# Patient Record
Sex: FEMALE | Race: WHITE | HISPANIC OR LATINO | Employment: UNEMPLOYED | URBAN - METROPOLITAN AREA
[De-identification: names, ages, dates, MRNs, and addresses within clinical notes are randomized per-mention and may not be internally consistent; named-entity substitution may affect disease eponyms.]

---

## 2018-08-02 PROBLEM — E78.00 HIGH CHOLESTEROL: Status: ACTIVE | Noted: 2018-08-02

## 2018-08-02 PROBLEM — I10 HYPERTENSION: Status: ACTIVE | Noted: 2018-08-02

## 2018-08-02 RX ORDER — ROPINIROLE 0.5 MG/1
0.5 TABLET, FILM COATED ORAL 2 TIMES DAILY
COMMUNITY
End: 2019-10-15 | Stop reason: SDUPTHER

## 2018-08-02 RX ORDER — BENAZEPRIL HYDROCHLORIDE 20 MG/1
20 TABLET ORAL DAILY
COMMUNITY
End: 2018-11-20 | Stop reason: SDUPTHER

## 2018-08-02 RX ORDER — INSULIN GLARGINE 100 [IU]/ML
48 INJECTION, SOLUTION SUBCUTANEOUS DAILY
COMMUNITY
End: 2018-08-13 | Stop reason: SDUPTHER

## 2018-08-02 RX ORDER — CYCLOBENZAPRINE HCL 5 MG
5 TABLET ORAL AS NEEDED
COMMUNITY
End: 2018-08-13 | Stop reason: SDUPTHER

## 2018-08-02 RX ORDER — ATORVASTATIN CALCIUM 10 MG/1
10 TABLET, FILM COATED ORAL DAILY
COMMUNITY
End: 2018-11-29 | Stop reason: HOSPADM

## 2018-08-02 RX ORDER — AMLODIPINE BESYLATE 5 MG/1
5 TABLET ORAL DAILY
COMMUNITY
End: 2018-11-20 | Stop reason: SDUPTHER

## 2018-08-02 RX ORDER — ASPIRIN 81 MG/1
81 TABLET ORAL DAILY
COMMUNITY

## 2018-08-02 RX ORDER — TIZANIDINE 4 MG/1
4 TABLET ORAL EVERY 8 HOURS PRN
COMMUNITY

## 2018-08-02 RX ORDER — TRAMADOL HYDROCHLORIDE 50 MG/1
50 TABLET ORAL EVERY 8 HOURS
COMMUNITY

## 2018-08-13 DIAGNOSIS — M62.838 MUSCLE SPASM: ICD-10-CM

## 2018-08-13 DIAGNOSIS — E10.9 TYPE 1 DIABETES MELLITUS WITHOUT COMPLICATION (HCC): Primary | ICD-10-CM

## 2018-08-13 DIAGNOSIS — M62.838 MUSCLE SPASM: Primary | ICD-10-CM

## 2018-08-13 RX ORDER — INSULIN GLARGINE 100 [IU]/ML
48 INJECTION, SOLUTION SUBCUTANEOUS DAILY
Qty: 10 ML | Refills: 3 | Status: SHIPPED | OUTPATIENT
Start: 2018-08-13 | End: 2018-08-17

## 2018-08-13 RX ORDER — CYCLOBENZAPRINE HCL 5 MG
5 TABLET ORAL DAILY
Qty: 30 TABLET | Refills: 0 | Status: SHIPPED | OUTPATIENT
Start: 2018-08-13 | End: 2018-10-10 | Stop reason: SDUPTHER

## 2018-08-13 RX ORDER — CYCLOBENZAPRINE HCL 5 MG
TABLET ORAL
Qty: 30 TABLET | Refills: 0 | Status: SHIPPED | OUTPATIENT
Start: 2018-08-13 | End: 2018-08-13 | Stop reason: SDUPTHER

## 2018-08-16 ENCOUNTER — TELEPHONE (OUTPATIENT)
Dept: FAMILY MEDICINE CLINIC | Facility: CLINIC | Age: 58
End: 2018-08-16

## 2018-08-16 NOTE — TELEPHONE ENCOUNTER
Notification from the pharmacy that patient's insurance will not cover the Lantus without an auth  They prefer she be changed to [place]  If this is ok, please send in  If you want her to continue Lantus, I will work on the [facility]    Auth # [id number]

## 2018-08-17 DIAGNOSIS — Z79.4 TYPE 2 DIABETES MELLITUS WITH COMPLICATION, WITH LONG-TERM CURRENT USE OF INSULIN (HCC): Primary | ICD-10-CM

## 2018-08-17 DIAGNOSIS — E11.8 TYPE 2 DIABETES MELLITUS WITH COMPLICATION, WITH LONG-TERM CURRENT USE OF INSULIN (HCC): Primary | ICD-10-CM

## 2018-09-05 ENCOUNTER — OFFICE VISIT (OUTPATIENT)
Dept: FAMILY MEDICINE CLINIC | Facility: CLINIC | Age: 58
End: 2018-09-05
Payer: COMMERCIAL

## 2018-09-05 VITALS
BODY MASS INDEX: 31.36 KG/M2 | RESPIRATION RATE: 16 BRPM | WEIGHT: 170.4 LBS | TEMPERATURE: 97.1 F | HEART RATE: 75 BPM | HEIGHT: 62 IN | DIASTOLIC BLOOD PRESSURE: 90 MMHG | SYSTOLIC BLOOD PRESSURE: 138 MMHG | OXYGEN SATURATION: 98 %

## 2018-09-05 DIAGNOSIS — E11.8 TYPE 2 DIABETES MELLITUS WITH COMPLICATION, WITH LONG-TERM CURRENT USE OF INSULIN (HCC): ICD-10-CM

## 2018-09-05 DIAGNOSIS — E78.00 HIGH CHOLESTEROL: ICD-10-CM

## 2018-09-05 DIAGNOSIS — I10 ESSENTIAL HYPERTENSION: Primary | ICD-10-CM

## 2018-09-05 DIAGNOSIS — Z79.4 TYPE 2 DIABETES MELLITUS WITH COMPLICATION, WITH LONG-TERM CURRENT USE OF INSULIN (HCC): ICD-10-CM

## 2018-09-05 DIAGNOSIS — M79.672 LEFT FOOT PAIN: ICD-10-CM

## 2018-09-05 PROBLEM — G89.29 CHRONIC BILATERAL LOW BACK PAIN WITHOUT SCIATICA: Status: ACTIVE | Noted: 2018-09-05

## 2018-09-05 PROBLEM — M54.50 CHRONIC BILATERAL LOW BACK PAIN WITHOUT SCIATICA: Status: ACTIVE | Noted: 2018-09-05

## 2018-09-05 PROCEDURE — 99214 OFFICE O/P EST MOD 30 MIN: CPT | Performed by: INTERNAL MEDICINE

## 2018-09-05 PROCEDURE — 3008F BODY MASS INDEX DOCD: CPT | Performed by: INTERNAL MEDICINE

## 2018-09-05 RX ORDER — PEN NEEDLE, DIABETIC 32GX 5/32"
NEEDLE, DISPOSABLE MISCELLANEOUS DAILY
Qty: 100 EACH | Refills: 3 | Status: SHIPPED | OUTPATIENT
Start: 2018-09-05 | End: 2019-03-07 | Stop reason: SDUPTHER

## 2018-09-05 RX ORDER — LANCETS
EACH MISCELLANEOUS
Qty: 300 EACH | Refills: 3 | Status: SHIPPED | OUTPATIENT
Start: 2018-09-05 | End: 2019-03-07 | Stop reason: SDUPTHER

## 2018-09-05 RX ORDER — PEN NEEDLE, DIABETIC 32GX 5/32"
NEEDLE, DISPOSABLE MISCELLANEOUS
Refills: 1 | COMMUNITY
Start: 2018-08-13 | End: 2018-09-05 | Stop reason: SDUPTHER

## 2018-09-05 RX ORDER — BLOOD SUGAR DIAGNOSTIC
STRIP MISCELLANEOUS
Refills: 0 | COMMUNITY
Start: 2018-08-14 | End: 2018-11-19 | Stop reason: SDUPTHER

## 2018-09-05 RX ORDER — LANCETS
EACH MISCELLANEOUS
Refills: 0 | COMMUNITY
Start: 2018-08-13 | End: 2018-09-05 | Stop reason: SDUPTHER

## 2018-09-05 NOTE — PROGRESS NOTES
Assessment/Plan:  Left foot pain for 3 weeks  Lump in the bottom of her her left mid sole  X-rays of the left foot and a podiatry consultation will be made whether it is a lipoma or a neuroma needs to be evaluated  Diabetes mellitus type 2 insulin requiring currently on basal ER insulin 48 U subcu daily metformin 500 mg 2 tablets daily  Hypertension will continue amlodipine  Chronic low back pain patient goes to Dr Estuardo Sroto for this hypercholesterolemia patient is on Lipitor 10 mg daily  Borderline liver function tests were abnormal I will repeat them again  The recent lab tests done on 06/26 2018 was reviewed and discussed with the patient  Patient reports she had a mammogram done this year  She had a colon exam done by Dr Ty Boards  Diagnoses and all orders for this visit:    Essential hypertension    High cholesterol    Type 2 diabetes mellitus with complication, with long-term current use of insulin ([address] )  -     BD PEN NEEDLE RACHEL U/F 32G X 4 MM MISC; Inject under the skin daily  -     ACCU-CHEK SOFTCLIX LANCETS lancets; Test TID    Left foot pain  -     XR foot 3+ vw left; Future  -     Ambulatory referral to Podiatry; Future    Other orders  -     ACCU-CHEK CHANDANA PLUS test strip;   -     Discontinue: BD PEN NEEDLE RACHEL U/F 32G X 4 MM MISC;   -     Discontinue: ACCU-CHEK SOFTCLIX LANCETS lancets; Subjective:      Patient ID: Ronak Reyes is a 62 y o  female      20-year-old white female is coming in for a routine follow-up visit  Complaining of pain in the left foot mainly in the bottom of her foot there is no history of trauma she has noted some lump in the bottom of her foot also no fever is reported she did not step on any nail she had a problem with her right foot before and she saw a foot doctor in [facility] and had a surgery done on the right foot at times when she puts her weight on the left foot it causes severe pain there is no weakness of the left foot        The following portions of the patient's history were reviewed and updated as appropriate: allergies, current medications, past family history, past medical history, past social history, past surgical history and problem list       Current Outpatient Prescriptions:     ACCU-CHEK CHANDANA PLUS test strip, , Disp: , Rfl: 0    ACCU-CHEK SOFTCLIX LANCETS lancets, Test TID, Disp: 300 each, Rfl: 3    amLODIPine (NORVASC) 5 mg tablet, Take 5 mg by mouth daily, Disp: , Rfl:     aspirin (ECOTRIN LOW STRENGTH) 81 mg EC tablet, Take 81 mg by mouth daily, Disp: , Rfl:     atorvastatin (LIPITOR) 10 mg tablet, Take 10 mg by mouth daily, Disp: , Rfl:     BD PEN NEEDLE RACHEL U/F 32G X 4 MM MISC, Inject under the skin daily, Disp: 100 each, Rfl: 3    benazepril (LOTENSIN) 20 mg tablet, Take 20 mg by mouth daily, Disp: , Rfl:     cyclobenzaprine (FLEXERIL) 5 mg tablet, Take 1 tablet (5 mg total) by mouth daily, Disp: 30 tablet, Rfl: 0    insulin glargine (BASAGLAR KWIKPEN) 100 units/mL injection pen, Inject 48 Units under the skin daily, Disp: 5 pen, Rfl: 2    metFORMIN (GLUCOPHAGE) 500 mg tablet, Take 500 mg by mouth 2 (two) times a day, Disp: , Rfl:     rOPINIRole (REQUIP) 0 5 mg tablet, Take 0 5 mg by mouth 2 (two) times a day  , Disp: , Rfl:     tiZANidine (ZANAFLEX) 4 mg tablet, Take 4 mg by mouth every 8 (eight) hours as needed for muscle spasms 1/2-1, Disp: , Rfl:     traMADol (ULTRAM) 50 mg tablet, Take 50 mg by mouth every 8 (eight) hours, Disp: , Rfl:     Past Medical History:   Diagnosis Date    Chronic back pain     Hypertension     Type 2 diabetes mellitus ([address] )        Past Surgical History:   Procedure Laterality Date    ANKLE SURGERY      tubal    GALLBLADDER SURGERY         Social History       Patient Active Problem List   Diagnosis    Hypertension    High cholesterol    Chronic bilateral low back pain without sciatica           Review of Systems   Constitutional: Negative  HENT: Negative  Eyes: Negative      Respiratory: Negative  Cardiovascular: Negative  Gastrointestinal: Negative  Endocrine: Negative  Genitourinary: Negative  Musculoskeletal: Negative  Skin: Negative  Allergic/Immunologic: Negative  Neurological: Negative  Hematological: Negative  Psychiatric/Behavioral: Negative  Objective:      /90   Pulse 75   Temp (!) 97 1 °F (36 2 °C) (Tympanic)   Resp 16   Ht 5' 1 5" (1 562 m)   Wt 77 3 kg (170 lb 6 4 oz)   SpO2 98%   BMI 31 68 kg/m²          Physical Exam   Constitutional: She is oriented to person, place, and time  She appears well-developed and well-nourished  HENT:   Head: Normocephalic  Mouth/Throat: Oropharynx is clear and moist    Eyes: Conjunctivae are normal  Pupils are equal, round, and reactive to light  Neck: Normal range of motion  Neck supple  Cardiovascular: Normal rate and normal heart sounds  Pulmonary/Chest: Effort normal and breath sounds normal    Abdominal: Soft  Bowel sounds are normal    Musculoskeletal: Normal range of motion  There is a lump in the bottom of her left foot near the mid sole consistent with lipoma or a fatty tumor pulses in the free time normal   Neurological: She is alert and oriented to person, place, and time  Skin: Skin is warm and dry  No visits with results within 4 Month(s) from this visit  Latest known visit with results is:   Conversion Encounter Outpatient on 06/26/2014   Component Date Value Ref Range Status    Hemoglobin A1C 06/26/2014 6 6* 4 0 - 5 6 % Final    Comment: 5 7-6 4% impaired fasting glucose  >=6 5% diagnosis of diabetes  Falsely low levels are seen in conditions linked to short RBC life span  ï¿½ hemolytic anemia, and splenomegaly  Falsely elevated levels are seen in situations where there is an  increased production of RBC ï¿½ receipt of erythropoietin or blood  transfusions  Adopted from ADA-Clinical Practice Recommendations      EAG 06/26/2014 143  mg/dL Final    Comment:  The above 2 analytes were performed by [facility]  [address], 25-Hydroxy 06/26/2014 17 3* 30 0 - 100 0 ng/mL Final    Comment: Deficiency <20ng/ml  Insufficiency 20-30ng/ml  Sufficient  ng/ml  *Patients undergoing fluorescein dye angiography may retain small  amounts of fluorescein in the body for 48-72 hours post procedure  Samples containing fluorescein can produce falsely elevated Vitamin D  values  If the patient had this procedure, a specimen should be  resubmitted post fluorescein clearance  The above 1 analytes were performed by [facility] [address] [id number]      Creatinine, Ur 06/26/2014 179 8  mg/dL Final    MICROALBUM ,U,RANDOM 06/26/2014 10  0 - 20 mg/L Final    MICROALBUMIN/CREATININE RATIO 06/26/2014 6  0 - 30 mg/g creatinine Final    Comment: The above 3 analytes were performed [facility]  [address] [facility] [address]      TSH [address] [place] [address] 06/26/2014 3 212  0 550 - 4 780 uIU/ML Final    Comment: *Patients undergoing fluorescein dye angiography may retain small  amounts of fluorescein in the body for 48-72 hours post procedure  Samples containing fluorescein can produce falsely depressed TSH   values  If the patient had this procedure, a specimen should be  resubmitted post fluorescein clearance    The recommended reference ranges for TSH during pregnancy are as  follows:  First trimester 0 1 to 2 5 uIU/mL  Second trimester  0 2 to 3 0 uIU/mL  Third trimester 0 3 to 3 0 uIU/mL  The above 1 analytes were performed by [facility]  [address] [facility] [address]      Triglycerides 06/26/2014 358  mg/dL Final    Comment: TRIGLYCERIDE:       Normal                 <150 mg/dl       Borderline High       150-199 mg/dl       High                  200-499 mg/dl       Very High             >499 mg/dl  _______________________________________      Cholesterol 06/26/2014 235  mg/dL Final    Comment: CHOLESTEROL:       Desirable <200 mg/dl       Borderline High     200-239 mg/dl       High                   >239 mg/dl  ____________________________________      HDL 06/26/2014 31  mg/dL Final    Comment: HDL:       High       >59 mg/dl       Low        <41 mg/dl  ______________________________      LDL Calculated 06/26/2014 132* 0 - 100 mg/dL Final    Comment: LDL, CALCULATED:     This screening LDL is a calculated result  It does not have the accuracy of the Direct Measured LDL in the  monitoring of patients with hyperlipidemia and/or statin therapy  Direct Measured LDL (Test Code 3126) must be ordered separately in these  patients   ______________________________  The above 4 analytes were performed by [facility]  [address] [place] [address]      Sodium 06/26/2014 144  135 - 146 mmol/L Final    Potassium 06/26/2014 4 5  3 5 - 5 3 mmol/L Final    Chloride 06/26/2014 105  98 - 108 mmol/L Final    CO2 06/26/2014 26 4  21 0 - 32 0 mmol/L Final    Anion Gap 06/26/2014 13  4 - 13 mmol/L Final    Glucose 06/26/2014 121  65 - 140 mg/dL Final    Comment: If patient is fasting, the ADA then defines impaired fasting glucose as  >100 mg/dl and diabetes as  >or equal to 126 mg/dl   BUN 06/26/2014 13  5 - 25 mg/dL Final    Calcium 06/26/2014 9 0  8 3 - 10 1 mg/dL Final    Creatinine 06/26/2014 0 81  0 60 - 1 30 mg/dL Final    Standardized to IDMS reference method    AAGFR 06/26/2014 >60  ml/min/1 73sq m Final    Comment: National Kidney Disease Education Program recommendations are as  follows:  GFR calculation is accurate only with a steady state creatinine  Chronic Kidney disease less than 60 ml/min/1 73 sq  meters  Kidney failure less than 15 ml/min/1 73 sq  meters        NAAGFR 06/26/2014 >60  ml/min/1 73sq m Final    Comment: The above 11 analytes were performed by [facility]  [address] [place] [address]      WBC 06/26/2014 7 52  4 31 - 10 16 Thousand/uL Final    RBC 06/26/2014 4 63  3 81 - 5 12 Million/uL Final    Hemoglobin 06/26/2014 13 2  11 5 - 15 4 g/dL Final    Hematocrit 06/26/2014 41 1  34 8 - 46 1 % Final    MCV 06/26/2014 89  82 - 98 fL Final    MCH 06/26/2014 28 5  26 8 - 34 3 pg Final    MCHC 06/26/2014 32 1  31 4 - 37 4 g/dL Final    RDW 06/26/2014 15 5* 11 6 - 15 1 % Final    Platelets 54/51/9963 363  149 - 390 Thousand/uL Final    MPV 06/26/2014 9 8  8 9 - 12 7 Final    Comment: The above 10 analytes were performed by [facility]  [address] [place] [address]         No results found

## 2018-10-01 ENCOUNTER — APPOINTMENT (OUTPATIENT)
Dept: RADIOLOGY | Facility: CLINIC | Age: 58
End: 2018-10-01
Payer: COMMERCIAL

## 2018-10-01 DIAGNOSIS — M79.672 LEFT FOOT PAIN: ICD-10-CM

## 2018-10-01 PROCEDURE — 73630 X-RAY EXAM OF FOOT: CPT

## 2018-10-08 NOTE — PROGRESS NOTES
Patient is going for MRI arthrogram/CT arthrogram  Patient needs to stop metformin the day of procedure and for 2 more days  Repeat BMP on day 3  Restart metformin if BMP is unchanged and okayed by the MD

## 2018-10-08 NOTE — PROGRESS NOTES
Patient is going for MRI arthrogram/CT arthrogram  Patient needs to stop metformin the day of procedure and for 2 more days  Repeat BMP on day 3  Restart metformin if BMP is unchanged and okayed by the MD also  Reduce insulinbasaglar evening before by 24 units instead of 48 units

## 2018-10-10 DIAGNOSIS — M62.838 MUSCLE SPASM: ICD-10-CM

## 2018-10-10 DIAGNOSIS — Z76.0 MEDICATION REFILL: Primary | ICD-10-CM

## 2018-10-10 RX ORDER — CYCLOBENZAPRINE HCL 5 MG
5 TABLET ORAL DAILY
Qty: 30 TABLET | Refills: 0 | Status: SHIPPED | OUTPATIENT
Start: 2018-10-10 | End: 2019-01-02 | Stop reason: SDUPTHER

## 2018-11-19 ENCOUNTER — HOSPITAL ENCOUNTER (EMERGENCY)
Facility: HOSPITAL | Age: 58
Discharge: HOME | End: 2018-11-20
Attending: EMERGENCY MEDICINE | Admitting: EMERGENCY MEDICINE
Payer: COMMERCIAL

## 2018-11-19 ENCOUNTER — APPOINTMENT (EMERGENCY)
Dept: RADIOLOGY | Facility: HOSPITAL | Age: 58
End: 2018-11-19
Payer: COMMERCIAL

## 2018-11-19 DIAGNOSIS — R07.9 CHEST PAIN: Primary | ICD-10-CM

## 2018-11-19 DIAGNOSIS — E11.9 TYPE 2 DIABETES MELLITUS WITHOUT COMPLICATION, WITHOUT LONG-TERM CURRENT USE OF INSULIN (HCC): Primary | ICD-10-CM

## 2018-11-19 LAB
ALBUMIN SERPL BCP-MCNC: 4.1 G/DL (ref 3.5–5.7)
ALP SERPL-CCNC: 117 U/L (ref 40–150)
ALT SERPL W P-5'-P-CCNC: 39 U/L (ref 7–52)
ANION GAP SERPL CALCULATED.3IONS-SCNC: 9 MMOL/L (ref 4–13)
AST SERPL W P-5'-P-CCNC: 30 U/L (ref 13–39)
BASOPHILS # BLD AUTO: 0.1 THOUSANDS/ΜL (ref 0–0.1)
BASOPHILS NFR BLD AUTO: 1 % (ref 0–2)
BILIRUB SERPL-MCNC: 0.5 MG/DL (ref 0.2–1)
BUN SERPL-MCNC: 16 MG/DL (ref 7–25)
CALCIUM SERPL-MCNC: 9.6 MG/DL (ref 8.6–10.5)
CHLORIDE SERPL-SCNC: 98 MMOL/L (ref 98–107)
CO2 SERPL-SCNC: 23 MMOL/L (ref 21–31)
CREAT SERPL-MCNC: 0.96 MG/DL (ref 0.6–1.2)
EOSINOPHIL # BLD AUTO: 0.2 THOUSAND/ΜL (ref 0–0.61)
EOSINOPHIL NFR BLD AUTO: 2 % (ref 0–5)
ERYTHROCYTE [DISTWIDTH] IN BLOOD BY AUTOMATED COUNT: 14.1 % (ref 11.5–14.5)
GFR SERPL CREATININE-BSD FRML MDRD: 65 ML/MIN/1.73SQ M
GLUCOSE SERPL-MCNC: 494 MG/DL (ref 65–99)
HCT VFR BLD AUTO: 41.7 % (ref 34.8–46.1)
HGB BLD-MCNC: 13.6 G/DL (ref 12–16)
LYMPHOCYTES # BLD AUTO: 2.6 THOUSANDS/ΜL (ref 0.6–4.47)
LYMPHOCYTES NFR BLD AUTO: 28 % (ref 21–51)
MCH RBC QN AUTO: 29.8 PG (ref 26–34)
MCHC RBC AUTO-ENTMCNC: 32.6 G/DL (ref 31–37)
MCV RBC AUTO: 91 FL (ref 81–99)
MONOCYTES # BLD AUTO: 0.5 THOUSAND/ΜL (ref 0.17–1.22)
MONOCYTES NFR BLD AUTO: 6 % (ref 2–12)
NEUTROPHILS # BLD AUTO: 5.8 THOUSANDS/ΜL (ref 1.4–6.5)
NEUTS SEG NFR BLD AUTO: 64 % (ref 42–75)
NRBC BLD AUTO-RTO: 0 /100 WBCS
PLATELET # BLD AUTO: 348 THOUSANDS/UL (ref 149–390)
PMV BLD AUTO: 7.9 FL (ref 8.6–11.7)
POTASSIUM SERPL-SCNC: 3.8 MMOL/L (ref 3.5–5.5)
PROT SERPL-MCNC: 8 G/DL (ref 6.4–8.9)
RBC # BLD AUTO: 4.56 MILLION/UL (ref 3.9–5.2)
SODIUM SERPL-SCNC: 130 MMOL/L (ref 134–143)
TROPONIN I SERPL-MCNC: <0.03 NG/ML
WBC # BLD AUTO: 9.2 THOUSAND/UL (ref 4.8–10.8)

## 2018-11-19 PROCEDURE — 84484 ASSAY OF TROPONIN QUANT: CPT | Performed by: EMERGENCY MEDICINE

## 2018-11-19 PROCEDURE — 36415 COLL VENOUS BLD VENIPUNCTURE: CPT | Performed by: EMERGENCY MEDICINE

## 2018-11-19 PROCEDURE — 93005 ELECTROCARDIOGRAM TRACING: CPT

## 2018-11-19 PROCEDURE — 71046 X-RAY EXAM CHEST 2 VIEWS: CPT

## 2018-11-19 PROCEDURE — 99285 EMERGENCY DEPT VISIT HI MDM: CPT

## 2018-11-19 PROCEDURE — 85025 COMPLETE CBC W/AUTO DIFF WBC: CPT | Performed by: EMERGENCY MEDICINE

## 2018-11-19 PROCEDURE — 80053 COMPREHEN METABOLIC PANEL: CPT | Performed by: EMERGENCY MEDICINE

## 2018-11-19 RX ORDER — 0.9 % SODIUM CHLORIDE 0.9 %
3 VIAL (ML) INJECTION AS NEEDED
Status: DISCONTINUED | OUTPATIENT
Start: 2018-11-19 | End: 2018-11-20 | Stop reason: HOSPADM

## 2018-11-19 RX ORDER — BLOOD SUGAR DIAGNOSTIC
STRIP MISCELLANEOUS
Qty: 100 EACH | Refills: 0 | Status: SHIPPED | OUTPATIENT
Start: 2018-11-19 | End: 2018-11-20 | Stop reason: SDUPTHER

## 2018-11-20 VITALS
HEIGHT: 61 IN | OXYGEN SATURATION: 99 % | DIASTOLIC BLOOD PRESSURE: 64 MMHG | RESPIRATION RATE: 18 BRPM | HEART RATE: 88 BPM | WEIGHT: 169 LBS | TEMPERATURE: 98.8 F | SYSTOLIC BLOOD PRESSURE: 150 MMHG | BODY MASS INDEX: 31.91 KG/M2

## 2018-11-20 DIAGNOSIS — E11.9 TYPE 2 DIABETES MELLITUS WITHOUT COMPLICATION, WITHOUT LONG-TERM CURRENT USE OF INSULIN (HCC): ICD-10-CM

## 2018-11-20 DIAGNOSIS — I10 ESSENTIAL HYPERTENSION: Primary | ICD-10-CM

## 2018-11-20 LAB
ATRIAL RATE: 91 BPM
P AXIS: 53 DEGREES
PR INTERVAL: 152 MS
QRS AXIS: 65 DEGREES
QRSD INTERVAL: 74 MS
QT INTERVAL: 362 MS
QTC INTERVAL: 445 MS
T WAVE AXIS: 21 DEGREES
VENTRICULAR RATE: 91 BPM

## 2018-11-20 PROCEDURE — 93010 ELECTROCARDIOGRAM REPORT: CPT | Performed by: INTERNAL MEDICINE

## 2018-11-20 PROCEDURE — 96372 THER/PROPH/DIAG INJ SC/IM: CPT

## 2018-11-20 PROCEDURE — 96374 THER/PROPH/DIAG INJ IV PUSH: CPT

## 2018-11-20 PROCEDURE — 4010F ACE/ARB THERAPY RXD/TAKEN: CPT | Performed by: INTERNAL MEDICINE

## 2018-11-20 RX ORDER — BLOOD SUGAR DIAGNOSTIC
STRIP MISCELLANEOUS
Qty: 200 EACH | Refills: 1 | Status: SHIPPED | OUTPATIENT
Start: 2018-11-20 | End: 2019-01-02 | Stop reason: SDUPTHER

## 2018-11-20 RX ORDER — BENAZEPRIL HYDROCHLORIDE 20 MG/1
20 TABLET ORAL DAILY
Qty: 90 TABLET | Refills: 1 | Status: ON HOLD | OUTPATIENT
Start: 2018-11-20 | End: 2018-11-29

## 2018-11-20 RX ORDER — AMLODIPINE BESYLATE 5 MG/1
5 TABLET ORAL DAILY
Qty: 90 TABLET | Refills: 1 | Status: SHIPPED | OUTPATIENT
Start: 2018-11-20 | End: 2019-12-18 | Stop reason: SDUPTHER

## 2018-11-20 RX ADMIN — INSULIN HUMAN 15 UNITS: 100 INJECTION, SOLUTION PARENTERAL at 00:43

## 2018-11-20 NOTE — ED PROCEDURE NOTE
PROCEDURE  ECG 12 Lead Documentation  Date/Time: 11/19/2018 11:19 PM  Performed by: Mindy Peterson  Authorized by: Castillo CABRERA     ECG reviewed by me, the ED Provider: yes    Patient location:  ED  Previous ECG:     Previous ECG:  Unavailable    Comparison to cardiac monitor: No    Interpretation:     Interpretation: normal    Rate:     ECG rate assessment: normal    Rhythm:     Rhythm: sinus rhythm    QRS:     QRS axis:  Normal    QRS intervals:  Normal  Conduction:     Conduction: normal    ST segments:     ST segments:  Normal  T waves:     T waves: normal           Barbara Rios MD  11/19/18 [id number]

## 2018-11-20 NOTE — DISCHARGE INSTRUCTIONS
Chest Pain, Ambulatory Care   GENERAL INFORMATION:   Chest pain  can be caused by a range of conditions, from not serious to life-threatening  It may be caused by a heart attack or a blood clot in your lungs  Sometimes chest pain or pressure is caused by poor blood flow to your heart (angina)  Infection, inflammation, or a fracture in the bones or cartilage in your chest can cause pain or discomfort  Chest pain can also be a symptom of a digestive problem, such as acid reflux or a stomach ulcer  Common symptoms include the following:   · Fever or sweating     · Nausea or vomiting     · Shortness of breath     · Discomfort or pressure that spreads from your chest to your back, jaw, or arm     · A racing or slow heartbeat     · Feeling weak, tired, or faint  Seek immediate care for the following symptoms:   · Any of the following signs of a heart attack:      ¨ Squeezing, pressure, or pain in your chest that lasts longer than 5 minutes or returns    ¨ Discomfort or pain in your back, neck, jaw, stomach, or arm     ¨ Trouble breathing     ¨ Nausea or vomiting    ¨ Lightheadedness or a sudden cold sweat, especially with trouble breathing         · Chest discomfort that gets worse, even with medicine    · Coughing or vomiting blood    · Black or bloody bowel movements     · Vomiting that does not stop, or pain when you swallow  Treatment for chest pain  may include medicine to treat your symptoms while he determines the cause of your chest pain  You may also need any of the following:  · Antiplatelets , such as aspirin, help prevent blood clots  Take your antiplatelet medicine exactly as directed  These medicines make it more likely for you to bleed or bruise  If you are told to take aspirin, do not take acetaminophen or ibuprofen instead  · Prescription pain medicine  may be given  Ask how to take this medicine safely  Do not smoke: If you smoke, it is never too late to quit   Smoking increases your risk for a heart attack and other heart and lung conditions  Ask your healthcare provider for information about how to stop smoking if you need help  Follow up with your healthcare provider as directed: You may need more tests  You may be referred to a specialist, such as a cardiologist or gastroenterologist  Write down your questions so you remember to ask them during your visits  CARE AGREEMENT:   You have the right to help plan your care  Learn about your health condition and how it may be treated  Discuss treatment options with your caregivers to decide what care you want to receive  You always have the right to refuse treatment  The above information is an  only  It is not intended as medical advice for individual conditions or treatments  Talk to your doctor, nurse or pharmacist before following any medical regimen to see if it is safe and effective for you  © 2014 [address] is for End User's use only and may not be sold, redistributed or otherwise used for commercial purposes  All illustrations and images included in CareNotes® are the copyrighted property of A D [employer] M [employer]  or Rajiv Valentine  Diabetic Hyperglycemia, Ambulatory Care   GENERAL INFORMATION:   Diabetic hyperglycemia  is a blood glucose (sugar) level that is higher than your healthcare provider recommends  You may not have any signs and symptoms  You may have increased thirst and urinate more often than usual   Seek immediate care for the following symptoms:   · Shortness of breath    · Breath that smells fruity    · Nausea and vomiting    · Symptoms of dehydration, such as dark yellow urine, dry mouth and lips, and dry skin  Manage diabetic hyperglycemia:   · If you take diabetes medicine or insulin, take it as directed  Missed or wrong doses can cause your blood sugar to go up  · Tell your healthcare provider if you continue to have trouble managing your blood sugar    He may change the type, amount, or timing of your diabetes medicine or insulin  If you do not take diabetes medicine or insulin, you may need to start  · Work with your healthcare provider to develop a sick day plan  Illness can cause your blood sugar to rise  A sick day plan helps you control your blood sugar level when you are sick  Prevent diabetic hyperglycemia:   · Check your blood sugar levels regularly  Ask your healthcare provider how often to check your blood sugar and what your levels should be  · Follow your meal plan  Your blood sugar can go up if you eat a large meal or you eat more carbohydrates than recommended  Work with a dietitian to develop a meal plan that is right for you  · Exercise  can help lower your blood sugar when it is high  It can also keep your blood sugar levels steady over time  Exercise for at least 30 minutes, 5 days a week  Include muscle strengthening activities 2 days each week  Work with your healthcare provider to create an exercise plan  Children should get at least 60 minutes of physical activity each day  · Check your ketones before exercise  if your blood sugar level is above 240 mg/dl  Do not exercise if you have ketones in your urine, because your blood sugar level may rise even more  Ask your healthcare provider how to lower your blood sugar when you have ketones  Follow up with your healthcare provider as directed:  Write down your questions so you remember to ask them during your visits  CARE AGREEMENT:   You have the right to help plan your care  Learn about your health condition and how it may be treated  Discuss treatment options with your caregivers to decide what care you want to receive  You always have the right to refuse treatment  The above information is an  only  It is not intended as medical advice for individual conditions or treatments   Talk to your doctor, nurse or pharmacist before following any medical regimen to see if it is safe and effective for you  © 2014 [address] is for End User's use only and may not be sold, redistributed or otherwise used for commercial purposes  All illustrations and images included in CareNotes® are the copyrighted property of A D A M [employer]  or Rajiv Valentine

## 2018-11-20 NOTE — ED PROVIDER NOTES
History  Chief Complaint   Patient presents with    Chest Pain     onset one week ago  pt  states "I just got sick of it" when asked what prompted tonight's visit   "central chest pain"     Arm Pain     left arm pain onset one week ago, "feels like it starts dangling"  denies numbness  THIS 62YEAR-OLD FEMALE WITH A HISTORY OF HYPERTENSION WHO PRESENTS TO THE EMERGENCY DEPARTMENT WITH LEFT-SIDED CHEST PAIN AS WELL AS LEFT SHOULDER PAIN  THE PATIENT DOES NOT CONNECT THE CHEST AND SHOULDER PAIN AS A RADIATING PICTURE  SHE DOES STATE THAT THE PAIN IN HER ARM SEEMS TO BE WORSE WITH MOVEMENT  SHE STATES THE PAIN IN HER CHEST SEEMS TO BE WORSE WITH DEEP INSPIRATION SHE REPORTS OCCASIONAL SHORTNESS OF BREATH WITH WALKING  SHE REPORTS OCCASIONAL WEAKNESS BUT NO SENSE OF FATIGUE THAT IS ASSOCIATED WITH THE PAIN  SHE DENIES ANY SENSE OF WARMTH OR DIAPHORESIS  SHE HAS HAD AN OCCASIONAL ONGOING COUGH  Prior to Admission Medications   Prescriptions Last Dose Informant Patient Reported? Taking? ACCU-CHEK CHANDANA PLUS test strip 11/19/2018 at Unknown time  No Yes   Sig: USE TO TEST SUGARS 7 TIMES DAILY   E11 9   ACCU-CHEK SOFTCLIX LANCETS lancets 11/19/2018 at Unknown time  No Yes   Sig: Test TID   BD PEN NEEDLE RACHEL U/F 32G X 4 MM MISC 11/19/2018 at Unknown time  No Yes   Sig: Inject under the skin daily   amLODIPine (NORVASC) 5 mg tablet 11/19/2018 at Unknown time  Yes Yes   Sig: Take 5 mg by mouth daily   aspirin (ECOTRIN LOW STRENGTH) 81 mg EC tablet 11/19/2018 at Unknown time  Yes Yes   Sig: Take 81 mg by mouth daily   atorvastatin (LIPITOR) 10 mg tablet 11/19/2018 at Unknown time  Yes Yes   Sig: Take 10 mg by mouth daily   benazepril (LOTENSIN) 20 mg tablet 11/19/2018 at Unknown time  Yes Yes   Sig: Take 20 mg by mouth daily   cyclobenzaprine (FLEXERIL) 5 mg tablet 11/19/2018 at Unknown time  No Yes   Sig: Take 1 tablet (5 mg total) by mouth daily   insulin glargine (BASAGLAR KWIKPEN) 100 units/mL injection pen 11/19/2018 at Unknown time  No Yes   Sig: Inject 48 Units under the skin daily   metFORMIN (GLUCOPHAGE) 500 mg tablet 11/19/2018 at Unknown time  No Yes   Sig: Take 1 tablet (500 mg total) by mouth 2 (two) times a day   rOPINIRole (REQUIP) 0 5 mg tablet 11/19/2018 at Unknown time  Yes Yes   Sig: Take 0 5 mg by mouth 2 (two) times a day     tiZANidine (ZANAFLEX) 4 mg tablet 11/19/2018 at Unknown time  Yes Yes   Sig: Take 4 mg by mouth every 8 (eight) hours as needed for muscle spasms 1/2-1   traMADol (ULTRAM) 50 mg tablet 11/19/2018 at Unknown time  Yes Yes   Sig: Take 50 mg by mouth every 8 (eight) hours      Facility-Administered Medications: None       Past Medical History:   Diagnosis Date    Chronic back pain     Hyperlipidemia     Hypertension     Restless leg syndrome     Type 2 diabetes mellitus (HCC)        Past Surgical History:   Procedure Laterality Date    ANKLE SURGERY      tubal    CHOLECYSTECTOMY      GALLBLADDER SURGERY      TUBAL LIGATION         Family History   Problem Relation Age of Onset    Breast cancer Mother     Diabetes Mother     Heart failure Mother     Heart disease Father     Stroke Father      I have reviewed and agree with the history as documented  Social History   Substance Use Topics    Smoking status: Never Smoker    Smokeless tobacco: Never Used    Alcohol use Yes      Comment: occasionally        Review of Systems   Constitutional: Negative for chills and fever  HENT: Negative for ear pain, rhinorrhea and sore throat  Eyes: Negative for pain, redness and visual disturbance  Respiratory: Negative for cough and shortness of breath  THERE IS TENDERNESS OVER THE LEFT ANTERIOR CHEST JUST MEDIAL TO THE SHOULDER AS WELL AS TENDERNESS AT THE SHOULDER THERE IS INCREASED PAIN WITH SHOULDER MOVEMENT AS WELL AS DEEP INSPIRATION  Cardiovascular: Negative for chest pain and leg swelling     Gastrointestinal: Negative for abdominal pain, diarrhea, nausea and vomiting  Genitourinary: Negative for dysuria, flank pain, frequency and urgency  Musculoskeletal: Negative for back pain, myalgias and neck pain  Skin: Negative for rash  Neurological: Negative for dizziness, weakness, light-headedness and headaches  Hematological: Negative  Psychiatric/Behavioral: Negative for agitation, confusion and suicidal ideas  The patient is not nervous/anxious  All other systems reviewed and are negative  Physical Exam  Physical Exam    Vital Signs  ED Triage Vitals [11/19/18 2323]   Temperature Pulse Respirations Blood Pressure SpO2   98 8 °F (37 1 °C) 100 18 (!) 203/114 96 %      Temp Source Heart Rate Source Patient Position - Orthostatic VS BP Location FiO2 (%)   Temporal -- Lying Right arm --      Pain Score       9           Vitals:    11/19/18 2323   BP: (!) 203/114   Pulse: 100   Patient Position - Orthostatic VS: Lying       Visual Acuity      ED Medications  Medications   sodium chloride (PF) 0 9 % injection 3 mL (not administered)       Diagnostic Studies  Results Reviewed     Procedure Component Value Units Date/Time    Comprehensive metabolic panel [[id number]]  (Abnormal) Collected:  11/19/18 2329    Lab Status:  Final result Specimen:  Blood from Arm, Left Updated:  11/19/18 2352     Sodium 130 (L) mmol/L      Potassium 3 8 mmol/L      Chloride 98 mmol/L      CO2 23 mmol/L      ANION GAP 9 mmol/L      BUN 16 mg/dL      Creatinine 0 96 mg/dL      Glucose 494 (H) mg/dL      Calcium 9 6 mg/dL      AST 30 U/L      ALT 39 U/L      Alkaline Phosphatase 117 U/L      Total Protein 8 0 g/dL      Albumin 4 1 g/dL      Total Bilirubin 0 50 mg/dL      eGFR 65 ml/min/1 73sq m     Narrative:         National Kidney Disease Education Program recommendations are as follows:  GFR calculation is accurate only with a steady state creatinine  Chronic Kidney disease less than 60 ml/min/1 73 sq  meters  Kidney failure less than 15 ml/min/1 73 sq  meters  CBC and differential [[id number]]  (Abnormal) Collected:  11/19/18 2329    Lab Status:  Final result Specimen:  Blood from Arm, Left Updated:  11/19/18 2337     WBC 9 20 Thousand/uL      RBC 4 56 Million/uL      Hemoglobin 13 6 g/dL      Hematocrit 41 7 %      MCV 91 fL      MCH 29 8 pg      MCHC 32 6 g/dL      RDW 14 1 %      MPV 7 9 (L) fL      Platelets 400 Thousands/uL      nRBC 0 /100 WBCs      Neutrophils Relative 64 %      Lymphocytes Relative 28 %      Monocytes Relative 6 %      Eosinophils Relative 2 %      Basophils Relative 1 %      Neutrophils Absolute 5 80 Thousands/µL      Lymphocytes Absolute 2 60 Thousands/µL      Monocytes Absolute 0 50 Thousand/µL      Eosinophils Absolute 0 20 Thousand/µL      Basophils Absolute 0 10 Thousands/µL     Troponin I [[id number]] Collected:  11/19/18 2329    Lab Status: In process Specimen:  Blood from Arm, Left Updated:  11/19/18 2333                 X-ray chest 2 views    (Results Pending)              Procedures  Procedures       Phone Contacts  ED Phone Contact    ED Course                               MDM  CritCare Time    Disposition  Final diagnoses:   None     ED Disposition     None      Follow-up Information    None         Patient's Medications   Discharge Prescriptions    No medications on file     No discharge procedures on file      ED Provider  Electronically Signed by           Sandhya Woodall MD  11/20/18 2273

## 2018-11-21 ENCOUNTER — OFFICE VISIT (OUTPATIENT)
Dept: FAMILY MEDICINE CLINIC | Facility: CLINIC | Age: 58
End: 2018-11-21
Payer: COMMERCIAL

## 2018-11-21 VITALS
SYSTOLIC BLOOD PRESSURE: 168 MMHG | RESPIRATION RATE: 14 BRPM | HEIGHT: 61 IN | WEIGHT: 168 LBS | TEMPERATURE: 99 F | OXYGEN SATURATION: 100 % | HEART RATE: 96 BPM | BODY MASS INDEX: 31.72 KG/M2 | DIASTOLIC BLOOD PRESSURE: 90 MMHG

## 2018-11-21 DIAGNOSIS — M54.50 CHRONIC BILATERAL LOW BACK PAIN WITHOUT SCIATICA: ICD-10-CM

## 2018-11-21 DIAGNOSIS — G89.29 CHRONIC BILATERAL LOW BACK PAIN WITHOUT SCIATICA: ICD-10-CM

## 2018-11-21 DIAGNOSIS — I10 ESSENTIAL HYPERTENSION: ICD-10-CM

## 2018-11-21 DIAGNOSIS — E78.00 HIGH CHOLESTEROL: ICD-10-CM

## 2018-11-21 DIAGNOSIS — R07.9 CHEST PAIN, UNSPECIFIED TYPE: Primary | ICD-10-CM

## 2018-11-21 PROCEDURE — 99214 OFFICE O/P EST MOD 30 MIN: CPT | Performed by: INTERNAL MEDICINE

## 2018-11-21 PROCEDURE — 3008F BODY MASS INDEX DOCD: CPT | Performed by: INTERNAL MEDICINE

## 2018-11-21 NOTE — PROGRESS NOTES
Assessment/Plan:  Chest pain nonspecific does not appear to be cardiac at this point  EKG was done in my office which shows normal sinus rhythm rate of 98 no her acute MI or ischemic changes were detected  Hypertension not well controlled  Advised her to increase the dose of beta benazepril to 40 mg daily  Insulin-dependent diabetes mellitus  Patient is on 48 units of basal lot at night and metformin the morning  Hypercholesterolemia patient is on Lotensin and preventive aspirin therapy  Will obtain a D-dimer test CT scan of the chest and a repeat troponin level  If these are negative then she will was advised to see a cardiologist she may need a stress test done  If she gets a dye in the CT scan she was told to stop metformin for for 2 days  And increase the dose of insulin by 5 more units for 2 days      Diagnoses and all orders for this visit:    Chest pain, unspecified type  -     CT chest w contrast; Future  -     D-dimer, quantitative; Future  -     Troponin I; Future    Chronic bilateral low back pain without sciatica    Essential hypertension    High cholesterol        Subjective:      Patient ID: Obey Schneider is a 62 y o  female      HPI 6year-old white female is coming in for a follow-up visit with chest pain mainly localized in the behind the sternum and it has been going on for 3 days is some mild dull kind of pain it does not radiate into the neck it does not radiate into the arms there is no shortness of breathing no nausea vomiting no sweating is reported she went for the same problem 2 [facility] on 11/19/2018 had a EKG done which was unremarkable and normal troponin level was normal and she will x-ray was unremarkable patient says she was sent home but her blood pressures have been running little bit high and she has continuous chest pain since then for the past 3 days she denies any heavy physical activity no fever no sputum production no leg pain is reported patient is a nonsmoker she has history of diabetes mellitus and hypertension hypercholesterolemia and chronic low back pain syndrome for which she goes to a pain control  she has no fever no cough no sputum production high blood sugar this morning was 178 in her home    The following portions of the patient's history were reviewed and updated as appropriate: allergies, current medications, past family history, past medical history, past social history, past surgical history and problem list       Current Outpatient Prescriptions:     ACCU-CHEK CHANDANA PLUS test strip, USE TO TEST SUGARS 7 TIMES DAILY   E11 9, Disp: 200 each, Rfl: 1    ACCU-CHEK SOFTCLIX LANCETS lancets, Test TID, Disp: 300 each, Rfl: 3    amLODIPine (NORVASC) 5 mg tablet, Take 1 tablet (5 mg total) by mouth daily, Disp: 90 tablet, Rfl: 1    aspirin (ECOTRIN LOW STRENGTH) 81 mg EC tablet, Take 81 mg by mouth daily, Disp: , Rfl:     atorvastatin (LIPITOR) 10 mg tablet, Take 10 mg by mouth daily, Disp: , Rfl:     BD PEN NEEDLE RACHEL U/F 32G X 4 MM MISC, Inject under the skin daily, Disp: 100 each, Rfl: 3    benazepril (LOTENSIN) 20 mg tablet, Take 1 tablet (20 mg total) by mouth daily, Disp: 90 tablet, Rfl: 1    cyclobenzaprine (FLEXERIL) 5 mg tablet, Take 1 tablet (5 mg total) by mouth daily, Disp: 30 tablet, Rfl: 0    insulin glargine (BASAGLAR KWIKPEN) 100 units/mL injection pen, Inject 48 Units under the skin daily, Disp: 5 pen, Rfl: 2    metFORMIN (GLUCOPHAGE) 500 mg tablet, Take 1 tablet (500 mg total) by mouth 2 (two) times a day, Disp: 180 tablet, Rfl: 2    rOPINIRole (REQUIP) 0 5 mg tablet, Take 0 5 mg by mouth 2 (two) times a day  , Disp: , Rfl:     tiZANidine (ZANAFLEX) 4 mg tablet, Take 4 mg by mouth every 8 (eight) hours as needed for muscle spasms 1/2-1, Disp: , Rfl:     traMADol (ULTRAM) 50 mg tablet, Take 50 mg by mouth every 8 (eight) hours, Disp: , Rfl:     Past Medical History:   Diagnosis Date    Chronic back pain     Hyperlipidemia     Hypertension     Restless leg syndrome     Type 2 diabetes mellitus ([address] )        Past Surgical History:   Procedure Laterality Date    ANKLE SURGERY      tubal    CHOLECYSTECTOMY      GALLBLADDER SURGERY      TUBAL LIGATION         Social History       Patient Active Problem List   Diagnosis    Hypertension    High cholesterol    Chronic bilateral low back pain without sciatica           Review of Systems      Objective:      /90 (BP Location: Left arm, Patient Position: Sitting, Cuff Size: Adult)   Pulse 96   Temp 99 °F (37 2 °C) (Tympanic)   Resp 14   Ht 5' 1" (1 549 m)   Wt 76 2 kg (168 lb)   SpO2 100%   BMI 31 74 kg/m²          Physical Exam   Constitutional: She is oriented to person, place, and time  She appears well-developed and well-nourished  HENT:   Head: Normocephalic  Mouth/Throat: Oropharynx is clear and moist    Eyes: Pupils are equal, round, and reactive to light  Conjunctivae are normal    Neck: Normal range of motion  Neck supple  Cardiovascular: Normal rate and normal heart sounds  No murmur no pericardial rub   Pulmonary/Chest: Effort normal and breath sounds normal    Abdominal: Soft  Bowel sounds are normal    Musculoskeletal: Normal range of motion  Neurological: She is alert and oriented to person, place, and time  Skin: Skin is warm and dry             Admission on 11/19/2018, Discharged on 11/20/2018   Component Date Value Ref Range Status    WBC 11/19/2018 9 20  4 80 - 10 80 Thousand/uL Final    RBC 11/19/2018 4 56  3 90 - 5 20 Million/uL Final    Hemoglobin 11/19/2018 13 6  12 0 - 16 0 g/dL Final    Hematocrit 11/19/2018 41 7  34 8 - 46 1 % Final    MCV 11/19/2018 91  81 - 99 fL Final    MCH 11/19/2018 29 8  26 0 - 34 0 pg Final    MCHC 11/19/2018 32 6  31 0 - 37 0 g/dL Final    RDW 11/19/2018 14 1  11 5 - 14 5 % Final    MPV 11/19/2018 7 9* 8 6 - 11 7 fL Final    Platelets 11/25/3270 348  149 - 390 Thousands/uL Final    nRBC 11/19/2018 0  /100 WBCs Final    Neutrophils Relative 11/19/2018 64  42 - 75 % Final    Lymphocytes Relative 11/19/2018 28  21 - 51 % Final    Monocytes Relative 11/19/2018 6  2 - 12 % Final    Eosinophils Relative 11/19/2018 2  0 - 5 % Final    Basophils Relative 11/19/2018 1  0 - 2 % Final    Neutrophils Absolute 11/19/2018 5 80  1 40 - 6 50 Thousands/µL Final    Lymphocytes Absolute 11/19/2018 2 60  0 60 - 4 47 Thousands/µL Final    Monocytes Absolute 11/19/2018 0 50  0 17 - 1 22 Thousand/µL Final    Eosinophils Absolute 11/19/2018 0 20  0 00 - 0 61 Thousand/µL Final    Basophils Absolute 11/19/2018 0 10  0 00 - 0 10 Thousands/µL Final    Sodium 11/19/2018 130* 134 - 143 mmol/L Final    Potassium 11/19/2018 3 8  3 5 - 5 5 mmol/L Final    Chloride 11/19/2018 98  98 - 107 mmol/L Final    CO2 11/19/2018 23  21 - 31 mmol/L Final    ANION GAP 11/19/2018 9  4 - 13 mmol/L Final    BUN 11/19/2018 16  7 - 25 mg/dL Final    Creatinine 11/19/2018 0 96  0 60 - 1 20 mg/dL Final    Standardized to IDMS reference method    Glucose 11/19/2018 494* 65 - 99 mg/dL Final      If the patient is fasting, the ADA then defines impaired fasting glucose as > 100 mg/dL and diabetes as > or equal to 123 mg/dL  Specimen collection should occur prior to Sulfasalazine administration due to the potential for falsely depressed results  Specimen collection should occur prior to Sulfapyridine administration due to the potential for falsely elevated results   Calcium 11/19/2018 9 6  8 6 - 10 5 mg/dL Final    AST 11/19/2018 30  13 - 39 U/L Final      Specimen collection should occur prior to Sulfasalazine administration due to the potential for falsely depressed results   ALT 11/19/2018 39  7 - 52 U/L Final      Specimen collection should occur prior to Sulfasalazine administration due to the potential for falsely depressed results       Alkaline Phosphatase 11/19/2018 117  40 - 150 U/L Final    Total Protein 11/19/2018 8 0  6 4 - 8 9 g/dL Final    Albumin 11/19/2018 4 1  3 5 - 5 7 g/dL Final    Total Bilirubin 11/19/2018 0 50  0 20 - 1 00 mg/dL Final    eGFR 11/19/2018 65  ml/min/1 73sq m Final    Troponin I 11/19/2018 <0 03  <=0 03 ng/mL Final    Ventricular Rate 11/19/2018 91  BPM Final    Atrial Rate 11/19/2018 91  BPM Final    CO Interval 11/19/2018 152  ms Final    QRSD Interval 11/19/2018 74  ms Final    QT Interval 11/19/2018 362  ms Final    QTC Interval 11/19/2018 445  ms Final    P Axis 11/19/2018 53  degrees Final    QRS Axis 11/19/2018 65  degrees Final    T Wave Axis 11/19/2018 21  degrees Final       Procedure: Xr Foot 3+ Vw Left    Result Date: 10/1/2018  Narrative: LEFT FOOT INDICATION:   [id number]: Pain in left foot  COMPARISON:  None VIEWS:  XR FOOT 3+ VW LEFT Images: 3 FINDINGS: There is no acute fracture or dislocation  Bipartite medial sesamoid of the 1st ray, considered a normal variant  Mild narrowing of the 1st metatarsophalangeal joint space is seen  Subchondral cyst noted in the medial aspect of the head of the 1st metatarsal  No lytic or blastic lesions seen  Soft tissues are unremarkable  Impression: No acute osseous abnormality  Degenerative changes, 1st metatarsophalangeal joint   Workstation performed: [id number]

## 2018-11-26 ENCOUNTER — APPOINTMENT (OUTPATIENT)
Dept: ULTRASOUND IMAGING | Facility: HOSPITAL | Age: 58
End: 2018-11-26
Payer: COMMERCIAL

## 2018-11-26 ENCOUNTER — APPOINTMENT (EMERGENCY)
Dept: RADIOLOGY | Facility: HOSPITAL | Age: 58
End: 2018-11-26
Payer: COMMERCIAL

## 2018-11-26 ENCOUNTER — APPOINTMENT (EMERGENCY)
Dept: CT IMAGING | Facility: HOSPITAL | Age: 58
End: 2018-11-26
Payer: COMMERCIAL

## 2018-11-26 ENCOUNTER — HOSPITAL ENCOUNTER (OUTPATIENT)
Facility: HOSPITAL | Age: 58
Setting detail: OBSERVATION
End: 2018-11-27
Attending: EMERGENCY MEDICINE | Admitting: INTERNAL MEDICINE
Payer: COMMERCIAL

## 2018-11-26 DIAGNOSIS — R07.9 ACUTE CHEST PAIN: Primary | ICD-10-CM

## 2018-11-26 DIAGNOSIS — I10 UNCONTROLLED HYPERTENSION: ICD-10-CM

## 2018-11-26 PROBLEM — G25.81 RESTLESS LEG SYNDROME: Chronic | Status: ACTIVE | Noted: 2018-11-26

## 2018-11-26 PROBLEM — R07.89 OTHER CHEST PAIN: Status: ACTIVE | Noted: 2018-11-26

## 2018-11-26 LAB
ALBUMIN SERPL BCP-MCNC: 4.5 G/DL (ref 3.5–5.7)
ALP SERPL-CCNC: 106 U/L (ref 40–150)
ALT SERPL W P-5'-P-CCNC: 54 U/L (ref 7–52)
ANION GAP SERPL CALCULATED.3IONS-SCNC: 14 MMOL/L (ref 4–13)
APTT PPP: 39 SECONDS (ref 26–38)
AST SERPL W P-5'-P-CCNC: 57 U/L (ref 13–39)
ATRIAL RATE: 121 BPM
BASOPHILS # BLD AUTO: 0.1 THOUSANDS/ΜL (ref 0–0.1)
BASOPHILS NFR BLD AUTO: 1 % (ref 0–2)
BILIRUB SERPL-MCNC: 0.4 MG/DL (ref 0.2–1)
BUN SERPL-MCNC: 10 MG/DL (ref 7–25)
CALCIUM SERPL-MCNC: 10 MG/DL (ref 8.6–10.5)
CHLORIDE SERPL-SCNC: 100 MMOL/L (ref 98–107)
CO2 SERPL-SCNC: 23 MMOL/L (ref 21–31)
CREAT SERPL-MCNC: 0.86 MG/DL (ref 0.6–1.2)
DEPRECATED D DIMER PPP: 402 NG/ML (FEU)
EOSINOPHIL # BLD AUTO: 0.1 THOUSAND/ΜL (ref 0–0.61)
EOSINOPHIL NFR BLD AUTO: 1 % (ref 0–5)
ERYTHROCYTE [DISTWIDTH] IN BLOOD BY AUTOMATED COUNT: 14.4 % (ref 11.5–14.5)
GFR SERPL CREATININE-BSD FRML MDRD: 75 ML/MIN/1.73SQ M
GLUCOSE SERPL-MCNC: 263 MG/DL (ref 65–140)
GLUCOSE SERPL-MCNC: 276 MG/DL (ref 65–99)
GLUCOSE SERPL-MCNC: 307 MG/DL (ref 65–140)
HCT VFR BLD AUTO: 44.7 % (ref 34.8–46.1)
HGB BLD-MCNC: 14.7 G/DL (ref 12–16)
INR PPP: 1.02 (ref 0.9–1.5)
LYMPHOCYTES # BLD AUTO: 3.5 THOUSANDS/ΜL (ref 0.6–4.47)
LYMPHOCYTES NFR BLD AUTO: 37 % (ref 21–51)
MCH RBC QN AUTO: 30.2 PG (ref 26–34)
MCHC RBC AUTO-ENTMCNC: 33 G/DL (ref 31–37)
MCV RBC AUTO: 91 FL (ref 81–99)
MONOCYTES # BLD AUTO: 0.5 THOUSAND/ΜL (ref 0.17–1.22)
MONOCYTES NFR BLD AUTO: 5 % (ref 2–12)
NEUTROPHILS # BLD AUTO: 5.3 THOUSANDS/ΜL (ref 1.4–6.5)
NEUTS SEG NFR BLD AUTO: 56 % (ref 42–75)
NRBC BLD AUTO-RTO: 0 /100 WBCS
P AXIS: 47 DEGREES
PLATELET # BLD AUTO: 407 THOUSANDS/UL (ref 149–390)
PMV BLD AUTO: 7.5 FL (ref 8.6–11.7)
POTASSIUM SERPL-SCNC: 3.6 MMOL/L (ref 3.5–5.5)
PR INTERVAL: 146 MS
PROT SERPL-MCNC: 8.3 G/DL (ref 6.4–8.9)
PROTHROMBIN TIME: 11.8 SECONDS (ref 10.2–13)
QRS AXIS: 64 DEGREES
QRSD INTERVAL: 78 MS
QT INTERVAL: 340 MS
QTC INTERVAL: 482 MS
RBC # BLD AUTO: 4.88 MILLION/UL (ref 3.9–5.2)
SODIUM SERPL-SCNC: 137 MMOL/L (ref 134–143)
T WAVE AXIS: 28 DEGREES
TROPONIN I SERPL-MCNC: 0.04 NG/ML
TROPONIN I SERPL-MCNC: 0.07 NG/ML
TROPONIN I SERPL-MCNC: 0.23 NG/ML
VENTRICULAR RATE: 121 BPM
WBC # BLD AUTO: 9.6 THOUSAND/UL (ref 4.8–10.8)

## 2018-11-26 PROCEDURE — 93010 ELECTROCARDIOGRAM REPORT: CPT | Performed by: INTERNAL MEDICINE

## 2018-11-26 PROCEDURE — 85379 FIBRIN DEGRADATION QUANT: CPT | Performed by: EMERGENCY MEDICINE

## 2018-11-26 PROCEDURE — 71275 CT ANGIOGRAPHY CHEST: CPT

## 2018-11-26 PROCEDURE — 76705 ECHO EXAM OF ABDOMEN: CPT

## 2018-11-26 PROCEDURE — 96375 TX/PRO/DX INJ NEW DRUG ADDON: CPT

## 2018-11-26 PROCEDURE — 84484 ASSAY OF TROPONIN QUANT: CPT | Performed by: PHYSICIAN ASSISTANT

## 2018-11-26 PROCEDURE — 99285 EMERGENCY DEPT VISIT HI MDM: CPT

## 2018-11-26 PROCEDURE — 85610 PROTHROMBIN TIME: CPT | Performed by: EMERGENCY MEDICINE

## 2018-11-26 PROCEDURE — 36415 COLL VENOUS BLD VENIPUNCTURE: CPT | Performed by: EMERGENCY MEDICINE

## 2018-11-26 PROCEDURE — 84484 ASSAY OF TROPONIN QUANT: CPT | Performed by: EMERGENCY MEDICINE

## 2018-11-26 PROCEDURE — 80053 COMPREHEN METABOLIC PANEL: CPT | Performed by: EMERGENCY MEDICINE

## 2018-11-26 PROCEDURE — 96374 THER/PROPH/DIAG INJ IV PUSH: CPT

## 2018-11-26 PROCEDURE — 94760 N-INVAS EAR/PLS OXIMETRY 1: CPT

## 2018-11-26 PROCEDURE — 93005 ELECTROCARDIOGRAM TRACING: CPT

## 2018-11-26 PROCEDURE — 96361 HYDRATE IV INFUSION ADD-ON: CPT

## 2018-11-26 PROCEDURE — 82948 REAGENT STRIP/BLOOD GLUCOSE: CPT

## 2018-11-26 PROCEDURE — 85025 COMPLETE CBC W/AUTO DIFF WBC: CPT | Performed by: EMERGENCY MEDICINE

## 2018-11-26 PROCEDURE — 85730 THROMBOPLASTIN TIME PARTIAL: CPT | Performed by: EMERGENCY MEDICINE

## 2018-11-26 PROCEDURE — 71045 X-RAY EXAM CHEST 1 VIEW: CPT

## 2018-11-26 RX ORDER — CYCLOBENZAPRINE HCL 10 MG
5 TABLET ORAL 3 TIMES DAILY PRN
Status: DISCONTINUED | OUTPATIENT
Start: 2018-11-26 | End: 2018-11-27 | Stop reason: HOSPADM

## 2018-11-26 RX ORDER — TIZANIDINE 4 MG/1
4 TABLET ORAL
Status: DISCONTINUED | OUTPATIENT
Start: 2018-11-26 | End: 2018-11-27 | Stop reason: HOSPADM

## 2018-11-26 RX ORDER — NITROGLYCERIN 0.4 MG/1
0.4 TABLET SUBLINGUAL
Status: DISCONTINUED | OUTPATIENT
Start: 2018-11-26 | End: 2018-11-27 | Stop reason: HOSPADM

## 2018-11-26 RX ORDER — ASPIRIN 81 MG/1
81 TABLET ORAL DAILY
Status: DISCONTINUED | OUTPATIENT
Start: 2018-11-27 | End: 2018-11-27 | Stop reason: HOSPADM

## 2018-11-26 RX ORDER — ATORVASTATIN CALCIUM 10 MG/1
10 TABLET, FILM COATED ORAL DAILY
Status: DISCONTINUED | OUTPATIENT
Start: 2018-11-27 | End: 2018-11-27

## 2018-11-26 RX ORDER — ONDANSETRON 2 MG/ML
4 INJECTION INTRAMUSCULAR; INTRAVENOUS EVERY 6 HOURS PRN
Status: DISCONTINUED | OUTPATIENT
Start: 2018-11-26 | End: 2018-11-27 | Stop reason: HOSPADM

## 2018-11-26 RX ORDER — AMLODIPINE BESYLATE 5 MG/1
5 TABLET ORAL DAILY
Status: DISCONTINUED | OUTPATIENT
Start: 2018-11-27 | End: 2018-11-27 | Stop reason: HOSPADM

## 2018-11-26 RX ORDER — ASPIRIN 81 MG/1
162 TABLET, CHEWABLE ORAL ONCE
Status: COMPLETED | OUTPATIENT
Start: 2018-11-26 | End: 2018-11-26

## 2018-11-26 RX ORDER — ACETAMINOPHEN 325 MG/1
650 TABLET ORAL EVERY 4 HOURS PRN
Status: DISCONTINUED | OUTPATIENT
Start: 2018-11-26 | End: 2018-11-27 | Stop reason: HOSPADM

## 2018-11-26 RX ORDER — NITROGLYCERIN 0.4 MG/1
0.4 TABLET SUBLINGUAL
Status: DISCONTINUED | OUTPATIENT
Start: 2018-11-26 | End: 2018-11-26

## 2018-11-26 RX ORDER — LISINOPRIL 20 MG/1
40 TABLET ORAL DAILY
Status: DISCONTINUED | OUTPATIENT
Start: 2018-11-27 | End: 2018-11-27 | Stop reason: HOSPADM

## 2018-11-26 RX ORDER — TRAMADOL HYDROCHLORIDE 50 MG/1
50 TABLET ORAL EVERY 8 HOURS
Status: DISCONTINUED | OUTPATIENT
Start: 2018-11-26 | End: 2018-11-27 | Stop reason: HOSPADM

## 2018-11-26 RX ORDER — LABETALOL HYDROCHLORIDE 5 MG/ML
20 INJECTION, SOLUTION INTRAVENOUS ONCE
Status: COMPLETED | OUTPATIENT
Start: 2018-11-26 | End: 2018-11-26

## 2018-11-26 RX ADMIN — INSULIN LISPRO 8 UNITS: 100 INJECTION, SOLUTION INTRAVENOUS; SUBCUTANEOUS at 18:42

## 2018-11-26 RX ADMIN — ROPINIROLE 1.5 MG: 1 TABLET, FILM COATED ORAL at 22:04

## 2018-11-26 RX ADMIN — LABETALOL 20 MG/4 ML (5 MG/ML) INTRAVENOUS SYRINGE 20 MG: at 15:06

## 2018-11-26 RX ADMIN — INSULIN LISPRO 3 UNITS: 100 INJECTION, SOLUTION INTRAVENOUS; SUBCUTANEOUS at 22:07

## 2018-11-26 RX ADMIN — SODIUM CHLORIDE 1000 ML: 0.9 INJECTION, SOLUTION INTRAVENOUS at 15:43

## 2018-11-26 RX ADMIN — ACETAMINOPHEN 650 MG: 325 TABLET ORAL at 20:32

## 2018-11-26 RX ADMIN — NITROGLYCERIN 0.4 MG: 0.4 TABLET SUBLINGUAL at 15:11

## 2018-11-26 RX ADMIN — MORPHINE SULFATE 2 MG: 2 INJECTION, SOLUTION INTRAMUSCULAR; INTRAVENOUS at 16:40

## 2018-11-26 RX ADMIN — INSULIN DETEMIR 52 UNITS: 100 INJECTION, SOLUTION SUBCUTANEOUS at 22:03

## 2018-11-26 RX ADMIN — ASPIRIN 81 MG 162 MG: 81 TABLET ORAL at 15:06

## 2018-11-26 RX ADMIN — TRAMADOL HYDROCHLORIDE 50 MG: 50 TABLET, COATED ORAL at 18:43

## 2018-11-26 RX ADMIN — NITROGLYCERIN 1 INCH: 20 OINTMENT TOPICAL at 16:43

## 2018-11-26 RX ADMIN — TIZANIDINE 4 MG: 4 TABLET ORAL at 22:04

## 2018-11-26 RX ADMIN — IOHEXOL 80 ML: 350 INJECTION, SOLUTION INTRAVENOUS at 16:24

## 2018-11-26 NOTE — PLAN OF CARE
Problem: PAIN - ADULT  Goal: Verbalizes/displays adequate comfort level or baseline comfort level  Interventions:  - Encourage patient to monitor pain and request assistance  - Assess pain using appropriate pain scale  - Administer analgesics based on type and severity of pain and evaluate response  - Implement non-pharmacological measures as appropriate and evaluate response  - Consider cultural and social influences on pain and pain management  - Notify physician/advanced practitioner if interventions unsuccessful or patient reports new pain   Outcome: Progressing      Comments: Patient states no pain @ this time

## 2018-11-26 NOTE — RESPIRATORY THERAPY NOTE
RT Protocol Note  Casie Garcia 62 y o  female MRN: [id number]  Unit/Bed#: -02 Encounter: [id number]    Assessment    Principal Problem:    Other chest pain  Active Problems:    Accelerated hypertension    High cholesterol    Type 2 diabetes mellitus without complication, with long-term current use of insulin (HCC)    Restless leg syndrome      Home Pulmonary Medications:  NONE  Home Devices/Therapy:  (None)    Past Medical History:   Diagnosis Date    Chronic back pain     Hyperlipidemia     Hypertension     Restless leg syndrome     Type 2 diabetes mellitus ([address] )      Social History     Social History    Marital status: /Civil Union     Spouse name: N/A    Number of children: N/A    Years of education: N/A     Social History Main Topics    Smoking status: Never Smoker    Smokeless tobacco: Never Used    Alcohol use Yes      Comment: occasionally    Drug use: No    Sexual activity: Not Asked     Other Topics Concern    None     Social History Narrative    None       Subjective         Objective    Physical Exam:   Assessment Type: Assess only  General Appearance: Alert, Awake  Respiratory Pattern: Normal  Chest Assessment: Chest expansion symmetrical  Bilateral Breath Sounds: Clear    Vitals:  Blood pressure 165/97, pulse 80, temperature 97 6 °F (36 4 °C), temperature source Temporal, resp  rate 18, height 5' 1" (1 549 m), weight 77 4 kg (170 lb 9 6 oz), SpO2 98 %  Imaging and other studies: I have personally reviewed pertinent reports              Plan    Respiratory Plan: No distress/Pulmonary history, Discontinue Protocol        Resp Comments: Pt requires no respiratory intervention

## 2018-11-26 NOTE — H&P
H&P- Litzy Patrick 1960, 62 y o  female MRN: [id number]    Unit/Bed#: ED 06 Encounter: [id number]    Primary Care Provider: Christina Riley MD   Date and time admitted to hospital: 11/26/2018  2:34 PM        * Other chest pain   Assessment & Plan    · Noted for several days  · Patient seen in ER 11/19, saw PCP 11/21 for chest pain  · Continue to trend troponins, monitor telemetry, will ask for cardiology consult  · Check echo with accelerated BP on admission     Accelerated hypertension   Assessment & Plan    · BP was accelerated in /125, , given nitro 0 4mg, labetalol 20mg IV  · BP improved  · Continue to Monitor  · Continue home meds     Type 2 diabetes mellitus without complication, with long-term current use of insulin (HCC)   Assessment & Plan    Lab Results   Component Value Date    HGBA1C 6 6 (H) 06/26/2014       No results for input(s): POCGLU in the last 72 hours  Blood Sugar Average: Last 72 hrs:  · continue insulin  · Check A1C  · Have meal coverage  · Hold metformin 48 hours w/ CTA     High cholesterol   Assessment & Plan    · Continue statin     Restless leg syndrome   Assessment & Plan    · Continue Requip       VTE Prophylaxis: Enoxaparin (Lovenox)  Code Status: full code  POLST: POLST is not applicable to this patient  Discussion with family:     Anticipated Length of Stay:  Patient will be admitted on an Observation basis with an anticipated length of stay of  < 2 midnights  Justification for Hospital Stay: Chest pain workup      Chief Complaint:   Chest pain    History of Present Illness:    Litzy Patrick is a 62 y o  female who presents with chest pain  Continue chest pain for over 1 month  Patient was seen in ER 11/19 and saw PCP 11/21 for Chest pain  Today, she reports pain was constant  She reports intermittent substernal chest pain with radiation to left arm, started around noon today  Noted SOB, mild nausea, no diaphoresis   No burping, belching, sour taste   +HA  9/10 before nitro, had trouble walking, breathing  Was pain free after nitro  Now 1/10  Patient did report shoveling last week  She also has RUQ pain that she notes when she eats, she did have cholecystectomy  Review of Systems:  Review of Systems   Constitutional: Positive for chills  Negative for fever  HENT: Negative for trouble swallowing  Eyes: Negative for pain  Respiratory: Positive for cough and shortness of breath  Cardiovascular: Positive for chest pain and palpitations  Negative for leg swelling  Gastrointestinal: Positive for abdominal pain and nausea  Negative for diarrhea and vomiting  Genitourinary: Negative for dysuria  Musculoskeletal: Positive for neck pain  Skin: Negative  Neurological: Positive for light-headedness and headaches  Negative for dizziness  Psychiatric/Behavioral: Negative for confusion  Past Medical and Surgical History:   Past Medical History:   Diagnosis Date    Chronic back pain     Hyperlipidemia     Hypertension     Restless leg syndrome     Type 2 diabetes mellitus ([facility] [address] )        Past Surgical History:   Procedure Laterality Date    ANKLE SURGERY      tubal    CHOLECYSTECTOMY      GALLBLADDER SURGERY      TUBAL LIGATION         Meds/Allergies:  Prior to Admission medications    Medication Sig Start Date End Date Taking? Authorizing Provider   ACCRADHA-IGOR CHANDANA PLUS test strip USE TO TEST SUGARS 7 TIMES DAILY   E11 9 11/20/18   Amanda Mcgee MD   ACCU-CHEKALYANI SOFTCLIX LANCETS lancets Test TID 9/5/18   Amanda Mcgee MD   amLODIPine (NORVASC) 5 mg tablet Take 1 tablet (5 mg total) by mouth daily 11/20/18   Amanda Mcgee MD   aspirin (ECOTRIN LOW STRENGTH) 81 mg EC tablet Take 81 mg by mouth daily    Historical Provider, MD   atorvastatin (LIPITOR) 10 mg tablet Take 10 mg by mouth daily    Historical Provider, MD   BD PEN NEEDLE RACHEL U/F 32G X 4 MM MISC Inject under the skin daily 9/5/18   Amanda Mcgee MD   benazepril (LOTENSIN) 20 mg tablet Take 1 tablet (20 mg total) by mouth daily  Patient taking differently: Take 40 mg by mouth daily   11/20/18   [facility], MD   cyclobenzaprine (FLEXERIL) 5 mg tablet Take 1 tablet (5 mg total) by mouth daily 10/10/18   [facility], MD   insulin glargine [facility]) 100 units/mL injection pen Inject 48 Units under the skin daily  Patient taking differently: Inject 52 Units under the skin daily   8/17/18   [facility], MD   metFORMIN (GLUCOPHAGE) 500 mg tablet Take 1 tablet (500 mg total) by mouth 2 (two) times a day 10/10/18   [facility], MD   rOPINIRole (REQUIP) 0 5 mg tablet Take 0 5 mg by mouth 2 (two) times a day      Historical Provider, MD   tiZANidine (ZANAFLEX) 4 mg tablet Take 4 mg by mouth every 8 (eight) hours as needed for muscle spasms 1/2-1    Historical Provider, MD   traMADol (ULTRAM) 50 mg tablet Take 50 mg by mouth every 8 (eight) hours    Historical Provider, MD     I have reviewed home medications with patient personally  Allergies: No Known Allergies    Social History:  Marital Status: /Civil Union   Occupation: retired  Patient Pre-hospital Living Situation: home  Patient Pre-hospital Level of Mobility: mobile  Patient Pre-hospital Diet Restrictions: diabetic  Substance Use History:   History   Alcohol Use    Yes     Comment: occasionally     History   Smoking Status    Never Smoker   Smokeless Tobacco    Never Used     History   Drug Use No       Family History:  I have reviewed the patients family history    Physical Exam:   Vitals:   Blood Pressure: 165/97 (11/26/18 1643)  Pulse: 78 (11/26/18 1643)  Temperature: 97 6 °F (36 4 °C) (11/26/18 1434)  Temp Source: Temporal (11/26/18 1434)  Respirations: 19 (11/26/18 1643)  Height: 5' 1" (154 9 cm) (11/26/18 1434)  Weight - Scale: 76 2 kg (168 lb) (11/26/18 1434)  SpO2: 98 % (11/26/18 1643)    Physical Exam   Constitutional: She is oriented to person, place, and time  She appears well-developed and well-nourished  HENT:   Head: Normocephalic and atraumatic  Eyes: Conjunctivae and EOM are normal  Right eye exhibits no discharge  Left eye exhibits no discharge  Neck: Normal range of motion  No tracheal deviation present  Cardiovascular: Normal rate, regular rhythm and normal heart sounds  Exam reveals no gallop and no friction rub  No murmur heard  Pulmonary/Chest: Effort normal and breath sounds normal  No respiratory distress  She has no wheezes  She has no rales  She exhibits tenderness  Abdominal: Soft  Bowel sounds are normal  She exhibits no distension  There is tenderness (RUQ)  There is no rebound  Musculoskeletal: Normal range of motion  She exhibits no edema, tenderness or deformity  Neurological: She is alert and oriented to person, place, and time  Skin: Skin is warm and dry  No rash noted  No erythema  No pallor  Psychiatric: She has a normal mood and affect  Her behavior is normal  Judgment and thought content normal    Nursing note and vitals reviewed  Additional Data:   Lab Results: I have personally reviewed pertinent reports  Results from last 7 days  Lab Units 11/26/18  1438   WBC Thousand/uL 9 60   HEMOGLOBIN g/dL 14 7   HEMATOCRIT % 44 7   PLATELETS Thousands/uL 407*   NEUTROS PCT % 56   LYMPHS PCT % 37   MONOS PCT % 5   EOS PCT % 1       Results from last 7 days  Lab Units 11/26/18  1438   POTASSIUM mmol/L 3 6   CHLORIDE mmol/L 100   CO2 mmol/L 23   BUN mg/dL 10   CREATININE mg/dL 0 86   CALCIUM mg/dL 10 0   ALK PHOS U/L 106   ALT U/L 54*   AST U/L 57*       Results from last 7 days  Lab Units 11/26/18  1446   INR  1 02     Imaging: I have personally reviewed pertinent reports  CTA ED chest PE study   Final Result by Artie Epstein (11/26 8443)   No pulmonary embolism or other acute intrathoracic process  Coronary artery calcifications  Severe hepatic steatosis  Tiny hiatal hernia           Signed by Artie Epstein MD      X-ray chest 1 view portable   Final Result by Alfonso Perez (11/26 1537)   No acute cardiopulmonary process  Signed by Alfonso Perez MD          [employer] / [facility] Records Reviewed: Yes     ** Please Note: This note has been constructed using a voice recognition system   **

## 2018-11-26 NOTE — ASSESSMENT & PLAN NOTE
· Noted for several days  · Patient seen in ER 11/19, saw PCP 11/21 for chest pain    · Continue to trend troponins, monitor telemetry, will ask for cardiology consult  · Check echo with accelerated BP on admission

## 2018-11-26 NOTE — NURSING NOTE
Pt to unit @ 1740, call bell in reach, no complaints of pain @ this time   Instructed to call if chest pain returns

## 2018-11-26 NOTE — ASSESSMENT & PLAN NOTE
Lab Results   Component Value Date    HGBA1C 6 6 (H) 06/26/2014       No results for input(s): POCGLU in the last 72 hours      Blood Sugar Average: Last 72 hrs:  · continue insulin  · Check A1C  · Have meal coverage  · Hold metformin 48 hours w/ CTA

## 2018-11-26 NOTE — ASSESSMENT & PLAN NOTE
· BP was accelerated in /125, , given nitro 0 4mg, labetalol 20mg IV  · BP improved  · Continue to Monitor  · Continue home meds

## 2018-11-26 NOTE — ED PROVIDER NOTES
History  Chief Complaint   Patient presents with    Chest Pain     chest pain on and off for the past few days  todays began around noon     25-year-old female with history of diabetes, hypertension and high cholesterol with complaints of intermittent substernal chest pain with radiation to her left arm since 12:00 p m  López Bacon Chest pain is described as sharp  Slight shortness of breath  Some nausea without emesis  No diaphoresis  No abdominal or back pain  No cough or congestion  No fever chills  No leg pain or swelling  Patient states she was to have a CTA of her coronaries in 4 days        History provided by:  Patient  Chest Pain   Pain location:  Substernal area  Pain quality: sharp    Pain radiates to:  L arm  Timing:  Intermittent  Relieved by:  Nothing  Worsened by:  Nothing tried  Associated symptoms: nausea and shortness of breath    Associated symptoms: no abdominal pain, no back pain, no cough, no diaphoresis, no dizziness, no fever, no headache, no lower extremity edema, no numbness, not vomiting and no weakness        Prior to Admission Medications   Prescriptions Last Dose Informant Patient Reported? Taking? ACCU-CHEK CHANDANA PLUS test strip   No No   Sig: USE TO TEST SUGARS 7 TIMES DAILY   E11 9   ACCU-CHEK SOFTCLIX LANCETS lancets   No No   Sig: Test TID   BD PEN NEEDLE RACHEL U/F 32G X 4 MM MISC   No No   Sig: Inject under the skin daily   amLODIPine (NORVASC) 5 mg tablet   No No   Sig: Take 1 tablet (5 mg total) by mouth daily   aspirin (ECOTRIN LOW STRENGTH) 81 mg EC tablet   Yes No   Sig: Take 81 mg by mouth daily   atorvastatin (LIPITOR) 10 mg tablet   Yes No   Sig: Take 10 mg by mouth daily   benazepril (LOTENSIN) 20 mg tablet   No No   Sig: Take 1 tablet (20 mg total) by mouth daily   Patient taking differently: Take 40 mg by mouth daily     cyclobenzaprine (FLEXERIL) 5 mg tablet   No No   Sig: Take 1 tablet (5 mg total) by mouth daily   insulin glargine (BASAGLAR KWIKPEN) 100 units/mL injection pen   No No   Sig: Inject 48 Units under the skin daily   Patient taking differently: Inject 52 Units under the skin daily     metFORMIN (GLUCOPHAGE) 500 mg tablet   No No   Sig: Take 1 tablet (500 mg total) by mouth 2 (two) times a day   rOPINIRole (REQUIP) 0 5 mg tablet   Yes No   Sig: Take 0 5 mg by mouth 2 (two) times a day     tiZANidine (ZANAFLEX) 4 mg tablet   Yes No   Sig: Take 4 mg by mouth every 8 (eight) hours as needed for muscle spasms 1/2-1   traMADol (ULTRAM) 50 mg tablet   Yes No   Sig: Take 50 mg by mouth every 8 (eight) hours      Facility-Administered Medications: None       Past Medical History:   Diagnosis Date    Chronic back pain     Hyperlipidemia     Hypertension     Restless leg syndrome     Type 2 diabetes mellitus (HCC)        Past Surgical History:   Procedure Laterality Date    ANKLE SURGERY      tubal    CHOLECYSTECTOMY      GALLBLADDER SURGERY      TUBAL LIGATION         Family History   Problem Relation Age of Onset    Breast cancer Mother     Diabetes Mother     Heart failure Mother     Heart disease Father     Stroke Father      I have reviewed and agree with the history as documented  Social History   Substance Use Topics    Smoking status: Never Smoker    Smokeless tobacco: Never Used    Alcohol use Yes      Comment: occasionally        Review of Systems   Constitutional: Negative for chills, diaphoresis and fever  HENT: Negative for rhinorrhea and sore throat  Eyes: Negative for visual disturbance  Respiratory: Positive for shortness of breath  Negative for cough  Cardiovascular: Positive for chest pain  Negative for leg swelling  Gastrointestinal: Positive for nausea  Negative for abdominal pain, diarrhea and vomiting  Genitourinary: Negative for dysuria  Musculoskeletal: Negative for back pain and myalgias  Skin: Negative for rash  Neurological: Negative for dizziness, weakness, numbness and headaches     Psychiatric/Behavioral: Negative for confusion  All other systems reviewed and are negative  Physical Exam  Physical Exam   Constitutional: She is oriented to person, place, and time  She appears well-developed and well-nourished  Hypertensive, tachycardic, anxious   HENT:   Nose: Nose normal    Mouth/Throat: Oropharynx is clear and moist  No oropharyngeal exudate  Eyes: Pupils are equal, round, and reactive to light  Conjunctivae and EOM are normal  No scleral icterus  Neck: Normal range of motion  Neck supple  No JVD present  No tracheal deviation present  Cardiovascular: Normal rate, regular rhythm and normal heart sounds  No murmur heard  Pulmonary/Chest: Effort normal and breath sounds normal  No respiratory distress  She has no wheezes  She has no rales  She exhibits tenderness (Mild tenderness sternal area)  Abdominal: Soft  Bowel sounds are normal  There is no tenderness  There is no guarding  Musculoskeletal: Normal range of motion  She exhibits no edema or tenderness  Neurological: She is alert and oriented to person, place, and time  No cranial nerve deficit or sensory deficit  She exhibits normal muscle tone  5/5 motor, nl sens   Skin: Skin is warm and dry  Psychiatric: Her behavior is normal    Anxious affect   Nursing note and vitals reviewed        Vital Signs  ED Triage Vitals [11/26/18 1434]   Temperature Pulse Respirations Blood Pressure SpO2   97 6 °F (36 4 °C) (!) 123 18 (!) 217/125 99 %      Temp Source Heart Rate Source Patient Position - Orthostatic VS BP Location FiO2 (%)   Temporal Monitor Lying Right arm --      Pain Score       9           Vitals:    11/26/18 1445 11/26/18 1500 11/26/18 1542 11/26/18 1643   BP: (!) 198/114  121/75 165/97   Pulse: (!) 114 (!) 113 84 78   Patient Position - Orthostatic VS:           Visual Acuity      ED Medications  Medications   nitroglycerin (NITROSTAT) SL tablet 0 4 mg (0 4 mg Sublingual Given 11/26/18 1511)   aspirin chewable tablet 162 mg (162 mg Oral Given 11/26/18 1506)   labetalol (NORMODYNE) injection 20 mg (20 mg Intravenous Given 11/26/18 1506)   sodium chloride 0 9 % bolus 1,000 mL (0 mL Intravenous Stopped 11/26/18 1643)   morphine injection 2 mg (2 mg Intravenous Given 11/26/18 1640)   nitroglycerin (NITRO-BID) 2 % TD ointment 1 inch (1 inch Topical Given 11/26/18 1643)   iohexol (OMNIPAQUE) 350 MG/ML injection (SINGLE-DOSE) 100 mL (80 mL Intravenous Given 11/26/18 1624)       Diagnostic Studies  Results Reviewed     Procedure Component Value Units Date/Time    Protime-INR [[id number]]  (Normal) Collected:  11/26/18 1446    Lab Status:  Final result Specimen:  Blood from Arm, Left Updated:  11/26/18 1518     Protime 11 8 seconds      INR 1 02    APTT [[id number]]  (Abnormal) Collected:  11/26/18 1446    Lab Status:  Final result Specimen:  Blood from Arm, Left Updated:  11/26/18 1518     PTT 39 (H) seconds     D-Dimer [[id number]]  (Abnormal) Collected:  11/26/18 1446    Lab Status:  Final result Specimen:  Blood from Arm, Left Updated:  11/26/18 1518     D-Dimer, Quant 402 (H) ng/ml (FEU)     Troponin I [[id number]]  (Abnormal) Collected:  11/26/18 1438    Lab Status:  Final result Specimen:  Blood from Arm, Right Updated:  11/26/18 1510     Troponin I 0 04 (H) ng/mL     Comprehensive metabolic panel [[id number]]  (Abnormal) Collected:  11/26/18 1438    Lab Status:  Final result Specimen:  Blood from Arm, Right Updated:  11/26/18 1510     Sodium 137 mmol/L      Potassium 3 6 mmol/L      Chloride 100 mmol/L      CO2 23 mmol/L      ANION GAP 14 (H) mmol/L      BUN 10 mg/dL      Creatinine 0 86 mg/dL      Glucose 276 (H) mg/dL      Calcium 10 0 mg/dL      AST 57 (H) U/L      ALT 54 (H) U/L      Alkaline Phosphatase 106 U/L      Total Protein 8 3 g/dL      Albumin 4 5 g/dL      Total Bilirubin 0 40 mg/dL      eGFR 75 ml/min/1 73sq m     Narrative:         National Kidney Disease Education Program recommendations are as follows:  GFR calculation is accurate only with a steady state creatinine  Chronic Kidney disease less than 60 ml/min/1 73 sq  meters  Kidney failure less than 15 ml/min/1 73 sq  meters  CBC and differential [[id number]]  (Abnormal) Collected:  11/26/18 1438    Lab Status:  Final result Specimen:  Blood from Arm, Right Updated:  11/26/18 1446     WBC 9 60 Thousand/uL      RBC 4 88 Million/uL      Hemoglobin 14 7 g/dL      Hematocrit 44 7 %      MCV 91 fL      MCH 30 2 pg      MCHC 33 0 g/dL      RDW 14 4 %      MPV 7 5 (L) fL      Platelets 102 (H) Thousands/uL      nRBC 0 /100 WBCs      Neutrophils Relative 56 %      Lymphocytes Relative 37 %      Monocytes Relative 5 %      Eosinophils Relative 1 %      Basophils Relative 1 %      Neutrophils Absolute 5 30 Thousands/µL      Lymphocytes Absolute 3 50 Thousands/µL      Monocytes Absolute 0 50 Thousand/µL      Eosinophils Absolute 0 10 Thousand/µL      Basophils Absolute 0 10 Thousands/µL                  CTA ED chest PE study   Final Result by Cecilia Nava (11/26 1643)   No pulmonary embolism or other acute intrathoracic process  Coronary artery calcifications  Severe hepatic steatosis  Tiny hiatal hernia  Signed by Cecilia Nava MD      X-ray chest 1 view portable   Final Result by Norma Hobson (11/26 1537)   No acute cardiopulmonary process  Signed by Yao Higginbotham MD                 Procedures  Procedures       Phone Contacts  ED Phone Contact    ED Course  ED Course as of Nov 26 1709   Mon Nov 26, 2018   1440 EKG ST 120bpm, nl QRS, NSSTTW changes    1536 HR and BP much improved after meds    1601 D-dimer elevated - CTA chest ordered    Pt states she is CP free    1704 Chloride: 100   1706 D/w Kika - admitting PA - accepts to tele OBS                                MDM  CritCare Time    Disposition  Final diagnoses:   Acute chest pain   Uncontrolled hypertension     Time reflects when diagnosis was documented in both MDM as applicable and the Disposition within this note     Time User Action Codes Description Comment    11/26/2018  3:36 PM Dedrajohn Pardodavid Neely [R07 9] Acute chest pain     11/26/2018  5:07 PM Dedra Gerald Neely [I10] Uncontrolled hypertension       ED Disposition     ED Disposition Condition Comment    Admit  Case was discussed with Rm Ivan - admitting PA, and the patient's admission status was agreed to be Admission Status: observation status to the service of Dr Sana Alvarez   Follow-up Information    None         Patient's Medications   Discharge Prescriptions    No medications on file     No discharge procedures on file      ED Provider  Electronically Signed by           Chase Wills MD  11/26/18 5472

## 2018-11-27 ENCOUNTER — HOSPITAL ENCOUNTER (INPATIENT)
Facility: HOSPITAL | Age: 58
LOS: 2 days | Discharge: HOME | DRG: 174 | End: 2018-11-29
Attending: INTERNAL MEDICINE | Admitting: INTERNAL MEDICINE
Payer: COMMERCIAL

## 2018-11-27 ENCOUNTER — APPOINTMENT (OUTPATIENT)
Dept: NON INVASIVE DIAGNOSTICS | Facility: HOSPITAL | Age: 58
End: 2018-11-27
Payer: COMMERCIAL

## 2018-11-27 VITALS
OXYGEN SATURATION: 99 % | BODY MASS INDEX: 32.21 KG/M2 | RESPIRATION RATE: 16 BRPM | WEIGHT: 170.6 LBS | HEIGHT: 61 IN | TEMPERATURE: 98.4 F | DIASTOLIC BLOOD PRESSURE: 79 MMHG | HEART RATE: 71 BPM | SYSTOLIC BLOOD PRESSURE: 126 MMHG

## 2018-11-27 DIAGNOSIS — I25.9 CHEST PAIN DUE TO MYOCARDIAL ISCHEMIA, UNSPECIFIED ISCHEMIC CHEST PAIN TYPE: ICD-10-CM

## 2018-11-27 DIAGNOSIS — Z79.4 TYPE 2 DIABETES MELLITUS WITH COMPLICATION, WITH LONG-TERM CURRENT USE OF INSULIN (HCC): ICD-10-CM

## 2018-11-27 DIAGNOSIS — I10 ESSENTIAL HYPERTENSION: ICD-10-CM

## 2018-11-27 DIAGNOSIS — E11.9 TYPE 2 DIABETES MELLITUS WITHOUT COMPLICATION, WITH LONG-TERM CURRENT USE OF INSULIN (HCC): Primary | Chronic | ICD-10-CM

## 2018-11-27 DIAGNOSIS — I21.4 NSTEMI (NON-ST ELEVATED MYOCARDIAL INFARCTION) (HCC): ICD-10-CM

## 2018-11-27 DIAGNOSIS — E11.8 TYPE 2 DIABETES MELLITUS WITH COMPLICATION, WITH LONG-TERM CURRENT USE OF INSULIN (HCC): ICD-10-CM

## 2018-11-27 DIAGNOSIS — E78.2 MIXED HYPERLIPIDEMIA: ICD-10-CM

## 2018-11-27 DIAGNOSIS — Z79.4 TYPE 2 DIABETES MELLITUS WITHOUT COMPLICATION, WITH LONG-TERM CURRENT USE OF INSULIN (HCC): Primary | Chronic | ICD-10-CM

## 2018-11-27 DIAGNOSIS — Z76.0 MEDICATION REFILL: ICD-10-CM

## 2018-11-27 PROBLEM — E78.00 HIGH CHOLESTEROL: Chronic | Status: ACTIVE | Noted: 2018-08-02

## 2018-11-27 PROBLEM — R07.9 ACUTE CHEST PAIN: Status: ACTIVE | Noted: 2018-11-26

## 2018-11-27 LAB
CHOLEST SERPL-MCNC: 277 MG/DL (ref 0–200)
EST. AVERAGE GLUCOSE BLD GHB EST-MCNC: 258 MG/DL
GLUCOSE SERPL-MCNC: 143 MG/DL (ref 65–140)
GLUCOSE SERPL-MCNC: 194 MG/DL (ref 65–140)
GLUCOSE SERPL-MCNC: 258 MG/DL (ref 65–140)
HBA1C MFR BLD: 10.6 % (ref 4.2–6.3)
HDLC SERPL-MCNC: 38 MG/DL (ref 40–60)
LDLC SERPL CALC-MCNC: 164 MG/DL (ref 75–193)
NONHDLC SERPL-MCNC: 239 MG/DL
TRIGL SERPL-MCNC: 373 MG/DL (ref 44–166)
TROPONIN I SERPL-MCNC: 0.31 NG/ML

## 2018-11-27 PROCEDURE — 93005 ELECTROCARDIOGRAM TRACING: CPT

## 2018-11-27 PROCEDURE — 82948 REAGENT STRIP/BLOOD GLUCOSE: CPT

## 2018-11-27 PROCEDURE — 99245 OFF/OP CONSLTJ NEW/EST HI 55: CPT | Performed by: INTERNAL MEDICINE

## 2018-11-27 PROCEDURE — 84484 ASSAY OF TROPONIN QUANT: CPT | Performed by: INTERNAL MEDICINE

## 2018-11-27 PROCEDURE — 80061 LIPID PANEL: CPT | Performed by: PHYSICIAN ASSISTANT

## 2018-11-27 PROCEDURE — 83036 HEMOGLOBIN GLYCOSYLATED A1C: CPT | Performed by: PHYSICIAN ASSISTANT

## 2018-11-27 PROCEDURE — 99222 1ST HOSP IP/OBS MODERATE 55: CPT | Performed by: INTERNAL MEDICINE

## 2018-11-27 RX ORDER — AMLODIPINE BESYLATE 5 MG/1
5 TABLET ORAL DAILY
Status: DISCONTINUED | OUTPATIENT
Start: 2018-11-28 | End: 2018-11-29 | Stop reason: HOSPADM

## 2018-11-27 RX ORDER — ATORVASTATIN CALCIUM 40 MG/1
40 TABLET, FILM COATED ORAL DAILY
Status: CANCELLED | OUTPATIENT
Start: 2018-11-28

## 2018-11-27 RX ORDER — TRAMADOL HYDROCHLORIDE 50 MG/1
50 TABLET ORAL EVERY 8 HOURS
Status: DISCONTINUED | OUTPATIENT
Start: 2018-11-27 | End: 2018-11-29 | Stop reason: HOSPADM

## 2018-11-27 RX ORDER — CYCLOBENZAPRINE HCL 10 MG
5 TABLET ORAL 3 TIMES DAILY PRN
Status: DISCONTINUED | OUTPATIENT
Start: 2018-11-27 | End: 2018-11-29 | Stop reason: HOSPADM

## 2018-11-27 RX ORDER — ASPIRIN 81 MG/1
81 TABLET ORAL DAILY
Status: CANCELLED | OUTPATIENT
Start: 2018-11-28

## 2018-11-27 RX ORDER — KETOROLAC TROMETHAMINE 30 MG/ML
15 INJECTION, SOLUTION INTRAMUSCULAR; INTRAVENOUS ONCE
Status: COMPLETED | OUTPATIENT
Start: 2018-11-27 | End: 2018-11-27

## 2018-11-27 RX ORDER — CYCLOBENZAPRINE HCL 10 MG
5 TABLET ORAL 3 TIMES DAILY PRN
Status: CANCELLED | OUTPATIENT
Start: 2018-11-27

## 2018-11-27 RX ORDER — TIZANIDINE 4 MG/1
4 TABLET ORAL
Status: CANCELLED | OUTPATIENT
Start: 2018-11-27

## 2018-11-27 RX ORDER — ASPIRIN 81 MG/1
81 TABLET ORAL DAILY
Status: DISCONTINUED | OUTPATIENT
Start: 2018-11-28 | End: 2018-11-29 | Stop reason: HOSPADM

## 2018-11-27 RX ORDER — NITROGLYCERIN 0.4 MG/1
0.4 TABLET SUBLINGUAL
Status: DISCONTINUED | OUTPATIENT
Start: 2018-11-27 | End: 2018-11-29 | Stop reason: HOSPADM

## 2018-11-27 RX ORDER — ATORVASTATIN CALCIUM 40 MG/1
40 TABLET, FILM COATED ORAL DAILY
Status: DISCONTINUED | OUTPATIENT
Start: 2018-11-28 | End: 2018-11-27 | Stop reason: HOSPADM

## 2018-11-27 RX ORDER — ONDANSETRON 2 MG/ML
4 INJECTION INTRAMUSCULAR; INTRAVENOUS EVERY 6 HOURS PRN
Status: CANCELLED | OUTPATIENT
Start: 2018-11-27

## 2018-11-27 RX ORDER — ATORVASTATIN CALCIUM 40 MG/1
40 TABLET, FILM COATED ORAL
Status: DISCONTINUED | OUTPATIENT
Start: 2018-11-27 | End: 2018-11-29 | Stop reason: HOSPADM

## 2018-11-27 RX ORDER — ONDANSETRON 2 MG/ML
4 INJECTION INTRAMUSCULAR; INTRAVENOUS EVERY 6 HOURS PRN
Status: DISCONTINUED | OUTPATIENT
Start: 2018-11-27 | End: 2018-11-29 | Stop reason: HOSPADM

## 2018-11-27 RX ORDER — NITROGLYCERIN 0.4 MG/1
0.4 TABLET SUBLINGUAL
Status: CANCELLED | OUTPATIENT
Start: 2018-11-27

## 2018-11-27 RX ORDER — ACETAMINOPHEN 325 MG/1
650 TABLET ORAL EVERY 4 HOURS PRN
Status: CANCELLED | OUTPATIENT
Start: 2018-11-27

## 2018-11-27 RX ORDER — ACETAMINOPHEN 325 MG/1
650 TABLET ORAL EVERY 4 HOURS PRN
Status: DISCONTINUED | OUTPATIENT
Start: 2018-11-27 | End: 2018-11-29 | Stop reason: HOSPADM

## 2018-11-27 RX ORDER — LISINOPRIL 20 MG/1
40 TABLET ORAL DAILY
Status: DISCONTINUED | OUTPATIENT
Start: 2018-11-28 | End: 2018-11-29 | Stop reason: HOSPADM

## 2018-11-27 RX ORDER — LISINOPRIL 20 MG/1
40 TABLET ORAL DAILY
Status: CANCELLED | OUTPATIENT
Start: 2018-11-28

## 2018-11-27 RX ORDER — MAGNESIUM HYDROXIDE/ALUMINUM HYDROXICE/SIMETHICONE 120; 1200; 1200 MG/30ML; MG/30ML; MG/30ML
15 SUSPENSION ORAL ONCE
Status: COMPLETED | OUTPATIENT
Start: 2018-11-27 | End: 2018-11-27

## 2018-11-27 RX ORDER — TRAMADOL HYDROCHLORIDE 50 MG/1
50 TABLET ORAL EVERY 8 HOURS
Status: CANCELLED | OUTPATIENT
Start: 2018-11-27

## 2018-11-27 RX ORDER — CLOPIDOGREL BISULFATE 75 MG/1
300 TABLET ORAL ONCE
Status: COMPLETED | OUTPATIENT
Start: 2018-11-27 | End: 2018-11-27

## 2018-11-27 RX ORDER — AMLODIPINE BESYLATE 5 MG/1
5 TABLET ORAL DAILY
Status: CANCELLED | OUTPATIENT
Start: 2018-11-28

## 2018-11-27 RX ORDER — TIZANIDINE 4 MG/1
4 TABLET ORAL
Status: DISCONTINUED | OUTPATIENT
Start: 2018-11-27 | End: 2018-11-29 | Stop reason: HOSPADM

## 2018-11-27 RX ADMIN — ASPIRIN 81 MG: 81 TABLET, COATED ORAL at 09:14

## 2018-11-27 RX ADMIN — METOPROLOL TARTRATE 25 MG: 25 TABLET, FILM COATED ORAL at 10:30

## 2018-11-27 RX ADMIN — TRAMADOL HYDROCHLORIDE 50 MG: 50 TABLET, COATED ORAL at 10:27

## 2018-11-27 RX ADMIN — TRAMADOL HYDROCHLORIDE 50 MG: 50 TABLET, COATED ORAL at 22:00

## 2018-11-27 RX ADMIN — LIDOCAINE HYDROCHLORIDE 15 ML: 20 SOLUTION ORAL; TOPICAL at 23:04

## 2018-11-27 RX ADMIN — ALUMINUM HYDROXIDE, MAGNESIUM HYDROXIDE, AND SIMETHICONE 15 ML: 200; 200; 20 SUSPENSION ORAL at 23:04

## 2018-11-27 RX ADMIN — NITROGLYCERIN 0.4 MG: 0.4 TABLET SUBLINGUAL at 11:29

## 2018-11-27 RX ADMIN — METOPROLOL TARTRATE 25 MG: 25 TABLET, FILM COATED ORAL at 21:42

## 2018-11-27 RX ADMIN — ENOXAPARIN SODIUM 40 MG: 40 INJECTION SUBCUTANEOUS at 09:14

## 2018-11-27 RX ADMIN — KETOROLAC TROMETHAMINE 15 MG: 30 INJECTION, SOLUTION INTRAMUSCULAR at 23:05

## 2018-11-27 RX ADMIN — INSULIN LISPRO 3 UNITS: 100 INJECTION, SOLUTION INTRAVENOUS; SUBCUTANEOUS at 23:04

## 2018-11-27 RX ADMIN — LISINOPRIL 40 MG: 20 TABLET ORAL at 09:13

## 2018-11-27 RX ADMIN — ROPINIROLE HYDROCHLORIDE 1.5 MG: 1 TABLET, FILM COATED ORAL at 21:42

## 2018-11-27 RX ADMIN — INSULIN LISPRO 2 UNITS: 100 INJECTION, SOLUTION INTRAVENOUS; SUBCUTANEOUS at 11:40

## 2018-11-27 RX ADMIN — ATORVASTATIN CALCIUM 10 MG: 10 TABLET, FILM COATED ORAL at 09:14

## 2018-11-27 RX ADMIN — INSULIN DETEMIR 52 UNITS: 100 INJECTION, SOLUTION SUBCUTANEOUS at 21:42

## 2018-11-27 RX ADMIN — ACETAMINOPHEN 650 MG: 325 TABLET ORAL at 06:38

## 2018-11-27 RX ADMIN — TIZANIDINE 4 MG: 4 TABLET ORAL at 21:42

## 2018-11-27 RX ADMIN — NITROGLYCERIN 0.4 MG: 0.4 TABLET SUBLINGUAL at 11:36

## 2018-11-27 RX ADMIN — AMLODIPINE BESYLATE 5 MG: 5 TABLET ORAL at 09:14

## 2018-11-27 RX ADMIN — CLOPIDOGREL BISULFATE 300 MG: 75 TABLET ORAL at 10:29

## 2018-11-27 RX ADMIN — TRAMADOL HYDROCHLORIDE 50 MG: 50 TABLET, COATED ORAL at 02:34

## 2018-11-27 NOTE — CONSULTS
Consult- Maddie Tatumyon 1960, 62 y o  female MRN: [id number]    Unit/Bed#: -02 Encounter: [id number]    Primary Care Provider: Glo Langston MD   Date and time admitted to hospital: 11/26/2018  2:34 PM      Inpatient consult to Cardiology  Consult performed by: Argenis Ordonez ordered by: Gerald Kat        * Other chest pain   Assessment & Plan    Exertional Chest Pain with typical features of ACI   EKG is inconsistent with acute ischemia   Troponins are steadily rising although they are not significant in their elevation  Given CAD risk factors of HTN, HLD, DMII, obesity and Family Hx of Early CAD, I would recommend transfer to [facility] for cardiac catheterization  Type 2 diabetes mellitus without complication, with long-term current use of insulin (HCC)   Assessment & Plan    Risk factor of CAD     High cholesterol   Assessment & Plan    Risk factor for CAD,  with elevated LDL  Accelerated hypertension   Assessment & Plan    This is now better controlled and patient continues to have exertional CP          Arrangements will be made for catheterization        Chief Complaint:  Chief Complaint   Patient presents with    Chest Pain     chest pain on and off for the past few days  todays began around noon        History of Present Illness: The patient is a 61 y/o female with a history of HTN, HLD, obesity and DMII  She comes to the hospital with complaints of exertional substernal chest pain that has been worsening over the past month or so  She has noticed that when she walks it worsens and is associated with diaphoresis, nausea and radiation into the L-arm  It can last several minutes in duration up to an hour without alleviation, even when she rests  Troponin has risen to a level of 0 23 from 0 04, creatinine and h/h has been normal  EKG is normal without ischemic changes       When she arrived her BP was elevated, but this morning her BP is under control and she continues to have exertional chest pain  She has a significant family history of early CAD in her mother, father and younger sister  She is a non-smoker and denies illicit drug use or excessive ETOH consumption  Review of Systems: a 10 point review of systems was conducted and is negative except for as mentioned in the HPI or as below  ROS    Past Medical and Surgical History:  Past Medical History:   Diagnosis Date    Chronic back pain     Hyperlipidemia     Hypertension     Restless leg syndrome     Type 2 diabetes mellitus (HCC)      Past Surgical History:   Procedure Laterality Date    ANKLE SURGERY      tubal    CHOLECYSTECTOMY      GALLBLADDER SURGERY      TUBAL LIGATION       History   Alcohol Use    Yes     Comment: occasionally     History   Drug Use No     History   Smoking Status    Never Smoker   Smokeless Tobacco    Never Used       Family History:  Family History   Problem Relation Age of Onset   Maggie Courser Breast cancer Mother     Diabetes Mother     Heart failure Mother     Heart disease Father     Stroke Father        Medication:  Prescriptions Prior to Admission   Medication    ACCU-CHEK CHANDANA PLUS test strip    ACCU-CHEK SOFTCLIX LANCETS lancets    amLODIPine (NORVASC) 5 mg tablet    aspirin (ECOTRIN LOW STRENGTH) 81 mg EC tablet    atorvastatin (LIPITOR) 10 mg tablet    benazepril (LOTENSIN) 20 mg tablet    cyclobenzaprine (FLEXERIL) 5 mg tablet    insulin glargine (BASAGLAR KWIKPEN) 100 units/mL injection pen    metFORMIN (GLUCOPHAGE) 500 mg tablet    rOPINIRole (REQUIP) 0 5 mg tablet    tiZANidine (ZANAFLEX) 4 mg tablet    traMADol (ULTRAM) 50 mg tablet    BD PEN NEEDLE RACHEL U/F 32G X 4 MM MISC        Allergies:  No Known Allergies    Physical Exam:  Vitals: Blood pressure 135/82, pulse 77, temperature 98 9 °F (37 2 °C), temperature source Tympanic, resp  rate 18, height 5' 1" (1 549 m), weight 77 4 kg (170 lb 9 6 oz), SpO2 95 %  , Body mass index is 32 23 kg/m² , Orthostatic Blood Pressures      Most Recent Value   Blood Pressure  135/82 filed at 11/27/2018 0615   Patient Position - Orthostatic VS  Lying filed at 11/27/2018 0963      , Systolic (41DPN), CMV:477 , Min:90 , PTO:609   , Diastolic (38WKP), SEP:56, Min:60, Max:125    Physical Exam    Most Recent Cardiac Imaging: Echo pending    EKG: NSR NS changes    Lab Results:   Troponins:   Results from last 7 days  Lab Units 11/26/18  2153 11/26/18  1724 11/26/18  1438   TROPONIN I ng/mL 0 23* 0 07* 0 04*     BNP:    CBC with diff:   Results from last 7 days  Lab Units 11/26/18  1438   WBC Thousand/uL 9 60   HEMOGLOBIN g/dL 14 7   HEMATOCRIT % 44 7   PLATELETS Thousands/uL 407*   RBC Million/uL 4 88     CMP:  Results from last 7 days  Lab Units 11/26/18  1438   POTASSIUM mmol/L 3 6   CHLORIDE mmol/L 100   BUN mg/dL 10   CREATININE mg/dL 0 86   CALCIUM mg/dL 10 0   AST U/L 57*   ALT U/L 54*   ALK PHOS U/L 106   EGFR ml/min/1 73sq m 75     Lipid Profile:  Results from last 7 days  Lab Units 11/27/18  0515   TRIGLYCERIDES mg/dL 373*

## 2018-11-27 NOTE — PLAN OF CARE
Problem: DISCHARGE PLANNING - CARE MANAGEMENT  Goal: Discharge to post-acute care or home with appropriate resources  INTERVENTIONS:  - Conduct assessment to determine patient/family and health care team treatment goals, and need for post-acute services based on payer coverage, community resources, and patient preferences, and barriers to discharge  - Address psychosocial, clinical, and financial barriers to discharge as identified in assessment in conjunction with the patient/family and health care team  - Arrange appropriate level of post-acute services according to patient's   needs and preference and payer coverage in collaboration with the physician and health care team  - Communicate with and update the patient/family, physician, and health care team regarding progress on the discharge plan  - Arrange appropriate transportation to post-acute venues  Transfer to [facility] for a cardiac cath today  Outcome: Completed Date Met: 11/27/18

## 2018-11-27 NOTE — UTILIZATION REVIEW
Initial Clinical Review    Admission: Date/Time/Statement: 11/26/18 @ 1708 OBSERVATION    Orders Placed This Encounter   Procedures    Place in Observation (expected length of stay for this patient is less than two midnights)     Standing Status:   Standing     Number of Occurrences:   1     Order Specific Question:   Admitting Physician     Answer:   Azar Basilio [[id number]]     Order Specific Question:   Level of Care     Answer:   Med Surg [16]     Order Specific Question:   Bed request comments     Answer:   tele     ED: Date/Time/Mode of Arrival:   ED Arrival Information     Expected Arrival 70 Ob[address] Miller of Arrival Escorted By Service Admission Type    - 11/26/2018 14:26 Emergent [facility] Medicine Emergency    Arrival Complaint    chest pain        Chief Complaint:   Chief Complaint   Patient presents with    Chest Pain     chest pain on and off for the past few days  todays began around noon       History of Illness: 72-year-old female with history of diabetes, hypertension and high cholesterol with complaints of intermittent substernal chest pain with radiation to her left arm since 12:00 p m  Jane Higgins Chest pain is described as sharp  Slight shortness of breath  Some nausea without emesis  No diaphoresis  No abdominal or back pain  No cough or congestion  No fever chills  No leg pain or swelling  Patient states she was to have a CTA of her coronaries in 4 days  ED Vital Signs:   ED Triage Vitals [11/26/18 1434]   Temperature Pulse Respirations Blood Pressure SpO2   97 6 °F (36 4 °C) (!) 123 18 (!) 217/125 99 %      Temp Source Heart Rate Source Patient Position - Orthostatic VS BP Location FiO2 (%)   Temporal Monitor Lying Right arm --      Pain Score       9        Wt Readings from Last 1 Encounters:   11/26/18 77 4 kg (170 lb 9 6 oz)       Vital Signs (abnormal):  WNL    Abnormal Labs/Diagnostic Test Results:     Troponin = 0 04, 0 07, 0 23, Glucose = 276, Cholesterol = 277, Triglycerides = 373, HDL = 38, D-Dimer = 402    EKG:    Ref Range & Units 11/26/18 1436   Ventricular Rate     Atrial Rate     OH Interval ms 146    QRSD Interval ms 78    QT Interval ms 340    QTC Interval ms 482    P Axis degrees 47    QRS Axis degrees 64    T Wave Axis degrees 28      Sinus tachycardia  Otherwise normal ECG  When compared with ECG of 19-NOV-2018 23:36,  No significant change was found          CT chest: no PE or other acute intrathoracic process  Coronary artery calcifications  ED Treatment:   Medication Administration from 11/26/2018 1426 to 11/26/2018 1730       Date/Time Order Dose Route Action Action by Comments     11/26/2018 1506 aspirin chewable tablet 162 mg 162 mg Oral Given Abhi Beltran RN      11/26/2018 1511 nitroglycerin (NITROSTAT) SL tablet 0 4 mg 0 4 mg Sublingual Given Abhi Beltran RN      11/26/2018 1506 labetalol (NORMODYNE) injection 20 mg 20 mg Intravenous Given Abhi Belrtan RN      11/26/2018 1643 sodium chloride 0 9 % bolus 1,000 mL 0 mL Intravenous Stopped Abhi Beltran RN      11/26/2018 1543 sodium chloride 0 9 % bolus 1,000 mL 1,000 mL Intravenous New Bag Abhi Beltran RN      11/26/2018 1640 morphine injection 2 mg 2 mg Intravenous Given Abhi Beltran RN      11/26/2018 1643 nitroglycerin (NITRO-BID) 2 % TD ointment 1 inch 1 inch Topical Given Abhi Beltran RN      11/26/2018 1624 iohexol (OMNIPAQUE) 350 MG/ML injection (SINGLE-DOSE) 100 mL 80 mL Intravenous Given Isiah Antoine           Past Medical/Surgical History:    Active Ambulatory Problems     Diagnosis Date Noted    Accelerated hypertension 08/02/2018    High cholesterol 08/02/2018    Chronic bilateral low back pain without sciatica 09/05/2018     Resolved Ambulatory Problems     Diagnosis Date Noted    No Resolved Ambulatory Problems     Past Medical History:   Diagnosis Date    Chronic back pain     Hyperlipidemia     Hypertension     Restless leg syndrome  Type 2 diabetes mellitus (HCC)        Admitting Diagnosis: Chest pain [R07 9]  Acute chest pain [R07 9]  Uncontrolled hypertension [I10]    Age/Sex: 62 y o  female    Assessment/Plan:        * Other chest pain   Assessment & Plan     · Noted for several days  · Patient seen in ER 11/19, saw PCP 11/21 for chest pain  · Continue to trend troponins, monitor telemetry, will ask for cardiology consult  · Check echo with accelerated BP on admission      Accelerated hypertension   Assessment & Plan     · BP was accelerated in /125, , given nitro 0 4mg, labetalol 20mg IV  · BP improved  · Continue to Monitor  · Continue home meds      Type 2 diabetes mellitus without complication, with long-term current use of insulin ([facility])   Assessment & Plan             Lab Results   Component Value Date     HGBA1C 6 6 (H) 06/26/2014         No results for input(s): POCGLU in the last 72 hours      Blood Sugar Average: Last 72 hrs:  · continue insulin  · Check A1C  · Have meal coverage  · Hold metformin 48 hours w/ CTA      High cholesterol   Assessment & Plan     · Continue statin      Restless leg syndrome   Assessment & Plan     · Continue Requip     Admission Orders: Cardiology consult, ECHO, HgbA1c, OOB, continuous cardiac and pulse oximetry monitoring      Scheduled Meds:   Current Facility-Administered Medications:  acetaminophen 650 mg Oral Q4H PRN   amLODIPine 5 mg Oral Daily   aspirin 81 mg Oral Daily   atorvastatin 10 mg Oral Daily   cyclobenzaprine 5 mg Oral TID PRN   enoxaparin 40 mg Subcutaneous Daily   insulin detemir 52 Units Subcutaneous HS   insulin lispro 1-6 Units Subcutaneous HS   insulin lispro 2-12 Units Subcutaneous TID AC   lisinopril 40 mg Oral Daily   nitroglycerin 0 4 mg Sublingual Q5 Min PRN   ondansetron 4 mg Intravenous Q6H PRN   rOPINIRole 1 5 mg Oral HS   tiZANidine 4 mg Oral HS   traMADol 50 mg Oral Q8H       ===========================================================  Continued Stay Review    Date: 11/27/18 @ 1609    Vital Signs: /79 (BP Location: Right arm)   Pulse 71   Temp 98 4 °F (36 9 °C) (Tympanic)   Resp 16   Ht 5' 1" (1 549 m)   Wt 77 4 kg (170 lb 9 6 oz)   SpO2 99%   BMI 32 23 kg/m²     Medications:   Scheduled Meds:   Current Facility-Administered Medications:  acetaminophen 650 mg Oral Q4H PRN   amLODIPine 5 mg Oral Daily   aspirin 81 mg Oral Daily   [START ON 11/28/2018] atorvastatin 40 mg Oral Daily   cyclobenzaprine 5 mg Oral TID PRN   enoxaparin 40 mg Subcutaneous Daily   insulin detemir 52 Units Subcutaneous HS   insulin lispro 1-6 Units Subcutaneous HS   insulin lispro 2-12 Units Subcutaneous TID AC   lisinopril 40 mg Oral Daily   metoprolol tartrate 25 mg Oral Q12H MANUEL   nitroglycerin 0 4 mg Sublingual Q5 Min PRN   ondansetron 4 mg Intravenous Q6H PRN   rOPINIRole 1 5 mg Oral HS   tiZANidine 4 mg Oral HS   traMADol 50 mg Oral Q8H       Abnormal Labs/Diagnostic Results:     Age/Sex: 62 y o  female     Assessment/Plan:     Alvarado Godfrey is a 62 y o  female patient who originally presented to the hospital on 11/26/2018 due to chest pain  She has been complaining of chest pain on and off for 1 month, had been seen in ER and by her PCP for chest pain  Her troponins increased slightly and Cardiology is recommending transfer for a cardiac cath    She does have hyperlipidemia, diabetes mellitus uncontrolled with A1c 10 6, hypertension, BP was accelerated on presentation, improved with IV labetalol and nitro     ======================================================  11/27 Cardiology Consult:           * Other chest pain   Assessment & Plan     Exertional Chest Pain with typical features of ACI               EKG is inconsistent with acute ischemia               Troponins are steadily rising although they are not significant in their elevation        Given CAD risk factors of HTN, HLD, DMII, obesity and Family Hx of Early CAD, I would recommend transfer to [facility] for cardiac catheterization          Type 2 diabetes mellitus without complication, with long-term current use of insulin (HCC)   Assessment & Plan     Risk factor of CAD      High cholesterol   Assessment & Plan     Risk factor for CAD,  with elevated LDL        Accelerated hypertension   Assessment & Plan     This is now better controlled and patient continues to have exertional CP          Discharge Plan: Transfer to [facility] for cardiac cath                  [address]  Utilization Review Department  Phone: [phone]; Fax [phone]  [email or web address]  ATTENTION: Please call with any questions or concerns to [phone]  and carefully listen to the prompts so that you are directed to the right person  Send all requests for admission clinical reviews, approved or denied determinations and any other requests to fax [phone]   All voicemails are confidential

## 2018-11-27 NOTE — EMTALA/ACUTE CARE TRANSFER
[address] [facility] [address]  [facility] [address] [id number]  [phone]  Dept: [phone]      ACUTE CARE TRANSFER CONSENT    NAME Maddie TRUJILLO 1960                              MRN [id number]    I have been informed of my rights regarding examination, treatment, and transfer   by Dr Carlos Painter MD    Benefits:   higher level care    Risks:   deterioration of condition, delay with traffic      Transfer Request:  I acknowledge that my medical condition has been evaluated and explained to me by the treating physician or other qualified medical person and/or my attending physician who has recommended and offered to me further medical examination and treatment  I understand the Hospital's obligation with respect to the treatment and stabilization of my medical condition  I nevertheless request to be transferred  I release the Hospital, the doctor, and any other persons caring for me from all responsibility or liability for any injury or ill effects that may result from my transfer and agree to accept all responsibility for the consequences of my choice to transfer, rather than receive stabilizing treatment at the Hospital  I understand that because the transfer is my request, my insurance may not provide reimbursement for the services  The Hospital will assist and direct me and my family in how to make arrangements for transfer, but the hospital is not liable for any fees charged by the transport service  In spite of this understanding, I refuse to consent to further medical examination and treatment which has been offered to me, and request transfer to    I authorize the performance of emergency medical procedures and treatments upon me in both transit and upon arrival at the receiving facility    Additionally, I authorize the release of any and all medical records to the receiving facility and request they be transported with me, if possible  I authorize the performance of emergency medical procedures and treatments upon me in both transit and upon arrival at the receiving facility  Additionally, I authorize the release of any and all medical records to the receiving facility and request they be transported with me, if possible  I understand that the safest mode of transportation during a medical emergency is an ambulance and that the Hospital advocates the use of this mode of transport  Risks of traveling to the receiving facility by car, including absence of medical control, life sustaining equipment, such as oxygen, and medical personnel has been explained to me and I fully understand them  (HANNAH CORRECT BOX BELOW)  [  ]  I consent to the stated transfer and to be transported by ambulance/helicopter  [  ]  I consent to the stated transfer, but refuse transportation by ambulance and accept full responsibility for my transportation by car  I understand the risks of non-ambulance transfers and I exonerate the Hospital and its staff from any deterioration in my condition that results from this refusal     X___________________________________________    DATE  18  TIME________  Signature of patient or legally responsible individual signing on patient behalf           RELATIONSHIP TO PATIENT_________________________          Provider Certification    NAME Brannon Castillo                                        [facility] 1960                              MRN [id number]    A medical screening exam was performed on the above named patient    Based on the examination:    Condition Necessitating Transfer Cardiac condition need further testing    Patient Condition:   stable    Reason for Transfer:  cardiac cath    Transfer Requirements: Facility     · Space available and qualified personnel available for treatment as acknowledged by    · Agreed to accept transfer and to provide appropriate medical treatment as acknowledged by · Appropriate medical records of the examination and treatment of the patient are provided at the time of transfer   [address] [facility], [address] _______  · Transfer will be performed by qualified personnel from    and appropriate transfer equipment as required, including the use of necessary and appropriate life support measures  Provider Certification: I have examined the patient and explained the following risks and benefits of being transferred/refusing transfer to the patient/family:         Based on these reasonable risks and benefits to the patient and/or the unborn child(marie), and based upon the information available at the time of the patients examination, I certify that the medical benefits reasonably to be expected from the provision of appropriate medical treatments at another medical facility outweigh the increasing risks, if any, to the individuals medical condition, and in the case of labor to the unborn child, from effecting the transfer      X____________________________________________ DATE 11/27/18        TIME_______      ORIGINAL - SEND TO MEDICAL RECORDS   COPY - SEND WITH PATIENT DURING TRANSFER

## 2018-11-27 NOTE — ASSESSMENT & PLAN NOTE
· BP was accelerated in /125, , given nitro 0 4mg, labetalol 20mg IV - resolved  · BP improved  · Continue to Monitor  · Continue home meds and metoprolol

## 2018-11-27 NOTE — ASSESSMENT & PLAN NOTE
Lab Results   Component Value Date    HGBA1C 10 6 (H) 11/27/2018       Recent Labs      11/26/18   1822  11/26/18   2040  11/27/18   0614  11/27/18   1051   POCGLU  307*  263*  143*  194*       Blood Sugar Average: Last 72 hrs:   poorly controlled diabetes mellitus, for now will continue previous home regimen, consult Endocrinology, further adjustment of insulin regimen pending further measurement overnight

## 2018-11-27 NOTE — ASSESSMENT & PLAN NOTE
Patient presented originally in [facility] [facility] with accelerated hypertension  Currently blood pressure is acceptable on metoprolol, lisinopril, amlodipine  Continue to monitor

## 2018-11-27 NOTE — DISCHARGE SUMMARY
Discharge- Awanda Bernheim 1960, 62 y o  female MRN: [id number]    Unit/Bed#: -02 Encounter: [id number]    Primary Care Provider: Nikole Cotter MD   Date and time admitted to hospital: 11/26/2018  2:34 PM        * Acute chest pain   Assessment & Plan    · Noted for several days  · Patient seen in ER 11/19, saw PCP 11/21 for chest pain  · Trops 0 04, 0 07, 0 23  · Cardiology recommend transfer for cardiac cath, Has been accepted by Dr Sanjeev Bills     Accelerated hypertension   Assessment & Plan    · BP was accelerated in /125, , given nitro 0 4mg, labetalol 20mg IV - resolved  · BP improved  · Continue to Monitor  · Continue home meds and metoprolol     Type 2 diabetes mellitus without complication, with long-term current use of insulin [facility])   Assessment & Plan    Lab Results   Component Value Date    HGBA1C 10 6 (H) 11/27/2018       Recent Labs      11/26/18   1822  11/26/18   2040  11/27/18   0614  11/27/18   1051   POCGLU  307*  263*  143*  194*       Blood Sugar Average: Last 72 hrs:  · (P) 226 75continue insulin  ·  A1C 10 6  · Have meal coverage and Lantus  · Hold metformin w/ CTA and pending Cath     High cholesterol   Assessment & Plan    · Continue statin     Restless leg syndrome   Assessment & Plan    · Continue Requip 1 5mg QHS       Discharging Physician / Practitioner: Eva Lomas PA-C  PCP: Nikole Cotter MD  Admission Date:   Admission Orders     Ordered        11/26/18 1708  Place in Observation (expected length of stay for this patient is less than two midnights)  Once             Discharge Date: 11/27/18    Resolved Problems  Date Reviewed: 11/27/2018    None          Consultations During Hospital Stay:  · Dr Michelle Fong, Cardiology    Procedures Performed:   · CTA: no PE, severe hepatic steatosis, Coronary artery calcifications  · CXR: no acute disease  · RUQ US: Surgically absent gallbladder, no biliary dilatation  Diffuse hepatic steatosis   No significant change compared to the prior ultrasound  Significant Findings / Test Results:   · Mildly abnormal troponins recommended for cardiac cath    Incidental Findings:   · none     Test Results Pending at Discharge (will require follow up):   · none     Outpatient Tests Requested:  · none    Complications:  none    Reason for Admission: chest pain    Hospital Course:     Sheila Tipton is a 62 y o  female patient who originally presented to the hospital on 11/26/2018 due to chest pain  She has been complaining of chest pain on and off for 1 month, had been seen in ER and by her PCP for chest pain  Her troponins increased slightly and Cardiology is recommending transfer for a cardiac cath  She does have hyperlipidemia, diabetes mellitus uncontrolled with A1c 10 6, hypertension, BP was accelerated on presentation, improved with IV labetalol and nitro  Case was discussed with Cardiology MARIANN Arteaga and nursing    Please see above list of diagnoses and related plan for additional information  Condition at Discharge: stable     Discharge Day Visit / Exam:     Subjective:  Patient seen in bed, reports having constant chest pain since 1030 this AM, she was given 2 nitro with minimal relief  She has mild nausea  She also had oxygen placed for comfort  Vitals: Blood Pressure: 135/82 (11/27/18 0615)  Pulse: 77 (11/27/18 0615)  Temperature: 98 9 °F (37 2 °C) (11/27/18 0615)  Temp Source: Tympanic (11/27/18 0615)  Respirations: 18 (11/27/18 0615)  Height: 5' 1" (154 9 cm) (11/26/18 1753)  Weight - Scale: 77 4 kg (170 lb 9 6 oz) (11/26/18 1753)  SpO2: 95 % (11/27/18 0615)     Exam:   Physical Exam   Constitutional: She is oriented to person, place, and time  She appears well-developed and well-nourished  Appears uncomfortable, anxious   HENT:   Head: Normocephalic and atraumatic  Eyes: Conjunctivae and EOM are normal  Right eye exhibits no discharge  Left eye exhibits no discharge  Neck: Normal range of motion   No tracheal deviation present  Cardiovascular: Normal rate, regular rhythm and normal heart sounds  Exam reveals no gallop and no friction rub  No murmur heard  Pulmonary/Chest: Breath sounds normal  No respiratory distress  She has no wheezes  She has no rales  She exhibits tenderness  Abdominal: Soft  Bowel sounds are normal  She exhibits no distension  There is tenderness (+ruq )  There is no rebound  Musculoskeletal: Normal range of motion  She exhibits no edema, tenderness or deformity  Neurological: She is alert and oriented to person, place, and time  Skin: Skin is warm and dry  No rash noted  No erythema  No pallor  Psychiatric: She has a normal mood and affect  Her behavior is normal  Judgment and thought content normal    Nursing note and vitals reviewed  Discussion with Family:  at bedside    Discharge instructions/Information to patient and family:   See after visit summary for information provided to patient and family  Provisions for Follow-Up Care:  See after visit summary for information related to follow-up care and any pertinent home health orders  Disposition:     [address] [address] Transfer to Dr Dom Castillo service [facility]    Planned Readmission: [facility] for cardiac cath     Discharge Statement:  I spent 35 minutes discharging the patient  This time was spent on the day of discharge  I had direct contact with the patient on the day of discharge  Greater than 50% of the total time was spent examining patient, answering all patient questions, arranging and discussing plan of care with patient as well as directly providing post-discharge instructions  Additional time then spent on discharge activities  Discharge Medications:  See after visit summary for reconciled discharge medications provided to patient and family        ** Please Note: This note has been constructed using a voice recognition system **

## 2018-11-27 NOTE — ASSESSMENT & PLAN NOTE
Lab Results   Component Value Date    HGBA1C 10 6 (H) 11/27/2018       Recent Labs      11/26/18   1822  11/26/18   2040  11/27/18   0614  11/27/18   1051   POCGLU  307*  263*  143*  194*       Blood Sugar Average: Last 72 hrs:  · (P) 226 75continue insulin  ·  A1C 10 6  · Have meal coverage and Lantus  · Hold metformin w/ CTA and pending Cath

## 2018-11-27 NOTE — ASSESSMENT & PLAN NOTE
Exertional Chest Pain with typical features of ACI   EKG is inconsistent with acute ischemia   Troponins are steadily rising although they are not significant in their elevation  Given CAD risk factors of HTN, HLD, DMII, obesity and Family Hx of Early CAD, I would recommend transfer to [facility] for cardiac catheterization

## 2018-11-27 NOTE — SOCIAL WORK
Evaluated the pt at the bedside with the spouse present  Pt lives in a 2 story home with 4 steps in front and 7 in back  Pt currently is having chest pain and will be transferred  to [facility] for a cardiac catheterization today  CM reviewed d/c planning process including the following: identifying help at home, patient preference for d/c planning needs, availability of treatment team to discuss questions or concerns patient and/or family may have regarding understanding medications and recognizing signs and symptoms once discharged  CM also encouraged patient to follow up with all recommended appointments after discharge  Patient advised of importance for patient and family to participate in managing patients medical well being

## 2018-11-27 NOTE — H&P
H&P- Altpatricia Patrick 1960, 62 y o  female MRN: [id number]    Unit/Bed#: 2 213-01 Encounter: [id number]    Primary Care Provider: Christina Riley MD   Date and time admitted to hospital: 11/27/2018  5:48 PM        Type 2 diabetes mellitus without complication, with long-term current use of insulin [facility])   Assessment & Plan    Lab Results   Component Value Date    HGBA1C 10 6 (H) 11/27/2018       Recent Labs      11/26/18   1822  11/26/18   2040  11/27/18   0614  11/27/18   1051   POCGLU  307*  263*  143*  194*       Blood Sugar Average: Last 72 hrs:   poorly controlled diabetes mellitus, for now will continue previous home regimen, consult Endocrinology, further adjustment of insulin regimen pending further measurement overnight     Essential hypertension   Assessment & Plan    Patient presented originally in [place] with accelerated hypertension  Currently blood pressure is acceptable on metoprolol, lisinopril, amlodipine  Continue to monitor     * Chest pain   Assessment & Plan    Patient presented with complain of chest pain on and off for a month  Found to have elevation of cardiac enzyme, originally pain was thought to be secondary to GI pathology, right upper quadrant ultrasound was obtained, showing diffuse hepatic steatosis  Patient found to have troponin elevation, max value 0 23, suspected type 1 NSTEMI  She received 300 mg of Plavix p o   Time once today as per Cardiology recommendation  She was transferred to our facility from [place] for cardiac catheterization tomorrow  Discussed with cardiologist this morning, at this point no need for full anticoagulation  Chest pain free  Measures troponin time to again  GI consultation  NPO after midnight       VTE Prophylaxis: Enoxaparin (Lovenox)  / sequential compression device   Code Status:  Full code  POLST: POLST is not applicable to this patient  Discussion with family:   at bedside    Anticipated Length of Stay:  Patient will be admitted on an Inpatient basis with an anticipated length of stay of  > 2 midnights  Justification for Hospital Stay:  Please refer to above    Total Time for Visit, including Counseling / Coordination of Care: 1 hour  Greater than 50% of this total time spent on direct patient counseling and coordination of care  Chief Complaint:   Presently she has no complaints, previously chest pain    History of Present Illness:    Boris Rosario is a 62 y o  female who presents with chest pain  This is a 60-year-old female with past medical history of hypertension, uncontrolled diabetes mellitus, hyperlipidemia, chronic back pain, restless leg syndrome, presenting to [facility] for chest pain, hospitalized under observation and subsequently found to have elevation of troponin, likely type 1 NSTEMI  For this reason, she was transferred to our facility for further management by Cardiology with cardiac catheterization  Currently she was no complaints  She she has not chest pain  No nausea, vomiting, diaphoresis, shortness of breath  No other concerns or complaints  As per patient chest pain was present on and off for a week  As per  at bedside patient pain was present on and off for about a month  Review of Systems:    Review of Systems   Respiratory: Negative for chest tightness  Cardiovascular: Negative for chest pain  Gastrointestinal: Negative for nausea  All other systems reviewed and are negative  Past Medical and Surgical History:     Past Medical History:   Diagnosis Date    Chronic back pain     Hyperlipidemia     Hypertension     Restless leg syndrome     Type 2 diabetes mellitus ([address] )        Past Surgical History:   Procedure Laterality Date    ANKLE SURGERY      tubal    CHOLECYSTECTOMY      GALLBLADDER SURGERY      TUBAL LIGATION         Meds/Allergies:    Prior to Admission medications    Medication Sig Start Date End Date Taking?  Authorizing Provider   ACCU-CHEK CHANDANA PLUS test strip USE TO TEST SUGARS 7 TIMES DAILY   E11 9 11/20/18   Amanda Mcgee MD   ACCU-CHEK SOFTCLIX LANCETS lancets Test TID 9/5/18   Amanda Mcgee MD   amLODIPine (NORVASC) 5 mg tablet Take 1 tablet (5 mg total) by mouth daily 11/20/18   Amanda Mcgee MD   aspirin (ECOTRIN LOW STRENGTH) 81 mg EC tablet Take 81 mg by mouth daily    Historical Provider, MD   atorvastatin (LIPITOR) 10 mg tablet Take 10 mg by mouth daily    Historical Provider, MD   BD PEN NEEDLE RACHEL U/F 32G X 4 MM MISC Inject under the skin daily 9/5/18   Amanda Mcgee MD   benazepril (LOTENSIN) 20 mg tablet Take 1 tablet (20 mg total) by mouth daily  Patient taking differently: Take 40 mg by mouth daily   11/20/18   Amanda Mcgee MD   cyclobenzaprine (FLEXERIL) 5 mg tablet Take 1 tablet (5 mg total) by mouth daily 10/10/18   Amanda Mcgee MD   insulin glargine [facility]) 100 units/mL injection pen Inject 48 Units under the skin daily  Patient taking differently: Inject 52 Units under the skin daily   8/17/18   Amanda Mcgee MD   metFORMIN (GLUCOPHAGE) 500 mg tablet Take 1 tablet (500 mg total) by mouth 2 (two) times a day 10/10/18   Amanda Mcgee MD   rOPINIRole (REQUIP) 0 5 mg tablet Take 0 5 mg by mouth 2 (two) times a day      Historical Provider, MD   tiZANidine (ZANAFLEX) 4 mg tablet Take 4 mg by mouth every 8 (eight) hours as needed for muscle spasms 1/2-1    Historical Provider, MD   traMADol (ULTRAM) 50 mg tablet Take 50 mg by mouth every 8 (eight) hours    Historical Provider, MD         Allergies: No Known Allergies    Social History:     Marital Status: /Civil Union     Substance Use History:   History   Alcohol Use    Yes     Comment: occasionally     History   Smoking Status    Never Smoker   Smokeless Tobacco    Never Used     History   Drug Use No       Family History:    non-contributory    Physical Exam:     Vitals:   Blood Pressure: 146/95 (11/27/18 1811)  Pulse: 78 (11/27/18 1811)  Temperature: 98 °F (36 7 °C) (11/27/18 1811)  Temp Source: Oral (11/27/18 1811)  Respirations: 18 (11/27/18 1811)  Height: 5' 1 5" (156 2 cm) (11/27/18 1811)  Weight - Scale: 77 1 kg (170 lb) (11/27/18 1811)  SpO2: 95 % (11/27/18 1811)    Physical Exam   Constitutional: She is oriented to person, place, and time  She appears well-developed  Cardiovascular: Normal rate, regular rhythm and normal heart sounds  Exam reveals no friction rub  No murmur heard  Pulmonary/Chest: Effort normal  No respiratory distress  She has no wheezes  She has no rales  Abdominal: Soft  She exhibits no distension  There is no tenderness  There is no rebound  Musculoskeletal: She exhibits no edema  Neurological: She is alert and oriented to person, place, and time  She exhibits normal muscle tone  Skin: Skin is warm  Psychiatric: She has a normal mood and affect  Additional Data:     Lab Results: I have personally reviewed pertinent reports  Results from last 7 days  Lab Units 11/26/18  1438   WBC Thousand/uL 9 60   HEMOGLOBIN g/dL 14 7   HEMATOCRIT % 44 7   PLATELETS Thousands/uL 407*   NEUTROS PCT % 56   LYMPHS PCT % 37   MONOS PCT % 5   EOS PCT % 1       Results from last 7 days  Lab Units 11/26/18  1438   SODIUM mmol/L 137   POTASSIUM mmol/L 3 6   CHLORIDE mmol/L 100   CO2 mmol/L 23   BUN mg/dL 10   CREATININE mg/dL 0 86   ANION GAP mmol/L 14*   CALCIUM mg/dL 10 0   ALBUMIN g/dL 4 5   TOTAL BILIRUBIN mg/dL 0 40   ALK PHOS U/L 106   ALT U/L 54*   AST U/L 57*   GLUCOSE RANDOM mg/dL 276*       Results from last 7 days  Lab Units 11/26/18  1446   INR  1 02       Results from last 7 days  Lab Units 11/27/18  1051 11/27/18  0614 11/26/18  2040 11/26/18  1822   POC GLUCOSE mg/dl 194* 143* 263* 307*       Results from last 7 days  Lab Units 11/27/18  0515   HEMOGLOBIN A1C % 10 6*           Imaging: I have personally reviewed pertinent reports        No orders to display       AllscriAppiterate / Picklify Records Reviewed: Yes     ** Please Note: This note has been constructed using a voice recognition system   **

## 2018-11-27 NOTE — ASSESSMENT & PLAN NOTE
Patient presented with complain of chest pain on and off for a month  Found to have elevation of cardiac enzyme, originally pain was thought to be secondary to GI pathology, right upper quadrant ultrasound was obtained, showing diffuse hepatic steatosis  Patient found to have troponin elevation, max value 0 23, suspected type 1 NSTEMI  She received 300 mg of Plavix p o   Time once today as per Cardiology recommendation  She was transferred to our facility from [place] for cardiac catheterization tomorrow  Discussed with cardiologist this morning, at this point no need for full anticoagulation  Chest pain free  Measures troponin time to again  GI consultation  NPO after midnight

## 2018-11-27 NOTE — ASSESSMENT & PLAN NOTE
· Noted for several days  · Patient seen in ER 11/19, saw PCP 11/21 for chest pain    · Trops 0 04, 0 07, 0 23  · Cardiology recommend transfer for cardiac cath, Has been accepted by Dr Elliot Hall

## 2018-11-28 ENCOUNTER — TELEPHONE (OUTPATIENT)
Dept: FAMILY MEDICINE CLINIC | Facility: CLINIC | Age: 58
End: 2018-11-28

## 2018-11-28 ENCOUNTER — APPOINTMENT (INPATIENT)
Dept: NON INVASIVE DIAGNOSTICS | Facility: HOSPITAL | Age: 58
DRG: 174 | End: 2018-11-28
Attending: INTERNAL MEDICINE
Payer: COMMERCIAL

## 2018-11-28 PROBLEM — E78.2 MIXED HYPERLIPIDEMIA: Status: ACTIVE | Noted: 2018-11-28

## 2018-11-28 PROBLEM — I21.4 NSTEMI (NON-ST ELEVATED MYOCARDIAL INFARCTION) (HCC): Status: ACTIVE | Noted: 2018-11-28

## 2018-11-28 LAB
ATRIAL RATE: 67 BPM
ATRIAL RATE: 97 BPM
GLUCOSE SERPL-MCNC: 148 MG/DL (ref 65–140)
GLUCOSE SERPL-MCNC: 159 MG/DL (ref 65–140)
GLUCOSE SERPL-MCNC: 247 MG/DL (ref 65–140)
KCT BLD-ACNC: 285 SEC (ref 89–137)
P AXIS: 22 DEGREES
P AXIS: 45 DEGREES
PR INTERVAL: 148 MS
PR INTERVAL: 152 MS
QRS AXIS: 42 DEGREES
QRS AXIS: 58 DEGREES
QRSD INTERVAL: 80 MS
QRSD INTERVAL: 82 MS
QT INTERVAL: 362 MS
QT INTERVAL: 442 MS
QTC INTERVAL: 459 MS
QTC INTERVAL: 467 MS
SPECIMEN SOURCE: ABNORMAL
T WAVE AXIS: 35 DEGREES
T WAVE AXIS: 36 DEGREES
TROPONIN I SERPL-MCNC: 0.2 NG/ML
VENTRICULAR RATE: 67 BPM
VENTRICULAR RATE: 97 BPM

## 2018-11-28 PROCEDURE — B2111ZZ FLUOROSCOPY OF MULTIPLE CORONARY ARTERIES USING LOW OSMOLAR CONTRAST: ICD-10-PCS | Performed by: INTERNAL MEDICINE

## 2018-11-28 PROCEDURE — C1769 GUIDE WIRE: HCPCS | Performed by: INTERNAL MEDICINE

## 2018-11-28 PROCEDURE — 99152 MOD SED SAME PHYS/QHP 5/>YRS: CPT | Performed by: INTERNAL MEDICINE

## 2018-11-28 PROCEDURE — 82948 REAGENT STRIP/BLOOD GLUCOSE: CPT

## 2018-11-28 PROCEDURE — 93010 ELECTROCARDIOGRAM REPORT: CPT | Performed by: INTERNAL MEDICINE

## 2018-11-28 PROCEDURE — C1725 CATH, TRANSLUMIN NON-LASER: HCPCS | Performed by: INTERNAL MEDICINE

## 2018-11-28 PROCEDURE — 92920 PRQ TRLUML C ANGIOP 1ART&/BR: CPT | Performed by: INTERNAL MEDICINE

## 2018-11-28 PROCEDURE — C1894 INTRO/SHEATH, NON-LASER: HCPCS | Performed by: INTERNAL MEDICINE

## 2018-11-28 PROCEDURE — C9600 PERC DRUG-EL COR STENT SING: HCPCS | Performed by: INTERNAL MEDICINE

## 2018-11-28 PROCEDURE — 85347 COAGULATION TIME ACTIVATED: CPT

## 2018-11-28 PROCEDURE — 93454 CORONARY ARTERY ANGIO S&I: CPT | Performed by: INTERNAL MEDICINE

## 2018-11-28 PROCEDURE — 93005 ELECTROCARDIOGRAM TRACING: CPT

## 2018-11-28 PROCEDURE — 99153 MOD SED SAME PHYS/QHP EA: CPT | Performed by: INTERNAL MEDICINE

## 2018-11-28 PROCEDURE — 99254 IP/OBS CNSLTJ NEW/EST MOD 60: CPT | Performed by: INTERNAL MEDICINE

## 2018-11-28 PROCEDURE — 99253 IP/OBS CNSLTJ NEW/EST LOW 45: CPT | Performed by: INTERNAL MEDICINE

## 2018-11-28 PROCEDURE — C1887 CATHETER, GUIDING: HCPCS | Performed by: INTERNAL MEDICINE

## 2018-11-28 PROCEDURE — 02713ZZ DILATION OF CORONARY ARTERY, TWO ARTERIES, PERCUTANEOUS APPROACH: ICD-10-PCS | Performed by: INTERNAL MEDICINE

## 2018-11-28 RX ORDER — BISACODYL 10 MG
10 SUPPOSITORY, RECTAL RECTAL DAILY PRN
Status: DISCONTINUED | OUTPATIENT
Start: 2018-11-28 | End: 2018-11-29 | Stop reason: HOSPADM

## 2018-11-28 RX ORDER — ONDANSETRON 2 MG/ML
4 INJECTION INTRAMUSCULAR; INTRAVENOUS EVERY 6 HOURS PRN
Status: DISCONTINUED | OUTPATIENT
Start: 2018-11-28 | End: 2018-11-29 | Stop reason: HOSPADM

## 2018-11-28 RX ORDER — HEPARIN SODIUM 1000 [USP'U]/ML
INJECTION, SOLUTION INTRAVENOUS; SUBCUTANEOUS CODE/TRAUMA/SEDATION MEDICATION
Status: COMPLETED | OUTPATIENT
Start: 2018-11-28 | End: 2018-11-28

## 2018-11-28 RX ORDER — NITROGLYCERIN 20 MG/100ML
INJECTION INTRAVENOUS CODE/TRAUMA/SEDATION MEDICATION
Status: COMPLETED | OUTPATIENT
Start: 2018-11-28 | End: 2018-11-28

## 2018-11-28 RX ORDER — SODIUM CHLORIDE 9 MG/ML
INJECTION, SOLUTION INTRAVENOUS
Status: COMPLETED | OUTPATIENT
Start: 2018-11-28 | End: 2018-11-28

## 2018-11-28 RX ORDER — POLYETHYLENE GLYCOL 3350 17 G/17G
17 POWDER, FOR SOLUTION ORAL DAILY
Status: DISCONTINUED | OUTPATIENT
Start: 2018-11-28 | End: 2018-11-29 | Stop reason: HOSPADM

## 2018-11-28 RX ORDER — LIDOCAINE HYDROCHLORIDE 10 MG/ML
INJECTION, SOLUTION INFILTRATION; PERINEURAL CODE/TRAUMA/SEDATION MEDICATION
Status: COMPLETED | OUTPATIENT
Start: 2018-11-28 | End: 2018-11-28

## 2018-11-28 RX ORDER — ACETAMINOPHEN 325 MG/1
650 TABLET ORAL EVERY 4 HOURS PRN
Status: DISCONTINUED | OUTPATIENT
Start: 2018-11-28 | End: 2018-11-29 | Stop reason: HOSPADM

## 2018-11-28 RX ORDER — SODIUM CHLORIDE 9 MG/ML
125 INJECTION, SOLUTION INTRAVENOUS CONTINUOUS
Status: DISCONTINUED | OUTPATIENT
Start: 2018-11-28 | End: 2018-11-29

## 2018-11-28 RX ORDER — SENNOSIDES 8.6 MG
1 TABLET ORAL
Status: DISCONTINUED | OUTPATIENT
Start: 2018-11-28 | End: 2018-11-29 | Stop reason: HOSPADM

## 2018-11-28 RX ORDER — NITROGLYCERIN 0.4 MG/1
0.4 TABLET SUBLINGUAL
Status: DISCONTINUED | OUTPATIENT
Start: 2018-11-28 | End: 2018-11-29 | Stop reason: HOSPADM

## 2018-11-28 RX ORDER — MIDAZOLAM HYDROCHLORIDE 1 MG/ML
INJECTION INTRAMUSCULAR; INTRAVENOUS CODE/TRAUMA/SEDATION MEDICATION
Status: COMPLETED | OUTPATIENT
Start: 2018-11-28 | End: 2018-11-28

## 2018-11-28 RX ORDER — FENTANYL CITRATE 50 UG/ML
INJECTION, SOLUTION INTRAMUSCULAR; INTRAVENOUS CODE/TRAUMA/SEDATION MEDICATION
Status: COMPLETED | OUTPATIENT
Start: 2018-11-28 | End: 2018-11-28

## 2018-11-28 RX ORDER — VERAPAMIL HYDROCHLORIDE 2.5 MG/ML
INJECTION, SOLUTION INTRAVENOUS CODE/TRAUMA/SEDATION MEDICATION
Status: COMPLETED | OUTPATIENT
Start: 2018-11-28 | End: 2018-11-28

## 2018-11-28 RX ADMIN — ASPIRIN 81 MG: 81 TABLET, COATED ORAL at 09:35

## 2018-11-28 RX ADMIN — AMLODIPINE BESYLATE 5 MG: 5 TABLET ORAL at 09:35

## 2018-11-28 RX ADMIN — ATORVASTATIN CALCIUM 40 MG: 40 TABLET, FILM COATED ORAL at 17:30

## 2018-11-28 RX ADMIN — MIDAZOLAM 2 MG: 1 INJECTION INTRAMUSCULAR; INTRAVENOUS at 12:09

## 2018-11-28 RX ADMIN — MIDAZOLAM 2 MG: 1 INJECTION INTRAMUSCULAR; INTRAVENOUS at 11:18

## 2018-11-28 RX ADMIN — SODIUM CHLORIDE 125 ML/HR: 0.9 INJECTION, SOLUTION INTRAVENOUS at 12:34

## 2018-11-28 RX ADMIN — ROPINIROLE HYDROCHLORIDE 1.5 MG: 1 TABLET, FILM COATED ORAL at 21:40

## 2018-11-28 RX ADMIN — NITROGLYCERIN 200 MCG: 20 INJECTION INTRAVENOUS at 11:34

## 2018-11-28 RX ADMIN — NITROGLYCERIN 200 MCG: 20 INJECTION INTRAVENOUS at 11:23

## 2018-11-28 RX ADMIN — SODIUM CHLORIDE 100 ML/HR: 0.9 INJECTION, SOLUTION INTRAVENOUS at 10:44

## 2018-11-28 RX ADMIN — INSULIN LISPRO 10 UNITS: 100 INJECTION, SOLUTION INTRAVENOUS; SUBCUTANEOUS at 17:30

## 2018-11-28 RX ADMIN — FENTANYL CITRATE 50 MCG: 50 INJECTION, SOLUTION INTRAMUSCULAR; INTRAVENOUS at 12:09

## 2018-11-28 RX ADMIN — INSULIN LISPRO 4 UNITS: 100 INJECTION, SOLUTION INTRAVENOUS; SUBCUTANEOUS at 17:29

## 2018-11-28 RX ADMIN — INSULIN DETEMIR 40 UNITS: 100 INJECTION, SOLUTION SUBCUTANEOUS at 21:40

## 2018-11-28 RX ADMIN — SODIUM CHLORIDE 125 ML/HR: 0.9 INJECTION, SOLUTION INTRAVENOUS at 19:17

## 2018-11-28 RX ADMIN — HEPARIN SODIUM 4000 UNITS: 1000 INJECTION INTRAVENOUS; SUBCUTANEOUS at 11:11

## 2018-11-28 RX ADMIN — VERAPAMIL HYDROCHLORIDE 2.5 MG: 2.5 INJECTION INTRAVENOUS at 11:03

## 2018-11-28 RX ADMIN — TRAMADOL HYDROCHLORIDE 50 MG: 50 TABLET, COATED ORAL at 12:34

## 2018-11-28 RX ADMIN — INSULIN LISPRO 2 UNITS: 100 INJECTION, SOLUTION INTRAVENOUS; SUBCUTANEOUS at 21:50

## 2018-11-28 RX ADMIN — FENTANYL CITRATE 50 MCG: 50 INJECTION, SOLUTION INTRAMUSCULAR; INTRAVENOUS at 11:17

## 2018-11-28 RX ADMIN — LISINOPRIL 40 MG: 20 TABLET ORAL at 09:35

## 2018-11-28 RX ADMIN — IOHEXOL 130 ML: 350 INJECTION, SOLUTION INTRAVENOUS at 11:44

## 2018-11-28 RX ADMIN — METOPROLOL TARTRATE 25 MG: 25 TABLET, FILM COATED ORAL at 21:40

## 2018-11-28 RX ADMIN — LIDOCAINE HYDROCHLORIDE 1 ML: 10 INJECTION, SOLUTION INFILTRATION; PERINEURAL at 10:55

## 2018-11-28 RX ADMIN — NITROGLYCERIN 200 MCG: 20 INJECTION INTRAVENOUS at 11:35

## 2018-11-28 RX ADMIN — HEPARIN SODIUM 4000 UNITS: 1000 INJECTION INTRAVENOUS; SUBCUTANEOUS at 11:03

## 2018-11-28 RX ADMIN — TRAMADOL HYDROCHLORIDE 50 MG: 50 TABLET, COATED ORAL at 17:31

## 2018-11-28 RX ADMIN — NITROGLYCERIN 200 MCG: 20 INJECTION INTRAVENOUS at 11:03

## 2018-11-28 RX ADMIN — POLYETHYLENE GLYCOL 3350 17 G: 17 POWDER, FOR SOLUTION ORAL at 12:40

## 2018-11-28 RX ADMIN — FENTANYL CITRATE 50 MCG: 50 INJECTION, SOLUTION INTRAMUSCULAR; INTRAVENOUS at 10:52

## 2018-11-28 RX ADMIN — TIZANIDINE 4 MG: 4 TABLET ORAL at 21:40

## 2018-11-28 RX ADMIN — SENNOSIDES 8.6 MG: 8.6 TABLET, FILM COATED ORAL at 21:42

## 2018-11-28 RX ADMIN — ACETAMINOPHEN 650 MG: 325 TABLET ORAL at 11:57

## 2018-11-28 RX ADMIN — MIDAZOLAM 2 MG: 1 INJECTION INTRAMUSCULAR; INTRAVENOUS at 10:52

## 2018-11-28 RX ADMIN — METOPROLOL TARTRATE 25 MG: 25 TABLET, FILM COATED ORAL at 09:35

## 2018-11-28 NOTE — PROGRESS NOTES
Pt c/o 3/10 substernal chest pain, nonradiating  EKG completed, VS stable and within normal limits  [facility] notified  Pt given scheduled dose of tramadol  Pt declined PRN nitro stating she did not think the pain is severe enough for it  Will continue to monitor

## 2018-11-28 NOTE — ASSESSMENT & PLAN NOTE
· Troponin peak 0 31 with noted trend down  · Type 1 NSTEMI  · Status post cardiac catheterization:  Balloon angioplasty no stenting of distal bifurcating OM 1 branch

## 2018-11-28 NOTE — PROGRESS NOTES
Pt was given GI cocktail and one time dose of IV toradol  Pt resting comfortably at this time  Will continue to monitor

## 2018-11-28 NOTE — CONSULTS
Please see original consult by Bc Guerin PA-C on 11/27/18  Progress Note - Cardiology   Bing Galvin 62 y o  female MRN: [id number]  Unit/Bed#: CW2 218-02 Encounter: [id number]    Assessment/Plan:  Chest pain  Strong suspicion for ACS, given worsening with exertion, relief with rest/nitroglycerin, personal risk factors of diabetes mellitus type II and HLD, and significant family risk factors of CAD in both parents and in younger sister (age in 45s) who has had stents placed  Troponin peak at  31 yesterday, most recent   20  On presentation troponin of  04  EKG performed 11/26 showed sinus tachycardia at 121 but otherwise normal rhythm  Pt received 300mg one-time dose Plavix yesterday, Pt has been NPO since midnight  · Plan for cardiac catheterization today  Hyperlipidemia  · Continue high intensity statin - atorvastatin 40mg    Hypertension  Severely elevated on initial presentation to [facility] at 217/125, this /65  · Continue metoprolol 25mg q12, amlodipine 5mg, lisinopril 40mg  Subjective/Objective   Chief Complaint: chest pain  Subjective: 63 yo female with PMhx of DM2, HTN, HLD [facility] with complaints of severe pressure-like chest pain associated with radiation to left arm and back, nausea, diaphoresis, SOB  Pt reports this episode started around noon yesterday and she presented to the hospital because it was constant and severe (9/10)  Pt states pain was worse with exertion and was relieved completely with nitroglycerin given in ED  Pt states that she has had this same chest pain intermittently for one month, lasting up to an hour with each episode  Cardiology was [facility] and recommended transfer to [facility] for cardiac catheterization  Pt seen and evaluated today, has no acute symptoms this morning, denies chest pain, palpitations, nausea, vomiting, but reports she has not been out of bed yet  Pt reports mild L shoulder pain    Per pt, last night she had mild (2-3/10) chest pain with radiation to L arm which resolved without intervention  Family Hx: CAD in both parents (unknown age of onset, father  at 80 last year, per pt family, they do not want to inform pt's mother of current situation due to her poor health), CAD in pt's younger sister in her 45s  Social history: never smoker, denies drug use, infrequent alcohol intake  Review of Systems:   Constitutional: Negative for chills, diaphoresis and fever  HENT: Negative for rhinorrhea and sore throat  Eyes: Negative for visual disturbance  Respiratory: Negative for shortness of breath,orthopnea  Negative for cough  Cardiovascular: Negative for chest pain  Negative for leg swelling  Gastrointestinal:  Negative for abdominal pain, nausea, diarrhea and vomiting  Genitourinary: Negative for dysuria  Musculoskeletal: Negative for back pain and myalgias  Mild L shoulder pain this AM    Skin: Negative for rash  Neurological: Negative for dizziness, weakness, numbness and headaches  Psychiatric/Behavioral: Negative for confusion     All other systems reviewed and are negative    Objective:  Vitals: /65 (BP Location: Left arm)   Pulse 82   Temp 97 8 °F (36 6 °C) (Oral)   Resp 18   Ht 5' 1 5" (1 562 m)   Wt 77 1 kg (170 lb)   SpO2 97%   BMI 31 60 kg/m²   Vitals:    18 1811   Weight: 77 1 kg (170 lb)     Orthostatic Blood Pressures      Most Recent Value   Blood Pressure  111/65 filed at 2018 0715   Patient Position - Orthostatic VS  Lying filed at 2018 0715            Intake/Output Summary (Last 24 hours) at 18 0935  Last data filed at 18 2221   Gross per 24 hour   Intake              240 ml   Output              600 ml   Net             -360 ml       Invasive Devices     Peripheral Intravenous Line            Peripheral IV 18 Right Forearm 1 day                Physical Exam:   Constitutional: She is oriented to person, place, and time  She appears well-developed and well-nourished  Resting comfortably  HENT:   Head: Normocephalic and atraumatic  Eyes: Conjunctivae and EOM are normal  Right eye exhibits no discharge  Left eye exhibits no discharge  Neck: Normal range of motion  No tracheal deviation present  Cardiovascular: Normal rate, regular rhythm and normal heart sounds  Exam reveals no gallop and no friction rub  No murmur heard  Pulmonary/Chest: Breath sounds normal  No respiratory distress  She has no wheezes  She has no rales  Abdominal: Soft  Bowel sounds are normal  She exhibits no distension  There is no tenderness  There is no rebound  Musculoskeletal: Normal range of motion  She exhibits no edema, tenderness or deformity  Neurological: She is alert and oriented to person, place, and time  Skin: Skin is warm and dry  No rash noted  No erythema  No pallor  Psychiatric: She has a normal mood and affect  Her behavior is normal  Judgment and thought content normal    Nursing note and vitals reviewed      Lab Results:   CBC with diff:   Results from last 7 days  Lab Units 11/26/18  1438   WBC Thousand/uL 9 60   RBC Million/uL 4 88   HEMOGLOBIN g/dL 14 7   HEMATOCRIT % 44 7   MCV fL 91   MCH pg 30 2   MCHC g/dL 33 0   RDW % 14 4   MPV fL 7 5*   PLATELETS Thousands/uL 407*     CMP:   Results from last 7 days  Lab Units 11/26/18  1438   SODIUM mmol/L 137   POTASSIUM mmol/L 3 6   CHLORIDE mmol/L 100   CO2 mmol/L 23   BUN mg/dL 10   CREATININE mg/dL 0 86   CALCIUM mg/dL 10 0   AST U/L 57*   ALT U/L 54*   ALK PHOS U/L 106   EGFR ml/min/1 73sq m 75     Troponin:   0  Lab Value Date/Time   TROPONINI 0 20 (H) 11/27/2018 2234   TROPONINI 0 31 (H) 11/27/2018 1948   TROPONINI 0 23 (H) 11/26/2018 2153   TROPONINI 0 07 (H) 11/26/2018 1724   TROPONINI 0 04 (H) 11/26/2018 1438   TROPONINI <0 03 11/19/2018 2329     Lipid Profile:   Results from last 7 days  Lab Units 11/27/18  0515   HDL mg/dL 38*   LDL CALC mg/dL 164 TRIGLYCERIDES mg/dL 373*     Imaging: I have personally reviewed pertinent reports      EKG: Sinus tachycardia at 121 BPM, otherwise normal  VTE Pharmacologic Prophylaxis: Sequential compression device (Venodyne)  and Enoxaparin (Lovenox)  VTE Mechanical Prophylaxis: sequential compression device

## 2018-11-28 NOTE — PROGRESS NOTES
Call received from nursing staff reporting pt with c/o CHP at 3/10 that is non radiating  Most recent troponin at 0 31  12 lead EKG reveals NSR with inverted T waves in lead III, only which was present on previous EKG  No other ischemic changes noted  VSS  Has already received Plavix loading dose per cardiology      - Continue to trend troponin  - Plan for [facility] tomorrow  - SL NTG PRN  - GI cocktail x 1  - Toradol 15mg IV x 1  - Continue to monitor

## 2018-11-28 NOTE — PROGRESS NOTES
Progress Note - Romi Hunt 1960, 62 y o  female MRN: [id number]    Unit/Bed#: CW2 218-02 Encounter: [id number]    Primary Care Provider: Reed Groves MD   Date and time admitted to hospital: 11/27/2018  5:48 PM        * Chest pain   Assessment & Plan    · Present on admission with chest pain that worsened with exertion, improved with rest and nitroglycerin, reports of on and off for approximately a month  · Risk factors of diabetes, hypertension, hyperlipidemia    · Family history CAD parents and sibling  · Troponins elevated with peak troponin of 0 31 with noted trend down  · EKG normal sinus rhythm  · Appreciate Cardiology consultation  · Patient is status post cardiac catheterization:  OM 1 lesion 99% stenosis, 1st left posterior lateral 100% stenosis  · Balloon angioplasty of distal bifurcating OM 1 branch  · Recommendations, aspirin, Plavix, statin and continue beta-blocker       NSTEMI (non-ST elevated myocardial infarction) ([facility] [address] )   Assessment & Plan    · Troponin peak 0 31 with noted trend down  · Type 1 NSTEMI  · Status post cardiac catheterization:  Balloon angioplasty no stenting of distal bifurcating OM 1 branch      Type 2 diabetes mellitus without complication, with long-term current use of insulin [facility])   Assessment & Plan    Lab Results   Component Value Date    HGBA1C 10 6 (H) 11/27/2018       Recent Labs      11/27/18   1051  11/27/18   2045  11/28/18   0609  11/28/18   1232   POCGLU  194*  258*  148*  159*       Blood Sugar Average: Last 72 hrs:  (P) 819 [id number]     · poorly controlled as evidenced by A1c and hyperglycemia  · Patient needs tighter control of blood sugar endocrine consult is pending  · Continue for now will continue Accu-Cheks a c  HS with SSI  · Continue patient's home regimen  · Hold metformin, resume at time of discharge unless Endocrine discontinues medication     Mixed hyperlipidemia   Assessment & Plan    · Cholesterol 277, triglycerides 373, HDL 38 LDL 164  · Patient was on 10 mg of Lipitor at home, increased to 40 mg  · Continue statin after discharge  · Discuss therapeutic lifestyle modifications     Restless leg syndrome   Assessment & Plan    · Continue Requip     Essential hypertension   Assessment & Plan    · Elevated on admission but currently controlled  · Continue amlodipine, lisinopril, metoprolol   · Monitor       VTE Pharmacologic Prophylaxis:   Pharmacologic: Enoxaparin (Lovenox)  Mechanical VTE Prophylaxis in Place: No    Patient Centered Rounds: I have performed bedside rounds with nursing staff today  Discussions with Specialists or Other Care Team Provider:  Cardiology    Education and Discussions with Family / Patient:  Patient and  daughter at bedside    Time Spent for Care: 30 minutes  More than 50% of total time spent on counseling and coordination of care as described above  Current Length of Stay: 1 day(s)    Current Patient Status: Inpatient   Certification Statement: The patient will continue to require additional inpatient hospital stay due to Status post cardiac catheterization, will discharge when cleared by Cardiology within the next 24 hours    Discharge Plan:  Home likely tomorrow    Code Status: Level 1 - Full Code      Subjective:   Denies chest pain shortness of breath nausea vomiting headache dizziness    Objective:     Vitals:   Temp (24hrs), Av 3 °F (36 8 °C), Min:97 8 °F (36 6 °C), Max:98 9 °F (37 2 °C)    Temp:  [97 8 °F (36 6 °C)-98 9 °F (37 2 °C)] 97 8 °F (36 6 °C)  HR:  [67-84] 68  Resp:  [16-18] 18  BP: ()/(64-95) 122/79  SpO2:  [92 %-99 %] 93 %  Body mass index is 31 6 kg/m²  Input and Output Summary (last 24 hours):        Intake/Output Summary (Last 24 hours) at 18 1407  Last data filed at 18 2221   Gross per 24 hour   Intake              240 ml   Output              600 ml   Net             -360 ml       Physical Exam:     Physical Exam   Constitutional: She is oriented to person, place, and time  She appears well-developed and well-nourished  No distress  HENT:   Head: Normocephalic and atraumatic  Mouth/Throat: Oropharynx is clear and moist    Neck: Neck supple  Cardiovascular: Normal rate, regular rhythm and intact distal pulses  No murmur heard  Pulmonary/Chest: Effort normal and breath sounds normal  No respiratory distress  Abdominal: Soft  Bowel sounds are normal  She exhibits no distension  There is no tenderness  Musculoskeletal: Normal range of motion  She exhibits no edema  Neurological: She is alert and oriented to person, place, and time  No cranial nerve deficit  Skin: Skin is warm and dry  Cuff on right wrist intact   Psychiatric: She has a normal mood and affect  Her behavior is normal    Vitals reviewed  Additional Data:     Labs:      Results from last 7 days  Lab Units 11/26/18  1438   WBC Thousand/uL 9 60   HEMOGLOBIN g/dL 14 7   HEMATOCRIT % 44 7   PLATELETS Thousands/uL 407*   NEUTROS PCT % 56   LYMPHS PCT % 37   MONOS PCT % 5   EOS PCT % 1       Results from last 7 days  Lab Units 11/26/18  1438   POTASSIUM mmol/L 3 6   CHLORIDE mmol/L 100   CO2 mmol/L 23   BUN mg/dL 10   CREATININE mg/dL 0 86   CALCIUM mg/dL 10 0   ALK PHOS U/L 106   ALT U/L 54*   AST U/L 57*       Results from last 7 days  Lab Units 11/26/18  1446   INR  1 02       * I Have Reviewed All Lab Data Listed Above  * Additional Pertinent Lab Tests Reviewed:  Mary 66 Admission Reviewed    Imaging:    Imaging Reports Reviewed Today Include:  Cardiac catheterization report  Imaging Personally Reviewed by Myself Includes:  None    Recent Cultures (last 7 days):           Last 24 Hours Medication List:     Current Facility-Administered Medications:  acetaminophen 650 mg Oral Q4H PRN Cassandra Lewis MD    acetaminophen 650 mg Oral Q4H PRN Martin Huang MD    amLODIPine 5 mg Oral Daily Cassandra Lewis MD    aspirin 81 mg Oral Daily Cassandra Lewis MD atorvastatin 40 mg Oral Daily With Tanner Hansen MD    bisacodyl 10 mg Rectal Daily PRN Hayden Suárez PA-C    cyclobenzaprine 5 mg Oral TID PRN Lane Hebert MD    enoxaparin 40 mg Subcutaneous Daily Lane Hebert MD    insulin detemir 52 Units Subcutaneous HS Lane Hebert MD    insulin lispro 1-6 Units Subcutaneous HS Lane Hebert MD    insulin lispro 2-12 Units Subcutaneous TID AC Lane Hebert MD    lisinopril 40 mg Oral Daily Lane Hebert MD    metoprolol tartrate 25 mg Oral Q12H [facility] Lane Hebert MD    nitroglycerin 0 4 mg Sublingual Q5 Min PRN Lane Hebert MD    nitroglycerin 0 4 mg Sublingual Q5 Min PRN Daryle Mountain, MD    ondansetron 4 mg Intravenous Q6H PRN Lane Hebert MD    ondansetron 4 mg Intravenous Q6H PRN Daryle Mountain, MD    polyethylene glycol 17 g Oral Daily Essence Calvert PA-C    rOPINIRole 1 5 mg Oral HS Lane Hebert MD    senna 1 tablet Oral HS Hayden Suárez PA-C    sodium chloride 125 mL/hr Intravenous Continuous Daryle Mountain, MD Last Rate: 125 mL/hr (11/28/18 1234)   tiZANidine 4 mg Oral HS Lane Hebert MD    traMADol 50 mg Oral Q8H Lane Hebert MD         Today, Patient Was Seen By: RUBEN Gonzalez    ** Please Note: Dictation voice to text software may have been used in the creation of this document   **

## 2018-11-28 NOTE — TELEPHONE ENCOUNTER
I called [facility] at [phone] for the auth for the CT scan of chest 97160 DX r07 9  This is pending  I will fax clinical information to [phone]  Tracking # [id number]

## 2018-11-28 NOTE — ASSESSMENT & PLAN NOTE
· Cholesterol 277, triglycerides 373, HDL 38   · Patient was on 10 mg of Lipitor at home, increased to 40 mg  · Continue statin after discharge  · Discuss therapeutic lifestyle modifications

## 2018-11-28 NOTE — ASSESSMENT & PLAN NOTE
· Present on admission with chest pain that worsened with exertion, improved with rest and nitroglycerin, reports of on and off for approximately a month  · Risk factors of diabetes, hypertension, hyperlipidemia    · Family history CAD parents and sibling  · Troponins elevated with peak troponin of 0 31 with noted trend down  · EKG normal sinus rhythm  · Appreciate Cardiology consultation  · Patient is status post cardiac catheterization:  OM 1 lesion 99% stenosis, 1st left posterior lateral 100% stenosis  · Balloon angioplasty of distal bifurcating OM 1 branch  · Recommendations, aspirin, Plavix, statin and continue beta-blocker

## 2018-11-28 NOTE — UTILIZATION REVIEW
Initial Clinical Review    [address] [facility]  Utilization Review Department  Phone: [phone]; Fax [phone]  [email or web address]  ATTENTION: Please call with any questions or concerns to [phone]  and carefully listen to the prompts so that you are directed to the right person  Send all requests for admission clinical reviews, approved or denied determinations and any other requests to fax [phone]  All voicemails are confidential     Admission: Date/Time/Statement: 11/27/18 @ 1748     Orders Placed This Encounter   Procedures    Inpatient Admission     Standing Status:   Standing     Number of Occurrences:   1     Order Specific Question:   Admitting Physician     Answer:   Christiano Chaney [[id number]]     Order Specific Question:   Level of Care     Answer:   Med Surg [16]     Order Specific Question:   Bed request comments     Answer:   tele     Order Specific Question:   Estimated length of stay     Answer:   More than 2 Midnights     Order Specific Question:   Certification     Answer:   I certify that inpatient services are medically necessary for this patient for a duration of greater than two midnights  See H&P and MD Progress Notes for additional information about the patient's course of treatment  Chief Complaint:  Chest pain     History of Illness: This is a 51-year-old female presenting to [facility] for chest pain, hospitalized under observation and subsequently found to have elevation of troponin, likely type 1 NSTEMI  For this reason, she was transferred to our facility [facility]  for further management by Cardiology with cardiac catheterization  Currently she was no complaints  As per patient chest pain was present on and off for a week          ED Vital Signs:   ED Triage Vitals   Temperature Pulse Respirations Blood Pressure SpO2   11/27/18 1811 11/27/18 1811 11/27/18 1811 11/27/18 1811 11/27/18 1811   98 °F (36 7 °C) 78 18 146/95 95 % RA sat       Temp Source Heart Rate Source Patient Position - Orthostatic VS BP Location FiO2 (%)   11/27/18 1811 -- 11/27/18 1811 11/27/18 1811 --   Oral  Lying Left arm       Pain Score       11/27/18 2142       3        Wt Readings from Last 1 Encounters:   11/27/18 77 1 kg (170 lb)         Abnormal Labs/Diagnostic Test Results:  11/27 - Trop 0 31-0 20  Hg A1C  10 6/258    US GB - 11/26 - Surgically absent gallbladder, no biliary dilatation  - Diffuse hepatic steatosis  CTA Chest - 11/26 - No pulmonary embolism or other acute intrathoracic process  Coronary artery calcifications  Severe hepatic steatosis  Tiny hiatal hernia      CXR - 11/26 - no acute       Past Medical/Surgical History:   Diagnosis    Chronic back pain    Hyperlipidemia    Hypertension    Restless leg syndrome    Type 2 diabetes mellitus (HCC)     Past Surgical History:   Procedure Laterality Date    ANKLE SURGERY         tubal    CHOLECYSTECTOMY        GALLBLADDER SURGERY        TUBAL LIGATION                      Admitting Diagnosis: Chest pain [R07 9]    Age/Sex: 62 y o  female    Assessment/Plan:  63 yo f presented with c/o chest pain on - off x a month - has elevated Trp-  Suspect NSTEMI -type 1 - per cardiology - Plavix po - x 1- transferred to [facility] fo cardiac work up - cath - essential HTN - on po meds - metoprolol - lisinopril  & amlodipine -  Continue to monitor -  DM -  - poorly controlled HgA1c - 10 6 - endocrine consult - to adjust meds     Admission Orders:  Scheduled Meds:   Current Facility-Administered Medications:  acetaminophen 650 mg Oral Q4H PRN   amLODIPine 5 mg Oral Daily   aspirin 81 mg Oral Daily   atorvastatin 40 mg Oral Daily With Dinner   bisacodyl 10 mg Rectal Daily PRN   cyclobenzaprine 5 mg Oral TID PRN   enoxaparin 40 mg Subcutaneous Daily   insulin detemir 52 Units Subcutaneous HS   insulin lispro 1-6 Units Subcutaneous HS   insulin lispro 2-12 Units Subcutaneous TID AC lisinopril 40 mg Oral Daily   metoprolol tartrate 25 mg Oral Q12H MANUEL   nitroglycerin 0 4 mg Sublingual Q5 Min PRN   ondansetron 4 mg Intravenous Q6H PRN   polyethylene glycol 17 g Oral Daily   rOPINIRole 1 5 mg Oral HS   senna 1 tablet Oral HS   tiZANidine 4 mg Oral HS   traMADol 50 mg Oral Q8H     Nursing orders - VS q 4 - up & OOB as tolerated - scd's to le's  -  Diet NPO       Cardiology - Strong suspicion for ACS, given worsening with exertion, relief with rest/nitroglycerin, personal risk factors of diabetes mellitus type II and HLD, and significant family risk factors of CAD in both parents and in younger sister (age in 45s) who has had stents placed  Troponin peak at  31 yesterday, most recent   20  On presentation troponin of  04  EKG performed 11/26 showed sinus tachycardia at 121 but otherwise normal rhythm  Pt received 300mg one-time dose Plavix yesterday, Pt has been NPO since midnight  · Plan for cardiac catheterization today      Hyperlipidemia  · Continue high intensity statin - atorvastatin 40mg     Hypertension  Severely elevated on initial presentation to [address] [place] [address] at 217/125, this /65  · Continue metoprolol 25mg q12, amlodipine 5mg, lisinopril 40mg      Cath done 11/28 -    CORONARY CIRCULATION:  1st obtuse marginal: There was a 99 % stenosis in the distal vessel at bifurcation of upper and lower pole  Lesion reduced to <50% residual with balloon inflation in upper and lower poles  Too small deliver stent  There was MADAI grade 1  flow through the vessel (slow flow without perfusion)  This lesion is a likely culprit for the patient's recent myocardial infarction  There was MADAI 3 flow post balloon angioplasty    1st left posterolateral: There was a 100 % stenosis      1ST LESION INTERVENTIONS:  A balloon angioplasty procedure was performed on the 99 % lesion in the 1st obtuse marginal  Following intervention there was a 0 % residual stenosis      2ND LESION INTERVENTIONS:  A successful balloon angioplasty procedure was performed on the 99 % lesion in the 1st obtuse marginal  Following intervention there was a 0 % residual stenosis      INDICATIONS:  --  Possible CAD: myocardial infarction        Endocrinology - pending

## 2018-11-28 NOTE — PLAN OF CARE
Problem: PAIN - ADULT  Goal: Verbalizes/displays adequate comfort level or baseline comfort level  Interventions:  - Encourage patient to monitor pain and request assistance  - Assess pain using appropriate pain scale  - Administer analgesics based on type and severity of pain and evaluate response  - Implement non-pharmacological measures as appropriate and evaluate response  - Consider cultural and social influences on pain and pain management  - Notify physician/advanced practitioner if interventions unsuccessful or patient reports new pain   Outcome: Progressing      Problem: SAFETY ADULT  Goal: Maintain or return to baseline ADL function  INTERVENTIONS:  -  Assess patient's ability to carry out ADLs; assess patient's baseline for ADL function and identify physical deficits which impact ability to perform ADLs (bathing, care of mouth/teeth, toileting, grooming, dressing, etc )  - Assess/evaluate cause of self-care deficits   - Assess range of motion  - Assess patient's mobility; develop plan if impaired  - Assess patient's need for assistive devices and provide as appropriate  - Encourage maximum independence but intervene and supervise when necessary  ¯ Involve family in performance of ADLs  ¯ Assess for home care needs following discharge   ¯ Request OT consult to assist with ADL evaluation and planning for discharge  ¯ Provide patient education as appropriate   Outcome: Progressing    Goal: Maintain or return mobility status to optimal level  INTERVENTIONS:  - Assess patient's baseline mobility status (ambulation, transfers, stairs, etc )    - Identify cognitive and physical deficits and behaviors that affect mobility  - Identify mobility aids required to assist with transfers and/or ambulation (gait belt, sit-to-stand, lift, walker, cane, etc )  - Elizabeth fall precautions as indicated by assessment  - Record patient progress and toleration of activity level on Mobility SBAR; progress patient to next Phase/Stage  - Instruct patient to call for assistance with activity based on assessment  - Request Rehabilitation consult to assist with strengthening/weightbearing, etc    Outcome: Progressing      Problem: DISCHARGE PLANNING  Goal: Discharge to home or other facility with appropriate resources  INTERVENTIONS:  - Identify barriers to discharge w/patient and caregiver  - Arrange for needed discharge resources and transportation as appropriate  - Identify discharge learning needs (meds, wound care, etc )  - Arrange for interpretive services to assist at discharge as needed  - Refer to Case Management Department for coordinating discharge planning if the patient needs post-hospital services based on physician/advanced practitioner order or complex needs related to functional status, cognitive ability, or social support system   Outcome: Progressing      Problem: Knowledge Deficit  Goal: Patient/family/caregiver demonstrates understanding of disease process, treatment plan, medications, and discharge instructions  Complete learning assessment and assess knowledge base  Interventions:  - Provide teaching at level of understanding  - Provide teaching via preferred learning methods   Outcome: Progressing      Problem: CARDIOVASCULAR - ADULT  Goal: Maintains optimal cardiac output and hemodynamic stability  INTERVENTIONS:  - Monitor I/O, vital signs and rhythm  - Monitor for S/S and trends of decreased cardiac output i e  bleeding, hypotension  - Administer and titrate ordered vasoactive medications to optimize hemodynamic stability  - Assess quality of pulses, skin color and temperature  - Assess for signs of decreased coronary artery perfusion - ex   Angina  - Instruct patient to report change in severity of symptoms   Outcome: Progressing

## 2018-11-28 NOTE — CONSULTS
Consultation - Yunier Johns 62 y o  female MRN: [id number]    Unit/Bed#: CW2 218-02 Encounter: [id number]      Assessment/Plan     Assessment/Plan:  Type 2 diabetes with hyperglycemia:  A1c was 10 6  Diabetes is uncontrolled  Discussed with patient that I think she will need mealtime insulin upon discharge  Will add Humalog 10 units with each meal   Will decrease Levemir to 40 units q h s  While in the hospital   Continue scale for correction  Continue to monitor and make changes as needed  Monitor for hypoglycemia  Upon discharge, resume Basaglar q h s     Would resume metformin 48-72 hours after catheterization as long as kidney function looks stable  Discussed with patient that I think she will need Humalog at least with her biggest meal of the day  Therefore I would discharge her on Humalog 10 units with dinner only with an extra 1 unit for every 50 her blood sugar is over 150 in addition to Basaglar qhs and metformin  She should follow-up with Endocrinology as outpatient  Discussed with patient that she can discuss medications such as [place] since now patient regards to type 2 diabetes and coronary artery disease  CC: Diabetes Consult    History of Present Illness     HPI: Yunier Johns is a 62y o  year old female with type 2 diabetes for 3 years  She is on oral agents and insulin at home  She denies any polyuria, polydipsia, nocturia and blurry vision  She denies neuropathy, nephropathy and retinopathy but does have cad found on this admission  She denies any hypoglycemia  She is admitted with chest pain and is status post cath and angioplasty  She is feeling better today and notes adequate appetite  Inpatient consult to Endocrinology  Consult performed by: Cristi Ramey ordered by: Scooby Waters          Review of Systems   Constitutional: Negative for appetite change  HENT: Negative for congestion and trouble swallowing      Eyes: Negative for visual disturbance  Respiratory: Negative for shortness of breath  Cardiovascular: Positive for chest pain  Negative for palpitations and leg swelling  Gastrointestinal: Negative for abdominal pain  Endocrine: Negative for polydipsia and polyuria  Genitourinary: Negative for difficulty urinating and frequency  Musculoskeletal: Negative for arthralgias  Skin: Negative for rash  Neurological: Negative for dizziness and weakness  Psychiatric/Behavioral: Negative for agitation and confusion         Historical Information   Past Medical History:   Diagnosis Date    Chronic back pain     Hyperlipidemia     Hypertension     Restless leg syndrome     Type 2 diabetes mellitus (HCC)      Past Surgical History:   Procedure Laterality Date    ANKLE SURGERY      tubal    CHOLECYSTECTOMY      GALLBLADDER SURGERY      TUBAL LIGATION       Social History   History   Alcohol Use    Yes     Comment: occasionally     History   Drug Use No     History   Smoking Status    Never Smoker   Smokeless Tobacco    Never Used     Family History:   Family History   Problem Relation Age of Onset    Breast cancer Mother     Diabetes Mother     Heart failure Mother     Heart disease Father     Stroke Father        Meds/Allergies   Current Facility-Administered Medications   Medication Dose Route Frequency Provider Last Rate Last Dose    acetaminophen (TYLENOL) tablet 650 mg  650 mg Oral Q4H PRN Shawna Collins MD        acetaminophen (TYLENOL) tablet 650 mg  650 mg Oral Q4H PRN Noé Salter MD   650 mg at 11/28/18 1157    amLODIPine (NORVASC) tablet 5 mg  5 mg Oral Daily Shawna Collins MD   5 mg at 11/28/18 0935    aspirin (ECOTRIN LOW STRENGTH) EC tablet 81 mg  81 mg Oral Daily Shawna Collins MD   81 mg at 11/28/18 0935    atorvastatin (LIPITOR) tablet 40 mg  40 mg Oral Daily With Tammy Wayne MD        bisacodyl (DULCOLAX) rectal suppository 10 mg  10 mg Rectal Daily PRN Christiane Cleaning MARIANN        cyclobenzaprine (FLEXERIL) tablet 5 mg  5 mg Oral TID PRN Torrey Gallo MD        enoxaparin (LOVENOX) subcutaneous injection 40 mg  40 mg Subcutaneous Daily Torrey Gallo MD        insulin detemir (LEVEMIR) subcutaneous injection 52 Units  52 Units Subcutaneous HS Torrey Gallo MD   52 Units at 11/27/18 2142    insulin lispro (HumaLOG) 100 units/mL subcutaneous injection 1-6 Units  1-6 Units Subcutaneous HS Torrey Gallo MD   3 Units at 11/27/18 2304    insulin lispro (HumaLOG) 100 units/mL subcutaneous injection 2-12 Units  2-12 Units Subcutaneous TID AC Torrey Gallo MD        lisinopril (ZESTRIL) tablet 40 mg  40 mg Oral Daily Torrey Gallo MD   40 mg at 11/28/18 0935    metoprolol tartrate (LOPRESSOR) tablet 25 mg  25 mg Oral Q12H [facility] Torrey Gallo MD   25 mg at 11/28/18 0935    nitroglycerin (NITROSTAT) SL tablet 0 4 mg  0 4 mg Sublingual Q5 Min PRN Torrey Gallo MD        nitroglycerin (NITROSTAT) SL tablet 0 4 mg  0 4 mg Sublingual Q5 Min PRN Aftab Stahl MD        ondansetron [facility]) injection 4 mg  4 mg Intravenous Q6H PRN Torrey Gallo MD        ondansetron [facility]) injection 4 mg  4 mg Intravenous Q6H PRN Aftab Stahl MD        polyethylene glycol (MIRALAX) packet 17 g  17 g Oral Daily Eric Santana PA-C   17 g at 11/28/18 1240    rOPINIRole (REQUIP) tablet 1 5 mg  1 5 mg Oral HS Torrey Gallo MD   1 5 mg at 11/27/18 2142    senna (SENOKOT) tablet 8 6 mg  1 tablet Oral HS Eric Santana PA-C        sodium chloride 0 9 % infusion  125 mL/hr Intravenous Continuous Aftab Stahl  mL/hr at 11/28/18 1234 125 mL/hr at 11/28/18 1234    tiZANidine (ZANAFLEX) tablet 4 mg  4 mg Oral HS Torrey Gallo MD   4 mg at 11/27/18 2142    traMADol (ULTRAM) tablet 50 mg  50 mg Oral Q8H Torrey Gallo MD   50 mg at 11/28/18 1234     No Known Allergies    Objective   Vitals: Blood pressure 143/87, pulse 73, temperature 97 8 °F (36 6 °C), temperature source Oral, resp  rate 18, height 5' 1 5" (1 562 m), weight 77 1 kg (170 lb), SpO2 93 %  Intake/Output Summary (Last 24 hours) at 11/28/18 1609  Last data filed at 11/27/18 2221   Gross per 24 hour   Intake              240 ml   Output              600 ml   Net             -360 ml     Invasive Devices     Peripheral Intravenous Line            Peripheral IV 11/26/18 Right Forearm 2 days                Physical Exam   Constitutional: She is oriented to person, place, and time  She appears well-developed and well-nourished  No distress  HENT:   Head: Normocephalic and atraumatic  Eyes: Pupils are equal, round, and reactive to light  Conjunctivae are normal    Neck: Normal range of motion  Neck supple  Cardiovascular: Normal rate and regular rhythm  Pulmonary/Chest: Effort normal and breath sounds normal  No respiratory distress  Abdominal: Soft  Bowel sounds are normal  She exhibits no distension  Musculoskeletal: She exhibits no edema  Neurological: She is alert and oriented to person, place, and time  Skin: Skin is warm and dry  No rash noted  She is not diaphoretic  Psychiatric: She has a normal mood and affect  Her behavior is normal    Vitals reviewed  The history was obtained from the review of the chart, patient      Lab Results:     Results from last 7 days  Lab Units 11/27/18  0515   HEMOGLOBIN A1C % 10 6*     Lab Results   Component Value Date    WBC 9 60 11/26/2018    HGB 14 7 11/26/2018    HCT 44 7 11/26/2018    MCV 91 11/26/2018     (H) 11/26/2018     Lab Results   Component Value Date/Time    BUN 10 11/26/2018 02:38 PM    BUN 13 06/26/2014 09:56 AM     06/26/2014 09:56 AM    K 3 6 11/26/2018 02:38 PM    K 4 5 06/26/2014 09:56 AM     11/26/2018 02:38 PM     06/26/2014 09:56 AM    CO2 23 11/26/2018 02:38 PM    CO2 26 4 06/26/2014 09:56 AM    CREATININE 0 86 11/26/2018 02:38 PM    CREATININE 0 81 06/26/2014 09:56 AM    AST 57 (H) 11/26/2018 02:38 PM    ALT 54 (H) 11/26/2018 02:38 PM    ALB 4 5 11/26/2018 02:38 PM     Recent Labs      11/27/18   0515   HDL  38*   TRIG  373*     No results found for: Larry Antoine  POC Glucose (mg/dl)   Date Value   11/28/2018 159 (H)   11/28/2018 148 (H)   11/27/2018 258 (H)   11/27/2018 194 (H)   11/27/2018 143 (H)   11/26/2018 263 (H)   11/26/2018 307 (H)       Imaging Studies: I have personally reviewed pertinent reports  X-ray chest 1 view portable [[id number]] Collected: 11/26/18 1534   Order Status: Completed Updated: 11/26/18 1538   Narrative:     INDICATION:  Mid sternal chest pain for a few days  HIgh blood pressure       ORDERING PROVIDER:  DORIAN BARNETT  TECHNIQUE:  Frontal chest was obtained at 14:56 hours  COMPARISON:  11/20/18 XR  FINDINGS:    The cardiomediastinal silhouette is normal in size   There is no  consolidation or atelectasis in either lung   There are no pleural  effusions  Gackle Hammed is no pneumothorax   No acute osseous process      Impression:     No acute cardiopulmonary process  Portions of the record may have been created with voice recognition software  Occasional wrong word or "sound a like" substitutions may have occurred due to the inherent limitations of voice recognition software  Read the chart carefully and recognize, using context, where substitutions have occurred

## 2018-11-28 NOTE — ASSESSMENT & PLAN NOTE
· Elevated on admission but currently controlled  · Continue amlodipine, lisinopril, metoprolol   · Monitor

## 2018-11-29 VITALS
HEART RATE: 68 BPM | WEIGHT: 170 LBS | TEMPERATURE: 98.4 F | RESPIRATION RATE: 18 BRPM | BODY MASS INDEX: 31.28 KG/M2 | DIASTOLIC BLOOD PRESSURE: 88 MMHG | HEIGHT: 62 IN | SYSTOLIC BLOOD PRESSURE: 131 MMHG | OXYGEN SATURATION: 93 %

## 2018-11-29 LAB
ANION GAP SERPL CALCULATED.3IONS-SCNC: 5 MMOL/L (ref 4–13)
BUN SERPL-MCNC: 12 MG/DL (ref 5–25)
CALCIUM SERPL-MCNC: 8.3 MG/DL (ref 8.3–10.1)
CHLORIDE SERPL-SCNC: 109 MMOL/L (ref 100–108)
CO2 SERPL-SCNC: 26 MMOL/L (ref 21–32)
CREAT SERPL-MCNC: 0.79 MG/DL (ref 0.6–1.3)
ERYTHROCYTE [DISTWIDTH] IN BLOOD BY AUTOMATED COUNT: 13.9 % (ref 11.6–15.1)
GFR SERPL CREATININE-BSD FRML MDRD: 83 ML/MIN/1.73SQ M
GLUCOSE SERPL-MCNC: 146 MG/DL (ref 65–140)
GLUCOSE SERPL-MCNC: 161 MG/DL (ref 65–140)
GLUCOSE SERPL-MCNC: 170 MG/DL (ref 65–140)
GLUCOSE SERPL-MCNC: 219 MG/DL (ref 65–140)
HCT VFR BLD AUTO: 38.5 % (ref 34.8–46.1)
HGB BLD-MCNC: 12.1 G/DL (ref 11.5–15.4)
MAGNESIUM SERPL-MCNC: 2.4 MG/DL (ref 1.6–2.6)
MCH RBC QN AUTO: 29.7 PG (ref 26.8–34.3)
MCHC RBC AUTO-ENTMCNC: 31.4 G/DL (ref 31.4–37.4)
MCV RBC AUTO: 94 FL (ref 82–98)
PLATELET # BLD AUTO: 336 THOUSANDS/UL (ref 149–390)
PMV BLD AUTO: 9.3 FL (ref 8.9–12.7)
POTASSIUM SERPL-SCNC: 4.6 MMOL/L (ref 3.5–5.3)
RBC # BLD AUTO: 4.08 MILLION/UL (ref 3.81–5.12)
SODIUM SERPL-SCNC: 140 MMOL/L (ref 136–145)
WBC # BLD AUTO: 7.3 THOUSAND/UL (ref 4.31–10.16)

## 2018-11-29 PROCEDURE — 80048 BASIC METABOLIC PNL TOTAL CA: CPT | Performed by: INTERNAL MEDICINE

## 2018-11-29 PROCEDURE — 99232 SBSQ HOSP IP/OBS MODERATE 35: CPT | Performed by: INTERNAL MEDICINE

## 2018-11-29 PROCEDURE — 83735 ASSAY OF MAGNESIUM: CPT | Performed by: INTERNAL MEDICINE

## 2018-11-29 PROCEDURE — 85027 COMPLETE CBC AUTOMATED: CPT | Performed by: INTERNAL MEDICINE

## 2018-11-29 PROCEDURE — 82948 REAGENT STRIP/BLOOD GLUCOSE: CPT

## 2018-11-29 RX ORDER — ATORVASTATIN CALCIUM 40 MG/1
40 TABLET, FILM COATED ORAL
Qty: 30 TABLET | Refills: 0 | Status: SHIPPED | OUTPATIENT
Start: 2018-11-29 | End: 2018-12-24 | Stop reason: SDUPTHER

## 2018-11-29 RX ORDER — BENAZEPRIL HYDROCHLORIDE 20 MG/1
40 TABLET ORAL DAILY
Start: 2018-11-29 | End: 2019-12-17 | Stop reason: SDUPTHER

## 2018-11-29 RX ORDER — CLOPIDOGREL BISULFATE 75 MG/1
75 TABLET ORAL DAILY
Qty: 30 TABLET | Refills: 0 | Status: SHIPPED | OUTPATIENT
Start: 2018-11-29 | End: 2018-12-24 | Stop reason: SDUPTHER

## 2018-11-29 RX ADMIN — INSULIN LISPRO 10 UNITS: 100 INJECTION, SOLUTION INTRAVENOUS; SUBCUTANEOUS at 11:16

## 2018-11-29 RX ADMIN — SODIUM CHLORIDE 125 ML/HR: 0.9 INJECTION, SOLUTION INTRAVENOUS at 02:50

## 2018-11-29 RX ADMIN — POLYETHYLENE GLYCOL 3350 17 G: 17 POWDER, FOR SOLUTION ORAL at 09:22

## 2018-11-29 RX ADMIN — ENOXAPARIN SODIUM 40 MG: 40 INJECTION SUBCUTANEOUS at 09:23

## 2018-11-29 RX ADMIN — SODIUM CHLORIDE 125 ML/HR: 0.9 INJECTION, SOLUTION INTRAVENOUS at 11:20

## 2018-11-29 RX ADMIN — AMLODIPINE BESYLATE 5 MG: 5 TABLET ORAL at 09:23

## 2018-11-29 RX ADMIN — TRAMADOL HYDROCHLORIDE 50 MG: 50 TABLET, COATED ORAL at 09:34

## 2018-11-29 RX ADMIN — LISINOPRIL 40 MG: 20 TABLET ORAL at 09:22

## 2018-11-29 RX ADMIN — TRAMADOL HYDROCHLORIDE 50 MG: 50 TABLET, COATED ORAL at 02:44

## 2018-11-29 RX ADMIN — METOPROLOL TARTRATE 25 MG: 25 TABLET, FILM COATED ORAL at 09:22

## 2018-11-29 RX ADMIN — INSULIN LISPRO 10 UNITS: 100 INJECTION, SOLUTION INTRAVENOUS; SUBCUTANEOUS at 06:38

## 2018-11-29 RX ADMIN — INSULIN LISPRO 2 UNITS: 100 INJECTION, SOLUTION INTRAVENOUS; SUBCUTANEOUS at 06:39

## 2018-11-29 RX ADMIN — ASPIRIN 81 MG: 81 TABLET, COATED ORAL at 09:33

## 2018-11-29 NOTE — DISCHARGE INSTRUCTIONS
Please follow-up with Cardiology after discharge, take your medications as prescribed    Follow-up with Endocrinology after discharge, [id number] [facility] AT BEDTIME  SPECIFIC INSTRUCTIONS  Per Dr Dominique Clemente endocrinologist: Concha Karimi 40 UNITS AT BEDTIME; HUMALOG 10 UNITS WITH DINNER ONLY; ADD 1 ADDITIONAL UNIT OF HUMALOG EXTRA FOR EVERY 50 OVER 150 WHEN YOU CHECK YOUR BLOOD SUGAR BEFORE YOUR DINNER  2002  Anthony Rivera ON NOVEMBER 30, 2018  Coronary Artery Disease in Women   WHAT YOU SHOULD KNOW:   Coronary artery disease (CAD), or heart disease, occurs when arteries that supply blood to your heart become narrow or blocked  CAD is caused by plaque (cholesterol, fat, and other substances) that builds up in your arteries  Oxygen cannot get to your heart when your arteries narrow or become blocked, which may be life-threatening  AFTER YOU LEAVE:   Medicines:   · ACE inhibitors: These decrease your symptoms and slow your heart failure  You may need ARBs if you cannot take ACE inhibitors  ARBs help your heart beat more strongly  · Beta-blockers: These help your heart pump strongly and regularly  · Calcium channel blockers: These make your heart beat at a regular rate and rhythm  · Cholesterol-lowering medicines: This medicine may help reduce the risk of plaque buildup in your arteries  · Nitrates: These improve the blood flow through your heart  · Blood thinners: These prevent blood clots  They may make you bruise or bleed more easily  Use a soft toothbrush and an electric shaver to prevent bleeding  · Clot busters: This medicine helps break apart clots  It is given IV and may be given at the same time as other blood thinners  This medicine could save your life because blood clots in the heart, lungs or brain can kill you  Be careful because you may bleed or bruise easily  · Antiplatelet medicines:   These medicines help keep your blood from clotting and blocking blood flow in your arteries  Aspirin is an example of antiplatelet medicine  · Diuretics: These help your body get rid of extra fluid and protect your heart from more damage  You may urinate more often while you are taking diuretics  · Blood pressure medicine: This is given to lower your blood pressure  A controlled blood pressure helps protect your organs, such as your heart, lungs, brain, and kidneys  Take your blood pressure medicine exactly as directed  · Take your medicine as directed  Call your healthcare provider if you think your medicine is not helping or if you have side effects  Tell him if you are allergic to any medicine  Keep a list of the medicines, vitamins, and herbs you take  Include the amounts, and when and why you take them  Bring the list or the pill bottles to follow-up visits  Carry your medicine list with you in case of an emergency  Cardiac rehabilitation:  Your primary healthcare provider or cardiologist may recommend that you attend cardiac rehabilitation (rehab)  This is a program run by specialists who will help you safely strengthen your heart and prevent more heart disease  The plan includes exercise, relaxation, stress management, and heart-healthy nutrition  Caregivers will also check to make sure any medicines you are taking are working  Lifestyle changes:   · Quit smoking: It is never too late to quit smoking  Ask for information about how to stop smoking if you need help  · Maintain a healthy weight:  Ask your primary healthcare provider how much you should weigh  Ask him to help you create a weight loss plan if you are overweight  · Eat healthy foods:  Healthy foods include fruits, vegetables, whole grain breads, low-fat dairy products, beans, lean meats, and fish  Ask if you need to be on a special diet  Healthy foods will help you lose weight and may help improve your heart condition   It may also help lower the risk of heart attack  · Get a flu vaccine: This will help prevent infection with the flu virus, which can cause heart damage  · Limit alcohol:  Limit alcohol to 1 drink a day  A drink of alcohol is 12 ounces of beer, 5 ounces of wine, or 1½ ounces of liquor  Follow up with your primary healthcare provider or cardiologist as directed:  Write down your questions so you remember to ask them during your visits  Contact your primary healthcare provider or cardiologist if:   · You feel depressed or anxious  · You have nausea  · You have questions or concerns about your condition or care  Seek care immediately or call 911 if:   · You feel pain or tightness in your back, neck, jaw, stomach, or arm  · You feel squeezing, pressure, fullness, or pain in your chest that lasts for several minutes or returns  · You have shortness of breath  · You feel lightheaded or suddenly begin to sweat at rest   © 2014 [address] is for End User's use only and may not be sold, redistributed or otherwise used for commercial purposes  All illustrations and images included in CareNotes® are the copyrighted property of A D A M [employer] Inc  or Rajiv Valentine  The above information is an  only  It is not intended as medical advice for individual conditions or treatments  Talk to your doctor, nurse or pharmacist before following any medical regimen to see if it is safe and effective for you  After Radial Heart Catheterization   WHAT YOU NEED TO KNOW:   What will happen after a radial heart catheterization? · You will be attached to a heart monitor until you are fully awake  A heart monitor is an EKG that stays on continuously to record your heart's electrical activity  Healthcare providers will monitor your vital signs and pulses in your arm  They will frequently check your pressure bandage for bleeding or swelling       · You may have a band wrapped tightly around your wrist  The band puts pressure on your wound and helps prevent bleeding  A healthcare provider can put air into the band or remove air from the band  A healthcare provider will gradually remove air from the band and decrease pressure on your wrist  The band may be removed in 2 hours or when your wound stops bleeding  · You will need to keep your wrist straight for 2 to 4 hours  Do not  push or pull with your arm  Arm movements can cause serious bleeding  After you are monitored for several hours, you may go home or may need to stay in the hospital overnight  What do I need to know before I go home? · Care for your wound as directed  Remove the pressure bandage in 24 hours or as directed  Mild bruising is normal and expected  A small bandage can be placed on your wound after you remove the pressure bandage  Do not put powders, lotions, or creams on your wound  They may cause your wound to get infected  Monitor your wound every day for signs of infection, such as redness, swelling, or pus  · Shower the day after your procedure or as directed  Remove your pressure bandage before you shower  Do not take baths or go in hot tubs or pools  Carefully wash the wound with soap and water  Pat the area dry  A small bandage can be placed on your wound after you shower  · Apply firm, steady pressure to your wound if it bleeds  Apply pressure with a clean gauze or towel for 5 to 10 minutes  Call 911 if bleeding becomes heavy or does not stop  · Drink liquids as directed  Liquids will help flush the contrast liquid from your body  Ask how much liquid to drink each day and which liquids are best for you  · Do not lift anything heavier than 5 pounds until directed by your healthcare provider  Heavy lifting can put stress on your wound and cause bleeding  Do not push or pull with the arm that was used for the procedure  Do not do vigorous activity for at least 48 hours   Vigorous activity may cause bleeding from your wound  Rest and do quiet activities  Take short walks around the house to prevent a blood clot  Ask your healthcare provider when you can return to your normal activities  · Do not drive or return to work until your healthcare provider says it is okay  Your healthcare provider may tell you to wait 48 hours before you drive to decrease your risk for bleeding  You may not be able to return to work for at least 2 days after your procedure if your job involves heavy lifting  What medicines may I need? You may need any of the following:  · Blood thinners    help prevent blood clots  Examples of blood thinners include heparin and warfarin  Clots can cause strokes, heart attacks, and death  The following are general safety guidelines to follow while you are taking a blood thinner:    ¨ Watch for bleeding and bruising while you take blood thinners  Watch for bleeding from your gums or nose  Watch for blood in your urine and bowel movements  Use a soft washcloth on your skin, and a soft toothbrush to brush your teeth  This can keep your skin and gums from bleeding  If you shave, use an electric shaver  Do not play contact sports  ¨ Tell your dentist and other healthcare providers that you take anticoagulants  Wear a bracelet or necklace that says you take this medicine  ¨ Do not start or stop any medicines unless your healthcare provider tells you to  Many medicines cannot be used with blood thinners  ¨ Tell your healthcare provider right away if you forget to take the medicine, or if you take too much  ¨ Warfarin  is a blood thinner that you may need to take  The following are things you should be aware of if you take warfarin  § Foods and medicines can affect the amount of warfarin in your blood  Do not make major changes to your diet while you take warfarin  Warfarin works best when you eat about the same amount of vitamin K every day   Vitamin K is found in green leafy vegetables and certain other foods  Ask for more information about what to eat when you are taking warfarin  § You will need to see your healthcare provider for follow-up visits when you are on warfarin  You will need regular blood tests  These tests are used to decide how much medicine you need  · Acetaminophen  helps decrease pain and fever  This medicine is available without a doctor's order  Ask how much medicine is safe to take, and how often to take it  Acetaminophen can cause liver damage if not taken correctly  · Take your medicine as directed  Contact your healthcare provider if you think your medicine is not helping or if you have side effects  Tell him or her if you are allergic to any medicine  Keep a list of the medicines, vitamins, and herbs you take  Include the amounts, and when and why you take them  Bring the list or the pill bottles to follow-up visits  Carry your medicine list with you in case of an emergency  Call 911 for any of the following:   · You have any of the following signs of a heart attack:      ¨ Squeezing, pressure, or pain in your chest that lasts longer than 5 minutes or returns    ¨ Discomfort or pain in your back, neck, jaw, stomach, or arm     ¨ Trouble breathing    ¨ Nausea or vomiting    ¨ Lightheadedness or a sudden cold sweat, especially with chest pain or trouble breathing    · You have any of the following signs of a stroke:      ¨ Numbness or drooping on one side of your face     ¨ Weakness in an arm or leg    ¨ Confusion or difficulty speaking    ¨ Dizziness, a severe headache, or vision loss    · You feel lightheaded, short of breath, and have chest pain  · You cough up blood  · You have trouble breathing  · You cannot stop the bleeding from your wound even after you hold firm pressure for 10 minutes  When should I seek immediate care? · Blood soaks through your bandage  · Your stitches come apart  · Your hand or arm feels numb, cool, or looks pale  · Your wound gets swollen quickly  When should I contact my healthcare provider? · You have a fever or chills  · Your wound is red, swollen, or draining pus  · Your wound looks more bruised or you have new bruising on the side of your wrist      · You have nausea or are vomiting  · Your skin is itchy, swollen, or you have a rash  · You have questions or concerns about your condition or care  CARE AGREEMENT:   You have the right to help plan your care  Learn about your health condition and how it may be treated  Discuss treatment options with your caregivers to decide what care you want to receive  You always have the right to refuse treatment  The above information is an  only  It is not intended as medical advice for individual conditions or treatments  Talk to your doctor, nurse or pharmacist before following any medical regimen to see if it is safe and effective for you  © 2017 [address] Information is for End User's use only and may not be sold, redistributed or otherwise used for commercial purposes  All illustrations and images included in CareNotes® are the copyrighted property of A D A M , Inc  or Rajiv Valentine

## 2018-11-29 NOTE — SOCIAL WORK
CM met with patient and explained cm role  Pt alert and oriented  Pt reports she lives in 2 story home w/ Tim [phone] and 4 riley on side, and 7 riley in the front  Pt denies DME, VNA, and rehab  Pt reports being independent with ADL's, reports good support from family and friends, she drives and has transport home for d/c with   Pts pharmacy is [address] [place] [address] on [address] [facility]  Pt denies hx/admission for drug/etoh and psych/mental health  CM reviewed d/c planning process including the following: identifying help at home, patient preference for d/c planning needs, Discharge Lounge, [facility] Meds to Bed program, availability of treatment team to discuss questions or concerns patient and/or family may have regarding understanding medications and recognizing signs and symptoms once discharged  CM also encouraged patient to follow up with all recommended appointments after discharge  Patient advised of importance for patient and family to participate in managing patients medical well being

## 2018-11-29 NOTE — PROGRESS NOTES
Progress Note - Cardiology   Charli Charles 62 y o  female MRN: [id number]  Unit/Bed#: CW2 218-02 Encounter: [id number]    Assessment/Plan:  Coronary artery disease, NSTEMI  Left and right cardiac cath yesterday showed 99% stenosis in 1st obtuse marginal, and 100% in 1st left posterolateral  Both stenoses were treated with balloon angioplasty with 0% residual stenosis in each  No stents were placed  Troponin peak at  31  EKG performed yesterday showed NSR at 67 bpm   · Pt is on aspirin 81mg, metoprolol 25mg q12, amlodipine 5mg, and lisinopril 40mg, atorvastatin 40mg and will continue these outpatient  · Pt is safe to be discharged from cardiology standpoint with outpatient cardiology followup  Hyperlipidemia  · Continue high intensity statin - atorvastatin 40mg     Hypertension  Severely elevated on initial presentation to [address] [place] [address] at 217/125, this /65  · Continue metoprolol 25mg q12, amlodipine 5mg, lisinopril 40mg        Subjective/Objective   Chief Complaint: none  Subjective: Pt seen and evaluated today, had cardiac cath yesterday  Has no complaints today and states she feels well  Denies any recurrence of chest pain or presenting symptoms (shoulder pain, nausea, diaphoresis) since cath yesterday  Objective: Physical Exam   Constitutional: She is oriented to person, place, and time  She appears well-developed and well-nourished  HENT:   Head: Normocephalic and atraumatic  Nose: Nose normal    Mouth/Throat: Oropharynx is clear and moist    Eyes: Right eye exhibits no discharge  Left eye exhibits no discharge  Cardiovascular: Normal rate, regular rhythm and normal heart sounds  Exam reveals no gallop and no friction rub  No murmur heard  Pulmonary/Chest: Effort normal and breath sounds normal  No stridor  No respiratory distress  She has no wheezes  She has no rales  Abdominal: Soft  Bowel sounds are normal  She exhibits no distension  There is no tenderness  There is no guarding  Musculoskeletal: She exhibits no edema or deformity  Neurological: She is alert and oriented to person, place, and time  Skin: Skin is warm and dry  Psychiatric: She has a normal mood and affect  Her speech is normal and behavior is normal    Vitals reviewed  Vitals: /88 (BP Location: Left arm)   Pulse 68   Temp 98 4 °F (36 9 °C) (Oral)   Resp 18   Ht 5' 1 5" (1 562 m)   Wt 77 1 kg (170 lb)   SpO2 93%   BMI 31 60 kg/m²   Vitals:    11/27/18 1811   Weight: 77 1 kg (170 lb)     Orthostatic Blood Pressures      Most Recent Value   Blood Pressure  131/88 filed at 11/29/2018 1100   Patient Position - Orthostatic VS  Lying filed at 11/29/2018 1100          Intake/Output Summary (Last 24 hours) at 11/29/18 1245  Last data filed at 11/29/18 4675   Gross per 24 hour   Intake              960 ml   Output                0 ml   Net              960 ml       Invasive Devices     Peripheral Intravenous Line            Peripheral IV 11/26/18 Right Forearm 2 days              Review of Systems:  Review of Systems   Constitutional: Negative for chills, fatigue and fever  Cardiovascular: Negative for chest pain, palpitations and leg swelling  Gastrointestinal: Negative for abdominal distention, abdominal pain, constipation, diarrhea, nausea and vomiting  Genitourinary: Negative for difficulty urinating  Neurological: Negative for dizziness, light-headedness and headaches       Lab Results:   CBC with diff:   Results from last 7 days  Lab Units 11/29/18  0439   WBC Thousand/uL 7 30   RBC Million/uL 4 08   HEMOGLOBIN g/dL 12 1   HEMATOCRIT % 38 5   MCV fL 94   MCH pg 29 7   MCHC g/dL 31 4   RDW % 13 9   MPV fL 9 3   PLATELETS Thousands/uL 336     CMP:   Results from last 7 days  Lab Units 11/29/18  0439 11/26/18  1438   SODIUM mmol/L 140 137   POTASSIUM mmol/L 4 6 3 6   CHLORIDE mmol/L 109* 100   CO2 mmol/L 26 23   BUN mg/dL 12 10   CREATININE mg/dL 0 79 0 86   CALCIUM mg/dL 8 3 10 0   AST U/L  --  57* ALT U/L  --  54*   ALK PHOS U/L  --  106   EGFR ml/min/1 73sq m 83 75     Troponin:   0  Lab Value Date/Time   TROPONINI 0 20 (H) 11/27/2018 2234   TROPONINI 0 31 (H) 11/27/2018 1948   TROPONINI 0 23 (H) 11/26/2018 2153   TROPONINI 0 07 (H) 11/26/2018 1724   TROPONINI 0 04 (H) 11/26/2018 1438   TROPONINI <0 03 11/19/2018 2329     Magnesium:   Results from last 7 days  Lab Units 11/29/18  0439   MAGNESIUM mg/dL 2 4     Lipid Profile:   Results from last 7 days  Lab Units 11/27/18  0515   HDL mg/dL 38*   LDL CALC mg/dL 164   TRIGLYCERIDES mg/dL 373*     Imaging: I have personally reviewed pertinent reports      EKG: NSR at 67 bpm  VTE Pharmacologic Prophylaxis: Enoxaparin (Lovenox)  VTE Mechanical Prophylaxis: sequential compression device

## 2018-11-30 ENCOUNTER — TRANSITIONAL CARE MANAGEMENT (OUTPATIENT)
Dept: FAMILY MEDICINE CLINIC | Facility: CLINIC | Age: 58
End: 2018-11-30

## 2018-12-03 ENCOUNTER — OFFICE VISIT (OUTPATIENT)
Dept: FAMILY MEDICINE CLINIC | Facility: CLINIC | Age: 58
End: 2018-12-03
Payer: COMMERCIAL

## 2018-12-03 ENCOUNTER — TRANSITIONAL CARE MANAGEMENT (OUTPATIENT)
Dept: FAMILY MEDICINE CLINIC | Facility: CLINIC | Age: 58
End: 2018-12-03

## 2018-12-03 VITALS
HEART RATE: 92 BPM | TEMPERATURE: 98.6 F | SYSTOLIC BLOOD PRESSURE: 144 MMHG | HEIGHT: 61 IN | OXYGEN SATURATION: 99 % | DIASTOLIC BLOOD PRESSURE: 98 MMHG | WEIGHT: 166 LBS | RESPIRATION RATE: 18 BRPM | BODY MASS INDEX: 31.34 KG/M2

## 2018-12-03 DIAGNOSIS — I10 ESSENTIAL HYPERTENSION: ICD-10-CM

## 2018-12-03 DIAGNOSIS — E11.9 TYPE 2 DIABETES MELLITUS WITHOUT COMPLICATION, WITH LONG-TERM CURRENT USE OF INSULIN (HCC): Chronic | ICD-10-CM

## 2018-12-03 DIAGNOSIS — Z79.4 TYPE 2 DIABETES MELLITUS WITHOUT COMPLICATION, WITH LONG-TERM CURRENT USE OF INSULIN (HCC): Chronic | ICD-10-CM

## 2018-12-03 DIAGNOSIS — Z79.4 TYPE 2 DIABETES MELLITUS WITH COMPLICATION, WITH LONG-TERM CURRENT USE OF INSULIN (HCC): ICD-10-CM

## 2018-12-03 DIAGNOSIS — I21.4 NSTEMI (NON-ST ELEVATED MYOCARDIAL INFARCTION) (HCC): ICD-10-CM

## 2018-12-03 DIAGNOSIS — E78.00 HIGH CHOLESTEROL: Chronic | ICD-10-CM

## 2018-12-03 DIAGNOSIS — Z76.0 MEDICATION REFILL: Primary | ICD-10-CM

## 2018-12-03 DIAGNOSIS — E11.8 TYPE 2 DIABETES MELLITUS WITH COMPLICATION, WITH LONG-TERM CURRENT USE OF INSULIN (HCC): ICD-10-CM

## 2018-12-03 PROCEDURE — 99215 OFFICE O/P EST HI 40 MIN: CPT | Performed by: INTERNAL MEDICINE

## 2018-12-03 RX ORDER — INSULIN LISPRO 100 U/ML
INJECTION, SOLUTION SUBCUTANEOUS
Refills: 0 | COMMUNITY
Start: 2018-11-29 | End: 2018-12-03 | Stop reason: SDUPTHER

## 2018-12-03 RX ORDER — INSULIN LISPRO 100 U/ML
INJECTION, SOLUTION SUBCUTANEOUS
Qty: 5 PEN | Refills: 5 | Status: SHIPPED | OUTPATIENT
Start: 2018-12-03 | End: 2018-12-24 | Stop reason: SDUPTHER

## 2018-12-03 NOTE — PROGRESS NOTES
Assessment/Plan:   Arteriosclerotic heart disease coronary artery disease recent non ST elevated MI  Status post coronary angiography and coronary angioplasty but no stent was inserted according to the   Patient is on baby aspirin Plavix statin therapy and beta-blocker and remained stable on this program  Insulin-dependent diabetes mellitus  Endocrine consultation noted patient has been started on basal are 40 units at bedtime 10 units of regular insulin at dinnertime in addition she is on metformin 500 mg b i d  Hypercholesterolemia patient is on Lipitor 40 mg daily  Hypertension seems to be well controlled with amlodipine and beta blockers and Ace inhibitor  Rest less leg syndrome on ropinirole  Patient had a mammogram done by [facility] this year few months ago      Diagnoses and all orders for this visit:    Medication refill  -     ADMELOG SOLOSTAR 100 units/mL injection pen; 10 units if blood sugars are under 130  Type 2 diabetes mellitus with complication, with long-term current use of insulin (HCC)  -     insulin glargine (BASAGLAR KWIKPEN) 100 units/mL injection pen; Inject 40 Units under the skin daily at bedtime    Type 2 diabetes mellitus without complication, with long-term current use of insulin (HCC)    Essential hypertension    NSTEMI (non-ST elevated myocardial infarction) (HCC)    High cholesterol    Other orders  -     Discontinue: ADMELOG SOLOSTAR 100 units/mL injection pen; inject 10 units subcutaneously daily WITH DINNER        Subjective:      Patient ID: Eleanor Ag is a 62 y o  female      HPI 60-year-old  speaking woman is coming in after a recent admission to [facility] patient has been having chest pains for the past 1 month if workup in the past has been unrevealing until she was admitted to [facility] where cereal cardiac enzymes revealed increasing troponin levels and she was sent to the [facility] for cardiac catheterization she underwent this procedure 4 days ago according to the  she was found to have a small vein blockage is she had angioplasty done but no stent could be put in and she was started on medications the endocrinologist also saw the patient and has modified the dose of insulin she is taking Lantus insulin 40 units at bedtime and 10 units of regular insulin at dinner and has been monitoring her blood sugars herself at home today in the morning it was around 140 mg percent since the discharge patient has no ongoing chest pain no shortness of breathing no nausea vomiting has apparently tolerated the procedure very well patient was also started on Plavix which probably will be needed for another 3-6 months until cleared by Cardiology patient has history of hypertension hypercholesterolemia insulin-dependent diabetes mellitus and a recent non ST elevated MI she also has chronic leg pains  The following portions of the patient's history were reviewed and updated as appropriate: allergies, current medications, past family history, past medical history, past social history, past surgical history and problem list       Current Outpatient Prescriptions:     ACCU-CHEK CHANDANA PLUS test strip, USE TO TEST SUGARS 7 TIMES DAILY   E11 9, Disp: 200 each, Rfl: 1    ACCU-CHEK SOFTCLIX LANCETS lancets, Test TID, Disp: 300 each, Rfl: 3    ADMELOG SOLOSTAR 100 units/mL injection pen, 10 units if blood sugars are under 130 , Disp: 5 pen, Rfl: 5    amLODIPine (NORVASC) 5 mg tablet, Take 1 tablet (5 mg total) by mouth daily, Disp: 90 tablet, Rfl: 1    aspirin (ECOTRIN LOW STRENGTH) 81 mg EC tablet, Take 81 mg by mouth daily, Disp: , Rfl:     atorvastatin (LIPITOR) 40 mg tablet, Take 1 tablet (40 mg total) by mouth daily with dinner, Disp: 30 tablet, Rfl: 0    BD PEN NEEDLE RACHEL U/F 32G X 4 MM MISC, Inject under the skin daily, Disp: 100 each, Rfl: 3    benazepril (LOTENSIN) 20 mg tablet, Take 2 tablets (40 mg total) by mouth daily, Disp: , Rfl:     clopidogrel (PLAVIX) 75 mg tablet, Take 1 tablet (75 mg total) by mouth daily, Disp: 30 tablet, Rfl: 0    cyclobenzaprine (FLEXERIL) 5 mg tablet, Take 1 tablet (5 mg total) by mouth daily, Disp: 30 tablet, Rfl: 0    insulin glargine (BASAGLAR KWIKPEN) 100 units/mL injection pen, Inject 40 Units under the skin daily at bedtime, Disp: 5 pen, Rfl: 0    metFORMIN (GLUCOPHAGE) 500 mg tablet, Take 1 tablet (500 mg total) by mouth 2 (two) times a day with meals, Disp: 180 tablet, Rfl: 0    metoprolol tartrate (LOPRESSOR) 25 mg tablet, Take 1 tablet (25 mg total) by mouth every 12 (twelve) hours, Disp: 60 tablet, Rfl: 0    rOPINIRole (REQUIP) 0 5 mg tablet, Take 0 5 mg by mouth 2 (two) times a day  , Disp: , Rfl:     tiZANidine (ZANAFLEX) 4 mg tablet, Take 4 mg by mouth every 8 (eight) hours as needed for muscle spasms 1/2-1, Disp: , Rfl:     traMADol (ULTRAM) 50 mg tablet, Take 50 mg by mouth every 8 (eight) hours, Disp: , Rfl:     Past Medical History:   Diagnosis Date    Chronic back pain     Hyperlipidemia     Hypertension     Restless leg syndrome     Type 2 diabetes mellitus ([facility] [address] )        Past Surgical History:   Procedure Laterality Date    ANKLE SURGERY      tubal    CHOLECYSTECTOMY      GALLBLADDER SURGERY      TUBAL LIGATION         Social History       Patient Active Problem List   Diagnosis    Essential hypertension    High cholesterol    Chronic bilateral low back pain without sciatica    Type 2 diabetes mellitus without complication, with long-term current use of insulin ([facility])    Chest pain    Restless leg syndrome    Disease of pancreas    Mixed hyperlipidemia    NSTEMI (non-ST elevated myocardial infarction) ([facility])           Review of Systems   Constitutional: Negative  HENT: Negative  Eyes: Negative  Respiratory: Negative  Cardiovascular: Negative  Gastrointestinal: Negative  Endocrine: Negative  Genitourinary: Negative  Musculoskeletal: Negative  Skin: Negative  Allergic/Immunologic: Negative  Neurological: Negative  Hematological: Negative  Psychiatric/Behavioral: Negative  Objective:      /98   Pulse 92   Temp 98 6 °F (37 °C) (Tympanic)   Resp 18   Ht 5' 1" (1 549 m)   Wt 75 3 kg (166 lb)   SpO2 99%   BMI 31 37 kg/m²          Physical Exam   Constitutional: She is oriented to person, place, and time  She appears well-developed and well-nourished  HENT:   Head: Normocephalic  Mouth/Throat: Oropharynx is clear and moist    Eyes: Pupils are equal, round, and reactive to light  Conjunctivae are normal    Neck: Normal range of motion  Neck supple  Cardiovascular: Normal rate and normal heart sounds  Pulmonary/Chest: Effort normal and breath sounds normal    Abdominal: Soft  Bowel sounds are normal    Musculoskeletal: Normal range of motion  Neurological: She is alert and oriented to person, place, and time  Skin: Skin is warm and dry  Admission on 11/27/2018, Discharged on 11/29/2018   Component Date Value Ref Range Status    Troponin I 11/27/2018 0 31* <=0 04 ng/mL Final      [employer] Chemistry analyzer 99% cutoff is > 0 04 ng/mL in network labs     o cTnI 99% cutoff is useful only when applied to patients in the clinical setting of myocardial ischemia   o cTnI 99% cutoff should be interpreted in the context of clinical history, ECG findings and possibly cardiac imaging to establish correct diagnosis  o cTnI 99% cutoff may be suggestive but clearly not indicative of a coronary event without the clinical setting of myocardial ischemia      Results indicate test should be repeated on new specimen collected within 4-6 hours of the original    Troponin I 11/27/2018 0 20* <=0 04 ng/mL Final      [employer] Chemistry analyzer 99% cutoff is > 0 04 ng/mL in network labs     o cTnI 99% cutoff is useful only when applied to patients in the clinical setting of myocardial ischemia   o cTnI 99% cutoff should be interpreted in the context of clinical history, ECG findings and possibly cardiac imaging to establish correct diagnosis  o cTnI 99% cutoff may be suggestive but clearly not indicative of a coronary event without the clinical setting of myocardial ischemia      Results indicate test should be repeated on new specimen collected within 4-6 hours of the original    POC Glucose 11/27/2018 258* 65 - 140 mg/dl Final    POC Glucose 11/28/2018 148* 65 - 140 mg/dl Final    Ventricular Rate 11/27/2018 97  BPM Final    Atrial Rate 11/27/2018 97  BPM Final    MT Interval 11/27/2018 152  ms Final    QRSD Interval 11/27/2018 80  ms Final    QT Interval 11/27/2018 362  ms Final    QTC Interval 11/27/2018 459  ms Final    P Axis 11/27/2018 45  degrees Final    QRS Axis 11/27/2018 42  degrees Final    T Wave Axis 11/27/2018 35  degrees Final    POC Glucose 11/28/2018 159* 65 - 140 mg/dl Final    Activated Clotting Time, i-STAT 11/28/2018 285* 89 - 137 sec Final    Specimen Type 11/28/2018 VENOUS   Final    POC Glucose 11/28/2018 247* 65 - 140 mg/dl Final    Ventricular Rate 11/28/2018 67  BPM Final    Atrial Rate 11/28/2018 67  BPM Final    MT Interval 11/28/2018 148  ms Final    QRSD Interval 11/28/2018 82  ms Final    QT Interval 11/28/2018 442  ms Final    QTC Interval 11/28/2018 467  ms Final    P Axis 11/28/2018 22  degrees Final    QRS Axis 11/28/2018 58  degrees Final    T Wave Axis 11/28/2018 36  degrees Final    Sodium 11/29/2018 140  136 - 145 mmol/L Final    Potassium 11/29/2018 4 6  3 5 - 5 3 mmol/L Final    Chloride 11/29/2018 109* 100 - 108 mmol/L Final    CO2 11/29/2018 26  21 - 32 mmol/L Final    ANION GAP 11/29/2018 5  4 - 13 mmol/L Final    BUN 11/29/2018 12  5 - 25 mg/dL Final    Creatinine 11/29/2018 0 79  0 60 - 1 30 mg/dL Final    Standardized to IDMS reference method    Glucose 11/29/2018 170* 65 - 140 mg/dL Final      If the patient is fasting, the ADA then defines impaired fasting glucose as > 100 mg/dL and diabetes as > or equal to 123 mg/dL  Specimen collection should occur prior to Sulfasalazine administration due to the potential for falsely depressed results  Specimen collection should occur prior to Sulfapyridine administration due to the potential for falsely elevated results   Calcium 11/29/2018 8 3  8 3 - 10 1 mg/dL Final    eGFR 11/29/2018 83  ml/min/1 73sq m Final    Magnesium 11/29/2018 2 4  1 6 - 2 6 mg/dL Final    WBC 11/29/2018 7 30  4 31 - 10 16 Thousand/uL Final    RBC 11/29/2018 4 08  3 81 - 5 12 Million/uL Final    Hemoglobin 11/29/2018 12 1  11 5 - 15 4 g/dL Final    Hematocrit 11/29/2018 38 5  34 8 - 46 1 % Final    MCV 11/29/2018 94  82 - 98 fL Final    MCH 11/29/2018 29 7  26 8 - 34 3 pg Final    MCHC 11/29/2018 31 4  31 4 - 37 4 g/dL Final    RDW 11/29/2018 13 9  11 6 - 15 1 % Final    Platelets 57/94/8824 336  149 - 390 Thousands/uL Final    MPV 11/29/2018 9 3  8 9 - 12 7 fL Final    POC Glucose 11/29/2018 161* 65 - 140 mg/dl Final    POC Glucose 11/28/2018 219* 65 - 140 mg/dl Final    POC Glucose 11/29/2018 146* 65 - 140 mg/dl Final   Admission on 11/26/2018, Discharged on 11/27/2018   Component Date Value Ref Range Status    Sodium 11/26/2018 137  134 - 143 mmol/L Final    Potassium 11/26/2018 3 6  3 5 - 5 5 mmol/L Final    Chloride 11/26/2018 100  98 - 107 mmol/L Final    CO2 11/26/2018 23  21 - 31 mmol/L Final    ANION GAP 11/26/2018 14* 4 - 13 mmol/L Final    BUN 11/26/2018 10  7 - 25 mg/dL Final    Creatinine 11/26/2018 0 86  0 60 - 1 20 mg/dL Final    Standardized to IDMS reference method    Glucose 11/26/2018 276* 65 - 99 mg/dL Final      If the patient is fasting, the ADA then defines impaired fasting glucose as > 100 mg/dL and diabetes as > or equal to 123 mg/dL  Specimen collection should occur prior to Sulfasalazine administration due to the potential for falsely depressed results   Specimen collection should occur prior to Sulfapyridine administration due to the potential for falsely elevated results   Calcium 11/26/2018 10 0  8 6 - 10 5 mg/dL Final    AST 11/26/2018 57* 13 - 39 U/L Final      Specimen collection should occur prior to Sulfasalazine administration due to the potential for falsely depressed results   ALT 11/26/2018 54* 7 - 52 U/L Final      Specimen collection should occur prior to Sulfasalazine administration due to the potential for falsely depressed results       Alkaline Phosphatase 11/26/2018 106  40 - 150 U/L Final    Total Protein 11/26/2018 8 3  6 4 - 8 9 g/dL Final    Albumin 11/26/2018 4 5  3 5 - 5 7 g/dL Final    Total Bilirubin 11/26/2018 0 40  0 20 - 1 00 mg/dL Final    eGFR 11/26/2018 75  ml/min/1 73sq m Final    WBC 11/26/2018 9 60  4 80 - 10 80 Thousand/uL Final    RBC 11/26/2018 4 88  3 90 - 5 20 Million/uL Final    Hemoglobin 11/26/2018 14 7  12 0 - 16 0 g/dL Final    Hematocrit 11/26/2018 44 7  34 8 - 46 1 % Final    MCV 11/26/2018 91  81 - 99 fL Final    MCH 11/26/2018 30 2  26 0 - 34 0 pg Final    MCHC 11/26/2018 33 0  31 0 - 37 0 g/dL Final    RDW 11/26/2018 14 4  11 5 - 14 5 % Final    MPV 11/26/2018 7 5* 8 6 - 11 7 fL Final    Platelets 88/85/6533 407* 149 - 390 Thousands/uL Final    nRBC 11/26/2018 0  /100 WBCs Final    Neutrophils Relative 11/26/2018 56  42 - 75 % Final    Lymphocytes Relative 11/26/2018 37  21 - 51 % Final    Monocytes Relative 11/26/2018 5  2 - 12 % Final    Eosinophils Relative 11/26/2018 1  0 - 5 % Final    Basophils Relative 11/26/2018 1  0 - 2 % Final    Neutrophils Absolute 11/26/2018 5 30  1 40 - 6 50 Thousands/µL Final    Lymphocytes Absolute 11/26/2018 3 50  0 60 - 4 47 Thousands/µL Final    Monocytes Absolute 11/26/2018 0 50  0 17 - 1 22 Thousand/µL Final    Eosinophils Absolute 11/26/2018 0 10  0 00 - 0 61 Thousand/µL Final    Basophils Absolute 11/26/2018 0 10  0 00 - 0 10 Thousands/µL Final    Troponin I 11/26/2018 0 04* <=0 03 ng/mL Final    Protime 11/26/2018 11 8  10 2 - 13 0 seconds Final         INR 11/26/2018 1 02  0 90 - 1 50 Final         PTT 11/26/2018 39* 26 - 38 seconds Final    Therapeutic Heparin Range =  60-90 seconds    D-Dimer, Quant 11/26/2018 402* <230 ng/ml (FEU) Final       Reference and upper limits to exclude DVT and PE are the same  Do not use to exclude if clinical symptoms are present  Pregnant women:  1st trimester:  <220 - 1060 ng/ml (FEU)  2nd trimester:  <220 - 1880 ng/ml (FEU)  3rd trimester:   238 - 3280 ng/ml (FEU)          Ventricular Rate 11/26/2018 121  BPM Final    Atrial Rate 11/26/2018 121  BPM Final    MD Interval 11/26/2018 146  ms Final    QRSD Interval 11/26/2018 78  ms Final    QT Interval 11/26/2018 340  ms Final    QTC Interval 11/26/2018 482  ms Final    P Axis 11/26/2018 47  degrees Final    QRS Axis 11/26/2018 64  degrees Final    T Wave Axis 11/26/2018 28  degrees Final    Troponin I 11/26/2018 0 07* <=0 03 ng/mL Final      Results indicate test should be repeated on new specimen collected within 4-6 hours of the original    POC Glucose 11/26/2018 307* 65 - 140 mg/dl Final    Troponin I 11/26/2018 0 23* <=0 03 ng/mL Final      Results indicate test should be repeated on new specimen collected within 4-6 hours of the original    POC Glucose 11/26/2018 263* 65 - 140 mg/dl Final    Cholesterol 11/27/2018 277* 0 - 200 mg/dL Final      Cholesterol:       Desirable         <200 mg/dl       Borderline         200-239 mg/dl       High              >239           Triglycerides 11/27/2018 373* 44 - 166 mg/dL Final      Triglyceride:     Normal          <150 mg/dl     Borderline High 150-199 mg/dl     High            200-499 mg/dl        Very High       >499 mg/dl    Specimen collection should occur prior to N-Acetylcysteine or Metamizole administration due to the potential for falsely depressed results      HDL, Direct 11/27/2018 38* 40 - 60 mg/dL Final      HDL Cholesterol:       High    >60 mg/dL       Low     <41 mg/dL  Specimen collection should occur prior to Metamizole administration due to the potential for falsley depressed results   LDL Calculated 11/27/2018 164  75 - 193 mg/dL Final      LDL Cholesterol:     Optimal           <100 mg/dl     Near Optimal      100-129 mg/dl     Above Optimal       Borderline High 130-159 mg/dl       High            160-189 mg/dl       Very High       >189 mg/dl         This screening LDL is a calculated result  It does not have the accuracy of the Direct Measured LDL in the monitoring of patients with hyperlipidemia and/or statin therapy  Direct Measure LDL (TKQ325) must be ordered separately in these patients      Non-HDL-Chol (CHOL-HDL) 11/27/2018 239  mg/dl Final    Hemoglobin A1C 11/27/2018 10 6* 4 2 - 6 3 % Final    EAG 11/27/2018 258  mg/dl Final    POC Glucose 11/27/2018 143* 65 - 140 mg/dl Final    POC Glucose 11/27/2018 194* 65 - 140 mg/dl Final   Admission on 11/19/2018, Discharged on 11/20/2018   Component Date Value Ref Range Status    WBC 11/19/2018 9 20  4 80 - 10 80 Thousand/uL Final    RBC 11/19/2018 4 56  3 90 - 5 20 Million/uL Final    Hemoglobin 11/19/2018 13 6  12 0 - 16 0 g/dL Final    Hematocrit 11/19/2018 41 7  34 8 - 46 1 % Final    MCV 11/19/2018 91  81 - 99 fL Final    MCH 11/19/2018 29 8  26 0 - 34 0 pg Final    MCHC 11/19/2018 32 6  31 0 - 37 0 g/dL Final    RDW 11/19/2018 14 1  11 5 - 14 5 % Final    MPV 11/19/2018 7 9* 8 6 - 11 7 fL Final    Platelets 48/53/8815 348  149 - 390 Thousands/uL Final    nRBC 11/19/2018 0  /100 WBCs Final    Neutrophils Relative 11/19/2018 64  42 - 75 % Final    Lymphocytes Relative 11/19/2018 28  21 - 51 % Final    Monocytes Relative 11/19/2018 6  2 - 12 % Final    Eosinophils Relative 11/19/2018 2  0 - 5 % Final    Basophils Relative 11/19/2018 1  0 - 2 % Final    Neutrophils Absolute 11/19/2018 5 80  1 40 - 6 50 Thousands/µL Final    Lymphocytes Absolute 11/19/2018 2 60  0 60 - 4 47 Thousands/µL Final    Monocytes Absolute 11/19/2018 0 50  0 17 - 1 22 Thousand/µL Final    Eosinophils Absolute 11/19/2018 0 20  0 00 - 0 61 Thousand/µL Final    Basophils Absolute 11/19/2018 0 10  0 00 - 0 10 Thousands/µL Final    Sodium 11/19/2018 130* 134 - 143 mmol/L Final    Potassium 11/19/2018 3 8  3 5 - 5 5 mmol/L Final    Chloride 11/19/2018 98  98 - 107 mmol/L Final    CO2 11/19/2018 23  21 - 31 mmol/L Final    ANION GAP 11/19/2018 9  4 - 13 mmol/L Final    BUN 11/19/2018 16  7 - 25 mg/dL Final    Creatinine 11/19/2018 0 96  0 60 - 1 20 mg/dL Final    Standardized to IDMS reference method    Glucose 11/19/2018 494* 65 - 99 mg/dL Final      If the patient is fasting, the ADA then defines impaired fasting glucose as > 100 mg/dL and diabetes as > or equal to 123 mg/dL  Specimen collection should occur prior to Sulfasalazine administration due to the potential for falsely depressed results  Specimen collection should occur prior to Sulfapyridine administration due to the potential for falsely elevated results   Calcium 11/19/2018 9 6  8 6 - 10 5 mg/dL Final    AST 11/19/2018 30  13 - 39 U/L Final      Specimen collection should occur prior to Sulfasalazine administration due to the potential for falsely depressed results   ALT 11/19/2018 39  7 - 52 U/L Final      Specimen collection should occur prior to Sulfasalazine administration due to the potential for falsely depressed results       Alkaline Phosphatase 11/19/2018 117  40 - 150 U/L Final    Total Protein 11/19/2018 8 0  6 4 - 8 9 g/dL Final    Albumin 11/19/2018 4 1  3 5 - 5 7 g/dL Final    Total Bilirubin 11/19/2018 0 50  0 20 - 1 00 mg/dL Final    eGFR 11/19/2018 65  ml/min/1 73sq m Final    Troponin I 11/19/2018 <0 03  <=0 03 ng/mL Final    Ventricular Rate 11/19/2018 91  BPM Final    Atrial Rate 11/19/2018 91  BPM Final    NM Interval 11/19/2018 152  ms Final    QRSD Interval 11/19/2018 74  ms Final    QT Interval 11/19/2018 362  ms Final    QTC Interval 11/19/2018 445  ms Final    P Axis 11/19/2018 53  degrees Final    QRS Axis 11/19/2018 65  degrees Final    T Wave Axis 11/19/2018 21  degrees Final       No results found

## 2018-12-03 NOTE — PROGRESS NOTES
Assessment/Plan:         There are no diagnoses linked to this encounter  Subjective:      Patient ID: Real Dick is a 62 y o  female  HPI    The following portions of the patient's history were reviewed and updated as appropriate: She  has a past medical history of Chronic back pain; Hyperlipidemia; Hypertension; Restless leg syndrome; and Type 2 diabetes mellitus ([facility] )  Patient Active Problem List    Diagnosis Date Noted    Mixed hyperlipidemia 11/28/2018    NSTEMI (non-ST elevated myocardial infarction) ([place] [address] ) 11/28/2018    Chest pain 11/26/2018    Restless leg syndrome 11/26/2018    Chronic bilateral low back pain without sciatica 09/05/2018    Essential hypertension 08/02/2018    High cholesterol 08/02/2018    Type 2 diabetes mellitus without complication, with long-term current use of insulin ([place] [address] ) 01/31/2014    Disease of pancreas 01/27/2014     She  has a past surgical history that includes Gallbladder surgery; Ankle surgery; Cholecystectomy; and Tubal ligation  Her family history includes Breast cancer in her mother; Diabetes in her mother; Heart disease in her father; Heart failure in her mother; Stroke in her father  She  reports that she has never smoked  She has never used smokeless tobacco  She reports that she drinks alcohol  She reports that she does not use drugs  Current Outpatient Prescriptions   Medication Sig Dispense Refill    ACCU-CHEK CHANDANA PLUS test strip USE TO TEST SUGARS 7 TIMES DAILY   E11 9 200 each 1    ACCU-CHEK SOFTCLIX LANCETS lancets Test  each 3    ADMELOG SOLOSTAR 100 units/mL injection pen inject 10 units subcutaneously daily WITH DINNER  0    amLODIPine (NORVASC) 5 mg tablet Take 1 tablet (5 mg total) by mouth daily 90 tablet 1    aspirin (ECOTRIN LOW STRENGTH) 81 mg EC tablet Take 81 mg by mouth daily      atorvastatin (LIPITOR) 40 mg tablet Take 1 tablet (40 mg total) by mouth daily with dinner 30 tablet 0    BD PEN NEEDLE RACHEL U/F 32G X 4 MM MISC Inject under the skin daily 100 each 3    benazepril (LOTENSIN) 20 mg tablet Take 2 tablets (40 mg total) by mouth daily      clopidogrel (PLAVIX) 75 mg tablet Take 1 tablet (75 mg total) by mouth daily 30 tablet 0    cyclobenzaprine (FLEXERIL) 5 mg tablet Take 1 tablet (5 mg total) by mouth daily 30 tablet 0    insulin glargine (BASAGLAR KWIKPEN) 100 units/mL injection pen Inject 10 Units under the skin daily at bedtime 5 pen 0    insulin lispro (HumaLOG) 100 units/mL injection Inject 10 Units under the skin daily with dinner 3 mL 0    metFORMIN (GLUCOPHAGE) 500 mg tablet Take 1 tablet (500 mg total) by mouth 2 (two) times a day with meals 180 tablet 0    metoprolol tartrate (LOPRESSOR) 25 mg tablet Take 1 tablet (25 mg total) by mouth every 12 (twelve) hours 60 tablet 0    rOPINIRole (REQUIP) 0 5 mg tablet Take 0 5 mg by mouth 2 (two) times a day        tiZANidine (ZANAFLEX) 4 mg tablet Take 4 mg by mouth every 8 (eight) hours as needed for muscle spasms 1/2-1      traMADol (ULTRAM) 50 mg tablet Take 50 mg by mouth every 8 (eight) hours       No current facility-administered medications for this visit  Current Outpatient Prescriptions on File Prior to Visit   Medication Sig    ACCU-CHEK CHANDANA PLUS test strip USE TO TEST SUGARS 7 TIMES DAILY   E11 9    ACCU-CHEK SOFTCLIX LANCETS lancets Test TID    amLODIPine (NORVASC) 5 mg tablet Take 1 tablet (5 mg total) by mouth daily    aspirin (ECOTRIN LOW STRENGTH) 81 mg EC tablet Take 81 mg by mouth daily    atorvastatin (LIPITOR) 40 mg tablet Take 1 tablet (40 mg total) by mouth daily with dinner    BD PEN NEEDLE RACHEL U/F 32G X 4 MM MISC Inject under the skin daily    benazepril (LOTENSIN) 20 mg tablet Take 2 tablets (40 mg total) by mouth daily    clopidogrel (PLAVIX) 75 mg tablet Take 1 tablet (75 mg total) by mouth daily    cyclobenzaprine (FLEXERIL) 5 mg tablet Take 1 tablet (5 mg total) by mouth daily    insulin glargine (BASAGLAR KWIKPEN) 100 units/mL injection pen Inject 10 Units under the skin daily at bedtime    insulin lispro (HumaLOG) 100 units/mL injection Inject 10 Units under the skin daily with dinner    metFORMIN (GLUCOPHAGE) 500 mg tablet Take 1 tablet (500 mg total) by mouth 2 (two) times a day with meals    metoprolol tartrate (LOPRESSOR) 25 mg tablet Take 1 tablet (25 mg total) by mouth every 12 (twelve) hours    rOPINIRole (REQUIP) 0 5 mg tablet Take 0 5 mg by mouth 2 (two) times a day      tiZANidine (ZANAFLEX) 4 mg tablet Take 4 mg by mouth every 8 (eight) hours as needed for muscle spasms 1/2-1    traMADol (ULTRAM) 50 mg tablet Take 50 mg by mouth every 8 (eight) hours     No current facility-administered medications on file prior to visit  She has No Known Allergies       Review of Systems      Objective:      /98   Pulse 92   Temp 98 6 °F (37 °C) (Tympanic)   Resp 18   Ht 5' 1" (1 549 m)   Wt 75 3 kg (166 lb)   SpO2 99%   BMI 31 37 kg/m²          Physical Exam      Admission on 11/27/2018, Discharged on 11/29/2018   Component Date Value Ref Range Status    Troponin I 11/27/2018 0 31* <=0 04 ng/mL Final      S[employer] Chemistry analyzer 99% cutoff is > 0 04 ng/mL in network labs     o cTnI 99% cutoff is useful only when applied to patients in the clinical setting of myocardial ischemia   o cTnI 99% cutoff should be interpreted in the context of clinical history, ECG findings and possibly cardiac imaging to establish correct diagnosis  o cTnI 99% cutoff may be suggestive but clearly not indicative of a coronary event without the clinical setting of myocardial ischemia      Results indicate test should be repeated on new specimen collected within 4-6 hours of the original    Troponin I 11/27/2018 0 20* <=0 04 ng/mL Final      Siemens Chemistry analyzer 99% cutoff is > 0 04 ng/mL in network labs     o cTnI 99% cutoff is useful only when applied to patients in the clinical setting of myocardial ischemia o cTnI 99% cutoff should be interpreted in the context of clinical history, ECG findings and possibly cardiac imaging to establish correct diagnosis  o cTnI 99% cutoff may be suggestive but clearly not indicative of a coronary event without the clinical setting of myocardial ischemia      Results indicate test should be repeated on new specimen collected within 4-6 hours of the original    POC Glucose 11/27/2018 258* 65 - 140 mg/dl Final    POC Glucose 11/28/2018 148* 65 - 140 mg/dl Final    Ventricular Rate 11/27/2018 97  BPM Final    Atrial Rate 11/27/2018 97  BPM Final    MO Interval 11/27/2018 152  ms Final    QRSD Interval 11/27/2018 80  ms Final    QT Interval 11/27/2018 362  ms Final    QTC Interval 11/27/2018 459  ms Final    P Axis 11/27/2018 45  degrees Final    QRS Axis 11/27/2018 42  degrees Final    T Wave Axis 11/27/2018 35  degrees Final    POC Glucose 11/28/2018 159* 65 - 140 mg/dl Final    Activated Clotting Time, i-STAT 11/28/2018 285* 89 - 137 sec Final    Specimen Type 11/28/2018 VENOUS   Final    POC Glucose 11/28/2018 247* 65 - 140 mg/dl Final    Ventricular Rate 11/28/2018 67  BPM Final    Atrial Rate 11/28/2018 67  BPM Final    MO Interval 11/28/2018 148  ms Final    QRSD Interval 11/28/2018 82  ms Final    QT Interval 11/28/2018 442  ms Final    QTC Interval 11/28/2018 467  ms Final    P Axis 11/28/2018 22  degrees Final    QRS Axis 11/28/2018 58  degrees Final    T Wave Axis 11/28/2018 36  degrees Final    Sodium 11/29/2018 140  136 - 145 mmol/L Final    Potassium 11/29/2018 4 6  3 5 - 5 3 mmol/L Final    Chloride 11/29/2018 109* 100 - 108 mmol/L Final    CO2 11/29/2018 26  21 - 32 mmol/L Final    ANION GAP 11/29/2018 5  4 - 13 mmol/L Final    BUN 11/29/2018 12  5 - 25 mg/dL Final    Creatinine 11/29/2018 0 79  0 60 - 1 30 mg/dL Final    Standardized to IDMS reference method    Glucose 11/29/2018 170* 65 - 140 mg/dL Final      If the patient is fasting, the ADA then defines impaired fasting glucose as > 100 mg/dL and diabetes as > or equal to 123 mg/dL  Specimen collection should occur prior to Sulfasalazine administration due to the potential for falsely depressed results  Specimen collection should occur prior to Sulfapyridine administration due to the potential for falsely elevated results   Calcium 11/29/2018 8 3  8 3 - 10 1 mg/dL Final    eGFR 11/29/2018 83  ml/min/1 73sq m Final    Magnesium 11/29/2018 2 4  1 6 - 2 6 mg/dL Final    WBC 11/29/2018 7 30  4 31 - 10 16 Thousand/uL Final    RBC 11/29/2018 4 08  3 81 - 5 12 Million/uL Final    Hemoglobin 11/29/2018 12 1  11 5 - 15 4 g/dL Final    Hematocrit 11/29/2018 38 5  34 8 - 46 1 % Final    MCV 11/29/2018 94  82 - 98 fL Final    MCH 11/29/2018 29 7  26 8 - 34 3 pg Final    MCHC 11/29/2018 31 4  31 4 - 37 4 g/dL Final    RDW 11/29/2018 13 9  11 6 - 15 1 % Final    Platelets 48/97/4248 336  149 - 390 Thousands/uL Final    MPV 11/29/2018 9 3  8 9 - 12 7 fL Final    POC Glucose 11/29/2018 161* 65 - 140 mg/dl Final    POC Glucose 11/28/2018 219* 65 - 140 mg/dl Final    POC Glucose 11/29/2018 146* 65 - 140 mg/dl Final   Admission on 11/26/2018, Discharged on 11/27/2018   Component Date Value Ref Range Status    Sodium 11/26/2018 137  134 - 143 mmol/L Final    Potassium 11/26/2018 3 6  3 5 - 5 5 mmol/L Final    Chloride 11/26/2018 100  98 - 107 mmol/L Final    CO2 11/26/2018 23  21 - 31 mmol/L Final    ANION GAP 11/26/2018 14* 4 - 13 mmol/L Final    BUN 11/26/2018 10  7 - 25 mg/dL Final    Creatinine 11/26/2018 0 86  0 60 - 1 20 mg/dL Final    Standardized to IDMS reference method    Glucose 11/26/2018 276* 65 - 99 mg/dL Final      If the patient is fasting, the ADA then defines impaired fasting glucose as > 100 mg/dL and diabetes as > or equal to 123 mg/dL  Specimen collection should occur prior to Sulfasalazine administration due to the potential for falsely depressed results   Specimen collection should occur prior to Sulfapyridine administration due to the potential for falsely elevated results   Calcium 11/26/2018 10 0  8 6 - 10 5 mg/dL Final    AST 11/26/2018 57* 13 - 39 U/L Final      Specimen collection should occur prior to Sulfasalazine administration due to the potential for falsely depressed results   ALT 11/26/2018 54* 7 - 52 U/L Final      Specimen collection should occur prior to Sulfasalazine administration due to the potential for falsely depressed results       Alkaline Phosphatase 11/26/2018 106  40 - 150 U/L Final    Total Protein 11/26/2018 8 3  6 4 - 8 9 g/dL Final    Albumin 11/26/2018 4 5  3 5 - 5 7 g/dL Final    Total Bilirubin 11/26/2018 0 40  0 20 - 1 00 mg/dL Final    eGFR 11/26/2018 75  ml/min/1 73sq m Final    WBC 11/26/2018 9 60  4 80 - 10 80 Thousand/uL Final    RBC 11/26/2018 4 88  3 90 - 5 20 Million/uL Final    Hemoglobin 11/26/2018 14 7  12 0 - 16 0 g/dL Final    Hematocrit 11/26/2018 44 7  34 8 - 46 1 % Final    MCV 11/26/2018 91  81 - 99 fL Final    MCH 11/26/2018 30 2  26 0 - 34 0 pg Final    MCHC 11/26/2018 33 0  31 0 - 37 0 g/dL Final    RDW 11/26/2018 14 4  11 5 - 14 5 % Final    MPV 11/26/2018 7 5* 8 6 - 11 7 fL Final    Platelets 75/34/9905 407* 149 - 390 Thousands/uL Final    nRBC 11/26/2018 0  /100 WBCs Final    Neutrophils Relative 11/26/2018 56  42 - 75 % Final    Lymphocytes Relative 11/26/2018 37  21 - 51 % Final    Monocytes Relative 11/26/2018 5  2 - 12 % Final    Eosinophils Relative 11/26/2018 1  0 - 5 % Final    Basophils Relative 11/26/2018 1  0 - 2 % Final    Neutrophils Absolute 11/26/2018 5 30  1 40 - 6 50 Thousands/µL Final    Lymphocytes Absolute 11/26/2018 3 50  0 60 - 4 47 Thousands/µL Final    Monocytes Absolute 11/26/2018 0 50  0 17 - 1 22 Thousand/µL Final    Eosinophils Absolute 11/26/2018 0 10  0 00 - 0 61 Thousand/µL Final    Basophils Absolute 11/26/2018 0 10  0 00 - 0 10 Thousands/µL Final    Troponin I 11/26/2018 0 04* <=0 03 ng/mL Final    Protime 11/26/2018 11 8  10 2 - 13 0 seconds Final         INR 11/26/2018 1 02  0 90 - 1 50 Final         PTT 11/26/2018 39* 26 - 38 seconds Final    Therapeutic Heparin Range =  60-90 seconds    D-Dimer, Quant 11/26/2018 402* <230 ng/ml (FEU) Final       Reference and upper limits to exclude DVT and PE are the same  Do not use to exclude if clinical symptoms are present  Pregnant women:  1st trimester:  <220 - 1060 ng/ml (FEU)  2nd trimester:  <220 - 1880 ng/ml (FEU)  3rd trimester:   238 - 3280 ng/ml (FEU)          Ventricular Rate 11/26/2018 121  BPM Final    Atrial Rate 11/26/2018 121  BPM Final    PA Interval 11/26/2018 146  ms Final    QRSD Interval 11/26/2018 78  ms Final    QT Interval 11/26/2018 340  ms Final    QTC Interval 11/26/2018 482  ms Final    P Axis 11/26/2018 47  degrees Final    QRS Axis 11/26/2018 64  degrees Final    T Wave Axis 11/26/2018 28  degrees Final    Troponin I 11/26/2018 0 07* <=0 03 ng/mL Final      Results indicate test should be repeated on new specimen collected within 4-6 hours of the original    POC Glucose 11/26/2018 307* 65 - 140 mg/dl Final    Troponin I 11/26/2018 0 23* <=0 03 ng/mL Final      Results indicate test should be repeated on new specimen collected within 4-6 hours of the original    POC Glucose 11/26/2018 263* 65 - 140 mg/dl Final    Cholesterol 11/27/2018 277* 0 - 200 mg/dL Final      Cholesterol:       Desirable         <200 mg/dl       Borderline         200-239 mg/dl       High              >239           Triglycerides 11/27/2018 373* 44 - 166 mg/dL Final      Triglyceride:     Normal          <150 mg/dl     Borderline High 150-199 mg/dl     High            200-499 mg/dl        Very High       >499 mg/dl    Specimen collection should occur prior to N-Acetylcysteine or Metamizole administration due to the potential for falsely depressed results      HDL, Direct 11/27/2018 38* 40 - 60 mg/dL Final      HDL Cholesterol:       High    >60 mg/dL       Low     <41 mg/dL  Specimen collection should occur prior to Metamizole administration due to the potential for falsley depressed results   LDL Calculated 11/27/2018 164  75 - 193 mg/dL Final      LDL Cholesterol:     Optimal           <100 mg/dl     Near Optimal      100-129 mg/dl     Above Optimal       Borderline High 130-159 mg/dl       High            160-189 mg/dl       Very High       >189 mg/dl         This screening LDL is a calculated result  It does not have the accuracy of the Direct Measured LDL in the monitoring of patients with hyperlipidemia and/or statin therapy  Direct Measure LDL (ZSM055) must be ordered separately in these patients      Non-HDL-Chol (CHOL-HDL) 11/27/2018 239  mg/dl Final    Hemoglobin A1C 11/27/2018 10 6* 4 2 - 6 3 % Final    EAG 11/27/2018 258  mg/dl Final    POC Glucose 11/27/2018 143* 65 - 140 mg/dl Final    POC Glucose 11/27/2018 194* 65 - 140 mg/dl Final   Admission on 11/19/2018, Discharged on 11/20/2018   Component Date Value Ref Range Status    WBC 11/19/2018 9 20  4 80 - 10 80 Thousand/uL Final    RBC 11/19/2018 4 56  3 90 - 5 20 Million/uL Final    Hemoglobin 11/19/2018 13 6  12 0 - 16 0 g/dL Final    Hematocrit 11/19/2018 41 7  34 8 - 46 1 % Final    MCV 11/19/2018 91  81 - 99 fL Final    MCH 11/19/2018 29 8  26 0 - 34 0 pg Final    MCHC 11/19/2018 32 6  31 0 - 37 0 g/dL Final    RDW 11/19/2018 14 1  11 5 - 14 5 % Final    MPV 11/19/2018 7 9* 8 6 - 11 7 fL Final    Platelets 53/85/4707 348  149 - 390 Thousands/uL Final    nRBC 11/19/2018 0  /100 WBCs Final    Neutrophils Relative 11/19/2018 64  42 - 75 % Final    Lymphocytes Relative 11/19/2018 28  21 - 51 % Final    Monocytes Relative 11/19/2018 6  2 - 12 % Final    Eosinophils Relative 11/19/2018 2  0 - 5 % Final    Basophils Relative 11/19/2018 1  0 - 2 % Final    Neutrophils Absolute 11/19/2018 5 80  1 40 - 6 50 Thousands/µL Final    Lymphocytes Absolute 11/19/2018 2 60  0 60 - 4 47 Thousands/µL Final    Monocytes Absolute 11/19/2018 0 50  0 17 - 1 22 Thousand/µL Final    Eosinophils Absolute 11/19/2018 0 20  0 00 - 0 61 Thousand/µL Final    Basophils Absolute 11/19/2018 0 10  0 00 - 0 10 Thousands/µL Final    Sodium 11/19/2018 130* 134 - 143 mmol/L Final    Potassium 11/19/2018 3 8  3 5 - 5 5 mmol/L Final    Chloride 11/19/2018 98  98 - 107 mmol/L Final    CO2 11/19/2018 23  21 - 31 mmol/L Final    ANION GAP 11/19/2018 9  4 - 13 mmol/L Final    BUN 11/19/2018 16  7 - 25 mg/dL Final    Creatinine 11/19/2018 0 96  0 60 - 1 20 mg/dL Final    Standardized to IDMS reference method    Glucose 11/19/2018 494* 65 - 99 mg/dL Final      If the patient is fasting, the ADA then defines impaired fasting glucose as > 100 mg/dL and diabetes as > or equal to 123 mg/dL  Specimen collection should occur prior to Sulfasalazine administration due to the potential for falsely depressed results  Specimen collection should occur prior to Sulfapyridine administration due to the potential for falsely elevated results   Calcium 11/19/2018 9 6  8 6 - 10 5 mg/dL Final    AST 11/19/2018 30  13 - 39 U/L Final      Specimen collection should occur prior to Sulfasalazine administration due to the potential for falsely depressed results   ALT 11/19/2018 39  7 - 52 U/L Final      Specimen collection should occur prior to Sulfasalazine administration due to the potential for falsely depressed results       Alkaline Phosphatase 11/19/2018 117  40 - 150 U/L Final    Total Protein 11/19/2018 8 0  6 4 - 8 9 g/dL Final    Albumin 11/19/2018 4 1  3 5 - 5 7 g/dL Final    Total Bilirubin 11/19/2018 0 50  0 20 - 1 00 mg/dL Final    eGFR 11/19/2018 65  ml/min/1 73sq m Final    Troponin I 11/19/2018 <0 03  <=0 03 ng/mL Final    Ventricular Rate 11/19/2018 91  BPM Final    Atrial Rate 11/19/2018 91  BPM Final    GA Interval 11/19/2018 152  ms Final    QRSD Interval 11/19/2018 74  ms Final    QT Interval 11/19/2018 362  ms Final    QTC Interval 11/19/2018 445  ms Final    P Axis 11/19/2018 53  degrees Final    QRS Axis 11/19/2018 65  degrees Final    T Wave Axis 11/19/2018 21  degrees Final       No results found  TCM Call (since 11/2/2018)     Hospital care reviewed  Discussed with Inpatient Physician    Patient was hospitialized at  [facility]    Date of Admission  11/26/18    Date of discharge  11/29/18    Disposition  Home    Were the patients medications reviewed and updated  Yes    Current Symptoms  None      TCM Call (since 11/2/2018)     Post hospital issues  None    Should patient be enrolled in anticoag monitoring? Yes    Scheduled for follow up? Yes    Patients specialists  Cardiologist    Did you obtain your prescribed medications  Yes    Do you need help managing your prescriptions or medications  No    Is transportation to your appointment needed  No    Living Arrangements  Spouse or Significiant other    Support System  Spouse;  Family    The type of support provided  None    Do you have social support  Yes, as much as I need    Are you recieving any outpatient services  No    Are you recieving home care services  No    Are you using any community resources  No    Current waiver services  No    Have you fallen in the last 12 months  No    Interperter language line needed  No    Counseling  Family

## 2018-12-24 DIAGNOSIS — Z76.0 MEDICATION REFILL: ICD-10-CM

## 2018-12-24 DIAGNOSIS — I21.4 NSTEMI (NON-ST ELEVATED MYOCARDIAL INFARCTION) (HCC): ICD-10-CM

## 2018-12-24 RX ORDER — ATORVASTATIN CALCIUM 40 MG/1
40 TABLET, FILM COATED ORAL
Qty: 90 TABLET | Refills: 1 | Status: SHIPPED | OUTPATIENT
Start: 2018-12-24 | End: 2019-07-08 | Stop reason: SDUPTHER

## 2018-12-24 RX ORDER — CLOPIDOGREL BISULFATE 75 MG/1
75 TABLET ORAL DAILY
Qty: 90 TABLET | Refills: 1 | Status: SHIPPED | OUTPATIENT
Start: 2018-12-24 | End: 2019-07-08 | Stop reason: SDUPTHER

## 2018-12-24 RX ORDER — INSULIN LISPRO 100 U/ML
INJECTION, SOLUTION SUBCUTANEOUS
Qty: 10 PEN | Refills: 3 | Status: SHIPPED | OUTPATIENT
Start: 2018-12-24 | End: 2019-02-04

## 2019-01-02 DIAGNOSIS — M62.838 MUSCLE SPASM: ICD-10-CM

## 2019-01-02 DIAGNOSIS — E11.9 TYPE 2 DIABETES MELLITUS WITHOUT COMPLICATION, WITHOUT LONG-TERM CURRENT USE OF INSULIN (HCC): ICD-10-CM

## 2019-01-02 RX ORDER — BLOOD SUGAR DIAGNOSTIC
STRIP MISCELLANEOUS
Qty: 200 EACH | Refills: 2 | Status: SHIPPED | OUTPATIENT
Start: 2019-01-02 | End: 2019-01-28 | Stop reason: SDUPTHER

## 2019-01-02 RX ORDER — CYCLOBENZAPRINE HCL 5 MG
5 TABLET ORAL DAILY
Qty: 30 TABLET | Refills: 1 | Status: SHIPPED | OUTPATIENT
Start: 2019-01-02 | End: 2019-03-07 | Stop reason: SDUPTHER

## 2019-01-28 DIAGNOSIS — E11.9 TYPE 2 DIABETES MELLITUS WITHOUT COMPLICATION, WITHOUT LONG-TERM CURRENT USE OF INSULIN (HCC): ICD-10-CM

## 2019-01-28 RX ORDER — BLOOD SUGAR DIAGNOSTIC
STRIP MISCELLANEOUS
Qty: 200 EACH | Refills: 2 | Status: SHIPPED | OUTPATIENT
Start: 2019-01-28 | End: 2019-03-07 | Stop reason: SDUPTHER

## 2019-01-28 NOTE — TELEPHONE ENCOUNTER
Patient called requesting a refill of medication  Can you please send to pharmacy on file?  Thank you      Requesting: Pt requesting refills on her Acc-Chek Jyotsna test Strips from [facility]  ![address]

## 2019-02-04 ENCOUNTER — OFFICE VISIT (OUTPATIENT)
Dept: FAMILY MEDICINE CLINIC | Facility: CLINIC | Age: 59
End: 2019-02-04
Payer: COMMERCIAL

## 2019-02-04 VITALS
WEIGHT: 168 LBS | OXYGEN SATURATION: 97 % | RESPIRATION RATE: 16 BRPM | DIASTOLIC BLOOD PRESSURE: 82 MMHG | HEIGHT: 61 IN | BODY MASS INDEX: 31.72 KG/M2 | TEMPERATURE: 98.4 F | HEART RATE: 84 BPM | SYSTOLIC BLOOD PRESSURE: 144 MMHG

## 2019-02-04 DIAGNOSIS — I10 HYPERTENSION, UNSPECIFIED TYPE: ICD-10-CM

## 2019-02-04 DIAGNOSIS — E11.9 DIABETIC EYE EXAM (HCC): Primary | ICD-10-CM

## 2019-02-04 DIAGNOSIS — Z01.00 DIABETIC EYE EXAM (HCC): Primary | ICD-10-CM

## 2019-02-04 DIAGNOSIS — E11.9 ENCOUNTER FOR DIABETIC FOOT EXAM (HCC): ICD-10-CM

## 2019-02-04 DIAGNOSIS — Z11.59 NEED FOR HEPATITIS C SCREENING TEST: ICD-10-CM

## 2019-02-04 DIAGNOSIS — I10 ESSENTIAL HYPERTENSION: ICD-10-CM

## 2019-02-04 DIAGNOSIS — Z79.4 TYPE 2 DIABETES MELLITUS WITHOUT COMPLICATION, WITH LONG-TERM CURRENT USE OF INSULIN (HCC): Chronic | ICD-10-CM

## 2019-02-04 DIAGNOSIS — I21.4 NSTEMI (NON-ST ELEVATED MYOCARDIAL INFARCTION) (HCC): ICD-10-CM

## 2019-02-04 DIAGNOSIS — E11.9 TYPE 2 DIABETES MELLITUS WITHOUT COMPLICATION, UNSPECIFIED WHETHER LONG TERM INSULIN USE (HCC): ICD-10-CM

## 2019-02-04 DIAGNOSIS — E11.9 TYPE 2 DIABETES MELLITUS WITHOUT COMPLICATION, WITH LONG-TERM CURRENT USE OF INSULIN (HCC): Chronic | ICD-10-CM

## 2019-02-04 DIAGNOSIS — R53.83 FATIGUE, UNSPECIFIED TYPE: ICD-10-CM

## 2019-02-04 DIAGNOSIS — Z12.11 COLON CANCER SCREENING: ICD-10-CM

## 2019-02-04 PROCEDURE — 99214 OFFICE O/P EST MOD 30 MIN: CPT | Performed by: INTERNAL MEDICINE

## 2019-02-04 NOTE — PROGRESS NOTES
Vitals:    02/04/19 1309 02/04/19 1315   BP:  144/82   BP Location:  Left arm   Patient Position:  Sitting   Cuff Size:  Adult   Pulse:  84   Resp:  16   Temp:  98 4 °F (36 9 °C)   TempSrc:  Tympanic   SpO2:  97%   Weight:  76 2 kg (168 lb)   Height: 5' 1" (1 549 m) 5' 1" (1 549 m)       Assessment/Plan:    Diagnoses and all orders for this visit:    Diabetic eye exam [facility])  -     Ambulatory Referral to Ophthalmology; Future  -     Microalbumin / creatinine urine ratio  -     POCT hemoglobin A1c  -     Diabetic foot exam; Future    Encounter for diabetic foot exam ([facility] )    Type 2 diabetes mellitus without complication, unspecified whether long term insulin use ([place] [id number] )    Hypertension, unspecified type  -     CBC and differential; Future  -     Comprehensive metabolic panel; Future  -     UA w Reflex to Microscopic w Reflex to Culture    Need for hepatitis C screening test  -     Hepatitis C antibody; Future    Fatigue, unspecified type  -     Lipid Panel with Direct LDL reflex; Future  -     TSH, 3rd generation with Free T4 reflex; Future    Colon cancer screening  -     Occult Blood, Fecal Immunochemical; Future    Other orders  -     Cancel: Diabetic foot exam; Future        Subjective:      Patient ID: Ray Montes is a 62 y o  female  HPI    The following portions of the patient's history were reviewed and updated as appropriate: allergies, current medications, past family history, past medical history, past social history, past surgical history and problem list       Current Outpatient Prescriptions:     ACCU-CHEK CHANDANA PLUS test strip, USE TO TEST SUGARS 7 TIMES DAILY   E11 9, Disp: 200 each, Rfl: 2    ACCU-CHEK SOFTCLIX LANCETS lancets, Test TID, Disp: 300 each, Rfl: 3    amLODIPine (NORVASC) 5 mg tablet, Take 1 tablet (5 mg total) by mouth daily, Disp: 90 tablet, Rfl: 1    aspirin (ECOTRIN LOW STRENGTH) 81 mg EC tablet, Take 81 mg by mouth daily, Disp: , Rfl:     atorvastatin (LIPITOR) 40 mg tablet, Take 1 tablet (40 mg total) by mouth daily with dinner, Disp: 90 tablet, Rfl: 1    BD PEN NEEDLE RACHEL U/F 32G X 4 MM MISC, Inject under the skin daily, Disp: 100 each, Rfl: 3    benazepril (LOTENSIN) 20 mg tablet, Take 2 tablets (40 mg total) by mouth daily, Disp: , Rfl:     clopidogrel (PLAVIX) 75 mg tablet, Take 1 tablet (75 mg total) by mouth daily, Disp: 90 tablet, Rfl: 1    cyclobenzaprine (FLEXERIL) 5 mg tablet, Take 1 tablet (5 mg total) by mouth daily, Disp: 30 tablet, Rfl: 1    insulin glargine (BASAGLAR KWIKPEN) 100 units/mL injection pen, Inject 40 Units under the skin daily at bedtime, Disp: 5 pen, Rfl: 0    metFORMIN (GLUCOPHAGE) 500 mg tablet, Take 1 tablet (500 mg total) by mouth 2 (two) times a day with meals, Disp: 180 tablet, Rfl: 0    metoprolol tartrate (LOPRESSOR) 25 mg tablet, Take 1 tablet (25 mg total) by mouth every 12 (twelve) hours, Disp: 180 tablet, Rfl: 1    rOPINIRole (REQUIP) 0 5 mg tablet, Take 0 5 mg by mouth 2 (two) times a day  , Disp: , Rfl:     tiZANidine (ZANAFLEX) 4 mg tablet, Take 4 mg by mouth every 8 (eight) hours as needed for muscle spasms 1/2-1, Disp: , Rfl:     traMADol (ULTRAM) 50 mg tablet, Take 50 mg by mouth every 8 (eight) hours, Disp: , Rfl:     Past Medical History:   Diagnosis Date    Chronic back pain     Hyperlipidemia     Hypertension     Restless leg syndrome     Type 2 diabetes mellitus (HCC)        Past Surgical History:   Procedure Laterality Date    ANKLE SURGERY      tubal    CHOLECYSTECTOMY      GALLBLADDER SURGERY      TUBAL LIGATION         Social History       Patient Active Problem List   Diagnosis    Essential hypertension    High cholesterol    Chronic bilateral low back pain without sciatica    Type 2 diabetes mellitus without complication, with long-term current use of insulin (HCC)    Chest pain    Restless leg syndrome    Disease of pancreas    Mixed hyperlipidemia    NSTEMI (non-ST elevated myocardial infarction) ([facility] )           Review of Systems      Objective:      /82 (BP Location: Left arm, Patient Position: Sitting, Cuff Size: Adult)   Pulse 84   Temp 98 4 °F (36 9 °C) (Tympanic)   Resp 16   Ht 5' 1" (1 549 m)   Wt 76 2 kg (168 lb)   SpO2 97%   BMI 31 74 kg/m²          Physical Exam   Cardiovascular: Pulses are no weak pulses  Pulses:       Dorsalis pedis pulses are 2+ on the right side, and 2+ on the left side  Posterior tibial pulses are 2+ on the right side, and 2+ on the left side  Feet:   Right Foot:   Skin Integrity: Negative for ulcer, skin breakdown, erythema, warmth, callus or dry skin  Left Foot:   Skin Integrity: Negative for ulcer, skin breakdown, erythema, warmth, callus or dry skin  Admission on 11/27/2018, Discharged on 11/29/2018   Component Date Value Ref Range Status    Troponin I 11/27/2018 0 31* <=0 04 ng/mL Final      [employer] Chemistry analyzer 99% cutoff is > 0 04 ng/mL in network labs     o cTnI 99% cutoff is useful only when applied to patients in the clinical setting of myocardial ischemia   o cTnI 99% cutoff should be interpreted in the context of clinical history, ECG findings and possibly cardiac imaging to establish correct diagnosis  o cTnI 99% cutoff may be suggestive but clearly not indicative of a coronary event without the clinical setting of myocardial ischemia  Results indicate test should be repeated on new specimen collected within 4-6 hours of the original    Troponin I 11/27/2018 0 20* <=0 04 ng/mL Final      [employer] Chemistry analyzer 99% cutoff is > 0 04 ng/mL in network labs     o cTnI 99% cutoff is useful only when applied to patients in the clinical setting of myocardial ischemia   o cTnI 99% cutoff should be interpreted in the context of clinical history, ECG findings and possibly cardiac imaging to establish correct diagnosis     o cTnI 99% cutoff may be suggestive but clearly not indicative of a coronary event without the clinical setting of myocardial ischemia  Results indicate test should be repeated on new specimen collected within 4-6 hours of the original    POC Glucose 11/27/2018 258* 65 - 140 mg/dl Final    POC Glucose 11/28/2018 148* 65 - 140 mg/dl Final    Ventricular Rate 11/27/2018 97  BPM Final    Atrial Rate 11/27/2018 97  BPM Final    DE Interval 11/27/2018 152  ms Final    QRSD Interval 11/27/2018 80  ms Final    QT Interval 11/27/2018 362  ms Final    QTC Interval 11/27/2018 459  ms Final    P Axis 11/27/2018 45  degrees Final    QRS Axis 11/27/2018 42  degrees Final    T Wave Axis 11/27/2018 35  degrees Final    POC Glucose 11/28/2018 159* 65 - 140 mg/dl Final    Activated Clotting Time, i-STAT 11/28/2018 285* 89 - 137 sec Final    Specimen Type 11/28/2018 VENOUS   Final    POC Glucose 11/28/2018 247* 65 - 140 mg/dl Final    Ventricular Rate 11/28/2018 67  BPM Final    Atrial Rate 11/28/2018 67  BPM Final    DE Interval 11/28/2018 148  ms Final    QRSD Interval 11/28/2018 82  ms Final    QT Interval 11/28/2018 442  ms Final    QTC Interval 11/28/2018 467  ms Final    P Axis 11/28/2018 22  degrees Final    QRS Axis 11/28/2018 58  degrees Final    T Wave Axis 11/28/2018 36  degrees Final    Sodium 11/29/2018 140  136 - 145 mmol/L Final    Potassium 11/29/2018 4 6  3 5 - 5 3 mmol/L Final    Chloride 11/29/2018 109* 100 - 108 mmol/L Final    CO2 11/29/2018 26  21 - 32 mmol/L Final    ANION GAP 11/29/2018 5  4 - 13 mmol/L Final    BUN 11/29/2018 12  5 - 25 mg/dL Final    Creatinine 11/29/2018 0 79  0 60 - 1 30 mg/dL Final    Standardized to IDMS reference method    Glucose 11/29/2018 170* 65 - 140 mg/dL Final      If the patient is fasting, the ADA then defines impaired fasting glucose as > 100 mg/dL and diabetes as > or equal to 123 mg/dL  Specimen collection should occur prior to Sulfasalazine administration due to the potential for falsely depressed results   Specimen collection should occur prior to Sulfapyridine administration due to the potential for falsely elevated results   Calcium 11/29/2018 8 3  8 3 - 10 1 mg/dL Final    eGFR 11/29/2018 83  ml/min/1 73sq m Final    Magnesium 11/29/2018 2 4  1 6 - 2 6 mg/dL Final    WBC 11/29/2018 7 30  4 31 - 10 16 Thousand/uL Final    RBC 11/29/2018 4 08  3 81 - 5 12 Million/uL Final    Hemoglobin 11/29/2018 12 1  11 5 - 15 4 g/dL Final    Hematocrit 11/29/2018 38 5  34 8 - 46 1 % Final    MCV 11/29/2018 94  82 - 98 fL Final    MCH 11/29/2018 29 7  26 8 - 34 3 pg Final    MCHC 11/29/2018 31 4  31 4 - 37 4 g/dL Final    RDW 11/29/2018 13 9  11 6 - 15 1 % Final    Platelets 74/03/4013 336  149 - 390 Thousands/uL Final    MPV 11/29/2018 9 3  8 9 - 12 7 fL Final    POC Glucose 11/29/2018 161* 65 - 140 mg/dl Final    POC Glucose 11/28/2018 219* 65 - 140 mg/dl Final    POC Glucose 11/29/2018 146* 65 - 140 mg/dl Final   Admission on 11/26/2018, Discharged on 11/27/2018   Component Date Value Ref Range Status    Sodium 11/26/2018 137  134 - 143 mmol/L Final    Potassium 11/26/2018 3 6  3 5 - 5 5 mmol/L Final    Chloride 11/26/2018 100  98 - 107 mmol/L Final    CO2 11/26/2018 23  21 - 31 mmol/L Final    ANION GAP 11/26/2018 14* 4 - 13 mmol/L Final    BUN 11/26/2018 10  7 - 25 mg/dL Final    Creatinine 11/26/2018 0 86  0 60 - 1 20 mg/dL Final    Standardized to IDMS reference method    Glucose 11/26/2018 276* 65 - 99 mg/dL Final      If the patient is fasting, the ADA then defines impaired fasting glucose as > 100 mg/dL and diabetes as > or equal to 123 mg/dL  Specimen collection should occur prior to Sulfasalazine administration due to the potential for falsely depressed results  Specimen collection should occur prior to Sulfapyridine administration due to the potential for falsely elevated results      Calcium 11/26/2018 10 0  8 6 - 10 5 mg/dL Final    AST 11/26/2018 57* 13 - 39 U/L Final      Specimen collection should occur prior to Sulfasalazine administration due to the potential for falsely depressed results   ALT 11/26/2018 54* 7 - 52 U/L Final      Specimen collection should occur prior to Sulfasalazine administration due to the potential for falsely depressed results       Alkaline Phosphatase 11/26/2018 106  40 - 150 U/L Final    Total Protein 11/26/2018 8 3  6 4 - 8 9 g/dL Final    Albumin 11/26/2018 4 5  3 5 - 5 7 g/dL Final    Total Bilirubin 11/26/2018 0 40  0 20 - 1 00 mg/dL Final    eGFR 11/26/2018 75  ml/min/1 73sq m Final    WBC 11/26/2018 9 60  4 80 - 10 80 Thousand/uL Final    RBC 11/26/2018 4 88  3 90 - 5 20 Million/uL Final    Hemoglobin 11/26/2018 14 7  12 0 - 16 0 g/dL Final    Hematocrit 11/26/2018 44 7  34 8 - 46 1 % Final    MCV 11/26/2018 91  81 - 99 fL Final    MCH 11/26/2018 30 2  26 0 - 34 0 pg Final    MCHC 11/26/2018 33 0  31 0 - 37 0 g/dL Final    RDW 11/26/2018 14 4  11 5 - 14 5 % Final    MPV 11/26/2018 7 5* 8 6 - 11 7 fL Final    Platelets 08/58/7652 407* 149 - 390 Thousands/uL Final    nRBC 11/26/2018 0  /100 WBCs Final    Neutrophils Relative 11/26/2018 56  42 - 75 % Final    Lymphocytes Relative 11/26/2018 37  21 - 51 % Final    Monocytes Relative 11/26/2018 5  2 - 12 % Final    Eosinophils Relative 11/26/2018 1  0 - 5 % Final    Basophils Relative 11/26/2018 1  0 - 2 % Final    Neutrophils Absolute 11/26/2018 5 30  1 40 - 6 50 Thousands/µL Final    Lymphocytes Absolute 11/26/2018 3 50  0 60 - 4 47 Thousands/µL Final    Monocytes Absolute 11/26/2018 0 50  0 17 - 1 22 Thousand/µL Final    Eosinophils Absolute 11/26/2018 0 10  0 00 - 0 61 Thousand/µL Final    Basophils Absolute 11/26/2018 0 10  0 00 - 0 10 Thousands/µL Final    Troponin I 11/26/2018 0 04* <=0 03 ng/mL Final    Protime 11/26/2018 11 8  10 2 - 13 0 seconds Final         INR 11/26/2018 1 02  0 90 - 1 50 Final         PTT 11/26/2018 39* 26 - 38 seconds Final    Therapeutic Heparin Range =  60-90 seconds    D-Dimer, Quant 11/26/2018 402* <230 ng/ml (FEU) Final       Reference and upper limits to exclude DVT and PE are the same  Do not use to exclude if clinical symptoms are present  Pregnant women:  1st trimester:  <220 - 1060 ng/ml (FEU)  2nd trimester:  <220 - 1880 ng/ml (FEU)  3rd trimester:   238 - 3280 ng/ml (FEU)          Ventricular Rate 11/26/2018 121  BPM Final    Atrial Rate 11/26/2018 121  BPM Final    DE Interval 11/26/2018 146  ms Final    QRSD Interval 11/26/2018 78  ms Final    QT Interval 11/26/2018 340  ms Final    QTC Interval 11/26/2018 482  ms Final    P Axis 11/26/2018 47  degrees Final    QRS Axis 11/26/2018 64  degrees Final    T Wave Axis 11/26/2018 28  degrees Final    Troponin I 11/26/2018 0 07* <=0 03 ng/mL Final      Results indicate test should be repeated on new specimen collected within 4-6 hours of the original    POC Glucose 11/26/2018 307* 65 - 140 mg/dl Final    Troponin I 11/26/2018 0 23* <=0 03 ng/mL Final      Results indicate test should be repeated on new specimen collected within 4-6 hours of the original    POC Glucose 11/26/2018 263* 65 - 140 mg/dl Final    Cholesterol 11/27/2018 277* 0 - 200 mg/dL Final      Cholesterol:       Desirable         <200 mg/dl       Borderline         200-239 mg/dl       High              >239           Triglycerides 11/27/2018 373* 44 - 166 mg/dL Final      Triglyceride:     Normal          <150 mg/dl     Borderline High 150-199 mg/dl     High            200-499 mg/dl        Very High       >499 mg/dl    Specimen collection should occur prior to N-Acetylcysteine or Metamizole administration due to the potential for falsely depressed results   HDL, Direct 11/27/2018 38* 40 - 60 mg/dL Final      HDL Cholesterol:       High    >60 mg/dL       Low     <41 mg/dL  Specimen collection should occur prior to Metamizole administration due to the potential for falsley depressed results      LDL Calculated 11/27/2018 164  75 - 193 mg/dL Final      LDL Cholesterol:     Optimal           <100 mg/dl     Near Optimal      100-129 mg/dl     Above Optimal       Borderline High 130-159 mg/dl       High            160-189 mg/dl       Very High       >189 mg/dl         This screening LDL is a calculated result  It does not have the accuracy of the Direct Measured LDL in the monitoring of patients with hyperlipidemia and/or statin therapy  Direct Measure LDL (FVG194) must be ordered separately in these patients      Non-HDL-Chol (CHOL-HDL) 11/27/2018 239  mg/dl Final    Hemoglobin A1C 11/27/2018 10 6* 4 2 - 6 3 % Final    EAG 11/27/2018 258  mg/dl Final    POC Glucose 11/27/2018 143* 65 - 140 mg/dl Final    POC Glucose 11/27/2018 194* 65 - 140 mg/dl Final   Admission on 11/19/2018, Discharged on 11/20/2018   Component Date Value Ref Range Status    WBC 11/19/2018 9 20  4 80 - 10 80 Thousand/uL Final    RBC 11/19/2018 4 56  3 90 - 5 20 Million/uL Final    Hemoglobin 11/19/2018 13 6  12 0 - 16 0 g/dL Final    Hematocrit 11/19/2018 41 7  34 8 - 46 1 % Final    MCV 11/19/2018 91  81 - 99 fL Final    MCH 11/19/2018 29 8  26 0 - 34 0 pg Final    MCHC 11/19/2018 32 6  31 0 - 37 0 g/dL Final    RDW 11/19/2018 14 1  11 5 - 14 5 % Final    MPV 11/19/2018 7 9* 8 6 - 11 7 fL Final    Platelets 52/05/7033 348  149 - 390 Thousands/uL Final    nRBC 11/19/2018 0  /100 WBCs Final    Neutrophils Relative 11/19/2018 64  42 - 75 % Final    Lymphocytes Relative 11/19/2018 28  21 - 51 % Final    Monocytes Relative 11/19/2018 6  2 - 12 % Final    Eosinophils Relative 11/19/2018 2  0 - 5 % Final    Basophils Relative 11/19/2018 1  0 - 2 % Final    Neutrophils Absolute 11/19/2018 5 80  1 40 - 6 50 Thousands/µL Final    Lymphocytes Absolute 11/19/2018 2 60  0 60 - 4 47 Thousands/µL Final    Monocytes Absolute 11/19/2018 0 50  0 17 - 1 22 Thousand/µL Final    Eosinophils Absolute 11/19/2018 0 20  0 00 - 0 61 Thousand/µL Final    Basophils Absolute 11/19/2018 0 10  0 00 - 0 10 Thousands/µL Final    Sodium 11/19/2018 130* 134 - 143 mmol/L Final    Potassium 11/19/2018 3 8  3 5 - 5 5 mmol/L Final    Chloride 11/19/2018 98  98 - 107 mmol/L Final    CO2 11/19/2018 23  21 - 31 mmol/L Final    ANION GAP 11/19/2018 9  4 - 13 mmol/L Final    BUN 11/19/2018 16  7 - 25 mg/dL Final    Creatinine 11/19/2018 0 96  0 60 - 1 20 mg/dL Final    Standardized to IDMS reference method    Glucose 11/19/2018 494* 65 - 99 mg/dL Final      If the patient is fasting, the ADA then defines impaired fasting glucose as > 100 mg/dL and diabetes as > or equal to 123 mg/dL  Specimen collection should occur prior to Sulfasalazine administration due to the potential for falsely depressed results  Specimen collection should occur prior to Sulfapyridine administration due to the potential for falsely elevated results   Calcium 11/19/2018 9 6  8 6 - 10 5 mg/dL Final    AST 11/19/2018 30  13 - 39 U/L Final      Specimen collection should occur prior to Sulfasalazine administration due to the potential for falsely depressed results   ALT 11/19/2018 39  7 - 52 U/L Final      Specimen collection should occur prior to Sulfasalazine administration due to the potential for falsely depressed results       Alkaline Phosphatase 11/19/2018 117  40 - 150 U/L Final    Total Protein 11/19/2018 8 0  6 4 - 8 9 g/dL Final    Albumin 11/19/2018 4 1  3 5 - 5 7 g/dL Final    Total Bilirubin 11/19/2018 0 50  0 20 - 1 00 mg/dL Final    eGFR 11/19/2018 65  ml/min/1 73sq m Final    Troponin I 11/19/2018 <0 03  <=0 03 ng/mL Final    Ventricular Rate 11/19/2018 91  BPM Final    Atrial Rate 11/19/2018 91  BPM Final    TX Interval 11/19/2018 152  ms Final    QRSD Interval 11/19/2018 74  ms Final    QT Interval 11/19/2018 362  ms Final    QTC Interval 11/19/2018 445  ms Final    P Axis 11/19/2018 53  degrees Final    QRS Axis 11/19/2018 65  degrees Final    T Wave Axis 11/19/2018 21  degrees Final       No results found  Social History     Social History Narrative    No narrative on file       Family History   Problem Relation Age of Onset    Breast cancer Mother     Diabetes Mother     Heart failure Mother     Heart disease Father     Stroke Father        Past Surgical History:   Procedure Laterality Date    ANKLE SURGERY      tubal    CHOLECYSTECTOMY      GALLBLADDER SURGERY      TUBAL LIGATION         No Known Allergies    Patient's shoes and socks removed  Right Foot/Ankle   Right Foot Inspection  Skin Exam: skin normal and skin intact no dry skin, no warmth, no callus, no erythema, no maceration, no abnormal color, no pre-ulcer, no ulcer and no callus                          Toe Exam: ROM and strength within normal limits  Sensory   Vibration: intact  Proprioception: intact   Monofilament testing: intact  Vascular  Capillary refills: < 3 seconds  The right DP pulse is 2+  The right PT pulse is 2+  Right Toe  - Comprehensive Exam  Ecchymosis: none  Arch: normal  Hammertoes: absent  Claw Toes: absent  Swelling: none   Tenderness: none         Left Foot/Ankle  Left Foot Inspection  Skin Exam: skin normal and skin intactno dry skin, no warmth, no erythema, no maceration, normal color, no pre-ulcer, no ulcer and no callus                         Toe Exam: ROM and strength within normal limits                   Sensory   Vibration: intact  Proprioception: intact  Monofilament: intact  Vascular  Capillary refills: < 3 seconds  The left DP pulse is 2+  The left PT pulse is 2+  Left Toe  - Comprehensive Exam  Ecchymosis: none  Arch: normal  Hammertoes: absent  Claw toes: absent  Swelling: none   Tenderness: none       Assign Risk Category:  No deformity present; No loss of protective sensation;  No weak pulses       Risk: 0

## 2019-02-04 NOTE — PROGRESS NOTES
Vitals:    02/04/19 1309 02/04/19 1315   BP:  144/82   BP Location:  Left arm   Patient Position:  Sitting   Cuff Size:  Adult   Pulse:  84   Resp:  16   Temp:  98 4 °F (36 9 °C)   TempSrc:  Tympanic   SpO2:  97%   Weight:  76 2 kg (168 lb)   Height: 5' 1" (1 549 m) 5' 1" (1 549 m)       Assessment/Plan:    Diagnoses and all orders for this visit:    Diabetic eye exam [facility])  -     Ambulatory Referral to Ophthalmology; Future  -     Microalbumin / creatinine urine ratio  -     POCT hemoglobin A1c  -     Diabetic foot exam; Future    Encounter for diabetic foot exam ([place] [address] )    Type 2 diabetes mellitus without complication, unspecified whether long term insulin use ([place] [address] )    Hypertension, unspecified type  -     CBC and differential; Future  -     Comprehensive metabolic panel; Future  -     UA w Reflex to Microscopic w Reflex to Culture    Need for hepatitis C screening test  -     Hepatitis C antibody; Future    Fatigue, unspecified type  -     Lipid Panel with Direct LDL reflex; Future  -     TSH, 3rd generation with Free T4 reflex; Future    Colon cancer screening  -     Occult Blood, Fecal Immunochemical; Future    Type 2 diabetes mellitus without complication, with long-term current use of insulin (HCC)    Essential hypertension    NSTEMI (non-ST elevated myocardial infarction) ([facility] )    Other orders  -     Cancel: Diabetic foot exam; Future        Subjective:      Patient ID: Bing Galvin is a 62 y o  female  HPI    The following portions of the patient's history were reviewed and updated as appropriate: allergies, current medications, past family history, past medical history, past social history, past surgical history and problem list       Current Outpatient Prescriptions:     ACCU-CHEK CHANDANA PLUS test strip, USE TO TEST SUGARS 7 TIMES DAILY   E11 9, Disp: 200 each, Rfl: 2    ACCU-CHEK SOFTCLIX LANCETS lancets, Test TID, Disp: 300 each, Rfl: 3    amLODIPine (NORVASC) 5 mg tablet, Take 1 tablet (5 mg total) by mouth daily, Disp: 90 tablet, Rfl: 1    aspirin (ECOTRIN LOW STRENGTH) 81 mg EC tablet, Take 81 mg by mouth daily, Disp: , Rfl:     atorvastatin (LIPITOR) 40 mg tablet, Take 1 tablet (40 mg total) by mouth daily with dinner, Disp: 90 tablet, Rfl: 1    BD PEN NEEDLE RACHEL U/F 32G X 4 MM MISC, Inject under the skin daily, Disp: 100 each, Rfl: 3    benazepril (LOTENSIN) 20 mg tablet, Take 2 tablets (40 mg total) by mouth daily, Disp: , Rfl:     clopidogrel (PLAVIX) 75 mg tablet, Take 1 tablet (75 mg total) by mouth daily, Disp: 90 tablet, Rfl: 1    cyclobenzaprine (FLEXERIL) 5 mg tablet, Take 1 tablet (5 mg total) by mouth daily, Disp: 30 tablet, Rfl: 1    insulin glargine (BASAGLAR KWIKPEN) 100 units/mL injection pen, Inject 40 Units under the skin daily at bedtime, Disp: 5 pen, Rfl: 0    metFORMIN (GLUCOPHAGE) 500 mg tablet, Take 1 tablet (500 mg total) by mouth 2 (two) times a day with meals, Disp: 180 tablet, Rfl: 0    metoprolol tartrate (LOPRESSOR) 25 mg tablet, Take 1 tablet (25 mg total) by mouth every 12 (twelve) hours, Disp: 180 tablet, Rfl: 1    rOPINIRole (REQUIP) 0 5 mg tablet, Take 0 5 mg by mouth 2 (two) times a day  , Disp: , Rfl:     tiZANidine (ZANAFLEX) 4 mg tablet, Take 4 mg by mouth every 8 (eight) hours as needed for muscle spasms 1/2-1, Disp: , Rfl:     traMADol (ULTRAM) 50 mg tablet, Take 50 mg by mouth every 8 (eight) hours, Disp: , Rfl:     Past Medical History:   Diagnosis Date    Chronic back pain     Hyperlipidemia     Hypertension     Restless leg syndrome     Type 2 diabetes mellitus (HCC)        Past Surgical History:   Procedure Laterality Date    ANKLE SURGERY      tubal    CHOLECYSTECTOMY      GALLBLADDER SURGERY      TUBAL LIGATION         Social History       Patient Active Problem List   Diagnosis    Essential hypertension    High cholesterol    Chronic bilateral low back pain without sciatica    Type 2 diabetes mellitus without complication, with long-term current use of insulin (HCC)    Chest pain    Restless leg syndrome    Disease of pancreas    Mixed hyperlipidemia    NSTEMI (non-ST elevated myocardial infarction) (HCC)           Review of Systems      Objective:      /82 (BP Location: Left arm, Patient Position: Sitting, Cuff Size: Adult)   Pulse 84   Temp 98 4 °F (36 9 °C) (Tympanic)   Resp 16   Ht 5' 1" (1 549 m)   Wt 76 2 kg (168 lb)   SpO2 97%   BMI 31 74 kg/m²          Physical Exam        Admission on 11/27/2018, Discharged on 11/29/2018   Component Date Value Ref Range Status    Troponin I 11/27/2018 0 31* <=0 04 ng/mL Final      S[employer] Chemistry analyzer 99% cutoff is > 0 04 ng/mL in network labs     o cTnI 99% cutoff is useful only when applied to patients in the clinical setting of myocardial ischemia   o cTnI 99% cutoff should be interpreted in the context of clinical history, ECG findings and possibly cardiac imaging to establish correct diagnosis  o cTnI 99% cutoff may be suggestive but clearly not indicative of a coronary event without the clinical setting of myocardial ischemia  Results indicate test should be repeated on new specimen collected within 4-6 hours of the original    Troponin I 11/27/2018 0 20* <=0 04 ng/mL Final      Siemens Chemistry analyzer 99% cutoff is > 0 04 ng/mL in network labs     o cTnI 99% cutoff is useful only when applied to patients in the clinical setting of myocardial ischemia   o cTnI 99% cutoff should be interpreted in the context of clinical history, ECG findings and possibly cardiac imaging to establish correct diagnosis  o cTnI 99% cutoff may be suggestive but clearly not indicative of a coronary event without the clinical setting of myocardial ischemia      Results indicate test should be repeated on new specimen collected within 4-6 hours of the original    POC Glucose 11/27/2018 258* 65 - 140 mg/dl Final    POC Glucose 11/28/2018 148* 65 - 140 mg/dl Final    Ventricular Rate 11/27/2018 97  BPM Final    Atrial Rate 11/27/2018 97  BPM Final    NH Interval 11/27/2018 152  ms Final    QRSD Interval 11/27/2018 80  ms Final    QT Interval 11/27/2018 362  ms Final    QTC Interval 11/27/2018 459  ms Final    P Axis 11/27/2018 45  degrees Final    QRS Axis 11/27/2018 42  degrees Final    T Wave Axis 11/27/2018 35  degrees Final    POC Glucose 11/28/2018 159* 65 - 140 mg/dl Final    Activated Clotting Time, i-STAT 11/28/2018 285* 89 - 137 sec Final    Specimen Type 11/28/2018 VENOUS   Final    POC Glucose 11/28/2018 247* 65 - 140 mg/dl Final    Ventricular Rate 11/28/2018 67  BPM Final    Atrial Rate 11/28/2018 67  BPM Final    NH Interval 11/28/2018 148  ms Final    QRSD Interval 11/28/2018 82  ms Final    QT Interval 11/28/2018 442  ms Final    QTC Interval 11/28/2018 467  ms Final    P Axis 11/28/2018 22  degrees Final    QRS Axis 11/28/2018 58  degrees Final    T Wave Axis 11/28/2018 36  degrees Final    Sodium 11/29/2018 140  136 - 145 mmol/L Final    Potassium 11/29/2018 4 6  3 5 - 5 3 mmol/L Final    Chloride 11/29/2018 109* 100 - 108 mmol/L Final    CO2 11/29/2018 26  21 - 32 mmol/L Final    ANION GAP 11/29/2018 5  4 - 13 mmol/L Final    BUN 11/29/2018 12  5 - 25 mg/dL Final    Creatinine 11/29/2018 0 79  0 60 - 1 30 mg/dL Final    Standardized to IDMS reference method    Glucose 11/29/2018 170* 65 - 140 mg/dL Final      If the patient is fasting, the ADA then defines impaired fasting glucose as > 100 mg/dL and diabetes as > or equal to 123 mg/dL  Specimen collection should occur prior to Sulfasalazine administration due to the potential for falsely depressed results  Specimen collection should occur prior to Sulfapyridine administration due to the potential for falsely elevated results      Calcium 11/29/2018 8 3  8 3 - 10 1 mg/dL Final    eGFR 11/29/2018 83  ml/min/1 73sq m Final    Magnesium 11/29/2018 2 4  1 6 - 2 6 mg/dL Final    WBC 11/29/2018 7  30  4 31 - 10 16 Thousand/uL Final    RBC 11/29/2018 4 08  3 81 - 5 12 Million/uL Final    Hemoglobin 11/29/2018 12 1  11 5 - 15 4 g/dL Final    Hematocrit 11/29/2018 38 5  34 8 - 46 1 % Final    MCV 11/29/2018 94  82 - 98 fL Final    MCH 11/29/2018 29 7  26 8 - 34 3 pg Final    MCHC 11/29/2018 31 4  31 4 - 37 4 g/dL Final    RDW 11/29/2018 13 9  11 6 - 15 1 % Final    Platelets 69/10/0050 336  149 - 390 Thousands/uL Final    MPV 11/29/2018 9 3  8 9 - 12 7 fL Final    POC Glucose 11/29/2018 161* 65 - 140 mg/dl Final    POC Glucose 11/28/2018 219* 65 - 140 mg/dl Final    POC Glucose 11/29/2018 146* 65 - 140 mg/dl Final   Admission on 11/26/2018, Discharged on 11/27/2018   Component Date Value Ref Range Status    Sodium 11/26/2018 137  134 - 143 mmol/L Final    Potassium 11/26/2018 3 6  3 5 - 5 5 mmol/L Final    Chloride 11/26/2018 100  98 - 107 mmol/L Final    CO2 11/26/2018 23  21 - 31 mmol/L Final    ANION GAP 11/26/2018 14* 4 - 13 mmol/L Final    BUN 11/26/2018 10  7 - 25 mg/dL Final    Creatinine 11/26/2018 0 86  0 60 - 1 20 mg/dL Final    Standardized to IDMS reference method    Glucose 11/26/2018 276* 65 - 99 mg/dL Final      If the patient is fasting, the ADA then defines impaired fasting glucose as > 100 mg/dL and diabetes as > or equal to 123 mg/dL  Specimen collection should occur prior to Sulfasalazine administration due to the potential for falsely depressed results  Specimen collection should occur prior to Sulfapyridine administration due to the potential for falsely elevated results   Calcium 11/26/2018 10 0  8 6 - 10 5 mg/dL Final    AST 11/26/2018 57* 13 - 39 U/L Final      Specimen collection should occur prior to Sulfasalazine administration due to the potential for falsely depressed results   ALT 11/26/2018 54* 7 - 52 U/L Final      Specimen collection should occur prior to Sulfasalazine administration due to the potential for falsely depressed results       Alkaline Phosphatase 11/26/2018 106  40 - 150 U/L Final    Total Protein 11/26/2018 8 3  6 4 - 8 9 g/dL Final    Albumin 11/26/2018 4 5  3 5 - 5 7 g/dL Final    Total Bilirubin 11/26/2018 0 40  0 20 - 1 00 mg/dL Final    eGFR 11/26/2018 75  ml/min/1 73sq m Final    WBC 11/26/2018 9 60  4 80 - 10 80 Thousand/uL Final    RBC 11/26/2018 4 88  3 90 - 5 20 Million/uL Final    Hemoglobin 11/26/2018 14 7  12 0 - 16 0 g/dL Final    Hematocrit 11/26/2018 44 7  34 8 - 46 1 % Final    MCV 11/26/2018 91  81 - 99 fL Final    MCH 11/26/2018 30 2  26 0 - 34 0 pg Final    MCHC 11/26/2018 33 0  31 0 - 37 0 g/dL Final    RDW 11/26/2018 14 4  11 5 - 14 5 % Final    MPV 11/26/2018 7 5* 8 6 - 11 7 fL Final    Platelets 79/21/3768 407* 149 - 390 Thousands/uL Final    nRBC 11/26/2018 0  /100 WBCs Final    Neutrophils Relative 11/26/2018 56  42 - 75 % Final    Lymphocytes Relative 11/26/2018 37  21 - 51 % Final    Monocytes Relative 11/26/2018 5  2 - 12 % Final    Eosinophils Relative 11/26/2018 1  0 - 5 % Final    Basophils Relative 11/26/2018 1  0 - 2 % Final    Neutrophils Absolute 11/26/2018 5 30  1 40 - 6 50 Thousands/µL Final    Lymphocytes Absolute 11/26/2018 3 50  0 60 - 4 47 Thousands/µL Final    Monocytes Absolute 11/26/2018 0 50  0 17 - 1 22 Thousand/µL Final    Eosinophils Absolute 11/26/2018 0 10  0 00 - 0 61 Thousand/µL Final    Basophils Absolute 11/26/2018 0 10  0 00 - 0 10 Thousands/µL Final    Troponin I 11/26/2018 0 04* <=0 03 ng/mL Final    Protime 11/26/2018 11 8  10 2 - 13 0 seconds Final         INR 11/26/2018 1 02  0 90 - 1 50 Final         PTT 11/26/2018 39* 26 - 38 seconds Final    Therapeutic Heparin Range =  60-90 seconds    D-Dimer, Quant 11/26/2018 402* <230 ng/ml (FEU) Final       Reference and upper limits to exclude DVT and PE are the same  Do not use to exclude if clinical symptoms are present        Pregnant women:  1st trimester:  <220 - 1060 ng/ml (FEU)  2nd trimester:  <220 - 1880 ng/ml (FEU)  3rd trimester:   238 - 3280 ng/ml (FEU)          Ventricular Rate 11/26/2018 121  BPM Final    Atrial Rate 11/26/2018 121  BPM Final    CT Interval 11/26/2018 146  ms Final    QRSD Interval 11/26/2018 78  ms Final    QT Interval 11/26/2018 340  ms Final    QTC Interval 11/26/2018 482  ms Final    P Axis 11/26/2018 47  degrees Final    QRS Axis 11/26/2018 64  degrees Final    T Wave Axis 11/26/2018 28  degrees Final    Troponin I 11/26/2018 0 07* <=0 03 ng/mL Final      Results indicate test should be repeated on new specimen collected within 4-6 hours of the original    POC Glucose 11/26/2018 307* 65 - 140 mg/dl Final    Troponin I 11/26/2018 0 23* <=0 03 ng/mL Final      Results indicate test should be repeated on new specimen collected within 4-6 hours of the original    POC Glucose 11/26/2018 263* 65 - 140 mg/dl Final    Cholesterol 11/27/2018 277* 0 - 200 mg/dL Final      Cholesterol:       Desirable         <200 mg/dl       Borderline         200-239 mg/dl       High              >239           Triglycerides 11/27/2018 373* 44 - 166 mg/dL Final      Triglyceride:     Normal          <150 mg/dl     Borderline High 150-199 mg/dl     High            200-499 mg/dl        Very High       >499 mg/dl    Specimen collection should occur prior to N-Acetylcysteine or Metamizole administration due to the potential for falsely depressed results   HDL, Direct 11/27/2018 38* 40 - 60 mg/dL Final      HDL Cholesterol:       High    >60 mg/dL       Low     <41 mg/dL  Specimen collection should occur prior to Metamizole administration due to the potential for falsley depressed results      LDL Calculated 11/27/2018 164  75 - 193 mg/dL Final      LDL Cholesterol:     Optimal           <100 mg/dl     Near Optimal      100-129 mg/dl     Above Optimal       Borderline High 130-159 mg/dl       High            160-189 mg/dl       Very High       >189 mg/dl         This screening LDL is a calculated result  It does not have the accuracy of the Direct Measured LDL in the monitoring of patients with hyperlipidemia and/or statin therapy  Direct Measure LDL (LIT486) must be ordered separately in these patients      Non-HDL-Chol (CHOL-HDL) 11/27/2018 239  mg/dl Final    Hemoglobin A1C 11/27/2018 10 6* 4 2 - 6 3 % Final    EAG 11/27/2018 258  mg/dl Final    POC Glucose 11/27/2018 143* 65 - 140 mg/dl Final    POC Glucose 11/27/2018 194* 65 - 140 mg/dl Final   Admission on 11/19/2018, Discharged on 11/20/2018   Component Date Value Ref Range Status    WBC 11/19/2018 9 20  4 80 - 10 80 Thousand/uL Final    RBC 11/19/2018 4 56  3 90 - 5 20 Million/uL Final    Hemoglobin 11/19/2018 13 6  12 0 - 16 0 g/dL Final    Hematocrit 11/19/2018 41 7  34 8 - 46 1 % Final    MCV 11/19/2018 91  81 - 99 fL Final    MCH 11/19/2018 29 8  26 0 - 34 0 pg Final    MCHC 11/19/2018 32 6  31 0 - 37 0 g/dL Final    RDW 11/19/2018 14 1  11 5 - 14 5 % Final    MPV 11/19/2018 7 9* 8 6 - 11 7 fL Final    Platelets 72/42/8587 348  149 - 390 Thousands/uL Final    nRBC 11/19/2018 0  /100 WBCs Final    Neutrophils Relative 11/19/2018 64  42 - 75 % Final    Lymphocytes Relative 11/19/2018 28  21 - 51 % Final    Monocytes Relative 11/19/2018 6  2 - 12 % Final    Eosinophils Relative 11/19/2018 2  0 - 5 % Final    Basophils Relative 11/19/2018 1  0 - 2 % Final    Neutrophils Absolute 11/19/2018 5 80  1 40 - 6 50 Thousands/µL Final    Lymphocytes Absolute 11/19/2018 2 60  0 60 - 4 47 Thousands/µL Final    Monocytes Absolute 11/19/2018 0 50  0 17 - 1 22 Thousand/µL Final    Eosinophils Absolute 11/19/2018 0 20  0 00 - 0 61 Thousand/µL Final    Basophils Absolute 11/19/2018 0 10  0 00 - 0 10 Thousands/µL Final    Sodium 11/19/2018 130* 134 - 143 mmol/L Final    Potassium 11/19/2018 3 8  3 5 - 5 5 mmol/L Final    Chloride 11/19/2018 98  98 - 107 mmol/L Final    CO2 11/19/2018 23  21 - 31 mmol/L Final    ANION GAP 11/19/2018 9 4 - 13 mmol/L Final    BUN 11/19/2018 16  7 - 25 mg/dL Final    Creatinine 11/19/2018 0 96  0 60 - 1 20 mg/dL Final    Standardized to IDMS reference method    Glucose 11/19/2018 494* 65 - 99 mg/dL Final      If the patient is fasting, the ADA then defines impaired fasting glucose as > 100 mg/dL and diabetes as > or equal to 123 mg/dL  Specimen collection should occur prior to Sulfasalazine administration due to the potential for falsely depressed results  Specimen collection should occur prior to Sulfapyridine administration due to the potential for falsely elevated results   Calcium 11/19/2018 9 6  8 6 - 10 5 mg/dL Final    AST 11/19/2018 30  13 - 39 U/L Final      Specimen collection should occur prior to Sulfasalazine administration due to the potential for falsely depressed results   ALT 11/19/2018 39  7 - 52 U/L Final      Specimen collection should occur prior to Sulfasalazine administration due to the potential for falsely depressed results   Alkaline Phosphatase 11/19/2018 117  40 - 150 U/L Final    Total Protein 11/19/2018 8 0  6 4 - 8 9 g/dL Final    Albumin 11/19/2018 4 1  3 5 - 5 7 g/dL Final    Total Bilirubin 11/19/2018 0 50  0 20 - 1 00 mg/dL Final    eGFR 11/19/2018 65  ml/min/1 73sq m Final    Troponin I 11/19/2018 <0 03  <=0 03 ng/mL Final    Ventricular Rate 11/19/2018 91  BPM Final    Atrial Rate 11/19/2018 91  BPM Final    [place] Interval 11/19/2018 152  ms Final    QRSD Interval 11/19/2018 74  ms Final    QT Interval 11/19/2018 362  ms Final    QTC Interval 11/19/2018 445  ms Final    P Axis 11/19/2018 53  degrees Final    QRS Axis 11/19/2018 65  degrees Final    T Wave Axis 11/19/2018 21  degrees Final       No results found      Social History     Social History Narrative    No narrative on file       Family History   Problem Relation Age of Onset    Breast cancer Mother     Diabetes Mother     Heart failure Mother     Heart disease Father     Stroke Father Past Surgical History:   Procedure Laterality Date    ANKLE SURGERY      tubal    CHOLECYSTECTOMY      GALLBLADDER SURGERY      TUBAL LIGATION         No Known Allergies

## 2019-02-04 NOTE — PROGRESS NOTES
Vitals:    02/04/19 1309 02/04/19 1315   BP:  144/82   BP Location:  Left arm   Patient Position:  Sitting   Cuff Size:  Adult   Pulse:  84   Resp:  16   Temp:  98 4 °F (36 9 °C)   TempSrc:  Tympanic   SpO2:  97%   Weight:  76 2 kg (168 lb)   Height: 5' 1" (1 549 m) 5' 1" (1 549 m)       Assessment/Plan:  Coronary artery disease status post non ST elevated MI  Status post coronary angioplasty  First obtuse   marginal stent could not be placed  Patient is on aspirin and Plavix along with statin therapy beta-blockers and Ace inhibitor  Patient advised to discuss with Cardiology about Plavix in her next visit  Hypertension  This is well controlled with Lotensin and beta-blocker and Norvasc  Hypercholesterolemia patient is on statin therapy with Lipitor 40 mg daily  Diabetes mellitus patient is on basal blocker 40 units at bedtime metformin 500 b i d  Diagnoses and all orders for this visit:    Diabetic eye exam [facility])  -     Ambulatory Referral to Ophthalmology; Future  -     Microalbumin / creatinine urine ratio  -     POCT hemoglobin A1c  -     Diabetic foot exam; Future    Encounter for diabetic foot exam ([facility] )    Type 2 diabetes mellitus without complication, unspecified whether long term insulin use ([facility] )    Hypertension, unspecified type  -     CBC and differential; Future  -     Comprehensive metabolic panel; Future  -     UA w Reflex to Microscopic w Reflex to Culture    Need for hepatitis C screening test  -     Hepatitis C antibody; Future    Fatigue, unspecified type  -     Lipid Panel with Direct LDL reflex; Future  -     TSH, 3rd generation with Free T4 reflex;  Future    Colon cancer screening  -     Occult Blood, Fecal Immunochemical; Future    Type 2 diabetes mellitus without complication, with long-term current use of insulin (HCC)    Essential hypertension    NSTEMI (non-ST elevated myocardial infarction) ([facility] )    Other orders  -     Cancel: Diabetic foot exam; Future        Subjective: Patient ID: Sheila Bonilla is a 62 y o  female  HPI 80-year-old white female is coming in for a follow-up visit patient had a coronary angiography on 11/28/2018 at [facility] after a non ST elevated MI was found to have a blockage of 99% in the 1st obtuse marginal she underwent successful coronary angioplasty but is stent could not be placed and since then has been on medications and is well controlled has no ongoing angina no leg swelling no congestive heart failure is reported patient is known to have history of hypercholesterolemia hypertension insulin-dependent diabetes mellitus and will now follow up with the Cardiology for further follow-up with Dr Jose L Deutsch    The following portions of the patient's history were reviewed and updated as appropriate: allergies, current medications, past family history, past medical history, past social history, past surgical history and problem list       Current Outpatient Prescriptions:     ACCU-CHEK CHANDANA PLUS test strip, USE TO TEST SUGARS 7 TIMES DAILY   E11 9, Disp: 200 each, Rfl: 2    ACCU-CHEK SOFTCLIX LANCETS lancets, Test TID, Disp: 300 each, Rfl: 3    amLODIPine (NORVASC) 5 mg tablet, Take 1 tablet (5 mg total) by mouth daily, Disp: 90 tablet, Rfl: 1    aspirin (ECOTRIN LOW STRENGTH) 81 mg EC tablet, Take 81 mg by mouth daily, Disp: , Rfl:     atorvastatin (LIPITOR) 40 mg tablet, Take 1 tablet (40 mg total) by mouth daily with dinner, Disp: 90 tablet, Rfl: 1    BD PEN NEEDLE RACHEL U/F 32G X 4 MM MISC, Inject under the skin daily, Disp: 100 each, Rfl: 3    benazepril (LOTENSIN) 20 mg tablet, Take 2 tablets (40 mg total) by mouth daily, Disp: , Rfl:     clopidogrel (PLAVIX) 75 mg tablet, Take 1 tablet (75 mg total) by mouth daily, Disp: 90 tablet, Rfl: 1    cyclobenzaprine (FLEXERIL) 5 mg tablet, Take 1 tablet (5 mg total) by mouth daily, Disp: 30 tablet, Rfl: 1    insulin glargine (BASAGLAR KWIKPEN) 100 units/mL injection pen, Inject 40 Units under the skin daily at bedtime, Disp: 5 pen, Rfl: 0    metFORMIN (GLUCOPHAGE) 500 mg tablet, Take 1 tablet (500 mg total) by mouth 2 (two) times a day with meals, Disp: 180 tablet, Rfl: 0    metoprolol tartrate (LOPRESSOR) 25 mg tablet, Take 1 tablet (25 mg total) by mouth every 12 (twelve) hours, Disp: 180 tablet, Rfl: 1    rOPINIRole (REQUIP) 0 5 mg tablet, Take 0 5 mg by mouth 2 (two) times a day  , Disp: , Rfl:     tiZANidine (ZANAFLEX) 4 mg tablet, Take 4 mg by mouth every 8 (eight) hours as needed for muscle spasms 1/2-1, Disp: , Rfl:     traMADol (ULTRAM) 50 mg tablet, Take 50 mg by mouth every 8 (eight) hours, Disp: , Rfl:     Past Medical History:   Diagnosis Date    Chronic back pain     Hyperlipidemia     Hypertension     Restless leg syndrome     Type 2 diabetes mellitus ([facility] )        Past Surgical History:   Procedure Laterality Date    ANKLE SURGERY      tubal    CHOLECYSTECTOMY      GALLBLADDER SURGERY      TUBAL LIGATION         Social History       Patient Active Problem List   Diagnosis    Essential hypertension    High cholesterol    Chronic bilateral low back pain without sciatica    Type 2 diabetes mellitus without complication, with long-term current use of insulin (HCC)    Chest pain    Restless leg syndrome    Disease of pancreas    Mixed hyperlipidemia    NSTEMI (non-ST elevated myocardial infarction) ([facility])           Review of Systems   Constitutional: Negative  HENT: Negative  Eyes: Negative  Respiratory: Negative  Cardiovascular: Negative  Gastrointestinal: Negative  Endocrine: Negative  Genitourinary: Negative  Musculoskeletal: Negative  Skin: Negative  Allergic/Immunologic: Negative  Neurological: Negative  Hematological: Negative  Psychiatric/Behavioral: Negative            Objective:      /82 (BP Location: Left arm, Patient Position: Sitting, Cuff Size: Adult)   Pulse 84   Temp 98 4 °F (36 9 °C) (Tympanic)   Resp 16   Ht 5' 1" (1 549 m)   Wt 76 2 kg (168 lb)   SpO2 97%   BMI 31 74 kg/m²          Physical Exam   Constitutional: She is oriented to person, place, and time  She appears well-developed and well-nourished  HENT:   Head: Normocephalic  Mouth/Throat: Oropharynx is clear and moist    Eyes: Pupils are equal, round, and reactive to light  Conjunctivae are normal    Neck: Normal range of motion  Neck supple  Cardiovascular: Normal rate and normal heart sounds  Pulmonary/Chest: Effort normal and breath sounds normal    Abdominal: Soft  Bowel sounds are normal    Musculoskeletal: Normal range of motion  Neurological: She is alert and oriented to person, place, and time  Skin: Skin is warm and dry  Admission on 11/27/2018, Discharged on 11/29/2018   Component Date Value Ref Range Status    Troponin I 11/27/2018 0 31* <=0 04 ng/mL Final      [employer] Chemistry analyzer 99% cutoff is > 0 04 ng/mL in network labs     o cTnI 99% cutoff is useful only when applied to patients in the clinical setting of myocardial ischemia   o cTnI 99% cutoff should be interpreted in the context of clinical history, ECG findings and possibly cardiac imaging to establish correct diagnosis  o cTnI 99% cutoff may be suggestive but clearly not indicative of a coronary event without the clinical setting of myocardial ischemia  Results indicate test should be repeated on new specimen collected within 4-6 hours of the original    Troponin I 11/27/2018 0 20* <=0 04 ng/mL Final      [employer]ens Chemistry analyzer 99% cutoff is > 0 04 ng/mL in network labs     o cTnI 99% cutoff is useful only when applied to patients in the clinical setting of myocardial ischemia   o cTnI 99% cutoff should be interpreted in the context of clinical history, ECG findings and possibly cardiac imaging to establish correct diagnosis     o cTnI 99% cutoff may be suggestive but clearly not indicative of a coronary event without the clinical setting of myocardial ischemia  Results indicate test should be repeated on new specimen collected within 4-6 hours of the original    POC Glucose 11/27/2018 258* 65 - 140 mg/dl Final    POC Glucose 11/28/2018 148* 65 - 140 mg/dl Final    Ventricular Rate 11/27/2018 97  BPM Final    Atrial Rate 11/27/2018 97  BPM Final    AL Interval 11/27/2018 152  ms Final    QRSD Interval 11/27/2018 80  ms Final    QT Interval 11/27/2018 362  ms Final    QTC Interval 11/27/2018 459  ms Final    P Axis 11/27/2018 45  degrees Final    QRS Axis 11/27/2018 42  degrees Final    T Wave Axis 11/27/2018 35  degrees Final    POC Glucose 11/28/2018 159* 65 - 140 mg/dl Final    Activated Clotting Time, i-STAT 11/28/2018 285* 89 - 137 sec Final    Specimen Type 11/28/2018 VENOUS   Final    POC Glucose 11/28/2018 247* 65 - 140 mg/dl Final    Ventricular Rate 11/28/2018 67  BPM Final    Atrial Rate 11/28/2018 67  BPM Final    AL Interval 11/28/2018 148  ms Final    QRSD Interval 11/28/2018 82  ms Final    QT Interval 11/28/2018 442  ms Final    QTC Interval 11/28/2018 467  ms Final    P Axis 11/28/2018 22  degrees Final    QRS Axis 11/28/2018 58  degrees Final    T Wave Axis 11/28/2018 36  degrees Final    Sodium 11/29/2018 140  136 - 145 mmol/L Final    Potassium 11/29/2018 4 6  3 5 - 5 3 mmol/L Final    Chloride 11/29/2018 109* 100 - 108 mmol/L Final    CO2 11/29/2018 26  21 - 32 mmol/L Final    ANION GAP 11/29/2018 5  4 - 13 mmol/L Final    BUN 11/29/2018 12  5 - 25 mg/dL Final    Creatinine 11/29/2018 0 79  0 60 - 1 30 mg/dL Final    Standardized to IDMS reference method    Glucose 11/29/2018 170* 65 - 140 mg/dL Final      If the patient is fasting, the ADA then defines impaired fasting glucose as > 100 mg/dL and diabetes as > or equal to 123 mg/dL  Specimen collection should occur prior to Sulfasalazine administration due to the potential for falsely depressed results   Specimen collection should occur prior to Sulfapyridine administration due to the potential for falsely elevated results   Calcium 11/29/2018 8 3  8 3 - 10 1 mg/dL Final    eGFR 11/29/2018 83  ml/min/1 73sq m Final    Magnesium 11/29/2018 2 4  1 6 - 2 6 mg/dL Final    WBC 11/29/2018 7 30  4 31 - 10 16 Thousand/uL Final    RBC 11/29/2018 4 08  3 81 - 5 12 Million/uL Final    Hemoglobin 11/29/2018 12 1  11 5 - 15 4 g/dL Final    Hematocrit 11/29/2018 38 5  34 8 - 46 1 % Final    MCV 11/29/2018 94  82 - 98 fL Final    MCH 11/29/2018 29 7  26 8 - 34 3 pg Final    MCHC 11/29/2018 31 4  31 4 - 37 4 g/dL Final    RDW 11/29/2018 13 9  11 6 - 15 1 % Final    Platelets 87/90/7804 336  149 - 390 Thousands/uL Final    MPV 11/29/2018 9 3  8 9 - 12 7 fL Final    POC Glucose 11/29/2018 161* 65 - 140 mg/dl Final    POC Glucose 11/28/2018 219* 65 - 140 mg/dl Final    POC Glucose 11/29/2018 146* 65 - 140 mg/dl Final   Admission on 11/26/2018, Discharged on 11/27/2018   Component Date Value Ref Range Status    Sodium 11/26/2018 137  134 - 143 mmol/L Final    Potassium 11/26/2018 3 6  3 5 - 5 5 mmol/L Final    Chloride 11/26/2018 100  98 - 107 mmol/L Final    CO2 11/26/2018 23  21 - 31 mmol/L Final    ANION GAP 11/26/2018 14* 4 - 13 mmol/L Final    BUN 11/26/2018 10  7 - 25 mg/dL Final    Creatinine 11/26/2018 0 86  0 60 - 1 20 mg/dL Final    Standardized to IDMS reference method    Glucose 11/26/2018 276* 65 - 99 mg/dL Final      If the patient is fasting, the ADA then defines impaired fasting glucose as > 100 mg/dL and diabetes as > or equal to 123 mg/dL  Specimen collection should occur prior to Sulfasalazine administration due to the potential for falsely depressed results  Specimen collection should occur prior to Sulfapyridine administration due to the potential for falsely elevated results      Calcium 11/26/2018 10 0  8 6 - 10 5 mg/dL Final    AST 11/26/2018 57* 13 - 39 U/L Final      Specimen collection should occur prior to Sulfasalazine administration due to the potential for falsely depressed results   ALT 11/26/2018 54* 7 - 52 U/L Final      Specimen collection should occur prior to Sulfasalazine administration due to the potential for falsely depressed results       Alkaline Phosphatase 11/26/2018 106  40 - 150 U/L Final    Total Protein 11/26/2018 8 3  6 4 - 8 9 g/dL Final    Albumin 11/26/2018 4 5  3 5 - 5 7 g/dL Final    Total Bilirubin 11/26/2018 0 40  0 20 - 1 00 mg/dL Final    eGFR 11/26/2018 75  ml/min/1 73sq m Final    WBC 11/26/2018 9 60  4 80 - 10 80 Thousand/uL Final    RBC 11/26/2018 4 88  3 90 - 5 20 Million/uL Final    Hemoglobin 11/26/2018 14 7  12 0 - 16 0 g/dL Final    Hematocrit 11/26/2018 44 7  34 8 - 46 1 % Final    MCV 11/26/2018 91  81 - 99 fL Final    MCH 11/26/2018 30 2  26 0 - 34 0 pg Final    MCHC 11/26/2018 33 0  31 0 - 37 0 g/dL Final    RDW 11/26/2018 14 4  11 5 - 14 5 % Final    MPV 11/26/2018 7 5* 8 6 - 11 7 fL Final    Platelets 90/13/1013 407* 149 - 390 Thousands/uL Final    nRBC 11/26/2018 0  /100 WBCs Final    Neutrophils Relative 11/26/2018 56  42 - 75 % Final    Lymphocytes Relative 11/26/2018 37  21 - 51 % Final    Monocytes Relative 11/26/2018 5  2 - 12 % Final    Eosinophils Relative 11/26/2018 1  0 - 5 % Final    Basophils Relative 11/26/2018 1  0 - 2 % Final    Neutrophils Absolute 11/26/2018 5 30  1 40 - 6 50 Thousands/µL Final    Lymphocytes Absolute 11/26/2018 3 50  0 60 - 4 47 Thousands/µL Final    Monocytes Absolute 11/26/2018 0 50  0 17 - 1 22 Thousand/µL Final    Eosinophils Absolute 11/26/2018 0 10  0 00 - 0 61 Thousand/µL Final    Basophils Absolute 11/26/2018 0 10  0 00 - 0 10 Thousands/µL Final    Troponin I 11/26/2018 0 04* <=0 03 ng/mL Final    Protime 11/26/2018 11 8  10 2 - 13 0 seconds Final         INR 11/26/2018 1 02  0 90 - 1 50 Final         PTT 11/26/2018 39* 26 - 38 seconds Final    Therapeutic Heparin Range =  60-90 seconds    D-Dimer, Quant 11/26/2018 402* <230 ng/ml (FEU) Final       Reference and upper limits to exclude DVT and PE are the same  Do not use to exclude if clinical symptoms are present  Pregnant women:  1st trimester:  <220 - 1060 ng/ml (FEU)  2nd trimester:  <220 - 1880 ng/ml (FEU)  3rd trimester:   238 - 3280 ng/ml (FEU)          Ventricular Rate 11/26/2018 121  BPM Final    Atrial Rate 11/26/2018 121  BPM Final    NM Interval 11/26/2018 146  ms Final    QRSD Interval 11/26/2018 78  ms Final    QT Interval 11/26/2018 340  ms Final    QTC Interval 11/26/2018 482  ms Final    P Axis 11/26/2018 47  degrees Final    QRS Axis 11/26/2018 64  degrees Final    T Wave Axis 11/26/2018 28  degrees Final    Troponin I 11/26/2018 0 07* <=0 03 ng/mL Final      Results indicate test should be repeated on new specimen collected within 4-6 hours of the original    POC Glucose 11/26/2018 307* 65 - 140 mg/dl Final    Troponin I 11/26/2018 0 23* <=0 03 ng/mL Final      Results indicate test should be repeated on new specimen collected within 4-6 hours of the original    POC Glucose 11/26/2018 263* 65 - 140 mg/dl Final    Cholesterol 11/27/2018 277* 0 - 200 mg/dL Final      Cholesterol:       Desirable         <200 mg/dl       Borderline         200-239 mg/dl       High              >239           Triglycerides 11/27/2018 373* 44 - 166 mg/dL Final      Triglyceride:     Normal          <150 mg/dl     Borderline High 150-199 mg/dl     High            200-499 mg/dl        Very High       >499 mg/dl    Specimen collection should occur prior to N-Acetylcysteine or Metamizole administration due to the potential for falsely depressed results   HDL, Direct 11/27/2018 38* 40 - 60 mg/dL Final      HDL Cholesterol:       High    >60 mg/dL       Low     <41 mg/dL  Specimen collection should occur prior to Metamizole administration due to the potential for falsley depressed results      LDL Calculated 11/27/2018 164  75 - 193 mg/dL Final LDL Cholesterol:     Optimal           <100 mg/dl     Near Optimal      100-129 mg/dl     Above Optimal       Borderline High 130-159 mg/dl       High            160-189 mg/dl       Very High       >189 mg/dl         This screening LDL is a calculated result  It does not have the accuracy of the Direct Measured LDL in the monitoring of patients with hyperlipidemia and/or statin therapy  Direct Measure LDL (NOA448) must be ordered separately in these patients      Non-HDL-Chol (CHOL-HDL) 11/27/2018 239  mg/dl Final    Hemoglobin A1C 11/27/2018 10 6* 4 2 - 6 3 % Final    EAG 11/27/2018 258  mg/dl Final    POC Glucose 11/27/2018 143* 65 - 140 mg/dl Final    POC Glucose 11/27/2018 194* 65 - 140 mg/dl Final   Admission on 11/19/2018, Discharged on 11/20/2018   Component Date Value Ref Range Status    WBC 11/19/2018 9 20  4 80 - 10 80 Thousand/uL Final    RBC 11/19/2018 4 56  3 90 - 5 20 Million/uL Final    Hemoglobin 11/19/2018 13 6  12 0 - 16 0 g/dL Final    Hematocrit 11/19/2018 41 7  34 8 - 46 1 % Final    MCV 11/19/2018 91  81 - 99 fL Final    MCH 11/19/2018 29 8  26 0 - 34 0 pg Final    MCHC 11/19/2018 32 6  31 0 - 37 0 g/dL Final    RDW 11/19/2018 14 1  11 5 - 14 5 % Final    MPV 11/19/2018 7 9* 8 6 - 11 7 fL Final    Platelets 49/30/7765 348  149 - 390 Thousands/uL Final    nRBC 11/19/2018 0  /100 WBCs Final    Neutrophils Relative 11/19/2018 64  42 - 75 % Final    Lymphocytes Relative 11/19/2018 28  21 - 51 % Final    Monocytes Relative 11/19/2018 6  2 - 12 % Final    Eosinophils Relative 11/19/2018 2  0 - 5 % Final    Basophils Relative 11/19/2018 1  0 - 2 % Final    Neutrophils Absolute 11/19/2018 5 80  1 40 - 6 50 Thousands/µL Final    Lymphocytes Absolute 11/19/2018 2 60  0 60 - 4 47 Thousands/µL Final    Monocytes Absolute 11/19/2018 0 50  0 17 - 1 22 Thousand/µL Final    Eosinophils Absolute 11/19/2018 0 20  0 00 - 0 61 Thousand/µL Final    Basophils Absolute 11/19/2018 0 10 0 00 - 0 10 Thousands/µL Final    Sodium 11/19/2018 130* 134 - 143 mmol/L Final    Potassium 11/19/2018 3 8  3 5 - 5 5 mmol/L Final    Chloride 11/19/2018 98  98 - 107 mmol/L Final    CO2 11/19/2018 23  21 - 31 mmol/L Final    ANION GAP 11/19/2018 9  4 - 13 mmol/L Final    BUN 11/19/2018 16  7 - 25 mg/dL Final    Creatinine 11/19/2018 0 96  0 60 - 1 20 mg/dL Final    Standardized to IDMS reference method    Glucose 11/19/2018 494* 65 - 99 mg/dL Final      If the patient is fasting, the ADA then defines impaired fasting glucose as > 100 mg/dL and diabetes as > or equal to 123 mg/dL  Specimen collection should occur prior to Sulfasalazine administration due to the potential for falsely depressed results  Specimen collection should occur prior to Sulfapyridine administration due to the potential for falsely elevated results   Calcium 11/19/2018 9 6  8 6 - 10 5 mg/dL Final    AST 11/19/2018 30  13 - 39 U/L Final      Specimen collection should occur prior to Sulfasalazine administration due to the potential for falsely depressed results   ALT 11/19/2018 39  7 - 52 U/L Final      Specimen collection should occur prior to Sulfasalazine administration due to the potential for falsely depressed results       Alkaline Phosphatase 11/19/2018 117  40 - 150 U/L Final    Total Protein 11/19/2018 8 0  6 4 - 8 9 g/dL Final    Albumin 11/19/2018 4 1  3 5 - 5 7 g/dL Final    Total Bilirubin 11/19/2018 0 50  0 20 - 1 00 mg/dL Final    eGFR 11/19/2018 65  ml/min/1 73sq m Final    Troponin I 11/19/2018 <0 03  <=0 03 ng/mL Final    Ventricular Rate 11/19/2018 91  BPM Final    Atrial Rate 11/19/2018 91  BPM Final    OR Interval 11/19/2018 152  ms Final    QRSD Interval 11/19/2018 74  ms Final    QT Interval 11/19/2018 362  ms Final    QTC Interval 11/19/2018 445  ms Final    P Axis 11/19/2018 53  degrees Final    QRS Axis 11/19/2018 65  degrees Final    T Wave Axis 11/19/2018 21  degrees Final       No results found      Social History     Social History Narrative    No narrative on file       Family History   Problem Relation Age of Onset    Breast cancer Mother     Diabetes Mother     Heart failure Mother     Heart disease Father     Stroke Father        Past Surgical History:   Procedure Laterality Date    ANKLE SURGERY      tubal    CHOLECYSTECTOMY      GALLBLADDER SURGERY      TUBAL LIGATION         No Known Allergies

## 2019-02-05 ENCOUNTER — TELEPHONE (OUTPATIENT)
Dept: FAMILY MEDICINE CLINIC | Facility: CLINIC | Age: 59
End: 2019-02-05

## 2019-02-05 NOTE — TELEPHONE ENCOUNTER
[id number] [address]    Patient's  called regarding patient's insurance not wanting to cover her test strips with the SIG test 7 times per day unless there is a medical necessity  The Veatrice Fruits was done back in January it looks like and denied  I spoke with  and asked the reason for patient testing so often  He stated that there is no medical reason, she was told that awhile ago and has just continued  States her BS are never severely out of range  He stated that patient would be willing to cut back her testing  I advised patient to try testing QID  Before each meal and at HS unless symptomatic   agreed and stated that he would notify patient  If there are any problems they will call the office and we will try to get the 7/day approved

## 2019-02-27 ENCOUNTER — OFFICE VISIT (OUTPATIENT)
Dept: CARDIOLOGY CLINIC | Facility: CLINIC | Age: 59
End: 2019-02-27
Payer: COMMERCIAL

## 2019-02-27 VITALS
WEIGHT: 167 LBS | HEART RATE: 80 BPM | SYSTOLIC BLOOD PRESSURE: 118 MMHG | HEIGHT: 61 IN | DIASTOLIC BLOOD PRESSURE: 72 MMHG | BODY MASS INDEX: 31.53 KG/M2

## 2019-02-27 DIAGNOSIS — E11.9 TYPE 2 DIABETES MELLITUS WITHOUT COMPLICATION, WITH LONG-TERM CURRENT USE OF INSULIN (HCC): Chronic | ICD-10-CM

## 2019-02-27 DIAGNOSIS — I25.9 CHEST PAIN DUE TO MYOCARDIAL ISCHEMIA, UNSPECIFIED ISCHEMIC CHEST PAIN TYPE: ICD-10-CM

## 2019-02-27 DIAGNOSIS — I25.10 CORONARY ARTERY DISEASE INVOLVING NATIVE CORONARY ARTERY OF NATIVE HEART WITHOUT ANGINA PECTORIS: ICD-10-CM

## 2019-02-27 DIAGNOSIS — R06.02 SOB (SHORTNESS OF BREATH): Primary | ICD-10-CM

## 2019-02-27 DIAGNOSIS — Z79.4 TYPE 2 DIABETES MELLITUS WITHOUT COMPLICATION, WITH LONG-TERM CURRENT USE OF INSULIN (HCC): Chronic | ICD-10-CM

## 2019-02-27 DIAGNOSIS — I10 ESSENTIAL HYPERTENSION: ICD-10-CM

## 2019-02-27 DIAGNOSIS — I21.4 NSTEMI (NON-ST ELEVATED MYOCARDIAL INFARCTION) (HCC): ICD-10-CM

## 2019-02-27 DIAGNOSIS — E78.2 MIXED HYPERLIPIDEMIA: ICD-10-CM

## 2019-02-27 PROCEDURE — 99214 OFFICE O/P EST MOD 30 MIN: CPT | Performed by: PHYSICIAN ASSISTANT

## 2019-02-27 RX ORDER — METOPROLOL TARTRATE 50 MG/1
50 TABLET, FILM COATED ORAL EVERY 12 HOURS SCHEDULED
Qty: 180 TABLET | Refills: 3 | Status: SHIPPED | OUTPATIENT
Start: 2019-02-27 | End: 2019-03-22 | Stop reason: DRUGHIGH

## 2019-02-27 NOTE — ASSESSMENT & PLAN NOTE
Progressively worsening since cath  With some exacerbation on ambulation       Will check a BNP and echo  BB is increase as her heart rate has been on the high side of normal

## 2019-02-27 NOTE — ASSESSMENT & PLAN NOTE
Recent cath with balloon angioplasty and possible stent to the OM-1  (report is unclear as there is report of the same balloon angioplasty twice and no report of stent, which the patient was informed she had)    She continues on DAPT, BB, ACE-l and statin

## 2019-02-27 NOTE — PROGRESS NOTES
[facility]   Outpatient Note  Laureano Long  1960  [id number]  [facility] [address] [place] [address] [place] [id number]  [phone]    Subjective:   Laureano Long is a 62 y o  female    Jacqualyn Antis is seen in the office today for a follow up regarding a recent catheterization  She was sent to the cath lab after being seen at [facility] for unstable angina  Cath reports two interventions to the with balloon angioplasty to the OM-1, the patient reports being told she had a stent  This is not recorded on the cath report  She was discharged on DAPT  Since her discharge she has been noticing ANDRADE that has progressively increased  Chest pain has resolved  She has no other symptoms associated  Social History  Social History     Tobacco Use   Smoking Status Never Smoker   Smokeless Tobacco Never Used   ,   Social History     Substance and Sexual Activity   Alcohol Use Yes    Comment: occasionally   ,   Social History     Substance and Sexual Activity   Drug Use No     Family History   Problem Relation Age of Onset    Breast cancer Mother     Diabetes Mother     Heart failure Mother     Heart disease Father     Stroke Father        Medical and Surgical History  Past Medical History:   Diagnosis Date    CAD (coronary artery disease)     Chronic back pain     Family history of early CAD     Hyperlipidemia     Hypertension     Restless leg syndrome     Type 2 diabetes mellitus ([address] )      Past Surgical History:   Procedure Laterality Date    ANKLE SURGERY      tubal    CARDIAC CATHETERIZATION  11/2018    Successful balloon angioplassty to OM1    CHOLECYSTECTOMY      GALLBLADDER SURGERY      TUBAL LIGATION           Current Outpatient Medications:     ACCU-CHEK CHANDANA PLUS test strip, USE TO TEST SUGARS 7 TIMES DAILY   E11 9, Disp: 200 each, Rfl: 2    ACCU-CHEK SOFTCLIX LANCETS lancets, Test TID, Disp: 300 each, Rfl: 3    amLODIPine (NORVASC) 5 mg tablet, Take 1 tablet (5 mg total) by mouth daily, Disp: 90 tablet, Rfl: 1    aspirin (ECOTRIN LOW STRENGTH) 81 mg EC tablet, Take 81 mg by mouth daily, Disp: , Rfl:     atorvastatin (LIPITOR) 40 mg tablet, Take 1 tablet (40 mg total) by mouth daily with dinner, Disp: 90 tablet, Rfl: 1    BD PEN NEEDLE RACHEL U/F 32G X 4 MM MISC, Inject under the skin daily, Disp: 100 each, Rfl: 3    benazepril (LOTENSIN) 20 mg tablet, Take 2 tablets (40 mg total) by mouth daily, Disp: , Rfl:     clopidogrel (PLAVIX) 75 mg tablet, Take 1 tablet (75 mg total) by mouth daily, Disp: 90 tablet, Rfl: 1    cyclobenzaprine (FLEXERIL) 5 mg tablet, Take 1 tablet (5 mg total) by mouth daily, Disp: 30 tablet, Rfl: 1    insulin glargine (BASAGLAR KWIKPEN) 100 units/mL injection pen, Inject 40 Units under the skin daily at bedtime, Disp: 5 pen, Rfl: 0    metFORMIN (GLUCOPHAGE) 500 mg tablet, Take 1 tablet (500 mg total) by mouth 2 (two) times a day with meals, Disp: 180 tablet, Rfl: 0    metoprolol tartrate (LOPRESSOR) 50 mg tablet, Take 1 tablet (50 mg total) by mouth every 12 (twelve) hours, Disp: 180 tablet, Rfl: 3    rOPINIRole (REQUIP) 0 5 mg tablet, Take 0 5 mg by mouth 2 (two) times a day  , Disp: , Rfl:     tiZANidine (ZANAFLEX) 4 mg tablet, Take 4 mg by mouth every 8 (eight) hours as needed for muscle spasms 1/2-1, Disp: , Rfl:     traMADol (ULTRAM) 50 mg tablet, Take 50 mg by mouth every 8 (eight) hours, Disp: , Rfl:   No Known Allergies    Review of Systems   Constitution: Negative  HENT: Negative  Eyes: Negative  Cardiovascular: Positive for dyspnea on exertion  Negative for chest pain, claudication, cyanosis, irregular heartbeat, leg swelling, near-syncope, orthopnea, palpitations, paroxysmal nocturnal dyspnea and syncope  Respiratory: Positive for shortness of breath  Negative for cough, hemoptysis, sleep disturbances due to breathing, snoring, sputum production and wheezing  Endocrine: Negative  Hematologic/Lymphatic: Negative  Skin: Negative  Musculoskeletal: Negative  Gastrointestinal: Negative  Genitourinary: Negative  Neurological: Negative  Psychiatric/Behavioral: Negative  Allergic/Immunologic: Negative  Objective:   /72   Pulse 80   Ht 5' 1" (1 549 m)   Wt 75 8 kg (167 lb)   BMI 31 55 kg/m²   Physical Exam   Constitutional: She is oriented to person, place, and time  She appears well-developed and well-nourished  HENT:   Head: Normocephalic and atraumatic  Mouth/Throat: Oropharynx is clear and moist    Eyes: Conjunctivae and EOM are normal  No scleral icterus  Neck: Normal range of motion  Neck supple  No JVD present  No tracheal deviation present  Cardiovascular: Normal rate, regular rhythm, normal heart sounds and intact distal pulses  Exam reveals no gallop and no friction rub  No murmur heard  Pulmonary/Chest: Effort normal and breath sounds normal  No respiratory distress  She has no wheezes  She has no rales  She exhibits no tenderness  Abdominal: Soft  Bowel sounds are normal  She exhibits no distension  There is no tenderness  Aorta not palpable    Musculoskeletal: Normal range of motion  She exhibits no edema or tenderness  Neurological: She is alert and oriented to person, place, and time  Skin: Skin is warm and dry  No rash noted  No erythema  No pallor  Psychiatric: She has a normal mood and affect  Her behavior is normal    Nursing note and vitals reviewed        Lab Review:   Lab Results   Component Value Date    CHOL 235 06/26/2014     Lab Results   Component Value Date    HDL 38 (L) 11/27/2018     Lab Results   Component Value Date    LDLCALC 164 11/27/2018     Lab Results   Component Value Date    TRIG 373 (H) 11/27/2018     Results Reviewed     None        Results Reviewed     None        Results Reviewed     None          Recent Cardiovascular Testing:   Cath 11-28-18: angioplasty of OM-1    ECG Review:   None today    Assessment and Plan:     Problem List Items Addressed This Visit        Endocrine    Type 2 diabetes mellitus without complication, with long-term current use of insulin ([facility] [address] ) (Chronic)       Cardiovascular and Mediastinum    Essential hypertension     Adequately controlled   Continue current medication  Relevant Medications    metoprolol tartrate (LOPRESSOR) 50 mg tablet    Other Relevant Orders    Echo complete with contrast if indicated    NT-BNP PRO    NSTEMI (non-ST elevated myocardial infarction) ([address] )     S/p intervention of the first obtuse marginal          Relevant Medications    metoprolol tartrate (LOPRESSOR) 50 mg tablet    Coronary artery disease involving native coronary artery of native heart     Recent cath with balloon angioplasty and possible stent to the OM-1  (report is unclear as there is report of the same balloon angioplasty twice and no report of stent, which the patient was informed she had)    She continues on DAPT, BB, ACE-l and statin  Relevant Medications    metoprolol tartrate (LOPRESSOR) 50 mg tablet    Other Relevant Orders    Echo complete with contrast if indicated    NT-BNP PRO       Other    Chest pain    Mixed hyperlipidemia     Tolerating statin         SOB (shortness of breath) - Primary     Progressively worsening since cath  With some exacerbation on ambulation  Will check a BNP and echo  BB is increase as her heart rate has been on the high side of normal           Relevant Orders    Echo complete with contrast if indicated    NT-BNP PRO           Additional Plan:   Medication changes as above  Echo and Lab work ordered   Return in one month with EKG

## 2019-03-07 DIAGNOSIS — E11.8 TYPE 2 DIABETES MELLITUS WITH COMPLICATION, WITH LONG-TERM CURRENT USE OF INSULIN (HCC): ICD-10-CM

## 2019-03-07 DIAGNOSIS — Z79.4 TYPE 2 DIABETES MELLITUS WITH COMPLICATION, WITH LONG-TERM CURRENT USE OF INSULIN (HCC): ICD-10-CM

## 2019-03-07 DIAGNOSIS — M62.838 MUSCLE SPASM: ICD-10-CM

## 2019-03-07 DIAGNOSIS — E11.9 TYPE 2 DIABETES MELLITUS WITHOUT COMPLICATION, WITHOUT LONG-TERM CURRENT USE OF INSULIN (HCC): ICD-10-CM

## 2019-03-07 RX ORDER — CYCLOBENZAPRINE HCL 5 MG
5 TABLET ORAL DAILY
Qty: 30 TABLET | Refills: 1 | Status: SHIPPED | OUTPATIENT
Start: 2019-03-07 | End: 2019-05-24 | Stop reason: SDUPTHER

## 2019-03-07 RX ORDER — LANCETS
EACH MISCELLANEOUS
Qty: 300 EACH | Refills: 3 | Status: SHIPPED | OUTPATIENT
Start: 2019-03-07 | End: 2019-11-04 | Stop reason: SDUPTHER

## 2019-03-07 RX ORDER — BLOOD SUGAR DIAGNOSTIC
STRIP MISCELLANEOUS
Qty: 200 EACH | Refills: 2 | Status: SHIPPED | OUTPATIENT
Start: 2019-03-07 | End: 2019-06-14 | Stop reason: SDUPTHER

## 2019-03-07 RX ORDER — PEN NEEDLE, DIABETIC 32GX 5/32"
NEEDLE, DISPOSABLE MISCELLANEOUS
Qty: 100 EACH | Refills: 3 | Status: SHIPPED | OUTPATIENT
Start: 2019-03-07 | End: 2020-02-19 | Stop reason: SDUPTHER

## 2019-03-11 ENCOUNTER — APPOINTMENT (OUTPATIENT)
Dept: LAB | Facility: CLINIC | Age: 59
End: 2019-03-11
Payer: COMMERCIAL

## 2019-03-11 DIAGNOSIS — I10 ESSENTIAL HYPERTENSION: ICD-10-CM

## 2019-03-11 DIAGNOSIS — I25.10 CORONARY ARTERY DISEASE INVOLVING NATIVE CORONARY ARTERY OF NATIVE HEART WITHOUT ANGINA PECTORIS: ICD-10-CM

## 2019-03-11 DIAGNOSIS — R53.83 FATIGUE, UNSPECIFIED TYPE: ICD-10-CM

## 2019-03-11 DIAGNOSIS — Z11.59 NEED FOR HEPATITIS C SCREENING TEST: ICD-10-CM

## 2019-03-11 DIAGNOSIS — R06.02 SOB (SHORTNESS OF BREATH): ICD-10-CM

## 2019-03-11 DIAGNOSIS — E11.9 TYPE 2 DIABETES MELLITUS WITHOUT COMPLICATION, UNSPECIFIED WHETHER LONG TERM INSULIN USE (HCC): ICD-10-CM

## 2019-03-11 DIAGNOSIS — I10 HYPERTENSION, UNSPECIFIED TYPE: ICD-10-CM

## 2019-03-11 LAB
ALBUMIN SERPL BCP-MCNC: 3.8 G/DL (ref 3.5–5)
ALP SERPL-CCNC: 124 U/L (ref 46–116)
ALT SERPL W P-5'-P-CCNC: 38 U/L (ref 12–78)
ANION GAP SERPL CALCULATED.3IONS-SCNC: 8 MMOL/L (ref 4–13)
AST SERPL W P-5'-P-CCNC: 29 U/L (ref 5–45)
BACTERIA UR QL AUTO: ABNORMAL /HPF
BASOPHILS # BLD AUTO: 0.06 THOUSANDS/ΜL (ref 0–0.1)
BASOPHILS NFR BLD AUTO: 1 % (ref 0–1)
BILIRUB SERPL-MCNC: 0.67 MG/DL (ref 0.2–1)
BILIRUB UR QL STRIP: ABNORMAL
BUN SERPL-MCNC: 11 MG/DL (ref 5–25)
CALCIUM SERPL-MCNC: 9 MG/DL (ref 8.3–10.1)
CHLORIDE SERPL-SCNC: 106 MMOL/L (ref 100–108)
CHOLEST SERPL-MCNC: 169 MG/DL (ref 50–200)
CLARITY UR: ABNORMAL
CO2 SERPL-SCNC: 26 MMOL/L (ref 21–32)
COLOR UR: ABNORMAL
CREAT SERPL-MCNC: 0.8 MG/DL (ref 0.6–1.3)
CREAT UR-MCNC: 370 MG/DL
EOSINOPHIL # BLD AUTO: 0.14 THOUSAND/ΜL (ref 0–0.61)
EOSINOPHIL NFR BLD AUTO: 2 % (ref 0–6)
ERYTHROCYTE [DISTWIDTH] IN BLOOD BY AUTOMATED COUNT: 13.7 % (ref 11.6–15.1)
EST. AVERAGE GLUCOSE BLD GHB EST-MCNC: 272 MG/DL
GFR SERPL CREATININE-BSD FRML MDRD: 82 ML/MIN/1.73SQ M
GLUCOSE P FAST SERPL-MCNC: 144 MG/DL (ref 65–99)
GLUCOSE UR STRIP-MCNC: ABNORMAL MG/DL
HBA1C MFR BLD: 11.1 % (ref 4.2–6.3)
HCT VFR BLD AUTO: 42.7 % (ref 34.8–46.1)
HDLC SERPL-MCNC: 44 MG/DL (ref 40–60)
HGB BLD-MCNC: 14.1 G/DL (ref 11.5–15.4)
HGB UR QL STRIP.AUTO: NEGATIVE
HYALINE CASTS #/AREA URNS LPF: ABNORMAL /LPF
IMM GRANULOCYTES # BLD AUTO: 0.02 THOUSAND/UL (ref 0–0.2)
IMM GRANULOCYTES NFR BLD AUTO: 0 % (ref 0–2)
KETONES UR STRIP-MCNC: ABNORMAL MG/DL
LDLC SERPL CALC-MCNC: 77 MG/DL (ref 0–100)
LEUKOCYTE ESTERASE UR QL STRIP: NEGATIVE
LYMPHOCYTES # BLD AUTO: 3.85 THOUSANDS/ΜL (ref 0.6–4.47)
LYMPHOCYTES NFR BLD AUTO: 41 % (ref 14–44)
MCH RBC QN AUTO: 29.9 PG (ref 26.8–34.3)
MCHC RBC AUTO-ENTMCNC: 33 G/DL (ref 31.4–37.4)
MCV RBC AUTO: 91 FL (ref 82–98)
MICROALBUMIN UR-MCNC: 40.7 MG/L (ref 0–20)
MICROALBUMIN/CREAT 24H UR: 11 MG/G CREATININE (ref 0–30)
MONOCYTES # BLD AUTO: 0.56 THOUSAND/ΜL (ref 0.17–1.22)
MONOCYTES NFR BLD AUTO: 6 % (ref 4–12)
NEUTROPHILS # BLD AUTO: 4.7 THOUSANDS/ΜL (ref 1.85–7.62)
NEUTS SEG NFR BLD AUTO: 50 % (ref 43–75)
NITRITE UR QL STRIP: NEGATIVE
NON-SQ EPI CELLS URNS QL MICRO: ABNORMAL /HPF
NRBC BLD AUTO-RTO: 0 /100 WBCS
NT-PROBNP SERPL-MCNC: 46 PG/ML
PH UR STRIP.AUTO: 6 [PH]
PLATELET # BLD AUTO: 329 THOUSANDS/UL (ref 149–390)
PMV BLD AUTO: 9.5 FL (ref 8.9–12.7)
POTASSIUM SERPL-SCNC: 3.8 MMOL/L (ref 3.5–5.3)
PROT SERPL-MCNC: 8 G/DL (ref 6.4–8.2)
PROT UR STRIP-MCNC: ABNORMAL MG/DL
RBC # BLD AUTO: 4.71 MILLION/UL (ref 3.81–5.12)
RBC #/AREA URNS AUTO: ABNORMAL /HPF
SODIUM SERPL-SCNC: 140 MMOL/L (ref 136–145)
SP GR UR STRIP.AUTO: 1.03 (ref 1–1.03)
T4 FREE SERPL-MCNC: 0.82 NG/DL (ref 0.76–1.46)
TRIGL SERPL-MCNC: 238 MG/DL
TSH SERPL DL<=0.05 MIU/L-ACNC: 6.6 UIU/ML (ref 0.36–3.74)
UROBILINOGEN UR QL STRIP.AUTO: 0.2 E.U./DL
WBC # BLD AUTO: 9.33 THOUSAND/UL (ref 4.31–10.16)
WBC #/AREA URNS AUTO: ABNORMAL /HPF

## 2019-03-11 PROCEDURE — 81001 URINALYSIS AUTO W/SCOPE: CPT | Performed by: INTERNAL MEDICINE

## 2019-03-11 PROCEDURE — 85025 COMPLETE CBC W/AUTO DIFF WBC: CPT

## 2019-03-11 PROCEDURE — 36415 COLL VENOUS BLD VENIPUNCTURE: CPT

## 2019-03-11 PROCEDURE — 83036 HEMOGLOBIN GLYCOSYLATED A1C: CPT

## 2019-03-11 PROCEDURE — 84443 ASSAY THYROID STIM HORMONE: CPT

## 2019-03-11 PROCEDURE — 82043 UR ALBUMIN QUANTITATIVE: CPT | Performed by: INTERNAL MEDICINE

## 2019-03-11 PROCEDURE — 84439 ASSAY OF FREE THYROXINE: CPT

## 2019-03-11 PROCEDURE — 82570 ASSAY OF URINE CREATININE: CPT | Performed by: INTERNAL MEDICINE

## 2019-03-11 PROCEDURE — 80053 COMPREHEN METABOLIC PANEL: CPT

## 2019-03-11 PROCEDURE — 80061 LIPID PANEL: CPT

## 2019-03-11 PROCEDURE — 3061F NEG MICROALBUMINURIA REV: CPT | Performed by: INTERNAL MEDICINE

## 2019-03-11 PROCEDURE — 86803 HEPATITIS C AB TEST: CPT

## 2019-03-11 PROCEDURE — 83880 ASSAY OF NATRIURETIC PEPTIDE: CPT

## 2019-03-12 ENCOUNTER — TELEPHONE (OUTPATIENT)
Dept: FAMILY MEDICINE CLINIC | Facility: CLINIC | Age: 59
End: 2019-03-12

## 2019-03-12 DIAGNOSIS — I21.4 NSTEMI (NON-ST ELEVATED MYOCARDIAL INFARCTION) (HCC): ICD-10-CM

## 2019-03-12 DIAGNOSIS — Z79.4 TYPE 2 DIABETES MELLITUS WITH COMPLICATION, WITH LONG-TERM CURRENT USE OF INSULIN (HCC): ICD-10-CM

## 2019-03-12 DIAGNOSIS — E11.8 TYPE 2 DIABETES MELLITUS WITH COMPLICATION, WITH LONG-TERM CURRENT USE OF INSULIN (HCC): ICD-10-CM

## 2019-03-12 LAB — HCV AB SER QL: NORMAL

## 2019-03-12 NOTE — TELEPHONE ENCOUNTER
----- Message from Sidra Castellanos MD sent at 3/12/2019  8:22 AM EDT -----  Please call her her A1c is high 11 1 increase her insulin dose by 5 units daily  Increase metformin from 500 mg b i d  she is taking 1 tablet daily now

## 2019-03-13 ENCOUNTER — APPOINTMENT (OUTPATIENT)
Dept: LAB | Facility: CLINIC | Age: 59
End: 2019-03-13
Payer: COMMERCIAL

## 2019-03-13 ENCOUNTER — TRANSCRIBE ORDERS (OUTPATIENT)
Dept: LAB | Facility: CLINIC | Age: 59
End: 2019-03-13

## 2019-03-13 DIAGNOSIS — Z12.11 COLON CANCER SCREENING: ICD-10-CM

## 2019-03-13 LAB — HEMOCCULT STL QL IA: NEGATIVE

## 2019-03-13 PROCEDURE — G0328 FECAL BLOOD SCRN IMMUNOASSAY: HCPCS

## 2019-03-14 ENCOUNTER — HOSPITAL ENCOUNTER (OUTPATIENT)
Dept: NON INVASIVE DIAGNOSTICS | Facility: CLINIC | Age: 59
Discharge: HOME | End: 2019-03-14
Payer: COMMERCIAL

## 2019-03-14 DIAGNOSIS — I10 ESSENTIAL HYPERTENSION: ICD-10-CM

## 2019-03-14 DIAGNOSIS — I25.10 CORONARY ARTERY DISEASE INVOLVING NATIVE CORONARY ARTERY OF NATIVE HEART WITHOUT ANGINA PECTORIS: ICD-10-CM

## 2019-03-14 DIAGNOSIS — R06.02 SOB (SHORTNESS OF BREATH): ICD-10-CM

## 2019-03-14 PROCEDURE — 93306 TTE W/DOPPLER COMPLETE: CPT

## 2019-03-14 PROCEDURE — 93306 TTE W/DOPPLER COMPLETE: CPT | Performed by: INTERNAL MEDICINE

## 2019-03-22 ENCOUNTER — OFFICE VISIT (OUTPATIENT)
Dept: CARDIOLOGY CLINIC | Facility: CLINIC | Age: 59
End: 2019-03-22
Payer: COMMERCIAL

## 2019-03-22 VITALS
HEIGHT: 62 IN | HEART RATE: 74 BPM | SYSTOLIC BLOOD PRESSURE: 154 MMHG | WEIGHT: 167 LBS | BODY MASS INDEX: 30.73 KG/M2 | DIASTOLIC BLOOD PRESSURE: 90 MMHG

## 2019-03-22 DIAGNOSIS — I10 ESSENTIAL HYPERTENSION: ICD-10-CM

## 2019-03-22 DIAGNOSIS — E78.2 MIXED HYPERLIPIDEMIA: ICD-10-CM

## 2019-03-22 DIAGNOSIS — I25.10 CORONARY ARTERY DISEASE INVOLVING NATIVE CORONARY ARTERY OF NATIVE HEART WITHOUT ANGINA PECTORIS: Primary | ICD-10-CM

## 2019-03-22 PROCEDURE — 93000 ELECTROCARDIOGRAM COMPLETE: CPT | Performed by: INTERNAL MEDICINE

## 2019-03-22 PROCEDURE — 99214 OFFICE O/P EST MOD 30 MIN: CPT | Performed by: INTERNAL MEDICINE

## 2019-03-22 RX ORDER — NITROGLYCERIN 0.4 MG/1
0.4 TABLET SUBLINGUAL
Qty: 100 TABLET | Refills: 3 | Status: SHIPPED | OUTPATIENT
Start: 2019-03-22 | End: 2020-09-16

## 2019-03-22 NOTE — PROGRESS NOTES
Subjective:        Patient ID: Austen Stephens is a 62 y o  female  Chief Complaint:  Mirna Moreno is here for routine cardiac follow-up  Echocardiogram revealed normal LV function wall motion  LDL goal met, triglycerides elevated A1c over 11  She is having some atypical chest pains here and there were she takes extra aspirin 4  She does not have any sublingual nitroglycerin  As you recall she has residual small vessel % posterolateral branch occlusion  Status post PTCA of obtuse marginal     She is trying to better her sugar, having some meals now focused on low-cholesterol low glycemic index foods liver home now  She is not having any alarming dyspnea orthopnea  She does not take her metoprolol time only takes 50 mg when she thinks her blood pressure will tolerate it  I cautioned her about going on off of metoprolol tartrate in a complications ago with this  I also tried to explain to her in detail myocardial oxygen demand and heart rate relationship in layman's terms  She examines euvolemic  EKG within normal limits today  The following portions of the patient's history were reviewed and updated as appropriate: allergies, current medications, past family history, past medical history, past social history, past surgical history and problem list   Review of Systems   Constitution: Negative for chills, diaphoresis, malaise/fatigue and weight gain  HENT: Negative for nosebleeds and stridor  Eyes: Negative for double vision, vision loss in left eye, vision loss in right eye and visual disturbance  Cardiovascular: Positive for chest pain (Atypical fleeting not particularly exertional)  Negative for claudication, cyanosis, dyspnea on exertion, irregular heartbeat, leg swelling, near-syncope, orthopnea, palpitations, paroxysmal nocturnal dyspnea and syncope  Respiratory: Negative for cough, shortness of breath, snoring and wheezing      Endocrine: Negative for polydipsia, polyphagia and polyuria  Hematologic/Lymphatic: Negative for bleeding problem  Does not bruise/bleed easily  Skin: Negative for flushing and rash  Musculoskeletal: Negative for falls and myalgias  Gastrointestinal: Negative for abdominal pain, heartburn, hematemesis, hematochezia, melena and nausea  Genitourinary: Negative for hematuria  Neurological: Negative for brief paralysis, dizziness, focal weakness, headaches, light-headedness, loss of balance and vertigo  Psychiatric/Behavioral: Negative for altered mental status and substance abuse  Allergic/Immunologic: Negative for hives  Objective:      /90   Pulse 74   Ht 5' 1 5" (1 562 m)   Wt 75 8 kg (167 lb)   BMI 31 04 kg/m²   Physical Exam   Constitutional: She is oriented to person, place, and time  She appears well-developed and well-nourished  HENT:   Head: Normocephalic and atraumatic  Eyes: Pupils are equal, round, and reactive to light  EOM are normal    Neck: Normal range of motion  Neck supple  No JVD present  No thyromegaly present  Cardiovascular: Normal rate, regular rhythm, normal heart sounds and intact distal pulses  Exam reveals no gallop and no friction rub  No murmur heard  Pulmonary/Chest: Effort normal and breath sounds normal  No stridor  No respiratory distress  She has no wheezes  She has no rales  Abdominal: Soft  Bowel sounds are normal  She exhibits no mass  There is no tenderness  Musculoskeletal: Normal range of motion  She exhibits no edema  Lymphadenopathy:     She has no cervical adenopathy  Neurological: She is alert and oriented to person, place, and time  Skin: Skin is warm and dry  No rash noted  No pallor  Psychiatric: She has a normal mood and affect  Her behavior is normal  Judgment and thought content normal    Nursing note and vitals reviewed        Lab Review:   Appointment on 03/13/2019   Component Date Value    OCCULT BLD, FECAL IMMUNO* 03/13/2019 Negative    Appointment on 03/11/2019   Component Date Value    WBC 03/11/2019 9 33     RBC 03/11/2019 4 71     Hemoglobin 03/11/2019 14 1     Hematocrit 03/11/2019 42 7     MCV 03/11/2019 91     MCH 03/11/2019 29 9     MCHC 03/11/2019 33 0     RDW 03/11/2019 13 7     MPV 03/11/2019 9 5     Platelets 90/41/6695 329     nRBC 03/11/2019 0     Neutrophils Relative 03/11/2019 50     Immat GRANS % 03/11/2019 0     Lymphocytes Relative 03/11/2019 41     Monocytes Relative 03/11/2019 6     Eosinophils Relative 03/11/2019 2     Basophils Relative 03/11/2019 1     Neutrophils Absolute 03/11/2019 4 70     Immature Grans Absolute 03/11/2019 0 02     Lymphocytes Absolute 03/11/2019 3 85     Monocytes Absolute 03/11/2019 0 56     Eosinophils Absolute 03/11/2019 0 14     Basophils Absolute 03/11/2019 0 06     Sodium 03/11/2019 140     Potassium 03/11/2019 3 8     Chloride 03/11/2019 106     CO2 03/11/2019 26     ANION GAP 03/11/2019 8     BUN 03/11/2019 11     Creatinine 03/11/2019 0 80     Glucose, Fasting 03/11/2019 144*    Calcium 03/11/2019 9 0     AST 03/11/2019 29     ALT 03/11/2019 38     Alkaline Phosphatase 03/11/2019 124*    Total Protein 03/11/2019 8 0     Albumin 03/11/2019 3 8     Total Bilirubin 03/11/2019 0 67     eGFR 03/11/2019 82     Hepatitis C Ab 03/11/2019 Non-reactive     Cholesterol 03/11/2019 169     Triglycerides 03/11/2019 238*    HDL, Direct 03/11/2019 44     LDL Calculated 03/11/2019 77     TSH 3RD GENERATON 03/11/2019 6 600*    Hemoglobin A1C 03/11/2019 11 1*    EAG 03/11/2019 272     NT-proBNP 03/11/2019 46     Free T4 03/11/2019 0 82    Office Visit on 02/04/2019   Component Date Value    Color, UA 03/11/2019 Dk Yellow     Clarity, UA 03/11/2019 Turbid     Specific Gravity, UA 03/11/2019 1 027     pH, UA 03/11/2019 6 0     Leukocytes, UA 03/11/2019 Negative     Nitrite, UA 03/11/2019 Negative     Protein, UA 03/11/2019 Trace*    Glucose, UA 03/11/2019 100 (1/10%)*    Ketones, UA 03/11/2019 Trace*    Urobilinogen, UA 03/11/2019 0 2     Bilirubin, UA 03/11/2019 Interference- unable to analyze*    Blood, UA 03/11/2019 Negative     Creatinine, Ur 03/11/2019 370 0     Microalbum  ,U,Random 03/11/2019 40 7*    Microalb Creat Ratio 03/11/2019 11     RBC, UA 03/11/2019 None Seen     WBC, UA 03/11/2019 2-4*    Epithelial Cells 03/11/2019 None Seen     Bacteria, UA 03/11/2019 None Seen     Hyaline Casts, UA 03/11/2019 5-10*     No results found  Assessment:       1  Coronary artery disease involving native coronary artery of native heart without angina pectoris  POCT ECG    metoprolol tartrate (LOPRESSOR) 25 mg tablet    nitroglycerin (NITROSTAT) 0 4 mg SL tablet   2  Essential hypertension  metoprolol tartrate (LOPRESSOR) 25 mg tablet   3  Mixed hyperlipidemia          Plan:       Chris Etienne is without classic angina, she has some atypical chest pains I do not believe are consistent with angina pectoris, not particularly exertionally mediated  That said I advise she not take additional antiplatelets of any kind, gave her examples of many including aspirin, but utilize p r n  Tylenol for aches and pains and sublingual nitroglycerin for any chest pains mediated by exertion or more classic such as central chest pain heaviness left arm pain neck or jaw pain  I gave her prescription for such  I also explained her I want her on the metoprolol twice a day routinely rather than on and off, I decreased her metoprolol 25 mg p o  B i d  To lessen the chance of fatigue and noncompliance  She is compliant with the amlodipine and benazepril  I made no other changes or diary testing today  I asked her to give our office a call should any elective surgery be scheduled otherwise I will see her back in 4 months, asked her to call should any anginal symptoms or other potentially cardiac concerning symptoms arise in the interim

## 2019-03-22 NOTE — PATIENT INSTRUCTIONS
Coronary Artery Disease   AMBULATORY CARE:   Coronary artery disease (CAD)  is narrowing of the arteries to your heart caused by a buildup of plaque  Plaque is made up of cholesterol and other substances  The narrowing in your arteries decreases the amount of blood that can flow to your heart  This causes your heart to get less oxygen  You may not have any symptoms of CAD  It is important for you to manage CAD even if you feel well  CAD can lead to a heart attack if it is not managed  Common symptoms include the following:   · Chest pain (angina), causing burning, squeezing, or crushing tightness in your chest    · Pain that spreads to your neck, jaw, or shoulder blade    · Nausea, vomiting, sweating, fainting, and hands and feet that are cold to the touch  Call 911 for any of the following:   · You have any of the following signs of a heart attack:      ¨ Squeezing, pressure, or pain in your chest that lasts longer than 5 minutes or returns    ¨ Discomfort or pain in your back, neck, jaw, stomach, or arm     ¨ Trouble breathing    ¨ Nausea or vomiting    ¨ Lightheadedness or a sudden cold sweat, especially with chest pain or trouble breathing    Contact your healthcare provider if:   · You have chest pain that is more frequent, or you have chest pain at rest     · You have questions or concerns about your condition or care  Medicines used to treat CAD:   · Blood pressure medicines  are given to lower your blood pressure  ACE inhibitors help keep your blood vessels relaxed and open to help keep blood flowing into your heart  Beta-blockers keep your heart pumping strongly and regularly so it does not work too hard to get oxygen  · Cholesterol medicines  help lower blood cholesterol levels  · Nitrates , such as nitroglycerin, relax the arteries of your heart so it gets more oxygen  They help to relieve your chest pain  · Antiplatelets , such as aspirin, help prevent blood clots   Take your antiplatelet medicine exactly as directed  These medicines make it more likely for you to bleed or bruise  If you are told to take aspirin, do not take acetaminophen or ibuprofen instead  · Blood thinners  keep clots from forming in your blood  Clots may cause heart attacks, strokes, or death  This medicine makes it more likely for you to bleed or bruise  · Do not take certain medicines without asking your healthcare provider first   These include NSAIDs, herbal or vitamin supplements, or hormones (estrogen or progestin)  Procedures used to treat CAD:   · Angioplasty  may be done to open an artery blocked by plaque  A tube with a balloon on the end is threaded into the blocked artery  Once the tube is in the artery, the balloon is inflated  As the balloon inflates, it presses the plaque against the artery wall to open the artery  A stent may be placed in your artery to keep it open  · Coronary artery bypass graft surgery (CABG)  is open heart surgery  Healthcare providers take arteries or veins from other areas in your body and use them to bypass or go around the blocked arteries of your heart  Cardiac rehabilitation:  Your healthcare provider may recommend that you attend cardiac rehabilitation (rehab)  This is a program run by specialists who will help you safely strengthen your heart and reduce the risk for more heart disease  The plan includes exercise, relaxation, stress management, and heart-healthy nutrition  Healthcare providers will also check to make sure any medicines you are taking are working  Manage CAD to prevent a heart attack:   · Do not smoke  Nicotine and other chemicals in cigarettes and cigars can cause heart and lung damage  Ask your healthcare provider for information if you currently smoke and need help to quit  E-cigarettes or smokeless tobacco still contain nicotine  Talk to your healthcare provider before you use these products  · Exercise regularly    Exercise at least 30 minutes each day, on most days of the week  Exercise helps to lower high cholesterol and high blood pressure  It can also help you maintain a healthy weight  Ask your healthcare provider about the kind of exercise you should do and how to get started  · Maintain a healthy weight  If you are overweight, talk to your healthcare provider about how to lose weight  A weight loss of 10% can improve your heart health  · Eat heart-healthy foods  Include fresh fruits and vegetables in your meal plan  Choose low-fat foods, such as skim or 1% fat milk, low-fat cheese and yogurt, fish, chicken (without skin), and lean meats  Eat two 4-ounce servings of fish high in omega-3 fats each week, such as salmon, fresh tuna, and herring  Do not eat foods that are high in sodium, such as canned foods, potato chips, salty snacks, and cold cuts  Put less table salt on your food  · Limit or do not drink alcohol  A drink of alcohol is 12 ounces of beer, 5 ounces of wine, or 1½ ounces of liquor  · Manage other health conditions  Follow your healthcare provider's advice on how to manage other conditions that can affect your heart health  These include diabetes, high blood pressure, and high cholesterol  You may need to take medicines for these conditions and make other lifestyle changes  · Ask if you should have a flu vaccine  The flu can be dangerous for a person who has CAD  The flu vaccine is available every year in the fall  Follow up with your healthcare provider as directed: You may need to return for other tests  You may also be referred to a cardiac surgeon  Write down your questions so you remember to ask them during your visits  © 2017 [employer]  Information is for End User's use only and may not be sold, redistributed or otherwise used for commercial purposes  All illustrations and images included in CareNotes® are the copyrighted property of [employer]  or [employer]    The above information is an  only  It is not intended as medical advice for individual conditions or treatments  Talk to your doctor, nurse or pharmacist before following any medical regimen to see if it is safe and effective for you

## 2019-03-28 DIAGNOSIS — E11.8 TYPE 2 DIABETES MELLITUS WITH COMPLICATION, WITH LONG-TERM CURRENT USE OF INSULIN (HCC): ICD-10-CM

## 2019-03-28 DIAGNOSIS — Z79.4 TYPE 2 DIABETES MELLITUS WITH COMPLICATION, WITH LONG-TERM CURRENT USE OF INSULIN (HCC): ICD-10-CM

## 2019-05-01 ENCOUNTER — TELEPHONE (OUTPATIENT)
Dept: CARDIOLOGY CLINIC | Facility: CLINIC | Age: 59
End: 2019-05-01

## 2019-05-02 ENCOUNTER — OFFICE VISIT (OUTPATIENT)
Dept: FAMILY MEDICINE CLINIC | Facility: CLINIC | Age: 59
End: 2019-05-02
Payer: COMMERCIAL

## 2019-05-02 VITALS
OXYGEN SATURATION: 98 % | BODY MASS INDEX: 30.96 KG/M2 | SYSTOLIC BLOOD PRESSURE: 140 MMHG | HEART RATE: 69 BPM | WEIGHT: 164 LBS | HEIGHT: 61 IN | TEMPERATURE: 97 F | DIASTOLIC BLOOD PRESSURE: 78 MMHG

## 2019-05-02 DIAGNOSIS — S46.912A STRAIN OF ACROMIOCLAVICULAR JOINT, LEFT, INITIAL ENCOUNTER: Primary | ICD-10-CM

## 2019-05-02 DIAGNOSIS — E78.2 MIXED HYPERLIPIDEMIA: ICD-10-CM

## 2019-05-02 DIAGNOSIS — I10 ESSENTIAL HYPERTENSION: ICD-10-CM

## 2019-05-02 DIAGNOSIS — Z79.4 TYPE 2 DIABETES MELLITUS WITHOUT COMPLICATION, WITH LONG-TERM CURRENT USE OF INSULIN (HCC): Chronic | ICD-10-CM

## 2019-05-02 DIAGNOSIS — E66.9 CLASS 1 OBESITY WITH SERIOUS COMORBIDITY AND BODY MASS INDEX (BMI) OF 30.0 TO 30.9 IN ADULT, UNSPECIFIED OBESITY TYPE: ICD-10-CM

## 2019-05-02 DIAGNOSIS — E11.9 TYPE 2 DIABETES MELLITUS WITHOUT COMPLICATION, WITH LONG-TERM CURRENT USE OF INSULIN (HCC): Chronic | ICD-10-CM

## 2019-05-02 PROCEDURE — 99214 OFFICE O/P EST MOD 30 MIN: CPT | Performed by: NURSE PRACTITIONER

## 2019-05-02 RX ORDER — PREDNISONE 20 MG/1
TABLET ORAL
Qty: 10 TABLET | Refills: 0 | Status: SHIPPED | OUTPATIENT
Start: 2019-05-02 | End: 2019-05-10 | Stop reason: HOSPADM

## 2019-05-09 ENCOUNTER — HOSPITAL ENCOUNTER (OUTPATIENT)
Facility: HOSPITAL | Age: 59
Setting detail: OBSERVATION
Discharge: HOME | End: 2019-05-10
Attending: EMERGENCY MEDICINE | Admitting: EMERGENCY MEDICINE
Payer: COMMERCIAL

## 2019-05-09 ENCOUNTER — APPOINTMENT (EMERGENCY)
Dept: RADIOLOGY | Facility: HOSPITAL | Age: 59
End: 2019-05-09
Payer: COMMERCIAL

## 2019-05-09 DIAGNOSIS — R07.9 ACUTE CHEST PAIN: Primary | ICD-10-CM

## 2019-05-09 DIAGNOSIS — R73.9 HYPERGLYCEMIA: ICD-10-CM

## 2019-05-09 LAB
ANION GAP SERPL CALCULATED.3IONS-SCNC: 10 MMOL/L (ref 4–13)
APTT PPP: 36 SECONDS (ref 26–38)
ATRIAL RATE: 70 BPM
BASOPHILS # BLD AUTO: 0 THOUSANDS/ΜL (ref 0–0.1)
BASOPHILS NFR BLD AUTO: 1 % (ref 0–2)
BUN SERPL-MCNC: 11 MG/DL (ref 7–25)
CALCIUM SERPL-MCNC: 9.6 MG/DL (ref 8.6–10.5)
CHLORIDE SERPL-SCNC: 104 MMOL/L (ref 98–107)
CO2 SERPL-SCNC: 26 MMOL/L (ref 21–31)
CREAT SERPL-MCNC: 0.75 MG/DL (ref 0.6–1.2)
EOSINOPHIL # BLD AUTO: 0.1 THOUSAND/ΜL (ref 0–0.61)
EOSINOPHIL NFR BLD AUTO: 2 % (ref 0–5)
ERYTHROCYTE [DISTWIDTH] IN BLOOD BY AUTOMATED COUNT: 14.3 % (ref 11.5–14.5)
GFR SERPL CREATININE-BSD FRML MDRD: 88 ML/MIN/1.73SQ M
GLUCOSE SERPL-MCNC: 203 MG/DL (ref 65–140)
GLUCOSE SERPL-MCNC: 213 MG/DL (ref 65–140)
GLUCOSE SERPL-MCNC: 244 MG/DL (ref 65–99)
HCT VFR BLD AUTO: 39.6 % (ref 42–47)
HGB BLD-MCNC: 13.4 G/DL (ref 12–16)
INR PPP: 1.03 (ref 0.9–1.5)
LYMPHOCYTES # BLD AUTO: 2.8 THOUSANDS/ΜL (ref 0.6–4.47)
LYMPHOCYTES NFR BLD AUTO: 33 % (ref 21–51)
MCH RBC QN AUTO: 30.5 PG (ref 26–34)
MCHC RBC AUTO-ENTMCNC: 33.9 G/DL (ref 31–37)
MCV RBC AUTO: 90 FL (ref 81–99)
MONOCYTES # BLD AUTO: 0.5 THOUSAND/ΜL (ref 0.17–1.22)
MONOCYTES NFR BLD AUTO: 6 % (ref 2–12)
NEUTROPHILS # BLD AUTO: 5.1 THOUSANDS/ΜL (ref 1.4–6.5)
NEUTS SEG NFR BLD AUTO: 59 % (ref 42–75)
P AXIS: 27 DEGREES
PLATELET # BLD AUTO: 330 THOUSANDS/UL (ref 149–390)
PMV BLD AUTO: 7.6 FL (ref 8.6–11.7)
POTASSIUM SERPL-SCNC: 3.8 MMOL/L (ref 3.5–5.5)
PR INTERVAL: 144 MS
PROTHROMBIN TIME: 12 SECONDS (ref 10.2–13)
QRS AXIS: 25 DEGREES
QRSD INTERVAL: 68 MS
QT INTERVAL: 402 MS
QTC INTERVAL: 434 MS
RBC # BLD AUTO: 4.41 MILLION/UL (ref 3.9–5.2)
SODIUM SERPL-SCNC: 140 MMOL/L (ref 134–143)
T WAVE AXIS: 36 DEGREES
TROPONIN I SERPL-MCNC: <0.03 NG/ML
TROPONIN I SERPL-MCNC: <0.03 NG/ML
VENTRICULAR RATE: 70 BPM
WBC # BLD AUTO: 8.6 THOUSAND/UL (ref 4.8–10.8)

## 2019-05-09 PROCEDURE — 96361 HYDRATE IV INFUSION ADD-ON: CPT

## 2019-05-09 PROCEDURE — 82948 REAGENT STRIP/BLOOD GLUCOSE: CPT

## 2019-05-09 PROCEDURE — 99285 EMERGENCY DEPT VISIT HI MDM: CPT

## 2019-05-09 PROCEDURE — 85610 PROTHROMBIN TIME: CPT

## 2019-05-09 PROCEDURE — 99220 PR INITIAL OBSERVATION CARE/DAY 70 MINUTES: CPT | Performed by: INTERNAL MEDICINE

## 2019-05-09 PROCEDURE — 80048 BASIC METABOLIC PNL TOTAL CA: CPT | Performed by: EMERGENCY MEDICINE

## 2019-05-09 PROCEDURE — 84484 ASSAY OF TROPONIN QUANT: CPT

## 2019-05-09 PROCEDURE — 96374 THER/PROPH/DIAG INJ IV PUSH: CPT

## 2019-05-09 PROCEDURE — 71045 X-RAY EXAM CHEST 1 VIEW: CPT

## 2019-05-09 PROCEDURE — 85025 COMPLETE CBC W/AUTO DIFF WBC: CPT

## 2019-05-09 PROCEDURE — 36415 COLL VENOUS BLD VENIPUNCTURE: CPT

## 2019-05-09 PROCEDURE — 85730 THROMBOPLASTIN TIME PARTIAL: CPT

## 2019-05-09 PROCEDURE — 93005 ELECTROCARDIOGRAM TRACING: CPT

## 2019-05-09 PROCEDURE — 93010 ELECTROCARDIOGRAM REPORT: CPT | Performed by: INTERNAL MEDICINE

## 2019-05-09 PROCEDURE — 84484 ASSAY OF TROPONIN QUANT: CPT | Performed by: INTERNAL MEDICINE

## 2019-05-09 RX ORDER — INSULIN GLARGINE 100 [IU]/ML
45 INJECTION, SOLUTION SUBCUTANEOUS
Status: DISCONTINUED | OUTPATIENT
Start: 2019-05-09 | End: 2019-05-10 | Stop reason: HOSPADM

## 2019-05-09 RX ORDER — HEPARIN SODIUM 5000 [USP'U]/ML
5000 INJECTION, SOLUTION INTRAVENOUS; SUBCUTANEOUS EVERY 8 HOURS SCHEDULED
Status: DISCONTINUED | OUTPATIENT
Start: 2019-05-09 | End: 2019-05-10 | Stop reason: HOSPADM

## 2019-05-09 RX ORDER — ASPIRIN 81 MG/1
81 TABLET ORAL DAILY
Status: DISCONTINUED | OUTPATIENT
Start: 2019-05-09 | End: 2019-05-10 | Stop reason: HOSPADM

## 2019-05-09 RX ORDER — LISINOPRIL 20 MG/1
20 TABLET ORAL DAILY
Status: DISCONTINUED | OUTPATIENT
Start: 2019-05-09 | End: 2019-05-10 | Stop reason: HOSPADM

## 2019-05-09 RX ORDER — NITROGLYCERIN 0.4 MG/1
0.4 TABLET SUBLINGUAL
Status: DISCONTINUED | OUTPATIENT
Start: 2019-05-09 | End: 2019-05-10 | Stop reason: HOSPADM

## 2019-05-09 RX ORDER — AMLODIPINE BESYLATE 5 MG/1
5 TABLET ORAL DAILY
Status: DISCONTINUED | OUTPATIENT
Start: 2019-05-09 | End: 2019-05-10

## 2019-05-09 RX ORDER — ATORVASTATIN CALCIUM 40 MG/1
40 TABLET, FILM COATED ORAL
Status: DISCONTINUED | OUTPATIENT
Start: 2019-05-09 | End: 2019-05-10 | Stop reason: HOSPADM

## 2019-05-09 RX ORDER — ONDANSETRON 2 MG/ML
4 INJECTION INTRAMUSCULAR; INTRAVENOUS ONCE
Status: COMPLETED | OUTPATIENT
Start: 2019-05-09 | End: 2019-05-09

## 2019-05-09 RX ORDER — ONDANSETRON 2 MG/ML
4 INJECTION INTRAMUSCULAR; INTRAVENOUS EVERY 6 HOURS PRN
Status: DISCONTINUED | OUTPATIENT
Start: 2019-05-09 | End: 2019-05-10 | Stop reason: HOSPADM

## 2019-05-09 RX ORDER — CLOPIDOGREL BISULFATE 75 MG/1
75 TABLET ORAL DAILY
Status: DISCONTINUED | OUTPATIENT
Start: 2019-05-10 | End: 2019-05-10 | Stop reason: HOSPADM

## 2019-05-09 RX ORDER — TRAMADOL HYDROCHLORIDE 50 MG/1
50 TABLET ORAL EVERY 8 HOURS PRN
Status: DISCONTINUED | OUTPATIENT
Start: 2019-05-09 | End: 2019-05-10 | Stop reason: HOSPADM

## 2019-05-09 RX ORDER — TIZANIDINE 4 MG/1
4 TABLET ORAL EVERY 8 HOURS PRN
Status: DISCONTINUED | OUTPATIENT
Start: 2019-05-09 | End: 2019-05-10 | Stop reason: HOSPADM

## 2019-05-09 RX ORDER — ROPINIROLE 0.25 MG/1
0.5 TABLET, FILM COATED ORAL 2 TIMES DAILY
Status: DISCONTINUED | OUTPATIENT
Start: 2019-05-09 | End: 2019-05-10 | Stop reason: HOSPADM

## 2019-05-09 RX ORDER — ACETAMINOPHEN 325 MG/1
650 TABLET ORAL EVERY 6 HOURS PRN
Status: DISCONTINUED | OUTPATIENT
Start: 2019-05-09 | End: 2019-05-10 | Stop reason: HOSPADM

## 2019-05-09 RX ORDER — CYCLOBENZAPRINE HCL 10 MG
5 TABLET ORAL DAILY
Status: DISCONTINUED | OUTPATIENT
Start: 2019-05-09 | End: 2019-05-10 | Stop reason: HOSPADM

## 2019-05-09 RX ADMIN — ASPIRIN 81 MG: 81 TABLET, COATED ORAL at 18:11

## 2019-05-09 RX ADMIN — ROPINIROLE HYDROCHLORIDE 0.5 MG: 0.25 TABLET, FILM COATED ORAL at 18:11

## 2019-05-09 RX ADMIN — NITROGLYCERIN 1 INCH: 20 OINTMENT TOPICAL at 13:43

## 2019-05-09 RX ADMIN — ACETAMINOPHEN 650 MG: 325 TABLET ORAL at 18:11

## 2019-05-09 RX ADMIN — ATORVASTATIN CALCIUM 40 MG: 40 TABLET, FILM COATED ORAL at 18:12

## 2019-05-09 RX ADMIN — CYCLOBENZAPRINE HYDROCHLORIDE 5 MG: 10 TABLET, FILM COATED ORAL at 18:12

## 2019-05-09 RX ADMIN — LISINOPRIL 20 MG: 20 TABLET ORAL at 18:24

## 2019-05-09 RX ADMIN — SODIUM CHLORIDE 1000 ML: 0.9 INJECTION, SOLUTION INTRAVENOUS at 13:44

## 2019-05-09 RX ADMIN — HEPARIN SODIUM 5000 UNITS: 5000 INJECTION INTRAVENOUS; SUBCUTANEOUS at 18:14

## 2019-05-09 RX ADMIN — ONDANSETRON 4 MG: 2 INJECTION INTRAMUSCULAR; INTRAVENOUS at 20:16

## 2019-05-09 RX ADMIN — HEPARIN SODIUM 5000 UNITS: 5000 INJECTION INTRAVENOUS; SUBCUTANEOUS at 21:59

## 2019-05-09 RX ADMIN — METOPROLOL TARTRATE 12.5 MG: 25 TABLET ORAL at 20:16

## 2019-05-09 RX ADMIN — INSULIN GLARGINE 45 UNITS: 100 INJECTION, SOLUTION SUBCUTANEOUS at 22:00

## 2019-05-09 RX ADMIN — AMLODIPINE BESYLATE 5 MG: 5 TABLET ORAL at 18:11

## 2019-05-09 RX ADMIN — ONDANSETRON 4 MG: 2 INJECTION INTRAMUSCULAR; INTRAVENOUS at 13:45

## 2019-05-10 ENCOUNTER — APPOINTMENT (OUTPATIENT)
Dept: NON INVASIVE DIAGNOSTICS | Facility: HOSPITAL | Age: 59
End: 2019-05-10
Payer: COMMERCIAL

## 2019-05-10 VITALS
BODY MASS INDEX: 31.25 KG/M2 | DIASTOLIC BLOOD PRESSURE: 70 MMHG | HEART RATE: 70 BPM | TEMPERATURE: 98 F | WEIGHT: 165.5 LBS | OXYGEN SATURATION: 98 % | SYSTOLIC BLOOD PRESSURE: 110 MMHG | HEIGHT: 61 IN | RESPIRATION RATE: 18 BRPM

## 2019-05-10 LAB
ANION GAP SERPL CALCULATED.3IONS-SCNC: 7 MMOL/L (ref 4–13)
BUN SERPL-MCNC: 12 MG/DL (ref 7–25)
CALCIUM SERPL-MCNC: 9.3 MG/DL (ref 8.6–10.5)
CHLORIDE SERPL-SCNC: 106 MMOL/L (ref 98–107)
CO2 SERPL-SCNC: 27 MMOL/L (ref 21–31)
CREAT SERPL-MCNC: 0.77 MG/DL (ref 0.6–1.2)
ERYTHROCYTE [DISTWIDTH] IN BLOOD BY AUTOMATED COUNT: 14.4 % (ref 11.5–14.5)
GFR SERPL CREATININE-BSD FRML MDRD: 85 ML/MIN/1.73SQ M
GLUCOSE P FAST SERPL-MCNC: 155 MG/DL (ref 65–99)
GLUCOSE SERPL-MCNC: 155 MG/DL (ref 65–140)
GLUCOSE SERPL-MCNC: 155 MG/DL (ref 65–99)
GLUCOSE SERPL-MCNC: 200 MG/DL (ref 65–140)
HCT VFR BLD AUTO: 38.1 % (ref 42–47)
HGB BLD-MCNC: 13 G/DL (ref 12–16)
MCH RBC QN AUTO: 31 PG (ref 26–34)
MCHC RBC AUTO-ENTMCNC: 34.1 G/DL (ref 31–37)
MCV RBC AUTO: 91 FL (ref 81–99)
PLATELET # BLD AUTO: 288 THOUSANDS/UL (ref 149–390)
PMV BLD AUTO: 7.9 FL (ref 8.6–11.7)
POTASSIUM SERPL-SCNC: 3.7 MMOL/L (ref 3.5–5.5)
RBC # BLD AUTO: 4.2 MILLION/UL (ref 3.9–5.2)
SODIUM SERPL-SCNC: 140 MMOL/L (ref 134–143)
TROPONIN I SERPL-MCNC: <0.03 NG/ML
WBC # BLD AUTO: 7 THOUSAND/UL (ref 4.8–10.8)

## 2019-05-10 PROCEDURE — 85027 COMPLETE CBC AUTOMATED: CPT | Performed by: INTERNAL MEDICINE

## 2019-05-10 PROCEDURE — 80048 BASIC METABOLIC PNL TOTAL CA: CPT | Performed by: INTERNAL MEDICINE

## 2019-05-10 PROCEDURE — G8989 SELF CARE D/C STATUS: HCPCS

## 2019-05-10 PROCEDURE — 93321 DOPPLER ECHO F-UP/LMTD STD: CPT | Performed by: INTERNAL MEDICINE

## 2019-05-10 PROCEDURE — 97166 OT EVAL MOD COMPLEX 45 MIN: CPT

## 2019-05-10 PROCEDURE — 93325 DOPPLER ECHO COLOR FLOW MAPG: CPT | Performed by: INTERNAL MEDICINE

## 2019-05-10 PROCEDURE — 97163 PT EVAL HIGH COMPLEX 45 MIN: CPT

## 2019-05-10 PROCEDURE — 99244 OFF/OP CNSLTJ NEW/EST MOD 40: CPT | Performed by: INTERNAL MEDICINE

## 2019-05-10 PROCEDURE — 93308 TTE F-UP OR LMTD: CPT | Performed by: INTERNAL MEDICINE

## 2019-05-10 PROCEDURE — G8978 MOBILITY CURRENT STATUS: HCPCS

## 2019-05-10 PROCEDURE — 82948 REAGENT STRIP/BLOOD GLUCOSE: CPT

## 2019-05-10 PROCEDURE — 99217 PR OBSERVATION CARE DISCHARGE MANAGEMENT: CPT | Performed by: HOSPITALIST

## 2019-05-10 PROCEDURE — 93308 TTE F-UP OR LMTD: CPT

## 2019-05-10 PROCEDURE — G8988 SELF CARE GOAL STATUS: HCPCS

## 2019-05-10 PROCEDURE — G8987 SELF CARE CURRENT STATUS: HCPCS

## 2019-05-10 PROCEDURE — G8979 MOBILITY GOAL STATUS: HCPCS

## 2019-05-10 RX ORDER — ISOSORBIDE MONONITRATE 30 MG/1
30 TABLET, EXTENDED RELEASE ORAL DAILY
Status: DISCONTINUED | OUTPATIENT
Start: 2019-05-10 | End: 2019-05-10 | Stop reason: HOSPADM

## 2019-05-10 RX ORDER — ISOSORBIDE MONONITRATE 30 MG/1
30 TABLET, EXTENDED RELEASE ORAL DAILY
Qty: 30 TABLET | Refills: 0 | Status: SHIPPED | OUTPATIENT
Start: 2019-05-11 | End: 2019-06-19

## 2019-05-10 RX ADMIN — CYCLOBENZAPRINE HYDROCHLORIDE 5 MG: 10 TABLET, FILM COATED ORAL at 09:58

## 2019-05-10 RX ADMIN — ROPINIROLE HYDROCHLORIDE 0.5 MG: 0.25 TABLET, FILM COATED ORAL at 09:58

## 2019-05-10 RX ADMIN — CLOPIDOGREL BISULFATE 75 MG: 75 TABLET ORAL at 09:58

## 2019-05-10 RX ADMIN — METOPROLOL TARTRATE 12.5 MG: 25 TABLET ORAL at 09:58

## 2019-05-10 RX ADMIN — TIZANIDINE 4 MG: 4 TABLET ORAL at 04:59

## 2019-05-10 RX ADMIN — ACETAMINOPHEN 650 MG: 325 TABLET ORAL at 12:04

## 2019-05-10 RX ADMIN — ISOSORBIDE MONONITRATE 30 MG: 30 TABLET, EXTENDED RELEASE ORAL at 09:57

## 2019-05-10 RX ADMIN — ASPIRIN 81 MG: 81 TABLET, COATED ORAL at 09:58

## 2019-05-10 RX ADMIN — HEPARIN SODIUM 5000 UNITS: 5000 INJECTION INTRAVENOUS; SUBCUTANEOUS at 06:54

## 2019-05-10 RX ADMIN — TRAMADOL HYDROCHLORIDE 50 MG: 50 TABLET, COATED ORAL at 04:59

## 2019-05-10 RX ADMIN — LISINOPRIL 20 MG: 20 TABLET ORAL at 09:57

## 2019-05-24 ENCOUNTER — HOSPITAL ENCOUNTER (EMERGENCY)
Facility: HOSPITAL | Age: 59
Discharge: HOME | End: 2019-05-25
Attending: EMERGENCY MEDICINE
Payer: COMMERCIAL

## 2019-05-24 ENCOUNTER — APPOINTMENT (EMERGENCY)
Dept: RADIOLOGY | Facility: HOSPITAL | Age: 59
End: 2019-05-24
Payer: COMMERCIAL

## 2019-05-24 ENCOUNTER — OFFICE VISIT (OUTPATIENT)
Dept: CARDIOLOGY CLINIC | Facility: CLINIC | Age: 59
End: 2019-05-24
Payer: COMMERCIAL

## 2019-05-24 VITALS
SYSTOLIC BLOOD PRESSURE: 152 MMHG | HEIGHT: 61 IN | WEIGHT: 164 LBS | BODY MASS INDEX: 30.96 KG/M2 | DIASTOLIC BLOOD PRESSURE: 84 MMHG | HEART RATE: 96 BPM

## 2019-05-24 VITALS
BODY MASS INDEX: 30.99 KG/M2 | HEART RATE: 100 BPM | RESPIRATION RATE: 20 BRPM | OXYGEN SATURATION: 95 % | WEIGHT: 164 LBS | DIASTOLIC BLOOD PRESSURE: 87 MMHG | SYSTOLIC BLOOD PRESSURE: 140 MMHG | TEMPERATURE: 98.3 F

## 2019-05-24 DIAGNOSIS — E78.00 HIGH CHOLESTEROL: Chronic | ICD-10-CM

## 2019-05-24 DIAGNOSIS — I25.10 CORONARY ARTERY DISEASE INVOLVING NATIVE CORONARY ARTERY OF NATIVE HEART WITHOUT ANGINA PECTORIS: Primary | ICD-10-CM

## 2019-05-24 DIAGNOSIS — I25.9 CHEST PAIN DUE TO MYOCARDIAL ISCHEMIA, UNSPECIFIED ISCHEMIC CHEST PAIN TYPE: Primary | ICD-10-CM

## 2019-05-24 DIAGNOSIS — I25.9 CHEST PAIN DUE TO MYOCARDIAL ISCHEMIA, UNSPECIFIED ISCHEMIC CHEST PAIN TYPE: ICD-10-CM

## 2019-05-24 DIAGNOSIS — M62.838 MUSCLE SPASM: ICD-10-CM

## 2019-05-24 DIAGNOSIS — I10 ESSENTIAL HYPERTENSION: ICD-10-CM

## 2019-05-24 LAB
ALBUMIN SERPL BCP-MCNC: 4.2 G/DL (ref 3.5–5.7)
ALP SERPL-CCNC: 102 U/L (ref 40–150)
ALT SERPL W P-5'-P-CCNC: 26 U/L (ref 7–52)
ANION GAP SERPL CALCULATED.3IONS-SCNC: 12 MMOL/L (ref 4–13)
APTT PPP: 39 SECONDS (ref 26–38)
AST SERPL W P-5'-P-CCNC: 27 U/L (ref 13–39)
BASOPHILS # BLD AUTO: 0 THOUSANDS/ΜL (ref 0–0.1)
BASOPHILS NFR BLD AUTO: 1 % (ref 0–2)
BILIRUB SERPL-MCNC: 0.4 MG/DL (ref 0.2–1)
BNP SERPL-MCNC: 15 PG/ML (ref 1–100)
BUN SERPL-MCNC: 19 MG/DL (ref 7–25)
CALCIUM SERPL-MCNC: 10 MG/DL (ref 8.6–10.5)
CHLORIDE SERPL-SCNC: 102 MMOL/L (ref 98–107)
CK SERPL-CCNC: 125 U/L (ref 30–192)
CO2 SERPL-SCNC: 23 MMOL/L (ref 21–31)
CREAT SERPL-MCNC: 0.86 MG/DL (ref 0.6–1.2)
DEPRECATED D DIMER PPP: 159 NG/ML (FEU)
EOSINOPHIL # BLD AUTO: 0.1 THOUSAND/ΜL (ref 0–0.61)
EOSINOPHIL NFR BLD AUTO: 1 % (ref 0–5)
ERYTHROCYTE [DISTWIDTH] IN BLOOD BY AUTOMATED COUNT: 14.3 % (ref 11.5–14.5)
GFR SERPL CREATININE-BSD FRML MDRD: 75 ML/MIN/1.73SQ M
GLUCOSE SERPL-MCNC: 435 MG/DL (ref 65–99)
HCT VFR BLD AUTO: 39.7 % (ref 42–47)
HGB BLD-MCNC: 13.2 G/DL (ref 12–16)
INR PPP: 0.9 (ref 0.9–1.5)
LIPASE SERPL-CCNC: 40 U/L (ref 11–82)
LYMPHOCYTES # BLD AUTO: 3.1 THOUSANDS/ΜL (ref 0.6–4.47)
LYMPHOCYTES NFR BLD AUTO: 40 % (ref 21–51)
MAGNESIUM SERPL-MCNC: 1.9 MG/DL (ref 1.9–2.7)
MCH RBC QN AUTO: 30.3 PG (ref 26–34)
MCHC RBC AUTO-ENTMCNC: 33.3 G/DL (ref 31–37)
MCV RBC AUTO: 91 FL (ref 81–99)
MONOCYTES # BLD AUTO: 0.4 THOUSAND/ΜL (ref 0.17–1.22)
MONOCYTES NFR BLD AUTO: 6 % (ref 2–12)
NEUTROPHILS # BLD AUTO: 4.1 THOUSANDS/ΜL (ref 1.4–6.5)
NEUTS SEG NFR BLD AUTO: 53 % (ref 42–75)
PLATELET # BLD AUTO: 343 THOUSANDS/UL (ref 149–390)
PMV BLD AUTO: 7.7 FL (ref 8.6–11.7)
POTASSIUM SERPL-SCNC: 4 MMOL/L (ref 3.5–5.5)
PROT SERPL-MCNC: 7.4 G/DL (ref 6.4–8.9)
PROTHROMBIN TIME: 10.4 SECONDS (ref 10.2–13)
RBC # BLD AUTO: 4.38 MILLION/UL (ref 3.9–5.2)
SODIUM SERPL-SCNC: 137 MMOL/L (ref 134–143)
TROPONIN I SERPL-MCNC: <0.03 NG/ML
WBC # BLD AUTO: 7.7 THOUSAND/UL (ref 4.8–10.8)

## 2019-05-24 PROCEDURE — 85025 COMPLETE CBC W/AUTO DIFF WBC: CPT | Performed by: EMERGENCY MEDICINE

## 2019-05-24 PROCEDURE — 82550 ASSAY OF CK (CPK): CPT | Performed by: EMERGENCY MEDICINE

## 2019-05-24 PROCEDURE — 93005 ELECTROCARDIOGRAM TRACING: CPT

## 2019-05-24 PROCEDURE — 83735 ASSAY OF MAGNESIUM: CPT | Performed by: EMERGENCY MEDICINE

## 2019-05-24 PROCEDURE — 85610 PROTHROMBIN TIME: CPT | Performed by: EMERGENCY MEDICINE

## 2019-05-24 PROCEDURE — 99214 OFFICE O/P EST MOD 30 MIN: CPT | Performed by: PHYSICIAN ASSISTANT

## 2019-05-24 PROCEDURE — 85730 THROMBOPLASTIN TIME PARTIAL: CPT | Performed by: EMERGENCY MEDICINE

## 2019-05-24 PROCEDURE — 71046 X-RAY EXAM CHEST 2 VIEWS: CPT

## 2019-05-24 PROCEDURE — 85379 FIBRIN DEGRADATION QUANT: CPT | Performed by: EMERGENCY MEDICINE

## 2019-05-24 PROCEDURE — 83880 ASSAY OF NATRIURETIC PEPTIDE: CPT | Performed by: EMERGENCY MEDICINE

## 2019-05-24 PROCEDURE — 83690 ASSAY OF LIPASE: CPT | Performed by: EMERGENCY MEDICINE

## 2019-05-24 PROCEDURE — 99285 EMERGENCY DEPT VISIT HI MDM: CPT

## 2019-05-24 PROCEDURE — 84484 ASSAY OF TROPONIN QUANT: CPT | Performed by: EMERGENCY MEDICINE

## 2019-05-24 PROCEDURE — 80053 COMPREHEN METABOLIC PANEL: CPT | Performed by: EMERGENCY MEDICINE

## 2019-05-24 PROCEDURE — 36415 COLL VENOUS BLD VENIPUNCTURE: CPT | Performed by: EMERGENCY MEDICINE

## 2019-05-24 RX ORDER — INSULIN GLARGINE 100 [IU]/ML
55 INJECTION, SOLUTION SUBCUTANEOUS ONCE
Status: COMPLETED | OUTPATIENT
Start: 2019-05-24 | End: 2019-05-25

## 2019-05-24 RX ORDER — ROPINIROLE 0.25 MG/1
0.5 TABLET, FILM COATED ORAL ONCE
Status: COMPLETED | OUTPATIENT
Start: 2019-05-24 | End: 2019-05-25

## 2019-05-24 RX ORDER — ACETAMINOPHEN 325 MG/1
650 TABLET ORAL ONCE
Status: COMPLETED | OUTPATIENT
Start: 2019-05-24 | End: 2019-05-24

## 2019-05-24 RX ORDER — CYCLOBENZAPRINE HCL 5 MG
5 TABLET ORAL DAILY
Qty: 30 TABLET | Refills: 1 | Status: SHIPPED | OUTPATIENT
Start: 2019-05-24 | End: 2020-02-18 | Stop reason: SDUPTHER

## 2019-05-24 RX ORDER — TIZANIDINE 4 MG/1
4 TABLET ORAL ONCE
Status: COMPLETED | OUTPATIENT
Start: 2019-05-24 | End: 2019-05-25

## 2019-05-24 RX ADMIN — ACETAMINOPHEN 650 MG: 325 TABLET ORAL at 22:14

## 2019-05-25 LAB — TROPONIN I SERPL-MCNC: <0.03 NG/ML

## 2019-05-25 PROCEDURE — 84484 ASSAY OF TROPONIN QUANT: CPT | Performed by: EMERGENCY MEDICINE

## 2019-05-25 PROCEDURE — 36415 COLL VENOUS BLD VENIPUNCTURE: CPT | Performed by: EMERGENCY MEDICINE

## 2019-05-25 PROCEDURE — 96372 THER/PROPH/DIAG INJ SC/IM: CPT

## 2019-05-25 RX ADMIN — ROPINIROLE HYDROCHLORIDE 0.5 MG: 0.25 TABLET, FILM COATED ORAL at 00:05

## 2019-05-25 RX ADMIN — TIZANIDINE 4 MG: 4 TABLET ORAL at 00:05

## 2019-05-25 RX ADMIN — INSULIN GLARGINE 55 UNITS: 100 INJECTION, SOLUTION SUBCUTANEOUS at 00:05

## 2019-05-26 LAB
ATRIAL RATE: 112 BPM
P AXIS: 44 DEGREES
PR INTERVAL: 138 MS
QRS AXIS: 52 DEGREES
QRSD INTERVAL: 74 MS
QT INTERVAL: 342 MS
QTC INTERVAL: 466 MS
T WAVE AXIS: 45 DEGREES
VENTRICULAR RATE: 112 BPM

## 2019-05-26 PROCEDURE — 93010 ELECTROCARDIOGRAM REPORT: CPT | Performed by: INTERNAL MEDICINE

## 2019-05-31 ENCOUNTER — HOSPITAL ENCOUNTER (OUTPATIENT)
Dept: NON INVASIVE DIAGNOSTICS | Facility: CLINIC | Age: 59
Discharge: HOME | End: 2019-05-31
Payer: COMMERCIAL

## 2019-05-31 ENCOUNTER — TRANSCRIBE ORDERS (OUTPATIENT)
Dept: NON INVASIVE DIAGNOSTICS | Facility: CLINIC | Age: 59
End: 2019-05-31

## 2019-05-31 DIAGNOSIS — I25.10 CORONARY ARTERY DISEASE INVOLVING NATIVE CORONARY ARTERY OF NATIVE HEART WITHOUT ANGINA PECTORIS: ICD-10-CM

## 2019-05-31 DIAGNOSIS — I25.9 CHEST PAIN DUE TO MYOCARDIAL ISCHEMIA, UNSPECIFIED ISCHEMIC CHEST PAIN TYPE: ICD-10-CM

## 2019-05-31 DIAGNOSIS — R07.9 CHEST PAIN, UNSPECIFIED TYPE: ICD-10-CM

## 2019-05-31 DIAGNOSIS — R07.9 CHEST PAIN, UNSPECIFIED TYPE: Primary | ICD-10-CM

## 2019-05-31 LAB
ARRHY DURING EX: NORMAL
ATRIAL RATE: 63 BPM
CHEST PAIN STATEMENT: NORMAL
MAX DIASTOLIC BP: 70 MMHG
MAX HEART RATE: 93 BPM
MAX PREDICTED HEART RATE: 162 BPM
MAX. SYSTOLIC BP: 134 MMHG
P AXIS: 18 DEGREES
PR INTERVAL: 140 MS
PROTOCOL NAME: NORMAL
QRS AXIS: 55 DEGREES
QRSD INTERVAL: 78 MS
QT INTERVAL: 426 MS
QTC INTERVAL: 435 MS
REASON FOR TERMINATION: NORMAL
T WAVE AXIS: 50 DEGREES
TARGET HR FORMULA: NORMAL
TEST INDICATION: NORMAL
TIME IN EXERCISE PHASE: NORMAL
VENTRICULAR RATE: 63 BPM

## 2019-05-31 PROCEDURE — 93017 CV STRESS TEST TRACING ONLY: CPT

## 2019-05-31 PROCEDURE — 78452 HT MUSCLE IMAGE SPECT MULT: CPT

## 2019-05-31 PROCEDURE — A9502 TC99M TETROFOSMIN: HCPCS

## 2019-05-31 PROCEDURE — 93005 ELECTROCARDIOGRAM TRACING: CPT

## 2019-05-31 PROCEDURE — 93010 ELECTROCARDIOGRAM REPORT: CPT | Performed by: INTERNAL MEDICINE

## 2019-05-31 RX ADMIN — REGADENOSON 0.4 MG: 0.08 INJECTION, SOLUTION INTRAVENOUS at 13:20

## 2019-06-03 PROCEDURE — 93016 CV STRESS TEST SUPVJ ONLY: CPT | Performed by: INTERNAL MEDICINE

## 2019-06-03 PROCEDURE — 78452 HT MUSCLE IMAGE SPECT MULT: CPT | Performed by: INTERNAL MEDICINE

## 2019-06-03 PROCEDURE — 93018 CV STRESS TEST I&R ONLY: CPT | Performed by: INTERNAL MEDICINE

## 2019-06-14 DIAGNOSIS — E11.9 TYPE 2 DIABETES MELLITUS WITHOUT COMPLICATION, WITHOUT LONG-TERM CURRENT USE OF INSULIN (HCC): ICD-10-CM

## 2019-06-14 RX ORDER — BLOOD SUGAR DIAGNOSTIC
STRIP MISCELLANEOUS
Qty: 200 EACH | Refills: 2 | Status: SHIPPED | OUTPATIENT
Start: 2019-06-14 | End: 2020-04-23 | Stop reason: SDUPTHER

## 2019-06-17 DIAGNOSIS — I10 ESSENTIAL HYPERTENSION: ICD-10-CM

## 2019-06-17 PROCEDURE — 4010F ACE/ARB THERAPY RXD/TAKEN: CPT | Performed by: NURSE PRACTITIONER

## 2019-06-17 RX ORDER — BENAZEPRIL HYDROCHLORIDE 20 MG/1
TABLET ORAL
Qty: 90 TABLET | Refills: 1 | Status: SHIPPED | OUTPATIENT
Start: 2019-06-17 | End: 2020-03-27 | Stop reason: SDUPTHER

## 2019-06-19 ENCOUNTER — OFFICE VISIT (OUTPATIENT)
Dept: CARDIOLOGY CLINIC | Facility: CLINIC | Age: 59
End: 2019-06-19
Payer: COMMERCIAL

## 2019-06-19 ENCOUNTER — APPOINTMENT (OUTPATIENT)
Dept: LAB | Facility: HOSPITAL | Age: 59
End: 2019-06-19
Attending: INTERNAL MEDICINE
Payer: COMMERCIAL

## 2019-06-19 VITALS
DIASTOLIC BLOOD PRESSURE: 88 MMHG | SYSTOLIC BLOOD PRESSURE: 124 MMHG | WEIGHT: 164 LBS | HEIGHT: 61 IN | HEART RATE: 90 BPM | BODY MASS INDEX: 30.96 KG/M2

## 2019-06-19 DIAGNOSIS — R07.9 CHEST PAIN, UNSPECIFIED TYPE: ICD-10-CM

## 2019-06-19 DIAGNOSIS — I25.10 CORONARY ARTERY DISEASE INVOLVING NATIVE CORONARY ARTERY OF NATIVE HEART WITHOUT ANGINA PECTORIS: Primary | ICD-10-CM

## 2019-06-19 DIAGNOSIS — R07.9 ACUTE CHEST PAIN: ICD-10-CM

## 2019-06-19 DIAGNOSIS — I25.10 CORONARY ARTERY DISEASE INVOLVING NATIVE CORONARY ARTERY OF NATIVE HEART WITHOUT ANGINA PECTORIS: ICD-10-CM

## 2019-06-19 LAB — TROPONIN I SERPL-MCNC: <0.03 NG/ML

## 2019-06-19 PROCEDURE — 84484 ASSAY OF TROPONIN QUANT: CPT

## 2019-06-19 PROCEDURE — 99214 OFFICE O/P EST MOD 30 MIN: CPT | Performed by: INTERNAL MEDICINE

## 2019-06-19 PROCEDURE — 36415 COLL VENOUS BLD VENIPUNCTURE: CPT

## 2019-06-19 RX ORDER — ISOSORBIDE MONONITRATE 30 MG/1
15 TABLET, EXTENDED RELEASE ORAL DAILY
Qty: 30 TABLET | Refills: 0 | Status: SHIPPED | OUTPATIENT
Start: 2019-06-19 | End: 2019-08-20 | Stop reason: CLARIF

## 2019-07-08 DIAGNOSIS — I21.4 NSTEMI (NON-ST ELEVATED MYOCARDIAL INFARCTION) (HCC): ICD-10-CM

## 2019-07-08 RX ORDER — CLOPIDOGREL BISULFATE 75 MG/1
75 TABLET ORAL DAILY
Qty: 90 TABLET | Refills: 2 | Status: SHIPPED | OUTPATIENT
Start: 2019-07-08 | End: 2020-02-13 | Stop reason: DRUGHIGH

## 2019-07-08 RX ORDER — ATORVASTATIN CALCIUM 40 MG/1
40 TABLET, FILM COATED ORAL
Qty: 90 TABLET | Refills: 2 | Status: SHIPPED | OUTPATIENT
Start: 2019-07-08 | End: 2020-02-18

## 2019-07-12 ENCOUNTER — HOSPITAL ENCOUNTER (EMERGENCY)
Facility: HOSPITAL | Age: 59
Discharge: HOME | End: 2019-07-12
Attending: EMERGENCY MEDICINE | Admitting: EMERGENCY MEDICINE
Payer: COMMERCIAL

## 2019-07-12 ENCOUNTER — APPOINTMENT (EMERGENCY)
Dept: RADIOLOGY | Facility: HOSPITAL | Age: 59
End: 2019-07-12
Payer: COMMERCIAL

## 2019-07-12 VITALS
HEIGHT: 61 IN | HEART RATE: 77 BPM | OXYGEN SATURATION: 97 % | WEIGHT: 165 LBS | RESPIRATION RATE: 20 BRPM | SYSTOLIC BLOOD PRESSURE: 175 MMHG | BODY MASS INDEX: 31.15 KG/M2 | DIASTOLIC BLOOD PRESSURE: 95 MMHG

## 2019-07-12 DIAGNOSIS — E78.5 HYPERLIPIDEMIA: ICD-10-CM

## 2019-07-12 DIAGNOSIS — I10 HYPERTENSION: ICD-10-CM

## 2019-07-12 DIAGNOSIS — E11.9 INSULIN DEPENDENT TYPE 2 DIABETES MELLITUS (HCC): ICD-10-CM

## 2019-07-12 DIAGNOSIS — I25.10 CORONARY ARTERY DISEASE: ICD-10-CM

## 2019-07-12 DIAGNOSIS — Z79.4 INSULIN DEPENDENT TYPE 2 DIABETES MELLITUS (HCC): ICD-10-CM

## 2019-07-12 DIAGNOSIS — R07.9 CHEST PAIN: Primary | ICD-10-CM

## 2019-07-12 LAB
ALBUMIN SERPL BCP-MCNC: 4.4 G/DL (ref 3.5–5.7)
ALP SERPL-CCNC: 109 U/L (ref 40–150)
ALT SERPL W P-5'-P-CCNC: 30 U/L (ref 7–52)
ANION GAP SERPL CALCULATED.3IONS-SCNC: 14 MMOL/L (ref 4–13)
APTT PPP: 38 SECONDS (ref 23–37)
AST SERPL W P-5'-P-CCNC: 31 U/L (ref 13–39)
BASOPHILS # BLD AUTO: 0 THOUSANDS/ΜL (ref 0–0.1)
BASOPHILS NFR BLD AUTO: 0 % (ref 0–2)
BILIRUB SERPL-MCNC: 0.5 MG/DL (ref 0.2–1)
BUN SERPL-MCNC: 13 MG/DL (ref 7–25)
CALCIUM SERPL-MCNC: 9.7 MG/DL (ref 8.6–10.5)
CHLORIDE SERPL-SCNC: 102 MMOL/L (ref 98–107)
CO2 SERPL-SCNC: 21 MMOL/L (ref 21–31)
CREAT SERPL-MCNC: 0.89 MG/DL (ref 0.6–1.2)
EOSINOPHIL # BLD AUTO: 0.1 THOUSAND/ΜL (ref 0–0.61)
EOSINOPHIL NFR BLD AUTO: 1 % (ref 0–5)
ERYTHROCYTE [DISTWIDTH] IN BLOOD BY AUTOMATED COUNT: 14.5 % (ref 11.5–14.5)
GFR SERPL CREATININE-BSD FRML MDRD: 71 ML/MIN/1.73SQ M
GLUCOSE SERPL-MCNC: 379 MG/DL (ref 65–99)
HCT VFR BLD AUTO: 41.3 % (ref 42–47)
HGB BLD-MCNC: 14.1 G/DL (ref 12–16)
INR PPP: 0.97 (ref 0.9–1.5)
LYMPHOCYTES # BLD AUTO: 4.1 THOUSANDS/ΜL (ref 0.6–4.47)
LYMPHOCYTES NFR BLD AUTO: 50 % (ref 21–51)
MCH RBC QN AUTO: 31.5 PG (ref 26–34)
MCHC RBC AUTO-ENTMCNC: 34.1 G/DL (ref 31–37)
MCV RBC AUTO: 92 FL (ref 81–99)
MONOCYTES # BLD AUTO: 0.4 THOUSAND/ΜL (ref 0.17–1.22)
MONOCYTES NFR BLD AUTO: 5 % (ref 2–12)
NEUTROPHILS # BLD AUTO: 3.6 THOUSANDS/ΜL (ref 1.4–6.5)
NEUTS SEG NFR BLD AUTO: 43 % (ref 42–75)
PLATELET # BLD AUTO: 342 THOUSANDS/UL (ref 149–390)
PMV BLD AUTO: 7.7 FL (ref 8.6–11.7)
POTASSIUM SERPL-SCNC: 4.3 MMOL/L (ref 3.5–5.5)
PROT SERPL-MCNC: 8.2 G/DL (ref 6.4–8.9)
PROTHROMBIN TIME: 11.2 SECONDS (ref 10.2–13)
RBC # BLD AUTO: 4.48 MILLION/UL (ref 3.9–5.2)
SODIUM SERPL-SCNC: 137 MMOL/L (ref 134–143)
TROPONIN I SERPL-MCNC: <0.03 NG/ML
TROPONIN I SERPL-MCNC: <0.03 NG/ML
WBC # BLD AUTO: 8.3 THOUSAND/UL (ref 4.8–10.8)

## 2019-07-12 PROCEDURE — 80053 COMPREHEN METABOLIC PANEL: CPT | Performed by: PHYSICIAN ASSISTANT

## 2019-07-12 PROCEDURE — 85610 PROTHROMBIN TIME: CPT | Performed by: PHYSICIAN ASSISTANT

## 2019-07-12 PROCEDURE — 85025 COMPLETE CBC W/AUTO DIFF WBC: CPT | Performed by: PHYSICIAN ASSISTANT

## 2019-07-12 PROCEDURE — 71046 X-RAY EXAM CHEST 2 VIEWS: CPT

## 2019-07-12 PROCEDURE — 93005 ELECTROCARDIOGRAM TRACING: CPT

## 2019-07-12 PROCEDURE — 96361 HYDRATE IV INFUSION ADD-ON: CPT

## 2019-07-12 PROCEDURE — 99285 EMERGENCY DEPT VISIT HI MDM: CPT

## 2019-07-12 PROCEDURE — 36415 COLL VENOUS BLD VENIPUNCTURE: CPT | Performed by: PHYSICIAN ASSISTANT

## 2019-07-12 PROCEDURE — 85730 THROMBOPLASTIN TIME PARTIAL: CPT | Performed by: PHYSICIAN ASSISTANT

## 2019-07-12 PROCEDURE — 96374 THER/PROPH/DIAG INJ IV PUSH: CPT

## 2019-07-12 PROCEDURE — 84484 ASSAY OF TROPONIN QUANT: CPT | Performed by: PHYSICIAN ASSISTANT

## 2019-07-12 RX ORDER — METOPROLOL TARTRATE 5 MG/5ML
2.5 INJECTION INTRAVENOUS ONCE
Status: COMPLETED | OUTPATIENT
Start: 2019-07-12 | End: 2019-07-12

## 2019-07-12 RX ORDER — METOPROLOL TARTRATE 5 MG/5ML
5 INJECTION INTRAVENOUS ONCE
Status: DISCONTINUED | OUTPATIENT
Start: 2019-07-12 | End: 2019-07-12

## 2019-07-12 RX ORDER — NITROGLYCERIN 0.4 MG/1
0.4 TABLET SUBLINGUAL ONCE
Status: COMPLETED | OUTPATIENT
Start: 2019-07-12 | End: 2019-07-12

## 2019-07-12 RX ORDER — ASPIRIN 81 MG/1
324 TABLET, CHEWABLE ORAL ONCE
Status: COMPLETED | OUTPATIENT
Start: 2019-07-12 | End: 2019-07-12

## 2019-07-12 RX ADMIN — NITROGLYCERIN 0.4 MG: 0.4 TABLET SUBLINGUAL at 16:58

## 2019-07-12 RX ADMIN — METOPROLOL TARTRATE 2.5 MG: 5 INJECTION, SOLUTION INTRAVENOUS at 19:17

## 2019-07-12 RX ADMIN — SODIUM CHLORIDE 1000 ML: 0.9 INJECTION, SOLUTION INTRAVENOUS at 16:39

## 2019-07-12 RX ADMIN — ASPIRIN 81 MG 324 MG: 81 TABLET ORAL at 16:57

## 2019-07-12 NOTE — ED PROVIDER NOTES
History  Chief Complaint   Patient presents with    Chest Pain     Patient presents to emergency room with chest pain which she states began about 1 hour prior when she was at a picnic  She describes pressure in the central chest region radiating to the midback associated with some mild shortness of breath  Patient has cardiac history 1 week ago was in [place] and sustained an MI at that time went to cardiac catheterization lab due to size of the vessel was unable to be stented  Patient additionally several months ago had an acute MI was transferred to [facility] for cardiac catheterization in the same findings of small vessel unable to be stented  Patient follows with Cardiology Dr Zavala  History provided by:  Patient and spouse   used: No    Chest Pain   Pain location:  Substernal area  Pain quality: pressure    Pain radiates to:  Upper back and L arm  Pain radiates to the back: yes    Pain severity:  Severe  Onset quality:  Sudden  Duration:  1 hour  Timing:  Constant  Progression:  Unchanged  Chronicity:  Recurrent  Context: at rest    Relieved by:  None tried  Worsened by:  Nothing tried  Ineffective treatments:  None tried  Associated symptoms: shortness of breath    Associated symptoms: no abdominal pain, no cough, no diaphoresis, no dizziness, no fatigue, no fever, no headache, no nausea, no near-syncope, no palpitations, not vomiting and no weakness    Shortness of breath:     Severity:  Mild    Onset quality:  Sudden    Duration:  1 hour    Timing:  Constant    Progression:  Unchanged  Risk factors: coronary artery disease and hypertension        No Known Allergies        Prior to Admission Medications   Prescriptions Last Dose Informant Patient Reported? Taking? ACCU-CHEK GUIDE test strip   No No   Sig: USE TO TEST SUGARS 7 TIMES DAILY   E11 9   ACCU-CHEK SOFTCLIX LANCETS lancets   No No   Sig: Test TID   BD PEN NEEDLE RACHEL U/F 32G X 4 MM MISC   No No Sig: Inject under the skin QID   amLODIPine (NORVASC) 5 mg tablet 7/12/2019 at Unknown time  No Yes   Sig: Take 1 tablet (5 mg total) by mouth daily   aspirin (ECOTRIN LOW STRENGTH) 81 mg EC tablet 7/12/2019 at Unknown time  Yes Yes   Sig: Take 81 mg by mouth daily   atorvastatin (LIPITOR) 40 mg tablet 7/11/2019 at Unknown time  No Yes   Sig: Take 1 tablet (40 mg total) by mouth daily with dinner   benazepril (LOTENSIN) 20 mg tablet Not Taking at Unknown time  No No   Sig: Take 2 tablets (40 mg total) by mouth daily   Patient not taking: Reported on 6/19/2019   benazepril (LOTENSIN) 20 mg tablet   No No   Sig: take 1 tablet by mouth daily   clopidogrel (PLAVIX) 75 mg tablet 7/12/2019 at Unknown time  No Yes   Sig: Take 1 tablet (75 mg total) by mouth daily   cyclobenzaprine (FLEXERIL) 5 mg tablet   No Yes   Sig: Take 1 tablet (5 mg total) by mouth daily   insulin glargine (LANTUS SOLOSTAR) 100 units/mL injection pen 7/11/2019 at Unknown time  No Yes   Sig: Inject 55 Units under the skin daily   insulin lispro (HumaLOG) 100 units/mL injection   Yes No   Sig: Inject 10 Units under the skin daily with dinner   isosorbide mononitrate (IMDUR) 30 mg 24 hr tablet   No No   Sig: Take 0 5 tablets (15 mg total) by mouth daily   metFORMIN (GLUCOPHAGE) 500 mg tablet   No No   Sig: Take 1 tablet (500 mg total) by mouth 2 (two) times a day with meals   metoprolol tartrate (LOPRESSOR) 25 mg tablet 7/12/2019 at Unknown time  No Yes   Sig: Take 1 tablet (25 mg total) by mouth every 12 (twelve) hours   Patient taking differently: Take 12 5 mg by mouth every 12 (twelve) hours    nitroglycerin (NITROSTAT) 0 4 mg SL tablet   No Yes   Sig: Place 1 tablet (0 4 mg total) under the tongue every 5 (five) minutes as needed for chest pain   rOPINIRole (REQUIP) 0 5 mg tablet 7/12/2019 at Unknown time  Yes Yes   Sig: Take 0 5 mg by mouth 2 (two) times a day     tiZANidine (ZANAFLEX) 4 mg tablet   Yes Yes   Sig: Take 4 mg by mouth every 8 (eight) hours as needed for muscle spasms 1/2-1   traMADol (ULTRAM) 50 mg tablet   Yes Yes   Sig: Take 50 mg by mouth every 8 (eight) hours      Facility-Administered Medications: None       Past Medical History:   Diagnosis Date    CAD (coronary artery disease)     Chronic back pain     Family history of early CAD     Hyperlipidemia     Hypertension     [facility] infarction [facility] [address] [facility]     Restless leg syndrome     [facility]        Past Surgical History:   Procedure Laterality Date    ABDOMINAL SURGERY      phill    ANKLE SURGERY      tubal    CARDIAC CATHETERIZATION  11/2018    Successful balloon angioplassty to OM1    CHOLECYSTECTOMY      FRACTURE SURGERY      right ankle    GALLBLADDER SURGERY      TUBAL LIGATION         Family History   Problem Relation Age of Onset    Breast cancer Mother     Diabetes Mother     Heart failure Mother     Heart disease Father     Stroke Father      I have reviewed and agree with the history as documented  Social History     Tobacco Use    Smoking status: Never Smoker    Smokeless tobacco: Never Used   Substance Use Topics    Alcohol use: Yes     Comment: occasionally    Drug use: No        Review of Systems   Constitutional: Negative for chills, diaphoresis, fatigue and fever  HENT: Negative for congestion, ear pain, rhinorrhea, sneezing and sore throat  Respiratory: Positive for shortness of breath  Negative for cough, wheezing and stridor  Cardiovascular: Positive for chest pain  Negative for palpitations, leg swelling and near-syncope  Gastrointestinal: Negative for abdominal distention, abdominal pain, blood in stool, constipation, diarrhea, nausea and vomiting  Genitourinary: Negative for difficulty urinating, dysuria, frequency, hematuria and urgency  Musculoskeletal: Negative for gait problem, myalgias and neck pain  Skin: Negative for rash     Neurological: Negative for dizziness, syncope, weakness, light-headedness and headaches  All other systems reviewed and are negative  Physical Exam  Physical Exam   Constitutional: She is oriented to person, place, and time  She appears well-developed and well-nourished  HENT:   Head: Normocephalic and atraumatic  Eyes: Pupils are equal, round, and reactive to light  EOM are normal    Neck: Normal range of motion  Neck supple  No tracheal deviation present  Cardiovascular: Regular rhythm, normal heart sounds and intact distal pulses  Tachycardia present  Pulmonary/Chest: Effort normal and breath sounds normal  No respiratory distress  She has no wheezes  She has no rales  Abdominal: Soft  Bowel sounds are normal  She exhibits no distension and no mass  There is no tenderness  Musculoskeletal: Normal range of motion  She exhibits no edema or tenderness  Neurological: She is alert and oriented to person, place, and time  Skin: Skin is warm and dry  No rash noted  No erythema  Nursing note and vitals reviewed        Vital Signs  ED Triage Vitals   Temp Pulse Respirations Blood Pressure SpO2   -- 07/12/19 1621 07/12/19 1621 07/12/19 1625 07/12/19 1625    (!) 122 16 141/97 96 %      Temp src Heart Rate Source Patient Position - Orthostatic VS BP Location FiO2 (%)   -- 07/12/19 1831 -- 07/12/19 1831 --    Monitor  Right arm       Pain Score       07/12/19 1621       8           Vitals:    07/12/19 2000 07/12/19 2015 07/12/19 2030 07/12/19 2100   BP: 142/83  162/83 (!) 175/95   Pulse: 69 67 74 76         Visual Acuity      ED Medications  Medications   sodium chloride 0 9 % bolus 1,000 mL (0 mL Intravenous Stopped 7/12/19 1911)   aspirin chewable tablet 324 mg (324 mg Oral Given 7/12/19 1657)   nitroglycerin (NITROSTAT) SL tablet 0 4 mg (0 4 mg Sublingual Given 7/12/19 1658)   metoprolol (LOPRESSOR) injection 2 5 mg (2 5 mg Intravenous Given 7/12/19 1917)       Diagnostic Studies  Results Reviewed     Procedure Component Value Units Date/Time    Troponin I [[id number]]  (Normal) Collected:  07/12/19 2022    Lab Status:  Final result Specimen:  Blood from Arm, Right Updated:  07/12/19 2055     Troponin I <0 03 ng/mL     Troponin I [[id number]]  (Normal) Collected:  07/12/19 1636    Lab Status:  Final result Specimen:  Blood from Arm, Right Updated:  07/12/19 1709     Troponin I <0 03 ng/mL     Comprehensive metabolic panel [[id number]]  (Abnormal) Collected:  07/12/19 1636    Lab Status:  Final result Specimen:  Blood from Arm, Right Updated:  07/12/19 1708     Sodium 137 mmol/L      Potassium 4 3 mmol/L      Chloride 102 mmol/L      CO2 21 mmol/L      ANION GAP 14 mmol/L      BUN 13 mg/dL      Creatinine 0 89 mg/dL      Glucose 379 mg/dL      Calcium 9 7 mg/dL      AST 31 U/L      ALT 30 U/L      Alkaline Phosphatase 109 U/L      Total Protein 8 2 g/dL      Albumin 4 4 g/dL      Total Bilirubin 0 50 mg/dL      eGFR 71 ml/min/1 73sq m     Narrative:       [facility] guidelines for Chronic Kidney Disease (CKD):     Stage 1 with normal or high GFR (GFR > 90 mL/min/1 73 square meters)    Stage 2 Mild CKD (GFR = 60-89 mL/min/1 73 square meters)    Stage 3A Moderate CKD (GFR = 45-59 mL/min/1 73 square meters)    Stage 3B Moderate CKD (GFR = 30-44 mL/min/1 73 square meters)    Stage 4 Severe CKD (GFR = 15-29 mL/min/1 73 square meters)    Stage 5 End Stage CKD (GFR <15 mL/min/1 73 square meters)  Note: GFR calculation is accurate only with a steady state creatinine    Protime-INR [[id number]]  (Normal) Collected:  07/12/19 1636    Lab Status:  Final result Specimen:  Blood from Arm, Right Updated:  07/12/19 1702     Protime 11 2 seconds      INR 0 97    APTT [[id number]]  (Abnormal) Collected:  07/12/19 1636    Lab Status:  Final result Specimen:  Blood from Arm, Right Updated:  07/12/19 1702     PTT 38 seconds     CBC and differential [[id number]]  (Abnormal) Collected:  07/12/19 1636    Lab Status:  Final result Specimen:  Blood from Arm, Right Updated:  07/12/19 1649     WBC 8 30 Thousand/uL      RBC 4 48 Million/uL      Hemoglobin 14 1 g/dL      Hematocrit 41 3 %      MCV 92 fL      MCH 31 5 pg      MCHC 34 1 g/dL      RDW 14 5 %      MPV 7 7 fL      Platelets 226 Thousands/uL      Neutrophils Relative 43 %      Lymphocytes Relative 50 %      Monocytes Relative 5 %      Eosinophils Relative 1 %      Basophils Relative 0 %      Neutrophils Absolute 3 60 Thousands/µL      Lymphocytes Absolute 4 10 Thousands/µL      Monocytes Absolute 0 40 Thousand/µL      Eosinophils Absolute 0 10 Thousand/µL      Basophils Absolute 0 00 Thousands/µL                  XR chest 2 views    (Results Pending)              Procedures  ECG 12 Lead Documentation Only  Date/Time: 7/12/2019 6:46 PM  Performed by: Maggie Baker PA-C  Authorized by: Maggie Baker PA-C     Indications / Diagnosis:  Chest pain  ECG reviewed by me, the ED Provider: yes (& Dr Aston Abbott)    Patient location:  ED  Interpretation:     Interpretation: abnormal    Rate:     ECG rate:  120    ECG rate assessment: tachycardic    Rhythm:     Rhythm: sinus tachycardia    QRS:     QRS axis:  Normal  Conduction:     Conduction: normal    ST segments:     ST segments:  Normal  T waves:     T waves: normal      CriticalCare Time  Performed by: Maggie Baker PA-C  Authorized by: Maggie Baker PA-C     Critical care provider statement:     Critical care time (minutes):  90    Critical care start time:  7/12/2019 4:21 PM    Critical care time was exclusive of:  Separately billable procedures and treating other patients    Critical care was necessary to treat or prevent imminent or life-threatening deterioration of the following conditions:  Cardiac failure, respiratory failure and circulatory failure    Critical care was time spent personally by me on the following activities:  Obtaining history from patient or surrogate, development of treatment plan with patient or surrogate, discussions with consultants, evaluation of patient's response to treatment, examination of patient, ordering and performing treatments and interventions, ordering and review of laboratory studies, re-evaluation of patient's condition, ordering and review of radiographic studies and review of old charts    I assumed direction of critical care for this patient from another provider in my specialty: no    Comments:      ACUTE RETURN OF CHRONIC CHEST PAIN WITH CARDIAC HISTORY, PATIENT REQUIRED ACS WORKUP AND CLOSE MONITORING ADMINISTRATION OF IV SUBLINGUAL NITRO  MG BABY ASPIRIN  MULTIPLE RE-EVALUATIONS OF PATIENT AND CONSULT CARDIOLOGY  ULTIMATELY PATIENT HAS RECURRENT ANGINA  NEGATIVE TROPONIN BLOOD DRAWS X2 IN THIS ER EVALUATION STABLE FOR DISCHARGE HOME WITH CLOSE FOLLOW-UP RETURN TO ER FOR RETURN OF CHEST PAIN  ED Course  ED Course as of Jul 12 2132 Fri Jul 12, 2019   1745 PATIENT HAD IMPROVEMENT IN CHEST PAIN DOWN TO 1/10 FOLLOWING ADMINISTRATION OF ASPIRIN AND SL NITRO  1847 Call placed to Dr Leslie Monahan of Cardiology on call, recommended challenging patient with IV metoprolol 5 mg x3 Q 5 minutes apart to determine if her chest pain will resolve  If chest pain resolved patient he advised to be stable for discharge home otherwise patient can be observation overnight  Jamila Terrynat Patient's heart rate is currently in the 70s in her chest pain is of 3 or 4/10 currently  Will challenge with 2 5 mg of IV metoprolol due to the heart rate in the 70s and rechallenge with 2 5 mg again  if her heart rate holds steady  2125 PATIENT HAD NEGATIVE TROPONIN X2, HAS INTERMITTENT CHEST PAIN CHRONICALLY, MOSTLY RESOLVED AT THIS TIME  DESCRIBES HER PAIN AS A 1/10  PATIENT WAS GIVEN OPTION TO STAY FOR FURTHER OBSERVATION OR BE DISCHARGED HOME WITH CLOSE MONITORING RETURN TO ER FOR RETURN OF CHEST PAIN  PATIENT AND  OPTED TO BE DISCHARGED HOME AND RETURN TO ER IF SYMPTOMS WORSEN    PATIENT FOLLOWS WITH CARDIOLOGY CLOSELY ON AN OUTPATIENT BASIS  ALSO ADVISED PATIENT TO TAKE FULL 25 MG DOSE OF METOPROLOL TWICE DAILY AND SHE HAS BEEN ONLY TAKING 12 5 MG TWICE DAILY DUE TO HER BLOOD PRESSURE DROPPING  ADVISED HER TO CHECK HER BLOOD PRESSURE AND IF SHE DEVELOPS SYMPTOMS OF LIGHTHEADED DIZZINESS OR HAS LOW BLOOD PRESSURE TO BACK OFF AGAIN ONLY HALF A TABLET  ADVISED PATIENT AND  CLOSE FOLLOW-UP WITH CARDIOLOGY                                    MDM  Number of Diagnoses or Management Options  Chest pain: established and worsening  Coronary artery disease: established and worsening  Hyperlipidemia:   Hypertension:   Insulin dependent type 2 diabetes mellitus ([place] [address] ):      Amount and/or Complexity of Data Reviewed  Clinical lab tests: ordered and reviewed  Tests in the radiology section of CPT®: ordered and reviewed  Independent visualization of images, tracings, or specimens: yes    Risk of Complications, Morbidity, and/or Mortality  Presenting problems: high  Diagnostic procedures: high  Management options: high    Patient Progress  Patient progress: stable      Disposition  Final diagnoses:   Chest pain   Insulin dependent type 2 diabetes mellitus ([place] [address] )   Hypertension   Hyperlipidemia   Coronary artery disease     Time reflects when diagnosis was documented in both MDM as applicable and the Disposition within this note     Time User Action Codes Description Comment    7/12/2019  9:22 PM John Opoka Add [I20 8] Angina at rest [facility])     7/12/2019  9:22 PM Real Alvarez [I20 8] Angina at rest [facility])     7/12/2019  9:22 PM John Opoka Add [R07 9] Chest pain     7/12/2019  9:23 PM Radha LI Add [E11 9,  Z79 4] Insulin dependent type 2 diabetes mellitus ([place] [address] )     7/12/2019  9:23 PM John Opoka Add [I10] Hypertension     7/12/2019  9:23 PM John Opoka Add [E78 5] Hyperlipidemia     7/12/2019  9:23 PM John Opoka Add [I25 10] Coronary artery disease       ED Disposition     ED Disposition Condition Date/Time Comment    Discharge Stable Fri Jul 12, 2019  9:22 PM Magdaleno Clark discharge to home/self care              Follow-up Information     Follow up With Specialties Details Why Contact Info    Redd Cristobal DO Family Medicine Schedule an appointment as soon as possible for a visit in 1 week  [facility] [address]      Angie Bee DO Cardiology Schedule an appointment as soon as possible for a visit in 1 week If symptoms worsen RETURN TO ER  [address] [facility]  [phone]            Discharge Medication List as of 7/12/2019  9:24 PM      CONTINUE these medications which have NOT CHANGED    Details   amLODIPine (NORVASC) 5 mg tablet Take 1 tablet (5 mg total) by mouth daily, Starting Tue 11/20/2018, Normal      aspirin (ECOTRIN LOW STRENGTH) 81 mg EC tablet Take 81 mg by mouth daily, Historical Med      atorvastatin (LIPITOR) 40 mg tablet Take 1 tablet (40 mg total) by mouth daily with dinner, Starting Mon 7/8/2019, Normal      clopidogrel (PLAVIX) 75 mg tablet Take 1 tablet (75 mg total) by mouth daily, Starting Mon 7/8/2019, Normal      cyclobenzaprine (FLEXERIL) 5 mg tablet Take 1 tablet (5 mg total) by mouth daily, Starting Fri 5/24/2019, Normal      insulin glargine (LANTUS SOLOSTAR) 100 units/mL injection pen Inject 55 Units under the skin daily, Starting Mon 4/1/2019, Normal      metoprolol tartrate (LOPRESSOR) 25 mg tablet Take 1 tablet (25 mg total) by mouth every 12 (twelve) hours, Starting Fri 3/22/2019, Normal      nitroglycerin (NITROSTAT) 0 4 mg SL tablet Place 1 tablet (0 4 mg total) under the tongue every 5 (five) minutes as needed for chest pain, Starting Fri 3/22/2019, Normal      rOPINIRole (REQUIP) 0 5 mg tablet Take 0 5 mg by mouth 2 (two) times a day  , Historical Med      tiZANidine (ZANAFLEX) 4 mg tablet Take 4 mg by mouth every 8 (eight) hours as needed for muscle spasms 1/2-1, Historical Med      traMADol (ULTRAM) 50 mg tablet Take 50 mg by mouth every 8 (eight) hours, Historical Med      ACCU-CHEK GUIDE test strip USE TO TEST SUGARS 7 TIMES DAILY  E11 9, Normal      ACCU-CHEK SOFTCLIX LANCETS lancets Test TID, Normal      BD PEN NEEDLE RACHEL U/F 32G X 4 MM MISC Inject under the skin QID, Normal      !! benazepril (LOTENSIN) 20 mg tablet Take 2 tablets (40 mg total) by mouth daily, Starting Thu 11/29/2018, No Print      !! benazepril (LOTENSIN) 20 mg tablet take 1 tablet by mouth daily, Normal      insulin lispro (HumaLOG) 100 units/mL injection Inject 10 Units under the skin daily with dinner, Historical Med      isosorbide mononitrate (IMDUR) 30 mg 24 hr tablet Take 0 5 tablets (15 mg total) by mouth daily, Starting Wed 6/19/2019, Normal      metFORMIN (GLUCOPHAGE) 500 mg tablet Take 1 tablet (500 mg total) by mouth 2 (two) times a day with meals, Starting Thu 11/29/2018, No Print       !! - Potential duplicate medications found  Please discuss with provider  No discharge procedures on file      ED Provider  Electronically Signed by           Olga Stover PA-C  07/12/19 2076

## 2019-07-12 NOTE — ED NOTES
Lopressor order changed to 2 5mg Iv, i administered 2 5mg Iv, unable to see documentation on my computer yet Clare Allenos can see it on her end       Greg Serrato RN  07/12/19 1943

## 2019-07-13 LAB
ATRIAL RATE: 120 BPM
P AXIS: 39 DEGREES
PR INTERVAL: 146 MS
QRS AXIS: 24 DEGREES
QRSD INTERVAL: 74 MS
QT INTERVAL: 326 MS
QTC INTERVAL: 460 MS
T WAVE AXIS: 33 DEGREES
VENTRICULAR RATE: 120 BPM

## 2019-07-13 PROCEDURE — 93010 ELECTROCARDIOGRAM REPORT: CPT | Performed by: INTERNAL MEDICINE

## 2019-08-20 ENCOUNTER — OFFICE VISIT (OUTPATIENT)
Dept: CARDIOLOGY CLINIC | Facility: CLINIC | Age: 59
End: 2019-08-20
Payer: COMMERCIAL

## 2019-08-20 VITALS
WEIGHT: 158 LBS | DIASTOLIC BLOOD PRESSURE: 90 MMHG | HEART RATE: 84 BPM | SYSTOLIC BLOOD PRESSURE: 132 MMHG | BODY MASS INDEX: 29.83 KG/M2 | HEIGHT: 61 IN

## 2019-08-20 DIAGNOSIS — I10 ESSENTIAL HYPERTENSION: ICD-10-CM

## 2019-08-20 DIAGNOSIS — I25.10 CORONARY ARTERY DISEASE INVOLVING NATIVE CORONARY ARTERY OF NATIVE HEART WITHOUT ANGINA PECTORIS: Primary | ICD-10-CM

## 2019-08-20 DIAGNOSIS — E78.2 MIXED HYPERLIPIDEMIA: ICD-10-CM

## 2019-08-20 PROCEDURE — 99214 OFFICE O/P EST MOD 30 MIN: CPT | Performed by: INTERNAL MEDICINE

## 2019-08-20 NOTE — PROGRESS NOTES
Patient ID: Lolly Butt is a 61 y o  female  Plan:      Mixed hyperlipidemia  Tolerating statin therapy    Coronary artery disease involving native coronary artery of native heart    Currently no symptoms  Essential hypertension    Adequately controlled  Follow up Plan:    One year EKG and follow-up visit  HPI:   Patient comes in for follow-up regarding CAD  Since the last visit she was in the emergency room once  Since that ER visit there have been no recurrences  She is reasonably active  She stopped all nitrates because of headaches  Most recent or relevant cardiac/vascular testing:      Coronary angiography 11/28/2018:  Normal LAD and right coronary artery  90% obtuse marginal 1 stenosis in the midvessel treated with plain old balloon angioplasty  Too small for stent  First posterolateral artery was 100% stenosis and left untreated  Past Surgical History:   Procedure Laterality Date    ABDOMINAL SURGERY      phill    ANKLE SURGERY      tubal    CARDIAC CATHETERIZATION  11/2018    Successful balloon angioplassty to OM1    CHOLECYSTECTOMY      FRACTURE SURGERY      right ankle    GALLBLADDER SURGERY      TUBAL LIGATION       CMP:   Lab Results   Component Value Date     06/26/2014    K 4 3 07/12/2019    K 4 5 06/26/2014     07/12/2019     06/26/2014    CO2 21 07/12/2019    CO2 26 4 06/26/2014    BUN 13 07/12/2019    BUN 13 06/26/2014    CREATININE 0 89 07/12/2019    CREATININE 0 81 06/26/2014    GLUCOSE 121 06/26/2014    EGFR 71 07/12/2019       Lipid Profile:   Lab Results   Component Value Date    CHOL 235 06/26/2014    TRIG 238 (H) 03/11/2019    TRIG 358 06/26/2014    HDL 44 03/11/2019    HDL 31 06/26/2014         Review of Systems   10  point ROS  was otherwise non pertinent or negative except as per HPI or as below     Gait:   Normal         Objective:     /90   Pulse 84   Ht 5' 1" (1 549 m)   Wt 71 7 kg (158 lb)   BMI 29 85 kg/m² PHYSICAL EXAM:    General:  Normal appearance in no distress  Eyes:  Anicteric  Oral mucosa:  Moist   Neck:  No JVD  Carotid upstrokes are brisk without bruits  No masses  Chest:  Clear to auscultation and percussion  Cardiac:  Normal PMI  Normal S1 and S2  No murmur gallop or rub  Abdomen:  Soft and nontender  No palpable organomegaly or aortic enlargement  Extremities:  No peripheral edema  Musculoskeletal:  Symmetric  Vascular:  Femoral pulses are brisk without bruits  Popliteal pulses are intact bilaterally  Pedal pulses are intact  Neuro:  Grossly symmetric  Psych:  Alert and oriented x3  Current Outpatient Medications:     ACCU-CHEK GUIDE test strip, USE TO TEST SUGARS 7 TIMES DAILY   E11 9, Disp: 200 each, Rfl: 2    ACCU-CHEK SOFTCLIX LANCETS lancets, Test TID, Disp: 300 each, Rfl: 3    amLODIPine (NORVASC) 5 mg tablet, Take 1 tablet (5 mg total) by mouth daily, Disp: 90 tablet, Rfl: 1    aspirin (ECOTRIN LOW STRENGTH) 81 mg EC tablet, Take 81 mg by mouth daily, Disp: , Rfl:     atorvastatin (LIPITOR) 40 mg tablet, Take 1 tablet (40 mg total) by mouth daily with dinner, Disp: 90 tablet, Rfl: 2    BD PEN NEEDLE RACHEL U/F 32G X 4 MM MISC, Inject under the skin QID, Disp: 100 each, Rfl: 3    benazepril (LOTENSIN) 20 mg tablet, Take 2 tablets (40 mg total) by mouth daily, Disp: , Rfl:     benazepril (LOTENSIN) 20 mg tablet, take 1 tablet by mouth daily, Disp: 90 tablet, Rfl: 1    clopidogrel (PLAVIX) 75 mg tablet, Take 1 tablet (75 mg total) by mouth daily, Disp: 90 tablet, Rfl: 2    cyclobenzaprine (FLEXERIL) 5 mg tablet, Take 1 tablet (5 mg total) by mouth daily, Disp: 30 tablet, Rfl: 1    insulin glargine (LANTUS SOLOSTAR) 100 units/mL injection pen, Inject 55 Units under the skin daily, Disp: 5 pen, Rfl: 3    insulin lispro (HumaLOG) 100 units/mL injection, Inject 10 Units under the skin daily with dinner, Disp: , Rfl:     metFORMIN (GLUCOPHAGE) 500 mg tablet, Take 1 tablet (500 mg total) by mouth 2 (two) times a day with meals, Disp: 180 tablet, Rfl: 0    metoprolol tartrate (LOPRESSOR) 25 mg tablet, Take 1 tablet (25 mg total) by mouth every 12 (twelve) hours, Disp: 180 tablet, Rfl: 3    nitroglycerin (NITROSTAT) 0 4 mg SL tablet, Place 1 tablet (0 4 mg total) under the tongue every 5 (five) minutes as needed for chest pain, Disp: 100 tablet, Rfl: 3    rOPINIRole (REQUIP) 0 5 mg tablet, Take 0 5 mg by mouth 2 (two) times a day  , Disp: , Rfl:     tiZANidine (ZANAFLEX) 4 mg tablet, Take 4 mg by mouth every 8 (eight) hours as needed for muscle spasms 1/2-1, Disp: , Rfl:     traMADol (ULTRAM) 50 mg tablet, Take 50 mg by mouth every 8 (eight) hours, Disp: , Rfl:   No Known Allergies  Past Medical History:   Diagnosis Date    CAD (coronary artery disease)     Chronic back pain     Family history of early CAD     Hyperlipidemia     Hypertension     [facility] [address] [facility]     Restless leg syndrome     Type 2 diabetes mellitus ([facility])            Social History     Tobacco Use   Smoking Status Never Smoker   Smokeless Tobacco Never Used

## 2019-10-04 ENCOUNTER — APPOINTMENT (OUTPATIENT)
Dept: LAB | Facility: CLINIC | Age: 59
End: 2019-10-04
Payer: COMMERCIAL

## 2019-10-04 DIAGNOSIS — E11.9 TYPE 2 DIABETES MELLITUS WITHOUT COMPLICATION, WITHOUT LONG-TERM CURRENT USE OF INSULIN (HCC): ICD-10-CM

## 2019-10-04 DIAGNOSIS — E11.9 TYPE 2 DIABETES MELLITUS WITHOUT COMPLICATION, WITHOUT LONG-TERM CURRENT USE OF INSULIN (HCC): Primary | ICD-10-CM

## 2019-10-04 LAB
EST. AVERAGE GLUCOSE BLD GHB EST-MCNC: 286 MG/DL
HBA1C MFR BLD: 11.6 % (ref 4.2–6.3)

## 2019-10-04 PROCEDURE — 84681 ASSAY OF C-PEPTIDE: CPT

## 2019-10-04 PROCEDURE — 83036 HEMOGLOBIN GLYCOSYLATED A1C: CPT

## 2019-10-04 PROCEDURE — 36415 COLL VENOUS BLD VENIPUNCTURE: CPT

## 2019-10-05 LAB — C PEPTIDE SERPL-MCNC: 4.3 NG/ML (ref 1.1–4.4)

## 2019-10-07 ENCOUNTER — OFFICE VISIT (OUTPATIENT)
Dept: FAMILY MEDICINE CLINIC | Facility: CLINIC | Age: 59
End: 2019-10-07
Payer: COMMERCIAL

## 2019-10-07 ENCOUNTER — TRANSCRIBE ORDERS (OUTPATIENT)
Dept: LAB | Facility: CLINIC | Age: 59
End: 2019-10-07

## 2019-10-07 ENCOUNTER — APPOINTMENT (OUTPATIENT)
Dept: LAB | Facility: CLINIC | Age: 59
End: 2019-10-07
Payer: COMMERCIAL

## 2019-10-07 VITALS
DIASTOLIC BLOOD PRESSURE: 80 MMHG | WEIGHT: 158 LBS | HEIGHT: 61 IN | SYSTOLIC BLOOD PRESSURE: 130 MMHG | HEART RATE: 84 BPM | BODY MASS INDEX: 29.83 KG/M2 | TEMPERATURE: 97.9 F

## 2019-10-07 DIAGNOSIS — I10 ESSENTIAL HYPERTENSION: ICD-10-CM

## 2019-10-07 DIAGNOSIS — E11.9 TYPE 2 DIABETES MELLITUS WITHOUT COMPLICATION, WITHOUT LONG-TERM CURRENT USE OF INSULIN (HCC): ICD-10-CM

## 2019-10-07 DIAGNOSIS — G47.00 INSOMNIA, UNSPECIFIED TYPE: ICD-10-CM

## 2019-10-07 DIAGNOSIS — G89.29 CHRONIC PAIN OF RIGHT ANKLE: ICD-10-CM

## 2019-10-07 DIAGNOSIS — Z13.31 NEGATIVE DEPRESSION SCREENING: ICD-10-CM

## 2019-10-07 DIAGNOSIS — Z23 ENCOUNTER FOR IMMUNIZATION: ICD-10-CM

## 2019-10-07 DIAGNOSIS — M25.571 CHRONIC PAIN OF RIGHT ANKLE: ICD-10-CM

## 2019-10-07 DIAGNOSIS — E11.9 TYPE 2 DIABETES MELLITUS WITHOUT COMPLICATION, WITHOUT LONG-TERM CURRENT USE OF INSULIN (HCC): Primary | ICD-10-CM

## 2019-10-07 LAB — GLUCOSE P FAST SERPL-MCNC: 169 MG/DL (ref 65–99)

## 2019-10-07 PROCEDURE — 99214 OFFICE O/P EST MOD 30 MIN: CPT | Performed by: FAMILY MEDICINE

## 2019-10-07 PROCEDURE — 90471 IMMUNIZATION ADMIN: CPT

## 2019-10-07 PROCEDURE — 82947 ASSAY GLUCOSE BLOOD QUANT: CPT

## 2019-10-07 PROCEDURE — 3079F DIAST BP 80-89 MM HG: CPT | Performed by: FAMILY MEDICINE

## 2019-10-07 PROCEDURE — 3008F BODY MASS INDEX DOCD: CPT | Performed by: FAMILY MEDICINE

## 2019-10-07 PROCEDURE — 36415 COLL VENOUS BLD VENIPUNCTURE: CPT

## 2019-10-07 PROCEDURE — 3075F SYST BP GE 130 - 139MM HG: CPT | Performed by: FAMILY MEDICINE

## 2019-10-07 PROCEDURE — 90715 TDAP VACCINE 7 YRS/> IM: CPT

## 2019-10-07 RX ORDER — TEMAZEPAM 15 MG/1
15 CAPSULE ORAL
Qty: 30 CAPSULE | Refills: 0 | Status: SHIPPED | OUTPATIENT
Start: 2019-10-07 | End: 2019-11-04 | Stop reason: SDUPTHER

## 2019-10-07 NOTE — PROGRESS NOTES
Assessment/Plan:    No problem-specific Assessment & Plan notes found for this encounter  Diagnoses and all orders for this visit:    Type 2 diabetes mellitus without complication, without long-term current use of insulin ([facility] )    Encounter for immunization  -     TDAP VACCINE GREATER THAN OR EQUAL TO 8YO IM    Negative depression screening    Essential hypertension    Other orders  -     Cancel: POCT hemoglobin A1c  -     Cancel: TDAP VACCINE GREATER THAN OR EQUAL TO 8YO IM  -     Cancel: Zoster Vaccine Recombinant IM  -     Cancel: PNEUMOCOCCAL CONJUGATE VACCINE 13-VALENT GREATER THAN 6 MONTHS          PHQ-9 Depression Screening    PHQ-9:    Frequency of the following problems over the past two weeks:       Little interest or pleasure in doing things:  0 - not at all  Feeling down, depressed, or hopeless:  0 - not at all  PHQ-2 Score:  0            Subjective:      Patient ID: Tereza Magana is a 61 y o  female  Bps at home are 130 over 80    Diabetes   She presents for her follow-up diabetic visit  She has type 2 diabetes mellitus  Her disease course has been stable  There are no hypoglycemic associated symptoms  There are no diabetic associated symptoms  Pertinent negatives for diabetes include no chest pain  There are no hypoglycemic complications  Symptoms are stable  There are no diabetic complications  Risk factors for coronary artery disease include obesity and sedentary lifestyle  Current diabetic treatment includes insulin injections and oral agent (monotherapy)  She is compliant with treatment most of the time  Her weight is stable  She is following a diabetic diet  Her overall blood glucose range is 180-200 mg/dl  An ACE inhibitor/angiotensin II receptor blocker is being taken         The following portions of the patient's history were reviewed and updated as appropriate: allergies, current medications, past family history, past medical history, past social history, past surgical history and problem list     Review of Systems   Eyes: Negative for visual disturbance  Cardiovascular: Negative for chest pain and palpitations  Gastrointestinal: Negative for abdominal pain, nausea and vomiting  Genitourinary: Negative for frequency  Skin: Negative for wound  Neurological: Negative for numbness  Objective:    /80   Pulse 84   Temp 97 9 °F (36 6 °C)   Ht 5' 1" (1 549 m)   Wt 71 7 kg (158 lb)   BMI 29 85 kg/m²      Physical Exam   Constitutional: She is oriented to person, place, and time  She appears well-developed and well-nourished  No distress  HENT:   Head: Normocephalic and atraumatic  Eyes: Pupils are equal, round, and reactive to light  Conjunctivae and EOM are normal  No scleral icterus  Neck: Normal range of motion  Neck supple  Cardiovascular: Normal rate, regular rhythm and normal heart sounds  No murmur heard  Pulmonary/Chest: Effort normal and breath sounds normal  No respiratory distress  She has no wheezes  She has no rales  Abdominal: Soft  Bowel sounds are normal  She exhibits no distension and no mass  There is no tenderness  There is no rebound and no guarding  Musculoskeletal: Normal range of motion  She exhibits no edema or deformity  Lymphadenopathy:     She has no cervical adenopathy  Neurological: She is alert and oriented to person, place, and time  Skin: Skin is warm and dry  She is not diaphoretic  Psychiatric: She has a normal mood and affect  Her behavior is normal  Judgment and thought content normal    Nursing note and vitals reviewed

## 2019-10-15 DIAGNOSIS — G25.81 RESTLESS LEG SYNDROME: Primary | Chronic | ICD-10-CM

## 2019-10-16 RX ORDER — ROPINIROLE 1 MG/1
1 TABLET, FILM COATED ORAL DAILY
Qty: 90 TABLET | Refills: 1 | Status: SHIPPED | OUTPATIENT
Start: 2019-10-16

## 2019-11-04 DIAGNOSIS — G47.00 INSOMNIA, UNSPECIFIED TYPE: ICD-10-CM

## 2019-11-04 DIAGNOSIS — E11.9 TYPE 2 DIABETES MELLITUS WITHOUT COMPLICATION, WITHOUT LONG-TERM CURRENT USE OF INSULIN (HCC): ICD-10-CM

## 2019-11-04 DIAGNOSIS — Z79.4 TYPE 2 DIABETES MELLITUS WITH COMPLICATION, WITH LONG-TERM CURRENT USE OF INSULIN (HCC): ICD-10-CM

## 2019-11-04 DIAGNOSIS — E11.8 TYPE 2 DIABETES MELLITUS WITH COMPLICATION, WITH LONG-TERM CURRENT USE OF INSULIN (HCC): ICD-10-CM

## 2019-11-05 RX ORDER — LANCETS
EACH MISCELLANEOUS
Qty: 100 EACH | Refills: 2 | Status: SHIPPED | OUTPATIENT
Start: 2019-11-05 | End: 2021-11-03 | Stop reason: SDUPTHER

## 2019-11-05 RX ORDER — BLOOD SUGAR DIAGNOSTIC
STRIP MISCELLANEOUS
Qty: 200 EACH | Refills: 4 | Status: SHIPPED | OUTPATIENT
Start: 2019-11-05 | End: 2020-02-24 | Stop reason: SDUPTHER

## 2019-11-05 RX ORDER — TEMAZEPAM 15 MG/1
15 CAPSULE ORAL
Qty: 30 CAPSULE | Refills: 0 | Status: SHIPPED | OUTPATIENT
Start: 2019-11-05 | End: 2019-12-05 | Stop reason: SDUPTHER

## 2019-11-07 ENCOUNTER — APPOINTMENT (OUTPATIENT)
Dept: RADIOLOGY | Facility: CLINIC | Age: 59
End: 2019-11-07
Payer: COMMERCIAL

## 2019-11-07 ENCOUNTER — APPOINTMENT (OUTPATIENT)
Dept: LAB | Facility: CLINIC | Age: 59
End: 2019-11-07
Payer: COMMERCIAL

## 2019-11-07 DIAGNOSIS — G89.29 CHRONIC PAIN OF RIGHT ANKLE: ICD-10-CM

## 2019-11-07 DIAGNOSIS — M25.571 CHRONIC PAIN OF RIGHT ANKLE: ICD-10-CM

## 2019-11-07 DIAGNOSIS — E11.9 TYPE 2 DIABETES MELLITUS WITHOUT COMPLICATION, WITHOUT LONG-TERM CURRENT USE OF INSULIN (HCC): ICD-10-CM

## 2019-11-07 PROCEDURE — 36415 COLL VENOUS BLD VENIPUNCTURE: CPT

## 2019-11-07 PROCEDURE — 84681 ASSAY OF C-PEPTIDE: CPT

## 2019-11-07 PROCEDURE — 73610 X-RAY EXAM OF ANKLE: CPT

## 2019-11-08 ENCOUNTER — OFFICE VISIT (OUTPATIENT)
Dept: FAMILY MEDICINE CLINIC | Facility: CLINIC | Age: 59
End: 2019-11-08
Payer: COMMERCIAL

## 2019-11-08 VITALS
BODY MASS INDEX: 29.98 KG/M2 | SYSTOLIC BLOOD PRESSURE: 138 MMHG | OXYGEN SATURATION: 97 % | DIASTOLIC BLOOD PRESSURE: 92 MMHG | HEART RATE: 90 BPM | HEIGHT: 61 IN | WEIGHT: 158.8 LBS | TEMPERATURE: 97.1 F

## 2019-11-08 DIAGNOSIS — Z23 ENCOUNTER FOR IMMUNIZATION: ICD-10-CM

## 2019-11-08 DIAGNOSIS — F32.A DEPRESSION, UNSPECIFIED DEPRESSION TYPE: ICD-10-CM

## 2019-11-08 DIAGNOSIS — M25.571 RIGHT ANKLE PAIN, UNSPECIFIED CHRONICITY: Primary | ICD-10-CM

## 2019-11-08 LAB — C PEPTIDE SERPL-MCNC: 2.7 NG/ML (ref 1.1–4.4)

## 2019-11-08 PROCEDURE — 99213 OFFICE O/P EST LOW 20 MIN: CPT | Performed by: FAMILY MEDICINE

## 2019-11-08 PROCEDURE — 1036F TOBACCO NON-USER: CPT | Performed by: FAMILY MEDICINE

## 2019-11-08 RX ORDER — SERTRALINE HYDROCHLORIDE 25 MG/1
25 TABLET, FILM COATED ORAL DAILY
Qty: 30 TABLET | Refills: 6 | Status: SHIPPED | OUTPATIENT
Start: 2019-11-08 | End: 2019-12-13

## 2019-11-08 NOTE — PROGRESS NOTES
Assessment/Plan:    No problem-specific Assessment & Plan notes found for this encounter  Diagnoses and all orders for this visit:    Right ankle pain, unspecified chronicity    Encounter for immunization    Depression, unspecified depression type  -     Ambulatory referral to Orthopedic Surgery; Future  -     sertraline (ZOLOFT) 25 mg tablet; Take 1 tablet (25 mg total) by mouth daily    Other orders  -     Cancel: Zoster Vaccine Recombinant IM          PHQ-9 Depression Screening    PHQ-9:    Frequency of the following problems over the past two weeks:       Little interest or pleasure in doing things:  1 - several days  Feeling down, depressed, or hopeless:  2 - more than half the days  Trouble falling or staying asleep, or sleeping too much:  2 - more than half the days  Feeling tired or having little energy:  2 - more than half the days  Poor appetite or overeatin - several days  Feeling bad about yourself - or that you are a failure or have let yourself or your family down:  3 - nearly every day  Trouble concentrating on things, such as reading the newspaper or watching television:  1 - several days  Moving or speaking so slowly that other people could have noticed  Or the opposite - being so fidgety or restless that you have been moving around a lot more than usual:  0 - not at all  Thoughts that you would be better off dead, or of hurting yourself in some way:  0 - not at all  PHQ-2 Score:  3  PHQ-9 Score:  12        Depression Screening Follow-up Plan: Patient's depression screening was positive with a PHQ-2 score of 3  Their PHQ-9 score was 12  Patient assessed for underlying major depression  They have no active suicidal ideations  Brief counseling provided and recommend additional follow-up/re-evaluation next office visit  Subjective:      Patient ID: Garland Bradford is a 61 y o  female      Follow up for right ankle pain, pt had an x-ray of the right ankle which was essentially negative    Ankle Pain    The incident occurred more than 1 week ago  There was no injury mechanism  The pain is present in the right ankle  The pain is mild  The pain has been intermittent since onset  She has tried acetaminophen for the symptoms  The treatment provided mild relief  The following portions of the patient's history were reviewed and updated as appropriate: allergies, current medications, past family history, past medical history, past social history, past surgical history and problem list     Review of Systems   Constitutional: Negative for chills and fever  Skin: Negative for rash  Objective:    /92   Pulse 90   Temp (!) 97 1 °F (36 2 °C) (Tympanic)   Ht 5' 1" (1 549 m)   Wt 72 kg (158 lb 12 8 oz)   SpO2 97%   BMI 30 00 kg/m²      Physical Exam   Constitutional: She is oriented to person, place, and time  She appears well-developed and well-nourished  No distress  HENT:   Head: Normocephalic and atraumatic  Neck: Normal range of motion  Neck supple  Cardiovascular: Normal rate, regular rhythm and normal heart sounds  No murmur heard  Pulmonary/Chest: Effort normal and breath sounds normal  No respiratory distress  She has no wheezes  She has no rales  Musculoskeletal: She exhibits no edema  Lymphadenopathy:     She has no cervical adenopathy  Neurological: She is alert and oriented to person, place, and time  Skin: Skin is warm and dry  She is not diaphoretic  Psychiatric: She has a normal mood and affect  Her behavior is normal  Judgment and thought content normal    Nursing note and vitals reviewed      Ortho Exam

## 2019-12-05 DIAGNOSIS — G47.00 INSOMNIA, UNSPECIFIED TYPE: ICD-10-CM

## 2019-12-06 RX ORDER — TEMAZEPAM 15 MG/1
15 CAPSULE ORAL
Qty: 30 CAPSULE | Refills: 0 | Status: SHIPPED | OUTPATIENT
Start: 2019-12-06 | End: 2019-12-13

## 2019-12-13 ENCOUNTER — OFFICE VISIT (OUTPATIENT)
Dept: FAMILY MEDICINE CLINIC | Facility: CLINIC | Age: 59
End: 2019-12-13
Payer: COMMERCIAL

## 2019-12-13 VITALS
BODY MASS INDEX: 28.92 KG/M2 | HEIGHT: 61 IN | SYSTOLIC BLOOD PRESSURE: 124 MMHG | TEMPERATURE: 97.7 F | DIASTOLIC BLOOD PRESSURE: 86 MMHG | OXYGEN SATURATION: 98 % | WEIGHT: 153.2 LBS | HEART RATE: 70 BPM

## 2019-12-13 DIAGNOSIS — K21.9 GASTROESOPHAGEAL REFLUX DISEASE WITHOUT ESOPHAGITIS: ICD-10-CM

## 2019-12-13 DIAGNOSIS — F32.A DEPRESSION, UNSPECIFIED DEPRESSION TYPE: ICD-10-CM

## 2019-12-13 DIAGNOSIS — Z79.4 TYPE 2 DIABETES MELLITUS WITHOUT COMPLICATION, WITH LONG-TERM CURRENT USE OF INSULIN (HCC): Chronic | ICD-10-CM

## 2019-12-13 DIAGNOSIS — E11.9 TYPE 2 DIABETES MELLITUS WITHOUT COMPLICATION, WITH LONG-TERM CURRENT USE OF INSULIN (HCC): Chronic | ICD-10-CM

## 2019-12-13 DIAGNOSIS — G47.00 INSOMNIA, UNSPECIFIED TYPE: ICD-10-CM

## 2019-12-13 DIAGNOSIS — Z23 ENCOUNTER FOR IMMUNIZATION: Primary | ICD-10-CM

## 2019-12-13 PROCEDURE — 3008F BODY MASS INDEX DOCD: CPT | Performed by: FAMILY MEDICINE

## 2019-12-13 PROCEDURE — 99214 OFFICE O/P EST MOD 30 MIN: CPT | Performed by: FAMILY MEDICINE

## 2019-12-13 PROCEDURE — 1036F TOBACCO NON-USER: CPT | Performed by: FAMILY MEDICINE

## 2019-12-13 RX ORDER — PANTOPRAZOLE SODIUM 40 MG/1
40 TABLET, DELAYED RELEASE ORAL DAILY
Qty: 30 TABLET | Refills: 3 | Status: SHIPPED | OUTPATIENT
Start: 2019-12-13 | End: 2020-04-20 | Stop reason: SDUPTHER

## 2019-12-13 RX ORDER — TEMAZEPAM 30 MG/1
30 CAPSULE ORAL
Qty: 30 CAPSULE | Refills: 0 | Status: SHIPPED | OUTPATIENT
Start: 2019-12-13 | End: 2020-01-14

## 2019-12-13 RX ORDER — INSULIN LISPRO 100 U/ML
INJECTION, SOLUTION SUBCUTANEOUS
Refills: 0 | COMMUNITY
Start: 2019-11-11 | End: 2020-01-14

## 2019-12-13 NOTE — PROGRESS NOTES
Assessment/Plan:    No problem-specific Assessment & Plan notes found for this encounter  Diagnoses and all orders for this visit:    Encounter for immunization    Type 2 diabetes mellitus without complication, with long-term current use of insulin ([facility] )  -     Microalbumin / creatinine urine ratio    Other orders  -     Cancel: influenza vaccine, [id number], quadrivalent, recombinant, PF, 0 5 mL, for patients 18 yr+ ([facility])  -     ADMELOG SOLOSTAR 100 units/mL injection pen          PHQ-9 Depression Screening    PHQ-9:    Frequency of the following problems over the past two weeks:                 Subjective:      Patient ID: Minnie Rivesr is a 61 y o  female  Pt complains of insomnia temazepam is not helping enough, pt complains of stomach pain that wakes her up from sleep    Depression   This is a chronic problem  The current episode started more than 1 month ago  The problem occurs intermittently  The problem has been gradually improving  Treatments tried: zoloft  The treatment provided mild relief  The following portions of the patient's history were reviewed and updated as appropriate: allergies, current medications, past family history, past medical history, past social history, past surgical history and problem list     Review of Systems   Respiratory: Negative for shortness of breath  Cardiovascular: Negative for palpitations  Psychiatric/Behavioral: Positive for depression  Negative for suicidal ideas  Objective:    /86   Pulse 70   Temp 97 7 °F (36 5 °C) (Tympanic)   Ht 5' 1" (1 549 m)   Wt 69 5 kg (153 lb 3 2 oz)   SpO2 98%   BMI 28 95 kg/m²      Physical Exam   Constitutional: She is oriented to person, place, and time  She appears well-developed and well-nourished  No distress  HENT:   Head: Normocephalic and atraumatic  Eyes: Pupils are equal, round, and reactive to light  Conjunctivae and EOM are normal  No scleral icterus     Neck: Normal range of motion  Neck supple  Cardiovascular: Normal rate, regular rhythm and normal heart sounds  No murmur heard  Pulmonary/Chest: Effort normal and breath sounds normal  No respiratory distress  She has no wheezes  She has no rales  Musculoskeletal: She exhibits no edema  Lymphadenopathy:     She has no cervical adenopathy  Neurological: She is alert and oriented to person, place, and time  Skin: Skin is warm and dry  She is not diaphoretic  Psychiatric: She has a normal mood and affect  Her behavior is normal  Judgment and thought content normal    Nursing note and vitals reviewed

## 2019-12-17 DIAGNOSIS — I10 ESSENTIAL HYPERTENSION: ICD-10-CM

## 2019-12-18 DIAGNOSIS — I10 ESSENTIAL HYPERTENSION: ICD-10-CM

## 2019-12-18 PROCEDURE — 4010F ACE/ARB THERAPY RXD/TAKEN: CPT | Performed by: INTERNAL MEDICINE

## 2019-12-18 RX ORDER — BENAZEPRIL HYDROCHLORIDE 20 MG/1
20 TABLET ORAL DAILY
Qty: 90 TABLET | Refills: 1 | Status: SHIPPED | OUTPATIENT
Start: 2019-12-18 | End: 2020-01-30

## 2019-12-18 RX ORDER — AMLODIPINE BESYLATE 5 MG/1
5 TABLET ORAL DAILY
Qty: 90 TABLET | Refills: 1 | Status: SHIPPED | OUTPATIENT
Start: 2019-12-18 | End: 2020-12-16 | Stop reason: SDUPTHER

## 2019-12-18 RX ORDER — AMLODIPINE BESYLATE 5 MG/1
TABLET ORAL
Qty: 90 TABLET | Refills: 1 | Status: SHIPPED | OUTPATIENT
Start: 2019-12-18 | End: 2020-01-30

## 2020-01-13 DIAGNOSIS — Z79.4 TYPE 2 DIABETES MELLITUS WITHOUT COMPLICATION, WITH LONG-TERM CURRENT USE OF INSULIN (HCC): Primary | Chronic | ICD-10-CM

## 2020-01-13 DIAGNOSIS — Z76.0 MEDICATION REFILL: ICD-10-CM

## 2020-01-13 DIAGNOSIS — G47.00 INSOMNIA, UNSPECIFIED TYPE: ICD-10-CM

## 2020-01-13 DIAGNOSIS — E11.9 TYPE 2 DIABETES MELLITUS WITHOUT COMPLICATION, WITH LONG-TERM CURRENT USE OF INSULIN (HCC): Primary | Chronic | ICD-10-CM

## 2020-01-14 RX ORDER — INSULIN LISPRO 100 U/ML
INJECTION, SOLUTION SUBCUTANEOUS
Qty: 30 ML | Refills: 0 | OUTPATIENT
Start: 2020-01-14

## 2020-01-14 RX ORDER — TEMAZEPAM 30 MG/1
CAPSULE ORAL
Qty: 30 CAPSULE | Refills: 0 | Status: SHIPPED | OUTPATIENT
Start: 2020-01-14 | End: 2020-01-20

## 2020-01-14 RX ORDER — INSULIN LISPRO 100 U/ML
INJECTION, SOLUTION SUBCUTANEOUS
Qty: 30 ML | Refills: 0 | Status: SHIPPED | OUTPATIENT
Start: 2020-01-14 | End: 2021-05-26 | Stop reason: SDUPTHER

## 2020-01-17 ENCOUNTER — APPOINTMENT (OUTPATIENT)
Dept: LAB | Facility: CLINIC | Age: 60
End: 2020-01-17
Payer: COMMERCIAL

## 2020-01-17 DIAGNOSIS — E11.9 TYPE 2 DIABETES MELLITUS WITHOUT COMPLICATION, WITH LONG-TERM CURRENT USE OF INSULIN (HCC): Chronic | ICD-10-CM

## 2020-01-17 DIAGNOSIS — Z79.4 TYPE 2 DIABETES MELLITUS WITHOUT COMPLICATION, WITH LONG-TERM CURRENT USE OF INSULIN (HCC): Chronic | ICD-10-CM

## 2020-01-17 LAB
CREAT UR-MCNC: 77.9 MG/DL
EST. AVERAGE GLUCOSE BLD GHB EST-MCNC: 315 MG/DL
GLUCOSE P FAST SERPL-MCNC: 426 MG/DL (ref 65–99)
HBA1C MFR BLD: 12.6 % (ref 4.2–6.3)
MICROALBUMIN UR-MCNC: 29 MG/L (ref 0–20)
MICROALBUMIN/CREAT 24H UR: 37 MG/G CREATININE (ref 0–30)

## 2020-01-17 PROCEDURE — 84681 ASSAY OF C-PEPTIDE: CPT

## 2020-01-17 PROCEDURE — 82947 ASSAY GLUCOSE BLOOD QUANT: CPT

## 2020-01-17 PROCEDURE — 3060F POS MICROALBUMINURIA REV: CPT | Performed by: FAMILY MEDICINE

## 2020-01-17 PROCEDURE — 36415 COLL VENOUS BLD VENIPUNCTURE: CPT

## 2020-01-17 PROCEDURE — 82570 ASSAY OF URINE CREATININE: CPT | Performed by: FAMILY MEDICINE

## 2020-01-17 PROCEDURE — 83036 HEMOGLOBIN GLYCOSYLATED A1C: CPT

## 2020-01-17 PROCEDURE — 82043 UR ALBUMIN QUANTITATIVE: CPT | Performed by: FAMILY MEDICINE

## 2020-01-18 LAB — C PEPTIDE SERPL-MCNC: 3 NG/ML (ref 1.1–4.4)

## 2020-01-20 ENCOUNTER — OFFICE VISIT (OUTPATIENT)
Dept: FAMILY MEDICINE CLINIC | Facility: CLINIC | Age: 60
End: 2020-01-20
Payer: COMMERCIAL

## 2020-01-20 VITALS
SYSTOLIC BLOOD PRESSURE: 150 MMHG | BODY MASS INDEX: 28.7 KG/M2 | DIASTOLIC BLOOD PRESSURE: 102 MMHG | HEIGHT: 61 IN | WEIGHT: 152 LBS | TEMPERATURE: 98.5 F

## 2020-01-20 DIAGNOSIS — G47.00 INSOMNIA, UNSPECIFIED TYPE: ICD-10-CM

## 2020-01-20 DIAGNOSIS — Z79.4 TYPE 2 DIABETES MELLITUS WITH COMPLICATION, WITH LONG-TERM CURRENT USE OF INSULIN (HCC): Primary | ICD-10-CM

## 2020-01-20 DIAGNOSIS — F32.A DEPRESSION, UNSPECIFIED DEPRESSION TYPE: ICD-10-CM

## 2020-01-20 DIAGNOSIS — E11.9 ENCOUNTER FOR DIABETIC FOOT EXAM (HCC): ICD-10-CM

## 2020-01-20 DIAGNOSIS — E11.8 TYPE 2 DIABETES MELLITUS WITH COMPLICATION, WITH LONG-TERM CURRENT USE OF INSULIN (HCC): Primary | ICD-10-CM

## 2020-01-20 DIAGNOSIS — I10 ESSENTIAL HYPERTENSION: ICD-10-CM

## 2020-01-20 DIAGNOSIS — E66.3 OVERWEIGHT (BMI 25.0-29.9): ICD-10-CM

## 2020-01-20 PROCEDURE — 99214 OFFICE O/P EST MOD 30 MIN: CPT | Performed by: FAMILY MEDICINE

## 2020-01-20 NOTE — PROGRESS NOTES
Assessment/Plan:    No problem-specific Assessment & Plan notes found for this encounter  Diagnoses and all orders for this visit:    Type 2 diabetes mellitus with complication, with long-term current use of insulin ([facility] )    Encounter for diabetic foot exam ([facility] )  -     Diabetic foot exam; Future    Overweight (BMI 25 0-29  9)    Depression, unspecified depression type    Essential hypertension    Insomnia, unspecified type  Comments:  ref for sleep study          PHQ-9 Depression Screening    PHQ-9:    Frequency of the following problems over the past two weeks:       Little interest or pleasure in doing things:  2 - more than half the days  Feeling down, depressed, or hopeless:  1 - several days  Trouble falling or staying asleep, or sleeping too much:  3 - nearly every day  Feeling tired or having little energy:  3 - nearly every day  Poor appetite or overeatin - not at all  Feeling bad about yourself - or that you are a failure or have let yourself or your family down:  0 - not at all  Trouble concentrating on things, such as reading the newspaper or watching television:  0 - not at all  Moving or speaking so slowly that other people could have noticed  Or the opposite - being so fidgety or restless that you have been moving around a lot more than usual:  0 - not at all  Thoughts that you would be better off dead, or of hurting yourself in some way:  0 - not at all  PHQ-2 Score:  3  PHQ-9 Score:  9      Depression Screening Follow-up Plan: Patient's depression screening was positive with a PHQ-2 score of 3  Their PHQ-9 score was 9  Patient assessed for underlying major depression  They have no active suicidal ideations  Brief counseling provided and recommend additional follow-up/re-evaluation next office visit  BMI Counseling: Body mass index is 28 72 kg/m²  The BMI is above normal  Nutrition recommendations include reducing portion sizes and 3-5 servings of fruits/vegetables daily   Exercise recommendations include moderate aerobic physical activity for 150 minutes/week and exercising 3-5 times per week  Subjective:      Patient ID: Lindsey Aggarwal is a 61 y o  female  Follow up for HTN, pt is finding high numbers at home, follow up for insomnia temazepam is not helping, follow up for depression zoloft is helping    Diabetes   She presents for her follow-up diabetic visit  She has type 2 diabetes mellitus  Her disease course has been worsening  There are no hypoglycemic associated symptoms  Pertinent negatives for diabetes include no chest pain  There are no hypoglycemic complications  Symptoms are stable  Risk factors for coronary artery disease include sedentary lifestyle and post-menopausal  She is following a diabetic diet  Her home blood glucose trend is increasing steadily  Her overall blood glucose range is >200 mg/dl  An ACE inhibitor/angiotensin II receptor blocker is being taken  The following portions of the patient's history were reviewed and updated as appropriate: allergies, current medications, past family history, past medical history, past social history, past surgical history and problem list     Review of Systems   Eyes: Negative for visual disturbance  Cardiovascular: Negative for chest pain and palpitations  Gastrointestinal: Negative for abdominal pain, nausea and vomiting  Genitourinary: Negative for frequency  Skin: Negative for wound  Neurological: Negative for numbness  Objective:    BP (!) 150/102   Temp 98 5 °F (36 9 °C)   Ht 5' 1" (1 549 m)   Wt 68 9 kg (152 lb)   BMI 28 72 kg/m²      Physical Exam   Constitutional: She is oriented to person, place, and time  She appears well-developed and well-nourished  No distress  HENT:   Head: Normocephalic and atraumatic  Eyes: Pupils are equal, round, and reactive to light  Conjunctivae and EOM are normal  No scleral icterus  Neck: Normal range of motion  Neck supple     Cardiovascular: Normal rate, regular rhythm and normal heart sounds  Pulses are no weak pulses  No murmur heard  Pulses:       Dorsalis pedis pulses are 2+ on the right side, and 2+ on the left side  Pulmonary/Chest: Effort normal and breath sounds normal  No respiratory distress  She has no wheezes  She has no rales  Abdominal: Soft  Bowel sounds are normal  She exhibits no distension and no mass  There is no tenderness  There is no rebound and no guarding  Musculoskeletal: Normal range of motion  She exhibits no edema or deformity  Feet:   Right Foot:   Skin Integrity: Negative for ulcer, skin breakdown, erythema, warmth, callus or dry skin  Left Foot:   Skin Integrity: Negative for ulcer, skin breakdown, erythema, warmth, callus or dry skin  Lymphadenopathy:     She has no cervical adenopathy  Neurological: She is alert and oriented to person, place, and time  Skin: Skin is warm and dry  She is not diaphoretic  Psychiatric: She has a normal mood and affect  Her behavior is normal  Judgment and thought content normal    Nursing note and vitals reviewed  Patient's shoes and socks removed  Right Foot/Ankle   Right Foot Inspection  Skin Exam: skin normal and skin intact no dry skin, no warmth, no callus, no erythema, no maceration, no abnormal color, no pre-ulcer, no ulcer and no callus                          Toe Exam: ROM and strength within normal limits  Sensory       Monofilament testing: intact  Vascular  Capillary refills: < 3 seconds  The right DP pulse is 2+  Left Foot/Ankle  Left Foot Inspection  Skin Exam: skin normal and skin intactno dry skin, no warmth, no erythema, no maceration, normal color, no pre-ulcer, no ulcer and no callus                         Toe Exam: ROM and strength within normal limits                   Sensory       Monofilament: intact  Vascular  Capillary refills: < 3 seconds  The left DP pulse is 2+  Assign Risk Category:  No deformity present;  No loss of protective sensation; No weak pulses       Risk: 0

## 2020-01-29 ENCOUNTER — TELEPHONE (OUTPATIENT)
Dept: INTERNAL MEDICINE CLINIC | Facility: CLINIC | Age: 60
End: 2020-01-29

## 2020-01-30 ENCOUNTER — OFFICE VISIT (OUTPATIENT)
Dept: SLEEP CENTER | Facility: CLINIC | Age: 60
End: 2020-01-30
Payer: COMMERCIAL

## 2020-01-30 VITALS
HEIGHT: 61 IN | DIASTOLIC BLOOD PRESSURE: 80 MMHG | WEIGHT: 157 LBS | BODY MASS INDEX: 29.64 KG/M2 | HEART RATE: 81 BPM | SYSTOLIC BLOOD PRESSURE: 142 MMHG | OXYGEN SATURATION: 93 %

## 2020-01-30 DIAGNOSIS — Z79.4 TYPE 2 DIABETES MELLITUS WITHOUT COMPLICATION, WITH LONG-TERM CURRENT USE OF INSULIN (HCC): ICD-10-CM

## 2020-01-30 DIAGNOSIS — R06.83 SNORING: ICD-10-CM

## 2020-01-30 DIAGNOSIS — R51.9 HEADACHE UPON AWAKENING: ICD-10-CM

## 2020-01-30 DIAGNOSIS — G47.33 OSA (OBSTRUCTIVE SLEEP APNEA): ICD-10-CM

## 2020-01-30 DIAGNOSIS — I10 ESSENTIAL HYPERTENSION: ICD-10-CM

## 2020-01-30 DIAGNOSIS — G89.29 CHRONIC BILATERAL LOW BACK PAIN WITHOUT SCIATICA: ICD-10-CM

## 2020-01-30 DIAGNOSIS — G25.81 RESTLESS LEG SYNDROME: ICD-10-CM

## 2020-01-30 DIAGNOSIS — F32.A DEPRESSION, UNSPECIFIED DEPRESSION TYPE: ICD-10-CM

## 2020-01-30 DIAGNOSIS — G47.00 INSOMNIA, UNSPECIFIED TYPE: Primary | ICD-10-CM

## 2020-01-30 DIAGNOSIS — E66.3 OVERWEIGHT (BMI 25.0-29.9): ICD-10-CM

## 2020-01-30 DIAGNOSIS — G47.19 EXCESSIVE DAYTIME SLEEPINESS: ICD-10-CM

## 2020-01-30 DIAGNOSIS — I25.10 CORONARY ARTERY DISEASE INVOLVING NATIVE CORONARY ARTERY OF NATIVE HEART WITHOUT ANGINA PECTORIS: ICD-10-CM

## 2020-01-30 DIAGNOSIS — E11.9 TYPE 2 DIABETES MELLITUS WITHOUT COMPLICATION, WITH LONG-TERM CURRENT USE OF INSULIN (HCC): ICD-10-CM

## 2020-01-30 DIAGNOSIS — M54.50 CHRONIC BILATERAL LOW BACK PAIN WITHOUT SCIATICA: ICD-10-CM

## 2020-01-30 PROCEDURE — 99204 OFFICE O/P NEW MOD 45 MIN: CPT | Performed by: INTERNAL MEDICINE

## 2020-01-30 NOTE — PROGRESS NOTES
Consultation - [address]  61 y o  female  VGV:[id number]  CFL:[id number]    Physician Requesting Consult: Abdoul Nieto DO     Reason for Consult : At your kind request I saw this patient for initial sleep evaluation today  She is here for a complaint of insomnia      PFSH, Problem List, Medications & Allergies were reviewed in EMR  She  has a past medical history of CAD (coronary artery disease), Chronic back pain, Family history of early CAD, Hyperlipidemia, Hypertension, Myocardial infarction ([facility] ), Restless leg syndrome, and Type 2 diabetes mellitus ([facility] )  She has a current medication list which includes the following prescription(s): accu-chek fastclix lancets, accu-chek guide, accu-chek guide, admelog solostar, amlodipine, aspirin, atorvastatin, bd pen needle marie u/f, benazepril, clopidogrel, cyclobenzaprine, insulin glargine, metformin, metoprolol tartrate, nitroglycerin, pantoprazole, ropinirole, sertraline, tizanidine, and tramadol  HPI:  She has been experiencing sleep difficulties for over 10 years  Sleep difficulties started following a fall that resulted in head injury  Around 3 years ago she had diabetes due to 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 that lasted around 6 weeks  Each time, sleep difficulties escalated  Presently she is using temazepam 30 mg with no benefit  She was diagnosed with depression and started on Zoloft a month ago but symptoms persist    reports loud snoring of several years duration     She awakens herself with snoring  They are not aware of breathing difficulties during sleep  Other Complaints: none  Restless Leg Syndrome: has typical symptoms that are controlled on Requip  Parasomnia: no features reported    Sleep Routine:   Typical Bedtime:  Between 10 and 11:00 p m  Gets OOB:  1-2 p m  TIB:>14 hrs   Sleep latency:> 60 minutes Sleep Interruptions:5-6/nite is not always sure of the cause and struggles to fall back asleep  Awakens: spontaneously  Upon awakening: never feels rested and has headache daily  She estimates getting 3 hrs sleep  She has Excessive Daytime Sleepiness and try to take naps not successful most of the time  [place] Sleepiness Scale rated at Total score: 9 /24  Habits: reports that she has never smoked  She has never used smokeless tobacco  , reports that she drinks alcohol  ,  reports that she does not use drugs  ,Caffeine use:excessive until around 8:00 p m , Exercise routine: none   Family History:  Both parents have sleep difficulties  ROS: reviewed & as attached  Significant for weight fluctuates  She reported no nasal symptoms  She has shortness of breath and chest tightness  She has random palpitations  She has musculoskeletal aches and pains at times with radicular symptoms  She has ongoing feelings of anxiety, depression and has difficulty with memory  EXAM:  /80 (BP Location: Left arm, Cuff Size: Standard)   Pulse 81   Ht 5' 1" (1 549 m)   Wt 71 2 kg (157 lb)   SpO2 93%   BMI 29 66 kg/m²    General: Well groomed female, well appearing, in no apparent distress  Psychiatric: Alert and orientated; Cooperative; Speech:clear and coherent; appears anxious and depressed   HEENT:  Craniofacial anatomy: slight overjet  Sinuses: non- tender  TMJ: Normal    Eyes: EOM's intact;  conjunctiva/corneas clear   Ears: Externallyappear normal     Nasal Airway: is patent Septum:intact; Mucosa: normal; Turbinates: normal; Rhinorrhea: None   Mouth: Lips: normal posture; Dentition: missing several  Mucosa:moist  ; Hard Palate:long  and narrow   Oropharryx: crowded and AP narrowing Tongue: Mallampati:Class II and MobileSoft Palate:  redundant  Tonsils: no hypertrophy  Neck:  Neck Circumference: 15 "; Supple; no abnormal masses;  Thyroid:normal  Trachea:central     Lymph: No Cervical or Submandibular Lymhadenopathy  Heart: S1,S2 normal; RRR; no gallop; nomurmurs   Lungs: Respiratory Effort:normal  Air entry good bilaterally  No wheezes  No rales  Abdomen: Obese, Soft & non-tender    Extremities: No pedal edema  No clubbing or cyanosis  Skin: warm and dry; Color& Hydration good; no facial rashes or lesions   Neurological: CNII-XII intact; Motor normal; Sensation normal  Musculoskeletal: Muscle bulk, tone and power WNL Gait:normal        IMPRESSION: Primary, Secondary Sleep Diagnoses (to Medical or Psych conditions) & Comorbidities   1  Insomnia, unspecified type  Ambulatory referral to Sleep Medicine    Diagnostic Sleep Study    CPAP Study    ref for sleep study   2  Restless leg syndrome     3  Snoring  Diagnostic Sleep Study    CPAP Study   4  Headache upon awakening  Diagnostic Sleep Study    CPAP Study   5  Excessive daytime sleepiness  Diagnostic Sleep Study    CPAP Study   6  VINICIO (obstructive sleep apnea)     7  Depression, unspecified depression type     8  Chronic bilateral low back pain without sciatica     9  Coronary artery disease involving native coronary artery of native heart without angina pectoris     10  Essential hypertension     11  Type 2 diabetes mellitus without complication, with long-term current use of insulin ([facility] )     12  Overweight (BMI 25 0-29  9)          PLAN:   1  Comprehensive counseling was provided on pathophysiology, diagnostic strategies & treatment options; effects on symptoms and comorbidities; risks of inadequate therapy; costs and insurance aspects  2  I advised on weight reduction, avoiding sleeping supine, using alcohol or sedating medications close to bed time and on safe driving practices  3  Cognitive behavioral therapy was initiated with advise on Sleep Hygiene and behavioral techniques to manage Insomnia  Specifically, keeping a regular routine, limiting time in bed to 7 hours, starting an exercise routine, avoiding caffeine after 3:00 p m  And on relaxation techniques    4  Nocturnal polysomnography is indicated and a diagnostic study will be scheduled  5  Patient is willing to try Positive airway pressure therapy and will be scheduled for a titration study if indicated  6  Continue Requip 1 mg 2 hours prior to bedtime   7  She is due to follow up for adjusting her antidepressant medications  8  Follow-up will be scheduled after the studies to review results, further details of treatment options and to initiate/adjust therapy  Thank you for allowing me to participate in the care of this patient  I will keep you apprised of developments      Sincerely,     Authenticated electronically by Ebony Villalba MD   on 58/54/92   Board Certified Specialist

## 2020-01-30 NOTE — PROGRESS NOTES
Review of Systems      Genitourinary need to urinate more than twice a night   Cardiology Frequent chest pain or angina,  and palpitations/fluttering feeling in the chest   Gastrointestinal frequent heartburn/acid reflux   Neurology frequent headaches, awaken with headache, need to move extremities, muscle weakness, numbness/tingling of an extremity, forgetfulness and difficulty with memory   Constitutional fatigue and weight change   Integumentary none   Psychiatry anxiety and depression   Musculoskeletal joint pain, muscle aches, back pain and legs twitching/jerking   Pulmonary shortness of breath with activity, chest tightness and snoring   ENT throat clearing   Endocrine excessive thirst and frequent urination   Hematological none

## 2020-01-30 NOTE — PATIENT INSTRUCTIONS
What you can do to improve your sleep: (Sleep Hygiene) Basic rules for a good night's sleep    Create a regular sleep schedule  This will help you form a sleep routine  Keep a record of your sleep patterns, and any sleeping problems you have  Bring the record to follow-up visits with healthcare providers  Avoid prolonged use of light-emitting screens before bedtime or watching TV in bed  Avoid forcing sleep  Do not take naps  Naps could make it hard for you to fall asleep at bedtime  Deal with your worries before bedtime  Keep your bedroom cool, quiet, and dark  Turn on white noise, such as a fan, to help you relax  Do not use your bed for any activity that will keep you awake  Do not read, exercise, eat, or watch TV in your bedroom  Get up if you do not fall asleep within 20 minutes  Move to another room and do something relaxing until you become sleepy  Limit caffeine, alcohol, nicotine and food to earlier in the day  Only drink caffeine in the morning  Do not drink alcohol within 6 hours of bedtime  Do not eat a heavy meal right before you go to bed  Avoid smoking, especially in the evening  Exercise regularly  Daily exercise will help you sleep better  Do not exercise within 4 hours of bedtime  Stimulus control therapy rules  1  Go to bed only when sleepy  2  Do not watch television, read, eat, or worry while in bed  Use bed only for sleep and sex  3  Get out of bed if unable to fall asleep within 20 minutes and go to another room  Return to bed only when sleepy  Repeat this step as many times as necessary throughout the night  4  Set an alarm clock to wake up at a fixed time each morning, including weekends  5  Do not take a nap during the day  Data from: [address] [place] [address], [address] Nonpharmacologic treatments of insomnia  J Clin Psychiat[facility] 5070; 53:37  Go to [employer]ASM website for more information: Sleepeducation  org     Recommended Reading:  Book by authors Sabina Coleman No More sleepless nights    What is VINICIO? Obstructive sleep apnea is a common and serious sleep disorder that causes you to stop breathing during sleep  The airway repeatedly becomes blocked, limiting the amount of air that reaches your lungs  When this happens, you may snore loudly or making choking noises as you try to breathe  Your brain and body becomes oxygen deprived and you may wake up  This may happen a few times a night, or in more severe cases, several hundred times a night  Sleep apnea can make you wake up in the morning feeling tired or unrefreshed even though you have had a full night of sleep  During the day, you may feel fatigued, have difficulty concentrating or you may even unintentionally fall asleep  This is because your body is waking up numerous times throughout the night, even though you might not be conscious of each awakening  The lack of oxygen your body receives can have negative long-term consequences for your health  This includes:  High blood pressure  Heart disease  Irregular heart rhythms  Stroke  Pre-diabetes and diabetes  Depression    Testing  An objective evaluation of your sleep may be needed before your board certified sleep physician can make a diagnosis  Options include:   In-lab overnight sleep study  This type of sleep study requires you to stay overnight at a sleep center, in a bed that may resemble a hotel room  You will sleep with sensors hooked up to various parts of your body  These sensors record your brain waves, heartbeat, breathing and movement  An overnight sleep study also provides your doctor with the most complete information about your sleep   Learn more about an overnight sleep study      Home sleep apnea test  Some patients with high risk factors for obstructive sleep apnea and no other medical disorders may be candidates for a home sleep apnea test  The testing equipment differs in that it is less complicated than what is used in an overnight sleep study  As such, does not give all the data an in-lab will and if negative, may not mean you do not have the problem  Treatment for sleep apnea  includes using a continuous positive airway pressure (CPAP) machine to keep your airway open during sleep  A mask is placed over your nose and mouth, or just your nose  The mask is hooked to the CPAP machine that blows a gentle stream of air into the mask when you breathe  This helps keep your airway open so you can breathe more regularly  Extra oxygen may be given to you through the machine  You may be given a mouth device  It looks like a mouth guard or dental retainer and stops your tongue and mouth tissues from blocking your throat while you sleep  Surgery may be needed to remove extra tissues that block your mouth, throat, or nose  Manage sleep apnea:   Do not smoke  Nicotine and other chemicals in cigarettes and cigars can cause lung damage  Ask your healthcare provider for information if you currently smoke and need help to quit  E-cigarettes or smokeless tobacco still contain nicotine  Talk to your healthcare provider before you use these products  Do not drink alcohol or take sedative medicine before you go to sleep  Alcohol and sedatives can relax the muscles and tissues around your throat  This can block the airflow to your lungs  Maintain a healthy weight  Excess tissue around your throat may restrict your breathing  Ask your healthcare provider for information if you need to lose weight  Sleep on your side or use pillows designed to prevent sleep apnea  This prevents your tongue or other tissues from blocking your throat  You can also raise the head of your bed  Driving Safety  Refrain from driving when drowsy  Follow up with your healthcare provider as directed:  Write down your questions so you remember to ask them during your visits  Go to AASM website for more information: Sleepeducation  org     What is VINICIO?    Obstructive sleep apnea is a common and serious sleep disorder that causes you to stop breathing during sleep  The airway repeatedly becomes blocked, limiting the amount of air that reaches your lungs  When this happens, you may snore loudly or making choking noises as you try to breathe  Your brain and body becomes oxygen deprived and you may wake up  This may happen a few times a night, or in more severe cases, several hundred times a night  Sleep apnea can make you wake up in the morning feeling tired or unrefreshed even though you have had a full night of sleep  During the day, you may feel fatigued, have difficulty concentrating or you may even unintentionally fall asleep  This is because your body is waking up numerous times throughout the night, even though you might not be conscious of each awakening  The lack of oxygen your body receives can have negative long-term consequences for your health  This includes:  High blood pressure  Heart disease  Irregular heart rhythms  Stroke  Pre-diabetes and diabetes  Depression    Testing  An objective evaluation of your sleep may be needed before your board certified sleep physician can make a diagnosis  Options include:   In-lab overnight sleep study  This type of sleep study requires you to stay overnight at a sleep center, in a bed that may resemble a hotel room  You will sleep with sensors hooked up to various parts of your body  These sensors record your brain waves, heartbeat, breathing and movement  An overnight sleep study also provides your doctor with the most complete information about your sleep  Learn more about an overnight sleep study      Home sleep apnea test  Some patients with high risk factors for obstructive sleep apnea and no other medical disorders may be candidates for a home sleep apnea test  The testing equipment differs in that it is less complicated than what is used in an overnight sleep study   As such, does not give all the data an in-lab will and if negative, may not mean you do not have the problem  Treatment for sleep apnea  includes using a continuous positive airway pressure (CPAP) machine to keep your airway open during sleep  A mask is placed over your nose and mouth, or just your nose  The mask is hooked to the CPAP machine that blows a gentle stream of air into the mask when you breathe  This helps keep your airway open so you can breathe more regularly  Extra oxygen may be given to you through the machine  You may be given a mouth device  It looks like a mouth guard or dental retainer and stops your tongue and mouth tissues from blocking your throat while you sleep  Surgery may be needed to remove extra tissues that block your mouth, throat, or nose  Manage sleep apnea:   Do not smoke  Nicotine and other chemicals in cigarettes and cigars can cause lung damage  Ask your healthcare provider for information if you currently smoke and need help to quit  E-cigarettes or smokeless tobacco still contain nicotine  Talk to your healthcare provider before you use these products  Do not drink alcohol or take sedative medicine before you go to sleep  Alcohol and sedatives can relax the muscles and tissues around your throat  This can block the airflow to your lungs  Maintain a healthy weight  Excess tissue around your throat may restrict your breathing  Ask your healthcare provider for information if you need to lose weight  Sleep on your side or use pillows designed to prevent sleep apnea  This prevents your tongue or other tissues from blocking your throat  You can also raise the head of your bed  Driving Safety  Refrain from driving when drowsy  Follow up with your healthcare provider as directed:  Write down your questions so you remember to ask them during your visits  Go to AAS website for more information: Sleepeducation  org             Nursing Support:  When: Monday through Friday 7A-5PM except holidays  Where: Our direct line is [phone]  If you are having a true emergency please call 911  In the event that the line is busy or it is after hours please leave a voice message and we will return your call  Please speak clearly, leaving your full name, birth date, best number to reach you and the reason for your call  Medication refills: We will need the name of the medication, the dosage, the ordering provider, whether you get a 30 or 90 day refill, and the pharmacy name and address  Medications will be ordered by the provider only  Nurses cannot call in prescriptions  Please allow 7 days for medication refills  Physician requested updates: If your provider requested that you call with an update after starting medication, please be ready to provide us the medication and dosage, what time you take your medication, the time you attempt to fall asleep, time you fall asleep, when you wake up, and what time you get out of bed  Sleep Study Results: We will contact you with sleep study results and/or next steps after the physician has reviewed your testing

## 2020-02-05 ENCOUNTER — HOSPITAL ENCOUNTER (OUTPATIENT)
Dept: SLEEP CENTER | Facility: HOSPITAL | Age: 60
Discharge: HOME | End: 2020-02-05
Attending: INTERNAL MEDICINE
Payer: COMMERCIAL

## 2020-02-05 ENCOUNTER — TELEPHONE (OUTPATIENT)
Dept: SLEEP CENTER | Facility: CLINIC | Age: 60
End: 2020-02-05

## 2020-02-05 DIAGNOSIS — G47.00 INSOMNIA, UNSPECIFIED TYPE: ICD-10-CM

## 2020-02-05 DIAGNOSIS — R51.9 HEADACHE UPON AWAKENING: ICD-10-CM

## 2020-02-05 DIAGNOSIS — R06.83 SNORING: ICD-10-CM

## 2020-02-05 DIAGNOSIS — G47.19 EXCESSIVE DAYTIME SLEEPINESS: ICD-10-CM

## 2020-02-05 PROCEDURE — 95810 POLYSOM 6/> YRS 4/> PARAM: CPT

## 2020-02-05 NOTE — TELEPHONE ENCOUNTER
I reviewed the patient liability acknowledgement form with the patient on the telephone  Patients who cancel their morning sleep study after 3:00 pm the day prior to the study, or cancel their evening sleep study after 12:00 pm on the day of the study, or fail to arrive for their scheduled appointment will be charged a $100 No Show Fee  Sleep studies scheduled on Sundays must be cancelled by 12:00 noon on the preceding Friday  The patient acknowledges verbally that they are financially responsible for a $100 No Show charge if they do not cancel their sleep study by the cancellation time requirement listed above  This charge will not be covered by their insurance company  This charge must be paid prior to the test being re-scheduled

## 2020-02-06 NOTE — PROGRESS NOTES
Sleep Study Documentation    Pre-Sleep Study       Sleep testing procedure explained to patient:YES    Patient napped prior to study:NO    Caffeine:Dayshift worker after 12PM   Caffeine use:YES- tea  6 to 18 ounces    Alcohol:Dayshift workers after 5PM: Alcohol use:NO    Typical day for patient:YES       Study Documentation    Sleep Study Indications: Snoring, EDS, chronic headaches    Sleep Study: Diagnostic   Snore: Moderate  Supplemental O2: no    O2 flow rate (L/min) range n/a  O2 flow rate (L/min) final n/a  Minimum SaO2 87  Baseline SaO2 91        Mode of Therapy:    EKG abnormalities: no     EEG abnormalities: no    Sleep Study Recorded < 2 hours: N/A    Sleep Study Recorded > 2 hours but incomplete study: N/A    Sleep Study Recorded 6 hours but no sleep obtained: NO    Patient classification: unemployed       Post-Sleep Study    Medication used at bedtime or during sleep study:YES prescription sleep aid and other prescription medications    Patient reports time it took to fall asleep:greater than 60 minutes    Patient reports waking up during study:3 or more times  Patient reports returning to sleep in greater than 30 minutes  Patient reports sleeping 2 to 4 hours without dreaming  Patient reports sleep during study:better than usual    Patient rated sleepiness: Not sleepy or tired    PAP treatment:no

## 2020-02-07 ENCOUNTER — TELEPHONE (OUTPATIENT)
Dept: SLEEP CENTER | Facility: CLINIC | Age: 60
End: 2020-02-07

## 2020-02-13 ENCOUNTER — OFFICE VISIT (OUTPATIENT)
Dept: CARDIOLOGY CLINIC | Facility: CLINIC | Age: 60
End: 2020-02-13
Payer: COMMERCIAL

## 2020-02-13 VITALS
DIASTOLIC BLOOD PRESSURE: 88 MMHG | BODY MASS INDEX: 28.32 KG/M2 | HEART RATE: 65 BPM | HEIGHT: 61 IN | SYSTOLIC BLOOD PRESSURE: 140 MMHG | WEIGHT: 150 LBS

## 2020-02-13 DIAGNOSIS — I20.8 OTHER FORMS OF ANGINA PECTORIS (HCC): ICD-10-CM

## 2020-02-13 DIAGNOSIS — I10 ESSENTIAL HYPERTENSION: ICD-10-CM

## 2020-02-13 DIAGNOSIS — E78.2 MIXED HYPERLIPIDEMIA: ICD-10-CM

## 2020-02-13 DIAGNOSIS — I25.10 CORONARY ARTERY DISEASE INVOLVING NATIVE CORONARY ARTERY OF NATIVE HEART WITHOUT ANGINA PECTORIS: Primary | ICD-10-CM

## 2020-02-13 PROCEDURE — 3060F POS MICROALBUMINURIA REV: CPT | Performed by: INTERNAL MEDICINE

## 2020-02-13 PROCEDURE — 3079F DIAST BP 80-89 MM HG: CPT | Performed by: INTERNAL MEDICINE

## 2020-02-13 PROCEDURE — 3077F SYST BP >= 140 MM HG: CPT | Performed by: INTERNAL MEDICINE

## 2020-02-13 PROCEDURE — 93000 ELECTROCARDIOGRAM COMPLETE: CPT | Performed by: INTERNAL MEDICINE

## 2020-02-13 PROCEDURE — 3008F BODY MASS INDEX DOCD: CPT | Performed by: INTERNAL MEDICINE

## 2020-02-13 PROCEDURE — 99214 OFFICE O/P EST MOD 30 MIN: CPT | Performed by: INTERNAL MEDICINE

## 2020-02-13 PROCEDURE — 1036F TOBACCO NON-USER: CPT | Performed by: INTERNAL MEDICINE

## 2020-02-13 PROCEDURE — 3046F HEMOGLOBIN A1C LEVEL >9.0%: CPT | Performed by: INTERNAL MEDICINE

## 2020-02-13 RX ORDER — TEMAZEPAM 22.5 MG/1
30 CAPSULE ORAL
COMMUNITY
End: 2020-02-18 | Stop reason: SDUPTHER

## 2020-02-13 NOTE — PROGRESS NOTES
Patient ID: Minnie Rivers is a 61 y o  female  Plan:      Coronary artery disease involving native coronary artery of native heart  · S/p POBA to OM1 11/28/18  No intervention on 100% left posterolateral    Chest pain  Musculoskeletal  Reassured today  Mixed hyperlipidemia  Tolerating statin therapy  Essential hypertension  Adequately control  Follow up Plan:  Mainly I reassured the patient as to the musculoskeletal nature of her symptoms  Clopidogrel can be stopped as it is more than 1 year post balloon angioplasty  Return visit and EKG in 1 year  HPI:  The patient is seen in follow-up today for reasons stated above  No recent angina  She has had some right parasternal chest discomfort that can wax and wane  It feels very different than what she experienced prior to coronary intervention  It is worsened with palpation on 1 of her ribs  To reiterate, in November of 2018 heart catheterization revealed normal LAD and right coronary artery  There was a 90% marginal stenosis treated with plain old balloon angioplasty  A posterolateral branch was occluded  Results for orders placed or performed in visit on 02/13/20   POCT ECG    Impression    Normal sinus rhythm  Within normal limits           Most recent or relevant cardiac/vascular testing:    Myoview 05/31/2019:  Normal       Past Surgical History:   Procedure Laterality Date    ABDOMINAL SURGERY      phill    ANKLE SURGERY      tubal    CARDIAC CATHETERIZATION  11/2018    Successful balloon angioplassty to OM1    CHOLECYSTECTOMY      FRACTURE SURGERY      right ankle    GALLBLADDER SURGERY      TUBAL LIGATION       CMP:   Lab Results   Component Value Date     06/26/2014    K 4 3 07/12/2019    K 4 5 06/26/2014     07/12/2019     06/26/2014    CO2 21 07/12/2019    CO2 26 4 06/26/2014    BUN 13 07/12/2019    BUN 13 06/26/2014    CREATININE 0 89 07/12/2019    CREATININE 0 81 06/26/2014    GLUCOSE 121 06/26/2014    EGFR 71 07/12/2019       Lipid Profile:   Lab Results   Component Value Date    CHOL 235 06/26/2014    TRIG 238 (H) 03/11/2019    TRIG 358 06/26/2014    HDL 44 03/11/2019    HDL 31 06/26/2014         Review of Systems   10  point ROS  was otherwise non pertinent or negative except as per HPI or as below  Gait: Normal         Objective:     /88   Pulse 65   Ht 5' 1" (1 549 m)   Wt 68 kg (150 lb)   BMI 28 34 kg/m²     PHYSICAL EXAM:    General:  Normal appearance in no distress  Eyes:  Anicteric  Oral mucosa:  Moist   Neck:  No JVD  Carotid upstrokes are brisk without bruits  No masses  Chest:  Clear to auscultation and percussion  Cardiac:  Normal PMI  Normal S1 and S2  No murmur gallop or rub  Abdomen:  Soft and nontender  No palpable organomegaly or aortic enlargement  Extremities:  No peripheral edema  Musculoskeletal:  Symmetric  Vascular:  Femoral pulses are brisk without bruits  Popliteal pulses are intact bilaterally  Pedal pulses are intact  Neuro:  Grossly symmetric  Psych:  Alert and oriented x3  Current Outpatient Medications:     ACCU-CHEK FASTCLIX LANCETS MISC, TEST THREE TIMES A DAY, Disp: 100 each, Rfl: 2    ACCU-CHEK GUIDE test strip, USE TO TEST SUGARS 7 TIMES DAILY  E11 9, Disp: 200 each, Rfl: 2    ACCU-CHEK GUIDE test strip, USE TO TEST SUGARS 7 TIMES DAILY   E11 9, Disp: 200 each, Rfl: 4    ADMELOG SOLOSTAR 100 units/mL injection pen, INJECT 10 UNITS UNDER THE SKIN AT DINNER TIME, Disp: 30 mL, Rfl: 0    amLODIPine (NORVASC) 5 mg tablet, Take 1 tablet (5 mg total) by mouth daily, Disp: 90 tablet, Rfl: 1    aspirin (ECOTRIN LOW STRENGTH) 81 mg EC tablet, Take 81 mg by mouth daily, Disp: , Rfl:     atorvastatin (LIPITOR) 40 mg tablet, Take 1 tablet (40 mg total) by mouth daily with dinner, Disp: 90 tablet, Rfl: 2    BD PEN NEEDLE RACHEL U/F 32G X 4 MM MISC, Inject under the skin QID, Disp: 100 each, Rfl: 3    benazepril (LOTENSIN) 20 mg tablet, take 1 tablet by mouth daily, Disp: 90 tablet, Rfl: 1    cyclobenzaprine (FLEXERIL) 5 mg tablet, Take 1 tablet (5 mg total) by mouth daily, Disp: 30 tablet, Rfl: 1    insulin glargine (LANTUS SOLOSTAR) 100 units/mL injection pen, Inject 55 Units under the skin daily, Disp: 5 pen, Rfl: 3    metFORMIN (GLUCOPHAGE) 1000 MG tablet, Take 1 tablet (1,000 mg total) by mouth 2 (two) times a day with meals, Disp: 60 tablet, Rfl: 6    metoprolol tartrate (LOPRESSOR) 25 mg tablet, Take 1 tablet (25 mg total) by mouth every 12 (twelve) hours, Disp: 180 tablet, Rfl: 3    nitroglycerin (NITROSTAT) 0 4 mg SL tablet, Place 1 tablet (0 4 mg total) under the tongue every 5 (five) minutes as needed for chest pain, Disp: 100 tablet, Rfl: 3    pantoprazole (PROTONIX) 40 mg tablet, Take 1 tablet (40 mg total) by mouth daily, Disp: 30 tablet, Rfl: 3    rOPINIRole (REQUIP) 1 mg tablet, Take 1 tablet (1 mg total) by mouth daily, Disp: 90 tablet, Rfl: 1    sertraline (ZOLOFT) 50 mg tablet, Take 1 tablet (50 mg total) by mouth daily, Disp: 30 tablet, Rfl: 6    temazepam (RESTORIL) 22 5 MG capsule, Take 30 mg by mouth daily at bedtime as needed for sleep, Disp: , Rfl:     tiZANidine (ZANAFLEX) 4 mg tablet, Take 4 mg by mouth every 8 (eight) hours as needed for muscle spasms 1/2-1, Disp: , Rfl:     traMADol (ULTRAM) 50 mg tablet, Take 50 mg by mouth every 8 (eight) hours, Disp: , Rfl:   No Known Allergies  Past Medical History:   Diagnosis Date    CAD (coronary artery disease)     Chronic back pain     Family history of early CAD     Hyperlipidemia     Hypertension     Myocardial infarction ([facility] [address] )     Restless leg syndrome     Type 2 diabetes mellitus (HCC)            Social History     Tobacco Use   Smoking Status Never Smoker   Smokeless Tobacco Never Used

## 2020-02-14 ENCOUNTER — TELEPHONE (OUTPATIENT)
Dept: SLEEP CENTER | Facility: CLINIC | Age: 60
End: 2020-02-14

## 2020-02-15 ENCOUNTER — HOSPITAL ENCOUNTER (OUTPATIENT)
Dept: SLEEP CENTER | Facility: HOSPITAL | Age: 60
Discharge: HOME | End: 2020-02-15
Attending: INTERNAL MEDICINE
Payer: COMMERCIAL

## 2020-02-15 DIAGNOSIS — R51.9 HEADACHE UPON AWAKENING: ICD-10-CM

## 2020-02-15 DIAGNOSIS — G47.19 EXCESSIVE DAYTIME SLEEPINESS: ICD-10-CM

## 2020-02-15 DIAGNOSIS — G47.00 INSOMNIA, UNSPECIFIED TYPE: ICD-10-CM

## 2020-02-15 DIAGNOSIS — R06.83 SNORING: ICD-10-CM

## 2020-02-15 PROCEDURE — 95811 POLYSOM 6/>YRS CPAP 4/> PARM: CPT

## 2020-02-17 DIAGNOSIS — G47.33 OSA (OBSTRUCTIVE SLEEP APNEA): Primary | ICD-10-CM

## 2020-02-17 DIAGNOSIS — I21.4 NSTEMI (NON-ST ELEVATED MYOCARDIAL INFARCTION) (HCC): ICD-10-CM

## 2020-02-17 NOTE — PROGRESS NOTES
Sleep Study Documentation    Pre-Sleep Study       Sleep testing procedure explained to patient:YES    Patient napped prior to study:YES- more than 30 minutes  Napped after 2PM: no    Caffeine:Dayshift worker after 12PM   Caffeine use:YES- coffee  6 to 18 ounces    Alcohol:Dayshift workers after 5PM: Alcohol use:NO    Typical day for patient:YES       Study Documentation    Sleep Study Indications: VINICIO diagnosed in lab    Sleep Study: Treatment   Optimal PAP pressure: +7  Leak:Small  Snore:Eliminated  REM Obtained:yes  Supplemental O2: no    Minimum SaO2 90  Baseline SaO2 94  PAP mask tried (list all)N20  PAP mask choice (final)N20  PAP mask type:nasal  PAP pressure at which snoring was eliminated +7  Minimum SaO2 at final PAP pressure 90  Mode of Therapy:CPAP  ETCO2:No  CPAP changed to BiPAP:No    Mode of Therapy:CPAP    EKG abnormalities: no     EEG abnormalities: no    Sleep Study Recorded < 2 hours: N/A    Sleep Study Recorded > 2 hours but incomplete study: N/A    Sleep Study Recorded 6 hours but no sleep obtained: NO    Patient classification: unemployed       Post-Sleep Study    Medication used at bedtime or during sleep study:YES prescription sleep aid and other prescription medications    Patient reports time it took to fall asleep:less than 20 minutes    Patient reports waking up during study:1 to 2 times  Patient reports returning to sleep in 10 to 30 minutes  Patient reports sleeping 4 to 6 hours with dreaming  Patient reports sleep during study:better than usual    Patient rated sleepiness: Somewhat sleepy or tired    PAP treatment:yes: Post PAP treatment patient reports feeling better and  would wear PAP mask at home

## 2020-02-18 ENCOUNTER — TELEPHONE (OUTPATIENT)
Dept: SLEEP CENTER | Facility: CLINIC | Age: 60
End: 2020-02-18

## 2020-02-18 DIAGNOSIS — I21.4 NSTEMI (NON-ST ELEVATED MYOCARDIAL INFARCTION) (HCC): ICD-10-CM

## 2020-02-18 DIAGNOSIS — G25.81 RESTLESS LEG SYNDROME: Primary | Chronic | ICD-10-CM

## 2020-02-18 DIAGNOSIS — M62.838 MUSCLE SPASM: ICD-10-CM

## 2020-02-18 RX ORDER — CYCLOBENZAPRINE HCL 5 MG
5 TABLET ORAL DAILY
Qty: 30 TABLET | Refills: 1 | Status: SHIPPED | OUTPATIENT
Start: 2020-02-18 | End: 2021-03-12 | Stop reason: SDUPTHER

## 2020-02-18 RX ORDER — ATORVASTATIN CALCIUM 40 MG/1
40 TABLET, FILM COATED ORAL
Qty: 90 TABLET | Refills: 2 | Status: SHIPPED | OUTPATIENT
Start: 2020-02-18 | End: 2020-04-20 | Stop reason: SDUPTHER

## 2020-02-18 RX ORDER — ATORVASTATIN CALCIUM 40 MG/1
TABLET, FILM COATED ORAL
Qty: 90 TABLET | Refills: 2 | Status: SHIPPED | OUTPATIENT
Start: 2020-02-18 | End: 2020-02-18 | Stop reason: SDUPTHER

## 2020-02-18 RX ORDER — TEMAZEPAM 22.5 MG/1
22.5 CAPSULE ORAL
Qty: 30 CAPSULE | Refills: 1 | Status: SHIPPED | OUTPATIENT
Start: 2020-02-18 | End: 2020-03-02 | Stop reason: SDUPTHER

## 2020-02-18 NOTE — TELEPHONE ENCOUNTER
Has follow-up & DME set-up 4/30- will offer earlier appointment  Left message to call office for study results

## 2020-02-19 DIAGNOSIS — E11.8 TYPE 2 DIABETES MELLITUS WITH COMPLICATION, WITH LONG-TERM CURRENT USE OF INSULIN (HCC): ICD-10-CM

## 2020-02-19 DIAGNOSIS — Z79.4 TYPE 2 DIABETES MELLITUS WITH COMPLICATION, WITH LONG-TERM CURRENT USE OF INSULIN (HCC): ICD-10-CM

## 2020-02-19 RX ORDER — PEN NEEDLE, DIABETIC 32GX 5/32"
NEEDLE, DISPOSABLE MISCELLANEOUS
Qty: 100 EACH | Refills: 3 | Status: SHIPPED | OUTPATIENT
Start: 2020-02-19 | End: 2020-11-23

## 2020-02-20 ENCOUNTER — OFFICE VISIT (OUTPATIENT)
Dept: SLEEP CENTER | Facility: CLINIC | Age: 60
End: 2020-02-20
Payer: COMMERCIAL

## 2020-02-20 VITALS
WEIGHT: 150 LBS | DIASTOLIC BLOOD PRESSURE: 84 MMHG | HEART RATE: 73 BPM | BODY MASS INDEX: 28.32 KG/M2 | SYSTOLIC BLOOD PRESSURE: 124 MMHG | OXYGEN SATURATION: 97 % | HEIGHT: 61 IN

## 2020-02-20 DIAGNOSIS — I10 ESSENTIAL HYPERTENSION: ICD-10-CM

## 2020-02-20 DIAGNOSIS — I25.10 CORONARY ARTERY DISEASE INVOLVING NATIVE CORONARY ARTERY OF NATIVE HEART WITHOUT ANGINA PECTORIS: ICD-10-CM

## 2020-02-20 DIAGNOSIS — G47.00 INSOMNIA, UNSPECIFIED TYPE: ICD-10-CM

## 2020-02-20 DIAGNOSIS — G25.81 RESTLESS LEG SYNDROME: ICD-10-CM

## 2020-02-20 DIAGNOSIS — M54.50 CHRONIC BILATERAL LOW BACK PAIN WITHOUT SCIATICA: ICD-10-CM

## 2020-02-20 DIAGNOSIS — R51.9 HEADACHE UPON AWAKENING: ICD-10-CM

## 2020-02-20 DIAGNOSIS — G47.19 EXCESSIVE DAYTIME SLEEPINESS: ICD-10-CM

## 2020-02-20 DIAGNOSIS — E11.9 TYPE 2 DIABETES MELLITUS WITHOUT COMPLICATION, WITH LONG-TERM CURRENT USE OF INSULIN (HCC): ICD-10-CM

## 2020-02-20 DIAGNOSIS — F32.A DEPRESSION, UNSPECIFIED DEPRESSION TYPE: ICD-10-CM

## 2020-02-20 DIAGNOSIS — E66.3 OVERWEIGHT (BMI 25.0-29.9): ICD-10-CM

## 2020-02-20 DIAGNOSIS — G89.29 CHRONIC BILATERAL LOW BACK PAIN WITHOUT SCIATICA: ICD-10-CM

## 2020-02-20 DIAGNOSIS — G47.33 OSA (OBSTRUCTIVE SLEEP APNEA): Primary | ICD-10-CM

## 2020-02-20 DIAGNOSIS — Z79.4 TYPE 2 DIABETES MELLITUS WITHOUT COMPLICATION, WITH LONG-TERM CURRENT USE OF INSULIN (HCC): ICD-10-CM

## 2020-02-20 PROCEDURE — 99214 OFFICE O/P EST MOD 30 MIN: CPT | Performed by: INTERNAL MEDICINE

## 2020-02-20 PROCEDURE — 3079F DIAST BP 80-89 MM HG: CPT | Performed by: INTERNAL MEDICINE

## 2020-02-20 PROCEDURE — 3008F BODY MASS INDEX DOCD: CPT | Performed by: INTERNAL MEDICINE

## 2020-02-20 PROCEDURE — 1036F TOBACCO NON-USER: CPT | Performed by: INTERNAL MEDICINE

## 2020-02-20 PROCEDURE — 3060F POS MICROALBUMINURIA REV: CPT | Performed by: INTERNAL MEDICINE

## 2020-02-20 PROCEDURE — 3074F SYST BP LT 130 MM HG: CPT | Performed by: INTERNAL MEDICINE

## 2020-02-20 PROCEDURE — 3046F HEMOGLOBIN A1C LEVEL >9.0%: CPT | Performed by: INTERNAL MEDICINE

## 2020-02-20 NOTE — PROGRESS NOTES
Review of Systems      Genitourinary need to urinate more than twice a night   Cardiology Frequent chest pain or angina,  and palpitations/fluttering feeling in the chest   Gastrointestinal abdominal pain or cramping that disturb sleep    Neurology frequent headaches, awaken with headache, muscle weakness, numbness/tingling of an extremity, forgetfulness, difficulty with memory and balance problems   Constitutional fatigue and weight change   Integumentary none   Psychiatry anxiety, depression and mood change   Musculoskeletal joint pain, muscle aches, back pain, legs twitching/jerking and sciatica   Pulmonary shortness of breath with activity, chest tightness and snoring   ENT throat clearing   Endocrine none   Hematological none

## 2020-02-20 NOTE — PATIENT INSTRUCTIONS

## 2020-02-20 NOTE — PROGRESS NOTES
Follow-up Note - [address] Nena Oconnell Nicole  61 y o  female  RGX:[id number]/35/0899  YCA:[id number]    I saw Tyrell Jacques in sleep clinic today for her sleep complaints & comorbidities  The patient had both diagnostic and therapeutic sleep studies and is here to review results and to initiate/adjust therapy  The diagnostic study confirmed mild obstructive sleep apnea:  AHI 6 7/hour   higher during stage REM  Intermittent snoring of moderate intensity was noted and there was also 1 respiratory effort-related arousals  Minimum oxygen saturation 87 % and 5 4 minutes of total sleep time was spent with saturations less than 90%  She had difficulty initiating and maintaining sleep  Sleep efficiency was reduced at 60%  During the subsequent therapeutic study, sleep disordered breathing was sufficiently remediated with PAP at 7 cm H2O  The AHI at this setting was 4 7 per hour  She was observed while supine but only briefly during stage REM sleep efficiency was reduced at 60 2%    PFSH, Problem List, Medications & Allergies were reviewed in EMR  Interval changes: none reported  She  has a past medical history of CAD (coronary artery disease), Chronic back pain, Family history of early CAD, Hyperlipidemia, Hypertension, Myocardial infarction ([facility] [address] ), Restless leg syndrome, and Type 2 diabetes mellitus ([facility] [address] )  She has a current medication list which includes the following prescription(s): accu-chek fastclix lancets, accu-chek guide, accu-chek guide, admelog solostar, amlodipine, aspirin, atorvastatin, bd pen needle marie u/f, benazepril, cyclobenzaprine, insulin glargine, metformin, metoprolol tartrate, nitroglycerin, pantoprazole, ropinirole, sertraline, temazepam, tizanidine, and tramadol  ROS: reviewed see attached  Significant for ongoing back pain  HPI:  Patient had no difficulty tolerating the mask or the pressure on the study night   Reported no nasal congestion, no mucosal dryness, no chest or abdominal discomfort  Sleep was   better than usual and felt more energetic and less foggy the next day  She had no headache upon awakening  Restless leg symptoms are controlled on ropinirole  Sleep Routine: No interval changes:  She is spending around 12 hours in bed but estimates she is only getting 6 hours sleep  She has difficulty both initiating and maintaining sleep in spite of several medications  Toshia Josue has excessive drowsiness and rated herself at Total score: 16 /24 on the Stanton sleepiness scale ]        Habits:  reports that she has never smoked  She has never used smokeless tobacco  She reports that she drinks alcohol  She reports that she does not use drugs  EXAM: /84 (BP Location: Left arm, Cuff Size: Standard)   Pulse 73   Ht 5' 1" (1 549 m)   Wt 68 kg (150 lb)   SpO2 97%   BMI 28 34 kg/m²     Patient is well groomed; well appearing  Skin/Extrem: warm & dry; col & hydration normal; no edema  Psych: cooperativeand in no distress  Mental state appears normal   CNS: Alert, orientated, clear & coherent speech  H&N: EOMI; NC/AT:no facial pressure marks, no rashes  Neck Circumference: 14 5 cm  ENMT Mucosa appearsnormal Nasal airway:patent  Oral airway:  crowded  Resp:effort is normal CVS: RRR ABD: truncal obesity MSK:Gait normal     IMPRESSION: Primary Sleep/Secondary(to Medical or Psych conditions) & comorbidities   1  VINICIO (obstructive sleep apnea)     2  Insomnia, unspecified type     3  Restless leg syndrome     4  Headache upon awakening     5  Excessive daytime sleepiness     6  Depression, unspecified depression type     7  Chronic bilateral low back pain without sciatica     8  Essential hypertension     9  Coronary artery disease involving native coronary artery of native heart without angina pectoris     10  Overweight (BMI 25 0-29 9)     11  Type 2 diabetes mellitus without complication, with long-term current use of insulin ([facility] [address] )       PLAN:    1   I reviewed results of the Sleep studies with the patient  2  With respect to above conditions, I counseled on pathophysiology, diagnosis, treatment options, risks and benefits; inter-relationship and effects on symptoms and comorbidities; risks of no treatment; costs and insurance aspects  3  Patient elected positive airway pressure therapy and care coordinated with the DME provider for set up  4  Need for compliance with therapy and weight reduction were emphasized  5  Cognitive behavioral therapy was continued, Sleep Hygiene and behavioral techniques to manage Insomnia were discussed  6  She will also need adequate treatment of her pain and depression  7  Follow-up to be scheduled in 2 months to monitor compliance, progress and to adjust therapy  Thank you for allowing me to participate in the care of this patient  I will keep you apprised of developments      Sincerely,    Authenticated electronically by Sean Pino MD on 67/57/24   Board Certified Specialist

## 2020-02-21 ENCOUNTER — TELEPHONE (OUTPATIENT)
Dept: SLEEP CENTER | Facility: CLINIC | Age: 60
End: 2020-02-21

## 2020-02-24 DIAGNOSIS — E11.9 TYPE 2 DIABETES MELLITUS WITHOUT COMPLICATION, WITHOUT LONG-TERM CURRENT USE OF INSULIN (HCC): ICD-10-CM

## 2020-02-27 DIAGNOSIS — G25.81 RESTLESS LEG SYNDROME: Chronic | ICD-10-CM

## 2020-02-27 DIAGNOSIS — E11.9 TYPE 2 DIABETES MELLITUS WITHOUT COMPLICATION, WITHOUT LONG-TERM CURRENT USE OF INSULIN (HCC): ICD-10-CM

## 2020-02-27 RX ORDER — TEMAZEPAM 22.5 MG/1
22.5 CAPSULE ORAL
Qty: 30 CAPSULE | Refills: 1 | Status: CANCELLED | OUTPATIENT
Start: 2020-02-27

## 2020-03-02 DIAGNOSIS — G25.81 RESTLESS LEG SYNDROME: Chronic | ICD-10-CM

## 2020-03-03 DIAGNOSIS — E11.8 TYPE 2 DIABETES MELLITUS WITH COMPLICATION, WITH LONG-TERM CURRENT USE OF INSULIN (HCC): ICD-10-CM

## 2020-03-03 DIAGNOSIS — Z79.4 TYPE 2 DIABETES MELLITUS WITH COMPLICATION, WITH LONG-TERM CURRENT USE OF INSULIN (HCC): ICD-10-CM

## 2020-03-03 RX ORDER — TEMAZEPAM 30 MG/1
30 CAPSULE ORAL
Qty: 30 CAPSULE | Refills: 1 | Status: SHIPPED | OUTPATIENT
Start: 2020-03-03 | End: 2020-04-20 | Stop reason: SDUPTHER

## 2020-03-04 RX ORDER — INSULIN GLARGINE 100 [IU]/ML
INJECTION, SOLUTION SUBCUTANEOUS
Qty: 15 ML | Refills: 5 | Status: SHIPPED | OUTPATIENT
Start: 2020-03-04 | End: 2020-08-26

## 2020-03-20 ENCOUNTER — DOCUMENTATION (OUTPATIENT)
Dept: CARDIOLOGY CLINIC | Facility: CLINIC | Age: 60
End: 2020-03-20

## 2020-03-23 ENCOUNTER — APPOINTMENT (OUTPATIENT)
Dept: LAB | Facility: CLINIC | Age: 60
End: 2020-03-23
Payer: COMMERCIAL

## 2020-03-23 ENCOUNTER — APPOINTMENT (OUTPATIENT)
Dept: RADIOLOGY | Facility: CLINIC | Age: 60
End: 2020-03-23
Payer: COMMERCIAL

## 2020-03-23 ENCOUNTER — TRANSCRIBE ORDERS (OUTPATIENT)
Dept: LAB | Facility: CLINIC | Age: 60
End: 2020-03-23

## 2020-03-23 DIAGNOSIS — C50.911 MALIGNANT NEOPLASM OF RIGHT FEMALE BREAST, UNSPECIFIED ESTROGEN RECEPTOR STATUS, UNSPECIFIED SITE OF BREAST (HCC): ICD-10-CM

## 2020-03-23 DIAGNOSIS — C50.911 MALIGNANT NEOPLASM OF RIGHT FEMALE BREAST, UNSPECIFIED ESTROGEN RECEPTOR STATUS, UNSPECIFIED SITE OF BREAST (HCC): Primary | ICD-10-CM

## 2020-03-23 DIAGNOSIS — Z79.4 TYPE 2 DIABETES MELLITUS WITH COMPLICATION, WITH LONG-TERM CURRENT USE OF INSULIN (HCC): ICD-10-CM

## 2020-03-23 DIAGNOSIS — E11.8 TYPE 2 DIABETES MELLITUS WITH COMPLICATION, WITH LONG-TERM CURRENT USE OF INSULIN (HCC): ICD-10-CM

## 2020-03-23 LAB
ALBUMIN SERPL BCP-MCNC: 3.9 G/DL (ref 3.5–5)
ALP SERPL-CCNC: 156 U/L (ref 46–116)
ALT SERPL W P-5'-P-CCNC: 25 U/L (ref 12–78)
ANION GAP SERPL CALCULATED.3IONS-SCNC: 6 MMOL/L (ref 4–13)
AST SERPL W P-5'-P-CCNC: 16 U/L (ref 5–45)
BASOPHILS # BLD AUTO: 0.06 THOUSANDS/ΜL (ref 0–0.1)
BASOPHILS NFR BLD AUTO: 1 % (ref 0–1)
BILIRUB SERPL-MCNC: 0.45 MG/DL (ref 0.2–1)
BUN SERPL-MCNC: 21 MG/DL (ref 5–25)
CALCIUM SERPL-MCNC: 9.4 MG/DL (ref 8.3–10.1)
CHLORIDE SERPL-SCNC: 107 MMOL/L (ref 100–108)
CO2 SERPL-SCNC: 23 MMOL/L (ref 21–32)
CREAT SERPL-MCNC: 0.96 MG/DL (ref 0.6–1.3)
EOSINOPHIL # BLD AUTO: 0.24 THOUSAND/ΜL (ref 0–0.61)
EOSINOPHIL NFR BLD AUTO: 3 % (ref 0–6)
ERYTHROCYTE [DISTWIDTH] IN BLOOD BY AUTOMATED COUNT: 13.2 % (ref 11.6–15.1)
EST. AVERAGE GLUCOSE BLD GHB EST-MCNC: 298 MG/DL
GFR SERPL CREATININE-BSD FRML MDRD: 65 ML/MIN/1.73SQ M
GLUCOSE P FAST SERPL-MCNC: 366 MG/DL (ref 65–99)
HBA1C MFR BLD: 12 %
HCT VFR BLD AUTO: 41.3 % (ref 34.8–46.1)
HGB BLD-MCNC: 13.6 G/DL (ref 11.5–15.4)
IMM GRANULOCYTES # BLD AUTO: 0.01 THOUSAND/UL (ref 0–0.2)
IMM GRANULOCYTES NFR BLD AUTO: 0 % (ref 0–2)
LYMPHOCYTES # BLD AUTO: 2.99 THOUSANDS/ΜL (ref 0.6–4.47)
LYMPHOCYTES NFR BLD AUTO: 36 % (ref 14–44)
MCH RBC QN AUTO: 30.3 PG (ref 26.8–34.3)
MCHC RBC AUTO-ENTMCNC: 32.9 G/DL (ref 31.4–37.4)
MCV RBC AUTO: 92 FL (ref 82–98)
MONOCYTES # BLD AUTO: 0.42 THOUSAND/ΜL (ref 0.17–1.22)
MONOCYTES NFR BLD AUTO: 5 % (ref 4–12)
NEUTROPHILS # BLD AUTO: 4.61 THOUSANDS/ΜL (ref 1.85–7.62)
NEUTS SEG NFR BLD AUTO: 55 % (ref 43–75)
NRBC BLD AUTO-RTO: 0 /100 WBCS
PLATELET # BLD AUTO: 337 THOUSANDS/UL (ref 149–390)
PMV BLD AUTO: 9.6 FL (ref 8.9–12.7)
POTASSIUM SERPL-SCNC: 4 MMOL/L (ref 3.5–5.3)
PROT SERPL-MCNC: 8.4 G/DL (ref 6.4–8.2)
RBC # BLD AUTO: 4.49 MILLION/UL (ref 3.81–5.12)
SODIUM SERPL-SCNC: 136 MMOL/L (ref 136–145)
WBC # BLD AUTO: 8.33 THOUSAND/UL (ref 4.31–10.16)

## 2020-03-23 PROCEDURE — 84681 ASSAY OF C-PEPTIDE: CPT

## 2020-03-23 PROCEDURE — 85025 COMPLETE CBC W/AUTO DIFF WBC: CPT

## 2020-03-23 PROCEDURE — 36415 COLL VENOUS BLD VENIPUNCTURE: CPT

## 2020-03-23 PROCEDURE — 71046 X-RAY EXAM CHEST 2 VIEWS: CPT

## 2020-03-23 PROCEDURE — 80053 COMPREHEN METABOLIC PANEL: CPT

## 2020-03-23 PROCEDURE — 83036 HEMOGLOBIN GLYCOSYLATED A1C: CPT

## 2020-03-24 LAB — C PEPTIDE SERPL-MCNC: 2.7 NG/ML (ref 1.1–4.4)

## 2020-03-27 DIAGNOSIS — I10 ESSENTIAL HYPERTENSION: ICD-10-CM

## 2020-03-30 PROCEDURE — 4010F ACE/ARB THERAPY RXD/TAKEN: CPT | Performed by: FAMILY MEDICINE

## 2020-03-30 RX ORDER — BENAZEPRIL HYDROCHLORIDE 20 MG/1
20 TABLET ORAL DAILY
Qty: 90 TABLET | Refills: 1 | Status: SHIPPED | OUTPATIENT
Start: 2020-03-30 | End: 2021-03-12 | Stop reason: SDUPTHER

## 2020-04-06 ENCOUNTER — TELEMEDICINE (OUTPATIENT)
Dept: FAMILY MEDICINE CLINIC | Facility: CLINIC | Age: 60
End: 2020-04-06
Payer: COMMERCIAL

## 2020-04-06 DIAGNOSIS — E11.9 TYPE 2 DIABETES MELLITUS WITHOUT COMPLICATION, WITH LONG-TERM CURRENT USE OF INSULIN (HCC): ICD-10-CM

## 2020-04-06 DIAGNOSIS — Z79.4 TYPE 2 DIABETES MELLITUS WITHOUT COMPLICATION, WITH LONG-TERM CURRENT USE OF INSULIN (HCC): ICD-10-CM

## 2020-04-06 DIAGNOSIS — R74.8 ELEVATED ALKALINE PHOSPHATASE LEVEL: Primary | ICD-10-CM

## 2020-04-06 PROCEDURE — 99214 OFFICE O/P EST MOD 30 MIN: CPT | Performed by: FAMILY MEDICINE

## 2020-04-16 ENCOUNTER — OFFICE VISIT (OUTPATIENT)
Dept: SLEEP CENTER | Facility: CLINIC | Age: 60
End: 2020-04-16
Payer: COMMERCIAL

## 2020-04-16 VITALS — HEIGHT: 61 IN | BODY MASS INDEX: 29.45 KG/M2 | WEIGHT: 156 LBS

## 2020-04-16 DIAGNOSIS — I10 ESSENTIAL HYPERTENSION: ICD-10-CM

## 2020-04-16 DIAGNOSIS — R68.2 DRY MOUTH: ICD-10-CM

## 2020-04-16 DIAGNOSIS — E66.3 OVERWEIGHT (BMI 25.0-29.9): ICD-10-CM

## 2020-04-16 DIAGNOSIS — F32.A DEPRESSION, UNSPECIFIED DEPRESSION TYPE: ICD-10-CM

## 2020-04-16 DIAGNOSIS — G47.33 OSA (OBSTRUCTIVE SLEEP APNEA): Primary | ICD-10-CM

## 2020-04-16 DIAGNOSIS — G47.00 INSOMNIA, UNSPECIFIED TYPE: ICD-10-CM

## 2020-04-16 DIAGNOSIS — G25.81 RESTLESS LEG SYNDROME: ICD-10-CM

## 2020-04-16 DIAGNOSIS — M54.50 CHRONIC BILATERAL LOW BACK PAIN WITHOUT SCIATICA: ICD-10-CM

## 2020-04-16 DIAGNOSIS — G47.19 EXCESSIVE DAYTIME SLEEPINESS: ICD-10-CM

## 2020-04-16 DIAGNOSIS — G47.33 OBSTRUCTIVE SLEEP APNEA (ADULT) (PEDIATRIC): Primary | ICD-10-CM

## 2020-04-16 DIAGNOSIS — I25.10 CORONARY ARTERY DISEASE INVOLVING NATIVE CORONARY ARTERY OF NATIVE HEART WITHOUT ANGINA PECTORIS: ICD-10-CM

## 2020-04-16 DIAGNOSIS — G89.29 CHRONIC BILATERAL LOW BACK PAIN WITHOUT SCIATICA: ICD-10-CM

## 2020-04-16 DIAGNOSIS — R51.9 HEADACHE UPON AWAKENING: ICD-10-CM

## 2020-04-16 PROCEDURE — 99214 OFFICE O/P EST MOD 30 MIN: CPT | Performed by: INTERNAL MEDICINE

## 2020-04-16 PROCEDURE — 3046F HEMOGLOBIN A1C LEVEL >9.0%: CPT | Performed by: INTERNAL MEDICINE

## 2020-04-16 PROCEDURE — 3079F DIAST BP 80-89 MM HG: CPT | Performed by: INTERNAL MEDICINE

## 2020-04-16 PROCEDURE — 3074F SYST BP LT 130 MM HG: CPT | Performed by: INTERNAL MEDICINE

## 2020-04-16 PROCEDURE — 3008F BODY MASS INDEX DOCD: CPT | Performed by: INTERNAL MEDICINE

## 2020-04-16 PROCEDURE — 1036F TOBACCO NON-USER: CPT | Performed by: INTERNAL MEDICINE

## 2020-04-16 PROCEDURE — 3060F POS MICROALBUMINURIA REV: CPT | Performed by: INTERNAL MEDICINE

## 2020-04-16 RX ORDER — ANASTROZOLE 1 MG/1
1 TABLET ORAL DAILY
COMMUNITY
Start: 2020-04-07 | End: 2022-01-24

## 2020-04-20 ENCOUNTER — TELEPHONE (OUTPATIENT)
Dept: SLEEP CENTER | Facility: CLINIC | Age: 60
End: 2020-04-20

## 2020-04-20 ENCOUNTER — TELEMEDICINE (OUTPATIENT)
Dept: FAMILY MEDICINE CLINIC | Facility: CLINIC | Age: 60
End: 2020-04-20
Payer: COMMERCIAL

## 2020-04-20 DIAGNOSIS — G25.81 RESTLESS LEG SYNDROME: Chronic | ICD-10-CM

## 2020-04-20 DIAGNOSIS — Z79.4 TYPE 2 DIABETES MELLITUS WITHOUT COMPLICATION, WITH LONG-TERM CURRENT USE OF INSULIN (HCC): Primary | ICD-10-CM

## 2020-04-20 DIAGNOSIS — E11.9 TYPE 2 DIABETES MELLITUS WITHOUT COMPLICATION, WITH LONG-TERM CURRENT USE OF INSULIN (HCC): Primary | ICD-10-CM

## 2020-04-20 DIAGNOSIS — I21.4 NSTEMI (NON-ST ELEVATED MYOCARDIAL INFARCTION) (HCC): ICD-10-CM

## 2020-04-20 DIAGNOSIS — K21.9 GASTROESOPHAGEAL REFLUX DISEASE WITHOUT ESOPHAGITIS: ICD-10-CM

## 2020-04-20 PROCEDURE — G2012 BRIEF CHECK IN BY MD/QHP: HCPCS | Performed by: FAMILY MEDICINE

## 2020-04-20 RX ORDER — ATORVASTATIN CALCIUM 40 MG/1
40 TABLET, FILM COATED ORAL
Qty: 90 TABLET | Refills: 2 | Status: SHIPPED | OUTPATIENT
Start: 2020-04-20 | End: 2021-02-24 | Stop reason: SDUPTHER

## 2020-04-20 RX ORDER — PANTOPRAZOLE SODIUM 40 MG/1
40 TABLET, DELAYED RELEASE ORAL DAILY
Qty: 30 TABLET | Refills: 3 | Status: SHIPPED | OUTPATIENT
Start: 2020-04-20

## 2020-04-20 RX ORDER — TEMAZEPAM 30 MG/1
30 CAPSULE ORAL
Qty: 30 CAPSULE | Refills: 1 | Status: SHIPPED | OUTPATIENT
Start: 2020-04-20 | End: 2020-06-29 | Stop reason: SDUPTHER

## 2020-04-23 DIAGNOSIS — E11.9 TYPE 2 DIABETES MELLITUS WITHOUT COMPLICATION, WITHOUT LONG-TERM CURRENT USE OF INSULIN (HCC): ICD-10-CM

## 2020-05-11 DIAGNOSIS — E11.9 TYPE 2 DIABETES MELLITUS WITHOUT COMPLICATION, WITHOUT LONG-TERM CURRENT USE OF INSULIN (HCC): ICD-10-CM

## 2020-06-25 DIAGNOSIS — I10 ESSENTIAL HYPERTENSION: ICD-10-CM

## 2020-06-25 DIAGNOSIS — I25.10 CORONARY ARTERY DISEASE INVOLVING NATIVE CORONARY ARTERY OF NATIVE HEART WITHOUT ANGINA PECTORIS: ICD-10-CM

## 2020-06-26 DIAGNOSIS — I10 ESSENTIAL HYPERTENSION: ICD-10-CM

## 2020-06-26 DIAGNOSIS — I25.10 CORONARY ARTERY DISEASE INVOLVING NATIVE CORONARY ARTERY OF NATIVE HEART WITHOUT ANGINA PECTORIS: ICD-10-CM

## 2020-06-29 DIAGNOSIS — G25.81 RESTLESS LEG SYNDROME: Chronic | ICD-10-CM

## 2020-06-30 RX ORDER — TEMAZEPAM 30 MG/1
30 CAPSULE ORAL
Qty: 30 CAPSULE | Refills: 1 | Status: SHIPPED | OUTPATIENT
Start: 2020-06-30 | End: 2020-08-27 | Stop reason: SDUPTHER

## 2020-07-23 ENCOUNTER — APPOINTMENT (OUTPATIENT)
Dept: LAB | Facility: CLINIC | Age: 60
End: 2020-07-23
Payer: COMMERCIAL

## 2020-07-23 DIAGNOSIS — E11.9 TYPE 2 DIABETES MELLITUS WITHOUT COMPLICATION, WITH LONG-TERM CURRENT USE OF INSULIN (HCC): ICD-10-CM

## 2020-07-23 DIAGNOSIS — Z79.4 TYPE 2 DIABETES MELLITUS WITHOUT COMPLICATION, WITH LONG-TERM CURRENT USE OF INSULIN (HCC): ICD-10-CM

## 2020-07-23 LAB
ALBUMIN SERPL BCP-MCNC: 3.4 G/DL (ref 3.5–5)
ALP SERPL-CCNC: 108 U/L (ref 46–116)
ALT SERPL W P-5'-P-CCNC: 26 U/L (ref 12–78)
ANION GAP SERPL CALCULATED.3IONS-SCNC: 8 MMOL/L (ref 4–13)
AST SERPL W P-5'-P-CCNC: 18 U/L (ref 5–45)
BILIRUB SERPL-MCNC: 0.52 MG/DL (ref 0.2–1)
BUN SERPL-MCNC: 16 MG/DL (ref 5–25)
CALCIUM SERPL-MCNC: 9 MG/DL (ref 8.3–10.1)
CHLORIDE SERPL-SCNC: 111 MMOL/L (ref 100–108)
CO2 SERPL-SCNC: 22 MMOL/L (ref 21–32)
CREAT SERPL-MCNC: 0.8 MG/DL (ref 0.6–1.3)
EST. AVERAGE GLUCOSE BLD GHB EST-MCNC: 260 MG/DL
GFR SERPL CREATININE-BSD FRML MDRD: 80 ML/MIN/1.73SQ M
GLUCOSE P FAST SERPL-MCNC: 170 MG/DL (ref 65–99)
HBA1C MFR BLD: 10.7 %
POTASSIUM SERPL-SCNC: 3.9 MMOL/L (ref 3.5–5.3)
PROT SERPL-MCNC: 7.9 G/DL (ref 6.4–8.2)
SODIUM SERPL-SCNC: 141 MMOL/L (ref 136–145)

## 2020-07-23 PROCEDURE — 80053 COMPREHEN METABOLIC PANEL: CPT

## 2020-07-23 PROCEDURE — 36415 COLL VENOUS BLD VENIPUNCTURE: CPT

## 2020-07-23 PROCEDURE — 3046F HEMOGLOBIN A1C LEVEL >9.0%: CPT | Performed by: FAMILY MEDICINE

## 2020-07-23 PROCEDURE — 83036 HEMOGLOBIN GLYCOSYLATED A1C: CPT

## 2020-07-23 PROCEDURE — 84681 ASSAY OF C-PEPTIDE: CPT

## 2020-07-24 ENCOUNTER — OFFICE VISIT (OUTPATIENT)
Dept: FAMILY MEDICINE CLINIC | Facility: CLINIC | Age: 60
End: 2020-07-24
Payer: COMMERCIAL

## 2020-07-24 VITALS
HEIGHT: 61 IN | DIASTOLIC BLOOD PRESSURE: 89 MMHG | TEMPERATURE: 97.5 F | HEART RATE: 107 BPM | WEIGHT: 162.5 LBS | OXYGEN SATURATION: 98 % | SYSTOLIC BLOOD PRESSURE: 139 MMHG | BODY MASS INDEX: 30.68 KG/M2

## 2020-07-24 DIAGNOSIS — Z13.5 SCREENING FOR DIABETIC RETINOPATHY: ICD-10-CM

## 2020-07-24 DIAGNOSIS — E78.2 MIXED HYPERLIPIDEMIA: ICD-10-CM

## 2020-07-24 DIAGNOSIS — I10 ESSENTIAL HYPERTENSION: ICD-10-CM

## 2020-07-24 DIAGNOSIS — Z79.4 TYPE 2 DIABETES MELLITUS WITHOUT COMPLICATION, WITH LONG-TERM CURRENT USE OF INSULIN (HCC): Primary | ICD-10-CM

## 2020-07-24 DIAGNOSIS — E11.9 TYPE 2 DIABETES MELLITUS WITHOUT COMPLICATION, WITH LONG-TERM CURRENT USE OF INSULIN (HCC): Primary | ICD-10-CM

## 2020-07-24 LAB — C PEPTIDE SERPL-MCNC: 1.7 NG/ML (ref 1.1–4.4)

## 2020-07-24 PROCEDURE — 3060F POS MICROALBUMINURIA REV: CPT | Performed by: FAMILY MEDICINE

## 2020-07-24 PROCEDURE — 1036F TOBACCO NON-USER: CPT | Performed by: FAMILY MEDICINE

## 2020-07-24 PROCEDURE — 3046F HEMOGLOBIN A1C LEVEL >9.0%: CPT | Performed by: FAMILY MEDICINE

## 2020-07-24 PROCEDURE — 3079F DIAST BP 80-89 MM HG: CPT | Performed by: FAMILY MEDICINE

## 2020-07-24 PROCEDURE — 99213 OFFICE O/P EST LOW 20 MIN: CPT | Performed by: FAMILY MEDICINE

## 2020-07-24 PROCEDURE — 3008F BODY MASS INDEX DOCD: CPT | Performed by: FAMILY MEDICINE

## 2020-07-24 PROCEDURE — 3075F SYST BP GE 130 - 139MM HG: CPT | Performed by: FAMILY MEDICINE

## 2020-07-24 NOTE — PROGRESS NOTES
Assessment/Plan:    No problem-specific Assessment & Plan notes found for this encounter  Diagnoses and all orders for this visit:    Type 2 diabetes mellitus without complication, with long-term current use of insulin ([facility])  Comments:  titrate insulin up to 80 units  Orders:  -     Glucose, fasting; Future  -     Hemoglobin A1C; Future    Screening for diabetic retinopathy  -     Ambulatory referral to Ophthalmology; Future    Essential hypertension  -     CBC and differential; Future  -     TSH, 3rd generation with Free T4 reflex; Future    Mixed hyperlipidemia  -     Comprehensive metabolic panel; Future  -     Lipid panel; Future    Other orders  -     betamethasone valerate (VALISONE) 0 1 % cream; Apply topically 2 (two) times a day          PHQ-9 Depression Screening    PHQ-9:    Frequency of the following problems over the past two weeks:       Little interest or pleasure in doing things:  1 - several days  Feeling down, depressed, or hopeless:  1 - several days  PHQ-2 Score:  2            Subjective:      Patient ID: Aries Brooks is a 61 y o  female  Diabetes   She presents for her follow-up diabetic visit  She has type 2 diabetes mellitus  Her disease course has been improving  There are no hypoglycemic associated symptoms  Pertinent negatives for diabetes include no chest pain  There are no hypoglycemic complications  Symptoms are stable  There are no diabetic complications  Risk factors for coronary artery disease include obesity and sedentary lifestyle  Current diabetic treatment includes insulin injections and oral agent (monotherapy)  She is compliant with treatment most of the time  Her weight is increasing steadily  She is following a diabetic diet  Her overall blood glucose range is 180-200 mg/dl  An ACE inhibitor/angiotensin II receptor blocker is being taken         The following portions of the patient's history were reviewed and updated as appropriate: allergies, current medications, past family history, past medical history, past social history, past surgical history and problem list     Review of Systems   Eyes: Negative for visual disturbance  Cardiovascular: Negative for chest pain and palpitations  Gastrointestinal: Negative for abdominal pain, nausea and vomiting  Genitourinary: Negative for frequency  Skin: Negative for wound  Neurological: Negative for numbness  Objective:    /89   Pulse (!) 107   Temp 97 5 °F (36 4 °C)   Ht 5' 1" (1 549 m)   Wt 73 7 kg (162 lb 8 oz)   SpO2 98%   BMI 30 70 kg/m²      Physical Exam   Constitutional: She is oriented to person, place, and time  She appears well-developed and well-nourished  No distress  HENT:   Head: Normocephalic and atraumatic  Eyes: Pupils are equal, round, and reactive to light  Conjunctivae and EOM are normal  No scleral icterus  Neck: Normal range of motion  Neck supple  Cardiovascular: Normal rate, regular rhythm and normal heart sounds  No murmur heard  Pulmonary/Chest: Effort normal and breath sounds normal  No respiratory distress  She has no wheezes  She has no rales  Abdominal: Soft  Bowel sounds are normal  She exhibits no distension and no mass  There is no tenderness  There is no rebound and no guarding  Musculoskeletal: Normal range of motion  She exhibits no edema or deformity  Lymphadenopathy:     She has no cervical adenopathy  Neurological: She is alert and oriented to person, place, and time  Skin: Skin is warm and dry  She is not diaphoretic  Psychiatric: She has a normal mood and affect  Her behavior is normal  Judgment and thought content normal    Nursing note and vitals reviewed

## 2020-08-14 ENCOUNTER — HOSPITAL ENCOUNTER (EMERGENCY)
Facility: HOSPITAL | Age: 60
Discharge: HOME | End: 2020-08-15
Attending: EMERGENCY MEDICINE | Admitting: EMERGENCY MEDICINE
Payer: COMMERCIAL

## 2020-08-14 ENCOUNTER — APPOINTMENT (EMERGENCY)
Dept: CT IMAGING | Facility: HOSPITAL | Age: 60
End: 2020-08-14
Payer: COMMERCIAL

## 2020-08-14 DIAGNOSIS — R53.1 GENERALIZED WEAKNESS: ICD-10-CM

## 2020-08-14 DIAGNOSIS — R74.8 ELEVATED LIPASE: ICD-10-CM

## 2020-08-14 DIAGNOSIS — R11.10 VOMITING: ICD-10-CM

## 2020-08-14 DIAGNOSIS — R11.0 NAUSEA: Primary | ICD-10-CM

## 2020-08-14 LAB
ALBUMIN SERPL BCP-MCNC: 3.8 G/DL (ref 3.5–5.7)
ALP SERPL-CCNC: 66 U/L (ref 55–165)
ALT SERPL W P-5'-P-CCNC: 20 U/L (ref 7–52)
ANION GAP SERPL CALCULATED.3IONS-SCNC: 9 MMOL/L (ref 4–13)
AST SERPL W P-5'-P-CCNC: 25 U/L (ref 13–39)
BASE EX.OXY STD BLDV CALC-SCNC: 94.5 %
BASE EXCESS BLDV CALC-SCNC: -0.9 MMOL/L
BASOPHILS # BLD AUTO: 0 THOUSANDS/ΜL (ref 0–0.1)
BASOPHILS NFR BLD AUTO: 1 % (ref 0–2)
BETA-HYDROXYBUTYRATE: 0.1 MMOL/L
BILIRUB SERPL-MCNC: 0.4 MG/DL (ref 0.2–1)
BUN SERPL-MCNC: 26 MG/DL (ref 7–25)
CALCIUM SERPL-MCNC: 9.4 MG/DL (ref 8.6–10.5)
CHLORIDE SERPL-SCNC: 98 MMOL/L (ref 98–107)
CO2 SERPL-SCNC: 24 MMOL/L (ref 21–31)
CREAT SERPL-MCNC: 0.99 MG/DL (ref 0.6–1.2)
EOSINOPHIL # BLD AUTO: 0.1 THOUSAND/ΜL (ref 0–0.61)
EOSINOPHIL NFR BLD AUTO: 2 % (ref 0–5)
ERYTHROCYTE [DISTWIDTH] IN BLOOD BY AUTOMATED COUNT: 14.1 % (ref 11.5–14.5)
GFR SERPL CREATININE-BSD FRML MDRD: 62 ML/MIN/1.73SQ M
GLUCOSE SERPL-MCNC: 455 MG/DL (ref 65–99)
GLUCOSE SERPL-MCNC: 458 MG/DL (ref 65–140)
HCO3 BLDV-SCNC: 23.5 MMOL/L (ref 24–30)
HCT VFR BLD AUTO: 38.6 % (ref 42–47)
HGB BLD-MCNC: 13.2 G/DL (ref 12–16)
LIPASE SERPL-CCNC: 100 U/L (ref 11–82)
LYMPHOCYTES # BLD AUTO: 1.6 THOUSANDS/ΜL (ref 0.6–4.47)
LYMPHOCYTES NFR BLD AUTO: 24 % (ref 21–51)
MCH RBC QN AUTO: 31.2 PG (ref 26–34)
MCHC RBC AUTO-ENTMCNC: 34.2 G/DL (ref 31–37)
MCV RBC AUTO: 91 FL (ref 81–99)
MONOCYTES # BLD AUTO: 0.5 THOUSAND/ΜL (ref 0.17–1.22)
MONOCYTES NFR BLD AUTO: 7 % (ref 2–12)
NEUTROPHILS # BLD AUTO: 4.5 THOUSANDS/ΜL (ref 1.4–6.5)
NEUTS SEG NFR BLD AUTO: 67 % (ref 42–75)
O2 CT BLDV-SCNC: 17.2 ML/DL
PCO2 BLDV: 40.1 MM HG
PH BLDV: 7.39 [PH] (ref 7.3–7.4)
PLATELET # BLD AUTO: 251 THOUSANDS/UL (ref 149–390)
PMV BLD AUTO: 8.2 FL (ref 8.6–11.7)
PO2 BLDV: 83 MM HG (ref 35–45)
POTASSIUM SERPL-SCNC: 3.9 MMOL/L (ref 3.5–5.5)
PROT SERPL-MCNC: 6.8 G/DL (ref 6.4–8.9)
RBC # BLD AUTO: 4.22 MILLION/UL (ref 3.9–5.2)
SODIUM SERPL-SCNC: 131 MMOL/L (ref 134–143)
WBC # BLD AUTO: 6.8 THOUSAND/UL (ref 4.8–10.8)

## 2020-08-14 PROCEDURE — 96361 HYDRATE IV INFUSION ADD-ON: CPT

## 2020-08-14 PROCEDURE — 80053 COMPREHEN METABOLIC PANEL: CPT | Performed by: EMERGENCY MEDICINE

## 2020-08-14 PROCEDURE — 99284 EMERGENCY DEPT VISIT MOD MDM: CPT | Performed by: EMERGENCY MEDICINE

## 2020-08-14 PROCEDURE — 36415 COLL VENOUS BLD VENIPUNCTURE: CPT | Performed by: EMERGENCY MEDICINE

## 2020-08-14 PROCEDURE — 83690 ASSAY OF LIPASE: CPT | Performed by: EMERGENCY MEDICINE

## 2020-08-14 PROCEDURE — 82948 REAGENT STRIP/BLOOD GLUCOSE: CPT

## 2020-08-14 PROCEDURE — 82805 BLOOD GASES W/O2 SATURATION: CPT | Performed by: EMERGENCY MEDICINE

## 2020-08-14 PROCEDURE — G1004 CDSM NDSC: HCPCS

## 2020-08-14 PROCEDURE — 74177 CT ABD & PELVIS W/CONTRAST: CPT

## 2020-08-14 PROCEDURE — 99285 EMERGENCY DEPT VISIT HI MDM: CPT

## 2020-08-14 PROCEDURE — 82010 KETONE BODYS QUAN: CPT | Performed by: EMERGENCY MEDICINE

## 2020-08-14 PROCEDURE — 96374 THER/PROPH/DIAG INJ IV PUSH: CPT

## 2020-08-14 PROCEDURE — 85025 COMPLETE CBC W/AUTO DIFF WBC: CPT | Performed by: EMERGENCY MEDICINE

## 2020-08-14 RX ORDER — INSULIN GLARGINE 100 [IU]/ML
70 INJECTION, SOLUTION SUBCUTANEOUS
COMMUNITY
End: 2020-09-15 | Stop reason: SDUPTHER

## 2020-08-14 RX ORDER — ONDANSETRON 2 MG/ML
4 INJECTION INTRAMUSCULAR; INTRAVENOUS ONCE
Status: COMPLETED | OUTPATIENT
Start: 2020-08-14 | End: 2020-08-14

## 2020-08-14 RX ADMIN — ONDANSETRON 4 MG: 2 INJECTION INTRAMUSCULAR; INTRAVENOUS at 22:09

## 2020-08-14 RX ADMIN — IOHEXOL 100 ML: 350 INJECTION, SOLUTION INTRAVENOUS at 23:19

## 2020-08-14 RX ADMIN — SODIUM CHLORIDE 1000 ML: 0.9 INJECTION, SOLUTION INTRAVENOUS at 22:05

## 2020-08-15 VITALS
HEIGHT: 62 IN | HEART RATE: 60 BPM | BODY MASS INDEX: 29.26 KG/M2 | RESPIRATION RATE: 16 BRPM | DIASTOLIC BLOOD PRESSURE: 68 MMHG | WEIGHT: 159 LBS | SYSTOLIC BLOOD PRESSURE: 117 MMHG | TEMPERATURE: 98.1 F | OXYGEN SATURATION: 95 %

## 2020-08-15 LAB — GLUCOSE SERPL-MCNC: 231 MG/DL (ref 65–140)

## 2020-08-15 PROCEDURE — 96361 HYDRATE IV INFUSION ADD-ON: CPT

## 2020-08-15 PROCEDURE — 93005 ELECTROCARDIOGRAM TRACING: CPT

## 2020-08-15 PROCEDURE — 82948 REAGENT STRIP/BLOOD GLUCOSE: CPT

## 2020-08-15 RX ORDER — ONDANSETRON 4 MG/1
4 TABLET, FILM COATED ORAL EVERY 6 HOURS
Qty: 12 TABLET | Refills: 0 | Status: SHIPPED | OUTPATIENT
Start: 2020-08-15 | End: 2020-11-13

## 2020-08-15 RX ORDER — FAMOTIDINE 20 MG/1
20 TABLET, FILM COATED ORAL DAILY
Qty: 14 TABLET | Refills: 0 | Status: SHIPPED | OUTPATIENT
Start: 2020-08-15 | End: 2021-03-06 | Stop reason: HOSPADM

## 2020-08-15 NOTE — ED NOTES
TECH REPORTED TO ME THAT PT WAS HAVING CHEST PAIN  EKG DONE AND TO DR Sarah Reeves  PT C/O SHARP PAIN STARTED MINUTES AGO TO RIGHT OF STERNUM, VERY TENDER TO PALPATION  DR Sarah Reeves TO ROOM        Sumit Tafoya RN  08/15/20 2142

## 2020-08-15 NOTE — DISCHARGE INSTRUCTIONS
If you feel worse at anytime, have return of your symptoms, uncontrolled pain, fever return to er  If the chest pain returns and you wish this to be evaluated in er return  to er       See pcp this week for follow up, lab recheck, vital recheck and repeat exam

## 2020-08-15 NOTE — ED NOTES
Patient's blood sugar via fingerstick 231 - patient reports that's much better than when she came to ED     Ketan Mccray RN  08/15/20 5571

## 2020-08-15 NOTE — ED PROVIDER NOTES
History  Chief Complaint   Patient presents with    Nausea    Weakness - Generalized     nausea, gagging, high blood sugar (448 at 1950), when she stands she feels like shes going to fall, blurred vision     4 days of vomiting and ill sensation  Pt states she is being tx of breast cancer, on chemo pills for 6 mths  She states she started vomiting 4 days ago  Reported mild off and on abd pain, rlq and epigastric in location, described as cramps    Normal bm today  No fever, uri sx, uti sx              Prior to Admission Medications   Prescriptions Last Dose Informant Patient Reported? Taking?    ACCU-CHEK FASTCLIX LANCETS MISC  Self No No   Sig: TEST THREE TIMES A DAY   ADMELOG SOLOSTAR 100 units/mL injection pen  Self No Yes   Sig: INJECT 10 UNITS UNDER THE SKIN AT DINNER TIME   BASAGLAR KWIKPEN 100 units/mL injection pen  Self No No   Sig:  INJECT 55 UNITS UNDER THE SKIN DAILY   BD PEN NEEDLE RACHEL U/F 32G X 4 MM MISC  Self No No   Sig: Inject under the skin QID   amLODIPine (NORVASC) 5 mg tablet  Self No Yes   Sig: Take 1 tablet (5 mg total) by mouth daily   anastrozole (ARIMIDEX) 1 mg tablet   Yes Yes   Sig: Take 1 mg by mouth daily   aspirin (ECOTRIN LOW STRENGTH) 81 mg EC tablet  Self Yes Yes   Sig: Take 81 mg by mouth daily   atorvastatin (LIPITOR) 40 mg tablet   No Yes   Sig: Take 1 tablet (40 mg total) by mouth daily with dinner   benazepril (LOTENSIN) 20 mg tablet  Self No Yes   Sig: Take 1 tablet (20 mg total) by mouth daily   betamethasone valerate (VALISONE) 0 1 % cream   Yes Yes   Sig: Apply topically 2 (two) times a day   cyclobenzaprine (FLEXERIL) 5 mg tablet  Self No Yes   Sig: Take 1 tablet (5 mg total) by mouth daily   glucose blood (Accu-Chek Guide) test strip  Self No No   Sig: Test bid   glucose blood (Accu-Chek Guide) test strip   No No   Si each by Other route 4 (four) times a day Use as instructed   insulin glargine (LANTUS) 100 units/mL subcutaneous injection   Yes Yes   Sig: Inject 70 Units under the skin daily at bedtime   metFORMIN (GLUCOPHAGE) 1000 MG tablet  Self No No   Sig: Take 1 tablet (1,000 mg total) by mouth 2 (two) times a day with meals   metoprolol tartrate (LOPRESSOR) 25 mg tablet   No Yes   Sig: Take 1 tablet (25 mg total) by mouth every 12 (twelve) hours   nitroglycerin (NITROSTAT) 0 4 mg SL tablet  Self No No   Sig: Place 1 tablet (0 4 mg total) under the tongue every 5 (five) minutes as needed for chest pain   pantoprazole (PROTONIX) 40 mg tablet   No Yes   Sig: Take 1 tablet (40 mg total) by mouth daily   rOPINIRole (REQUIP) 1 mg tablet  Self No Yes   Sig: Take 1 tablet (1 mg total) by mouth daily   sertraline (ZOLOFT) 50 mg tablet  Self No No   Sig: Take 1 tablet (50 mg total) by mouth daily   temazepam (RESTORIL) 30 mg capsule   No Yes   Sig: Take 1 capsule (30 mg total) by mouth daily at bedtime as needed for sleep   tiZANidine (ZANAFLEX) 4 mg tablet  Self Yes Yes   Sig: Take 4 mg by mouth every 8 (eight) hours as needed for muscle spasms 1/2-1   traMADol (ULTRAM) 50 mg tablet  Self Yes Yes   Sig: Take 50 mg by mouth every 8 (eight) hours      Facility-Administered Medications: None       Past Medical History:   Diagnosis Date    CAD (coronary artery disease)     Chronic back pain     Family history of early CAD     Hyperlipidemia     Hypertension     Myocardial infarction ([facility] [address] )     Restless leg syndrome     Type 2 diabetes mellitus (H[facility])        Past Surgical History:   Procedure Laterality Date    ABDOMINAL SURGERY      phill    ANKLE SURGERY      tubal    CARDIAC CATHETERIZATION  11/2018    Successful balloon angioplassty to OM1    CHOLECYSTECTOMY      FRACTURE SURGERY      right ankle    GALLBLADDER SURGERY      TUBAL LIGATION         Family History   Problem Relation Age of Onset    Breast cancer Mother     Diabetes Mother     Heart failure Mother     Heart disease Father     Stroke Father      I have reviewed and agree with the history as documented  E-Cigarette/Vaping    E-Cigarette Use Never User      E-Cigarette/Vaping Substances     Social History     Tobacco Use    Smoking status: Never Smoker    Smokeless tobacco: Never Used   Substance Use Topics    Alcohol use: Yes     Comment: occasionally    Drug use: No       Review of Systems   All other systems reviewed and are negative  Physical Exam  Physical Exam  Constitutional:       General: She is not in acute distress  Appearance: She is well-developed  She is not ill-appearing, toxic-appearing or diaphoretic  HENT:      Head: Normocephalic and atraumatic  Right Ear: External ear normal       Left Ear: External ear normal       Nose: Nose normal       Mouth/Throat:      Mouth: Mucous membranes are moist       Pharynx: Oropharynx is clear  Eyes:      General: No scleral icterus  Right eye: No discharge  Left eye: No discharge  Pupils: Pupils are equal, round, and reactive to light  Neck:      Musculoskeletal: Normal range of motion and neck supple  No neck rigidity  Vascular: No JVD  Cardiovascular:      Rate and Rhythm: Normal rate and regular rhythm  Heart sounds: Normal heart sounds  No murmur  No friction rub  No gallop  Pulmonary:      Effort: Pulmonary effort is normal  No respiratory distress  Breath sounds: Normal breath sounds  No stridor  No wheezing, rhonchi or rales  Chest:      Chest wall: No tenderness  Abdominal:      General: There is no distension  Palpations: Abdomen is soft  There is no mass  Tenderness: There is abdominal tenderness  There is no guarding or rebound  Hernia: No hernia is present  Comments: rlq and epigastric tenderness, no peritoneal signs  Musculoskeletal: Normal range of motion  General: No swelling, tenderness or deformity  Skin:     General: Skin is warm  Capillary Refill: Capillary refill takes less than 2 seconds        Findings: No erythema or rash    Neurological:      General: No focal deficit present  Mental Status: She is alert and oriented to person, place, and time  Cranial Nerves: No cranial nerve deficit  Sensory: No sensory deficit  Motor: No weakness or abnormal muscle tone        Coordination: Coordination normal       Gait: Gait normal       Deep Tendon Reflexes: Reflexes normal    Psychiatric:         Mood and Affect: Mood normal          Vital Signs  ED Triage Vitals   Temperature Pulse Respirations Blood Pressure SpO2   08/14/20 2108 08/14/20 2108 08/14/20 2108 08/14/20 2108 08/14/20 2108   98 1 °F (36 7 °C) 86 20 135/84 96 %      Temp src Heart Rate Source Patient Position - Orthostatic VS BP Location FiO2 (%)   -- -- -- 08/15/20 0106 --      Left arm       Pain Score       08/15/20 0130       2           Vitals:    08/14/20 2108 08/15/20 0106 08/15/20 0130   BP: 135/84 125/77 117/68   Pulse: 86 64 64         Visual Acuity      ED Medications  Medications   sodium chloride 0 9 % bolus 1,000 mL (0 mL Intravenous Stopped 8/15/20 0129)   ondansetron (ZOFRAN) injection 4 mg (4 mg Intravenous Given 8/14/20 2209)   iohexol (OMNIPAQUE) 350 MG/ML injection (SINGLE-DOSE) 100 mL (100 mL Intravenous Given 8/14/20 2319)       Diagnostic Studies  Results Reviewed     Procedure Component Value Units Date/Time    Comprehensive metabolic panel [[id number]]  (Abnormal) Collected:  08/14/20 2204    Lab Status:  Final result Specimen:  Blood from Arm, Right Updated:  08/14/20 2232     Sodium 131 mmol/L      Potassium 3 9 mmol/L      Chloride 98 mmol/L      CO2 24 mmol/L      ANION GAP 9 mmol/L      BUN 26 mg/dL      Creatinine 0 99 mg/dL      Glucose 455 mg/dL      Calcium 9 4 mg/dL      AST 25 U/L      ALT 20 U/L      Alkaline Phosphatase 66 U/L      Total Protein 6 8 g/dL      Albumin 3 8 g/dL      Total Bilirubin 0 40 mg/dL      eGFR 62 ml/min/1 73sq m     Narrative:       [place] guidelines for Chronic Kidney Disease (CKD):     Stage 1 with normal or high GFR (GFR > 90 mL/min/1 73 square meters)    Stage 2 Mild CKD (GFR = 60-89 mL/min/1 73 square meters)    Stage 3A Moderate CKD (GFR = 45-59 mL/min/1 73 square meters)    Stage 3B Moderate CKD (GFR = 30-44 mL/min/1 73 square meters)    Stage 4 Severe CKD (GFR = 15-29 mL/min/1 73 square meters)    Stage 5 End Stage CKD (GFR <15 mL/min/1 73 square meters)  Note: GFR calculation is accurate only with a steady state creatinine    Lipase [[id number]]  (Abnormal) Collected:  08/14/20 2204    Lab Status:  Final result Specimen:  Blood from Arm, Right Updated:  08/14/20 2232     Lipase 100 u/L     CBC and differential [[id number]]  (Abnormal) Collected:  08/14/20 2204    Lab Status:  Final result Specimen:  Blood from Arm, Right Updated:  08/14/20 2213     WBC 6 80 Thousand/uL      RBC 4 22 Million/uL      Hemoglobin 13 2 g/dL      Hematocrit 38 6 %      MCV 91 fL      MCH 31 2 pg      MCHC 34 2 g/dL      RDW 14 1 %      MPV 8 2 fL      Platelets 706 Thousands/uL      Neutrophils Relative 67 %      Lymphocytes Relative 24 %      Monocytes Relative 7 %      Eosinophils Relative 2 %      Basophils Relative 1 %      Neutrophils Absolute 4 50 Thousands/µL      Lymphocytes Absolute 1 60 Thousands/µL      Monocytes Absolute 0 50 Thousand/µL      Eosinophils Absolute 0 10 Thousand/µL      Basophils Absolute 0 00 Thousands/µL     Blood gas, venous [[id number]]  (Abnormal) Collected:  08/14/20 2204    Lab Status:  Final result Specimen:  Blood from Arm, Right Updated:  08/14/20 2213     pH, Nader 7 390     pCO2, Nader 40 1 mm Hg      pO2, Nader 83 0 mm Hg      HCO3, Nader 23 5 mmol/L      Base Excess, Nader -0 9 mmol/L      O2 Content, Nader 17 2 ml/dL      O2 HGB, VENOUS 94 5 %     Beta Hydroxybutyrate [[id number]]  (Normal) Collected:  08/14/20 2204    Lab Status:  Final result Specimen:  Blood from Arm, Right Updated:  08/14/20 2212     BETA-HYDROXYBUTYRATE 0 1 mmol/L     Fingerstick Glucose (POCT) [[id number]]  (Abnormal) Collected:  08/14/20 2126    Lab Status:  Final result Updated:  08/14/20 2129     POC Glucose 458 mg/dl     UA w Reflex to Microscopic w Reflex to Culture [[id number]]     Lab Status:  No result Specimen:  Urine                  CT abdomen pelvis with contrast   Final Result by Kailee Dumont MD (08/15 3281)         1  Irregular gastric wall enhancement within the gastric cardia adjacent to the gastroesophageal junction  Possible sliding hernia versus focal inflammation or lesion  Christine Skipper Upper GI series or EGD may be helpful for further evaluation  2   No evidence of bowel obstruction, colitis or diverticulitis  Workstation performed: [id number]                    Procedures  Procedures         ED Course                                             MDM  Number of Diagnoses or Management Options  Diagnosis management comments: With 4 days of vomiting, hyperglycemia and ill sensastion  In er she has rlq and epigastric tenderness, ct neg for appy  Discussed findings with pt, she will fu for possible egd  She has not vomited in er, she is feeling much better  She did not some burning like chest pain in er, ekg obtained  She is refusing further evaluation into this complaint, states she wishes to go home  She is aware I have note evaluated her cp at all as per its etiology  Likely this is gi related as her tenderness is epigastric, her ct read  On exam the epigastric region is tender, she has chest wall tenderness as well  Overall in er she is feel;ing much better  Labs noted, will fu with pcp and oncology for repeat bld work  lipase noted  She is drinking well in er, very mild evaluation , she will return with return of her sx  Glucose trending down, 230 from 460  She is comfortably managing this at home  I have discussed all red flags, return to er instructions, need for fu, she has voice understanding           Disposition  Final diagnoses:   Nausea   Vomiting Generalized weakness   Elevated lipase     Time reflects when diagnosis was documented in both MDM as applicable and the Disposition within this note     Time User Action Codes Description Comment    8/15/2020  1:26 AM Byron Roll Add [R11 0] Nausea     8/15/2020  1:27 AM Byron Roll Add [R11 10] Vomiting     8/15/2020  1:27 AM Byron Roll Add [R53 1] Generalized weakness     8/15/2020  1:27 AM Byron Roll Add [R74 8] Elevated lipase       ED Disposition     ED Disposition Condition Date/Time Comment    Discharge Stable Sat Aug 15, 2020  1:26 AM Brannon Castillo discharge to home/self care  Follow-up Information     Follow up With Specialties Details Why Contact Info    Jorge Melissa DO Family Medicine Schedule an appointment as soon as possible for a visit in 2 days  [place] [address]  [phone]            Patient's Medications   Discharge Prescriptions    FAMOTIDINE (PEPCID) 20 MG TABLET    Take 1 tablet (20 mg total) by mouth daily for 14 days       Start Date: 8/15/2020 End Date: 8/29/2020       Order Dose: 20 mg       Quantity: 14 tablet    Refills: 0    ONDANSETRON (ZOFRAN) 4 MG TABLET    Take 1 tablet (4 mg total) by mouth every 6 (six) hours       Start Date: 8/15/2020 End Date: --       Order Dose: 4 mg       Quantity: 12 tablet    Refills: 0     No discharge procedures on file      PDMP Review     None          ED Provider  Electronically Signed by           Darnell Avitia MD  08/15/20 0140

## 2020-08-16 LAB
ATRIAL RATE: 64 BPM
P AXIS: 45 DEGREES
PR INTERVAL: 150 MS
QRS AXIS: 77 DEGREES
QRSD INTERVAL: 74 MS
QT INTERVAL: 446 MS
QTC INTERVAL: 460 MS
T WAVE AXIS: 42 DEGREES
VENTRICULAR RATE: 64 BPM

## 2020-08-16 PROCEDURE — 93010 ELECTROCARDIOGRAM REPORT: CPT | Performed by: INTERNAL MEDICINE

## 2020-08-18 ENCOUNTER — DOCUMENTATION (OUTPATIENT)
Dept: OTHER | Facility: HOSPITAL | Age: 60
End: 2020-08-18

## 2020-08-18 ENCOUNTER — APPOINTMENT (OUTPATIENT)
Dept: LAB | Facility: HOSPITAL | Age: 60
End: 2020-08-18
Attending: PHYSICIAN ASSISTANT
Payer: COMMERCIAL

## 2020-08-18 DIAGNOSIS — R93.3 GASTROINTESTINAL TRACT IMAGING ABNORMALITY: Primary | ICD-10-CM

## 2020-08-18 DIAGNOSIS — R10.13 ABDOMINAL PAIN, EPIGASTRIC: ICD-10-CM

## 2020-08-18 DIAGNOSIS — R10.11 ABDOMINAL PAIN, RIGHT UPPER QUADRANT: ICD-10-CM

## 2020-08-18 DIAGNOSIS — R11.2 NAUSEA AND VOMITING, INTRACTABILITY OF VOMITING NOT SPECIFIED, UNSPECIFIED VOMITING TYPE: ICD-10-CM

## 2020-08-18 LAB
ALBUMIN SERPL BCP-MCNC: 4 G/DL (ref 3.5–5.7)
ALP SERPL-CCNC: 73 U/L (ref 55–165)
ALT SERPL W P-5'-P-CCNC: 21 U/L (ref 7–52)
AMYLASE SERPL-CCNC: 73 IU/L (ref 29–103)
ANION GAP SERPL CALCULATED.3IONS-SCNC: 8 MMOL/L (ref 4–13)
AST SERPL W P-5'-P-CCNC: 27 U/L (ref 13–39)
BASOPHILS # BLD AUTO: 0 THOUSANDS/ΜL (ref 0–0.1)
BASOPHILS NFR BLD AUTO: 1 % (ref 0–2)
BILIRUB SERPL-MCNC: 0.4 MG/DL (ref 0.2–1)
BUN SERPL-MCNC: 15 MG/DL (ref 7–25)
CALCIUM SERPL-MCNC: 9.7 MG/DL (ref 8.6–10.5)
CHLORIDE SERPL-SCNC: 105 MMOL/L (ref 98–107)
CO2 SERPL-SCNC: 27 MMOL/L (ref 21–31)
CREAT SERPL-MCNC: 0.74 MG/DL (ref 0.6–1.2)
EOSINOPHIL # BLD AUTO: 0.2 THOUSAND/ΜL (ref 0–0.61)
EOSINOPHIL NFR BLD AUTO: 3 % (ref 0–5)
ERYTHROCYTE [DISTWIDTH] IN BLOOD BY AUTOMATED COUNT: 14 % (ref 11.5–14.5)
GFR SERPL CREATININE-BSD FRML MDRD: 88 ML/MIN/1.73SQ M
GLUCOSE SERPL-MCNC: 172 MG/DL (ref 65–99)
HCT VFR BLD AUTO: 39.7 % (ref 42–47)
HGB BLD-MCNC: 13.1 G/DL (ref 12–16)
LIPASE SERPL-CCNC: 49 U/L (ref 11–82)
LYMPHOCYTES # BLD AUTO: 2 THOUSANDS/ΜL (ref 0.6–4.47)
LYMPHOCYTES NFR BLD AUTO: 32 % (ref 21–51)
MCH RBC QN AUTO: 30.5 PG (ref 26–34)
MCHC RBC AUTO-ENTMCNC: 32.9 G/DL (ref 31–37)
MCV RBC AUTO: 93 FL (ref 81–99)
MONOCYTES # BLD AUTO: 0.4 THOUSAND/ΜL (ref 0.17–1.22)
MONOCYTES NFR BLD AUTO: 7 % (ref 2–12)
NEUTROPHILS # BLD AUTO: 3.7 THOUSANDS/ΜL (ref 1.4–6.5)
NEUTS SEG NFR BLD AUTO: 58 % (ref 42–75)
PLATELET # BLD AUTO: 283 THOUSANDS/UL (ref 149–390)
PMV BLD AUTO: 8.2 FL (ref 8.6–11.7)
POTASSIUM SERPL-SCNC: 4.2 MMOL/L (ref 3.5–5.5)
PROT SERPL-MCNC: 7 G/DL (ref 6.4–8.9)
RBC # BLD AUTO: 4.28 MILLION/UL (ref 3.9–5.2)
SODIUM SERPL-SCNC: 140 MMOL/L (ref 134–143)
WBC # BLD AUTO: 6.4 THOUSAND/UL (ref 4.8–10.8)

## 2020-08-18 PROCEDURE — 82150 ASSAY OF AMYLASE: CPT

## 2020-08-18 PROCEDURE — 85025 COMPLETE CBC W/AUTO DIFF WBC: CPT

## 2020-08-18 PROCEDURE — 83690 ASSAY OF LIPASE: CPT

## 2020-08-18 PROCEDURE — 36415 COLL VENOUS BLD VENIPUNCTURE: CPT

## 2020-08-18 PROCEDURE — 80053 COMPREHEN METABOLIC PANEL: CPT

## 2020-08-24 DIAGNOSIS — R11.2 NAUSEA AND VOMITING, INTRACTABILITY OF VOMITING NOT SPECIFIED, UNSPECIFIED VOMITING TYPE: ICD-10-CM

## 2020-08-24 PROCEDURE — U0003 INFECTIOUS AGENT DETECTION BY NUCLEIC ACID (DNA OR RNA); SEVERE ACUTE RESPIRATORY SYNDROME CORONAVIRUS 2 (SARS-COV-2) (CORONAVIRUS DISEASE [COVID-19]), AMPLIFIED PROBE TECHNIQUE, MAKING USE OF HIGH THROUGHPUT TECHNOLOGIES AS DESCRIBED BY CMS-2020-01-R: HCPCS | Performed by: PHYSICIAN ASSISTANT

## 2020-08-25 ENCOUNTER — ANESTHESIA EVENT (OUTPATIENT)
Dept: GASTROENTEROLOGY | Facility: HOSPITAL | Age: 60
End: 2020-08-25

## 2020-08-26 ENCOUNTER — HOSPITAL ENCOUNTER (OUTPATIENT)
Dept: GASTROENTEROLOGY | Facility: HOSPITAL | Age: 60
Setting detail: OUTPATIENT SURGERY
Discharge: HOME | End: 2020-08-26
Attending: INTERNAL MEDICINE
Payer: COMMERCIAL

## 2020-08-26 ENCOUNTER — ANESTHESIA (OUTPATIENT)
Dept: GASTROENTEROLOGY | Facility: HOSPITAL | Age: 60
End: 2020-08-26

## 2020-08-26 VITALS
DIASTOLIC BLOOD PRESSURE: 75 MMHG | TEMPERATURE: 96.3 F | RESPIRATION RATE: 18 BRPM | SYSTOLIC BLOOD PRESSURE: 120 MMHG | HEART RATE: 74 BPM | OXYGEN SATURATION: 94 %

## 2020-08-26 DIAGNOSIS — R10.11 RUQ PAIN: ICD-10-CM

## 2020-08-26 DIAGNOSIS — R11.2 NAUSEA WITH VOMITING, UNSPECIFIED: ICD-10-CM

## 2020-08-26 DIAGNOSIS — R19.8: ICD-10-CM

## 2020-08-26 DIAGNOSIS — K44.9 DIAPHRAGMATIC HERNIA: ICD-10-CM

## 2020-08-26 LAB
GLUCOSE SERPL-MCNC: 199 MG/DL (ref 65–140)
SARS-COV-2 RNA SPEC QL NAA+PROBE: NOT DETECTED

## 2020-08-26 PROCEDURE — 88305 TISSUE EXAM BY PATHOLOGIST: CPT | Performed by: PATHOLOGY

## 2020-08-26 PROCEDURE — 82948 REAGENT STRIP/BLOOD GLUCOSE: CPT

## 2020-08-26 RX ORDER — SODIUM CHLORIDE, SODIUM LACTATE, POTASSIUM CHLORIDE, CALCIUM CHLORIDE 600; 310; 30; 20 MG/100ML; MG/100ML; MG/100ML; MG/100ML
125 INJECTION, SOLUTION INTRAVENOUS CONTINUOUS
Status: DISCONTINUED | OUTPATIENT
Start: 2020-08-26 | End: 2020-08-30 | Stop reason: HOSPADM

## 2020-08-26 RX ORDER — PROPOFOL 10 MG/ML
INJECTION, EMULSION INTRAVENOUS AS NEEDED
Status: DISCONTINUED | OUTPATIENT
Start: 2020-08-26 | End: 2020-08-26

## 2020-08-26 RX ORDER — LIDOCAINE HYDROCHLORIDE 20 MG/ML
INJECTION, SOLUTION EPIDURAL; INFILTRATION; INTRACAUDAL; PERINEURAL AS NEEDED
Status: DISCONTINUED | OUTPATIENT
Start: 2020-08-26 | End: 2020-08-26

## 2020-08-26 RX ORDER — EPHEDRINE SULFATE 50 MG/ML
INJECTION INTRAVENOUS AS NEEDED
Status: DISCONTINUED | OUTPATIENT
Start: 2020-08-26 | End: 2020-08-26

## 2020-08-26 RX ORDER — SODIUM CHLORIDE, SODIUM LACTATE, POTASSIUM CHLORIDE, CALCIUM CHLORIDE 600; 310; 30; 20 MG/100ML; MG/100ML; MG/100ML; MG/100ML
125 INJECTION, SOLUTION INTRAVENOUS CONTINUOUS
Status: CANCELLED | OUTPATIENT
Start: 2020-08-26

## 2020-08-26 RX ADMIN — PROPOFOL 30 MG: 10 INJECTION, EMULSION INTRAVENOUS at 09:57

## 2020-08-26 RX ADMIN — SODIUM CHLORIDE, SODIUM LACTATE, POTASSIUM CHLORIDE, AND CALCIUM CHLORIDE 125 ML/HR: .6; .31; .03; .02 INJECTION, SOLUTION INTRAVENOUS at 08:21

## 2020-08-26 RX ADMIN — EPHEDRINE SULFATE 10 MG: 50 INJECTION, SOLUTION INTRAVENOUS at 10:00

## 2020-08-26 RX ADMIN — PROPOFOL 30 MG: 10 INJECTION, EMULSION INTRAVENOUS at 09:54

## 2020-08-26 RX ADMIN — PROPOFOL 120 MG: 10 INJECTION, EMULSION INTRAVENOUS at 09:51

## 2020-08-26 RX ADMIN — LIDOCAINE HYDROCHLORIDE 100 MG: 20 INJECTION, SOLUTION EPIDURAL; INFILTRATION; INTRACAUDAL; PERINEURAL at 09:51

## 2020-08-26 NOTE — ANESTHESIA POSTPROCEDURE EVALUATION
Post-Op Assessment Note    CV Status:  Stable  Pain Score: 0    Pain management: adequate     Mental Status:  Alert and awake   Hydration Status:  Euvolemic   PONV Controlled:  Controlled   Airway Patency:  Patent      Post Op Vitals Reviewed: Yes      Staff: CRNA         No complications documented      BP   101/58   Temp      Pulse  78   Resp   16   SpO2   96

## 2020-08-26 NOTE — ANESTHESIA PREPROCEDURE EVALUATION
Procedure:  EGD    Relevant Problems   CARDIO   (+) Chest pain   (+) Coronary artery disease involving native coronary artery of native heart   (+) Essential hypertension   (+) Mixed hyperlipidemia   (+) NSTEMI (non-ST elevated myocardial infarction) (HCC)      ENDO   (+) Type 2 diabetes mellitus without complication, with long-term current use of insulin (HCC)      GI/HEPATIC   (+) Disease of pancreas      MUSCULOSKELETAL   (+) Chronic bilateral low back pain without sciatica      NEURO/PSYCH   (+) Depression      PULMONARY   (+) Obstructive sleep apnea (adult) (pediatric)      Other   (+) Overweight (BMI 25 0-29 9)   (+) Restless leg syndrome      2018: OM1 PTCA, 0% residual; 100% left posterolateral    2019: normal biV systolic function, no significant valve issues; neg stress test      7/2020: A1C 10 7    Anesthesia Plan  ASA Score- 3     Anesthesia Type- IV sedation with anesthesia with ASA Monitors  Additional Monitors:   Airway Plan:     Comment: IV sedation,  standard ASA monitors  Risks and benefits discussed with patient; patient consented and agrees to proceed  I saw and evaluated the patient  If seen with CRNA, we have discussed the anesthetic plan and I am in agreement that the plan is appropriate for the patient          Plan Factors-    Induction- intravenous  Postoperative Plan-     Informed Consent- Anesthetic plan and risks discussed with patient  I personally reviewed this patient with the CRNA  Discussed and agreed on the Anesthesia Plan with the CRNA  Sakina Welsh

## 2020-08-26 NOTE — INTERVAL H&P NOTE
H&P reviewed  After examining the patient I find no changes in the patients condition since the H&P had been written      Vitals:    08/26/20 0813   BP: 140/82   Pulse: 69   Resp: 18   Temp: 98 3 °F (36 8 °C)   SpO2: 96%

## 2020-08-27 DIAGNOSIS — G25.81 RESTLESS LEG SYNDROME: Chronic | ICD-10-CM

## 2020-08-28 RX ORDER — TEMAZEPAM 30 MG/1
30 CAPSULE ORAL
Qty: 30 CAPSULE | Refills: 1 | Status: SHIPPED | OUTPATIENT
Start: 2020-08-28 | End: 2021-03-15 | Stop reason: SDUPTHER

## 2020-09-04 DIAGNOSIS — F32.A DEPRESSION, UNSPECIFIED DEPRESSION TYPE: ICD-10-CM

## 2020-09-15 ENCOUNTER — TELEPHONE (OUTPATIENT)
Dept: OTHER | Facility: OTHER | Age: 60
End: 2020-09-15

## 2020-09-15 DIAGNOSIS — I25.10 CORONARY ARTERY DISEASE INVOLVING NATIVE CORONARY ARTERY OF NATIVE HEART WITHOUT ANGINA PECTORIS: ICD-10-CM

## 2020-09-15 DIAGNOSIS — E11.9 TYPE 2 DIABETES MELLITUS WITHOUT COMPLICATION, WITH LONG-TERM CURRENT USE OF INSULIN (HCC): Primary | Chronic | ICD-10-CM

## 2020-09-15 DIAGNOSIS — Z79.4 TYPE 2 DIABETES MELLITUS WITHOUT COMPLICATION, WITH LONG-TERM CURRENT USE OF INSULIN (HCC): Primary | Chronic | ICD-10-CM

## 2020-09-15 RX ORDER — INSULIN GLARGINE 100 [IU]/ML
70 INJECTION, SOLUTION SUBCUTANEOUS
Qty: 2100 UNITS | Refills: 5 | Status: SHIPPED | OUTPATIENT
Start: 2020-09-15 | End: 2020-09-21

## 2020-09-15 RX ORDER — BLOOD-GLUCOSE METER
EACH MISCELLANEOUS
Qty: 400 EACH | Refills: 5 | Status: SHIPPED | OUTPATIENT
Start: 2020-09-15

## 2020-09-15 RX ORDER — BLOOD-GLUCOSE METER
EACH MISCELLANEOUS
Qty: 1 KIT | Refills: 0 | Status: SHIPPED | OUTPATIENT
Start: 2020-09-15 | End: 2022-04-18 | Stop reason: SDUPTHER

## 2020-09-15 RX ORDER — BLOOD SUGAR DIAGNOSTIC
STRIP MISCELLANEOUS
Qty: 400 EACH | Refills: 5 | Status: SHIPPED | OUTPATIENT
Start: 2020-09-15 | End: 2021-09-27

## 2020-09-15 NOTE — TELEPHONE ENCOUNTER
Patient's pharmacy called office Re: Order faxed yesterday 9/15/20 on Lantus Vials per Pharmacist should be Lantus pens   Please send new order per Pharmacist request     Pharmacy :   [facility], [address]    Phone:  [phone]

## 2020-09-16 DIAGNOSIS — E11.9 TYPE 2 DIABETES MELLITUS WITHOUT COMPLICATION, WITH LONG-TERM CURRENT USE OF INSULIN (HCC): Chronic | ICD-10-CM

## 2020-09-16 DIAGNOSIS — Z79.4 TYPE 2 DIABETES MELLITUS WITHOUT COMPLICATION, WITH LONG-TERM CURRENT USE OF INSULIN (HCC): Chronic | ICD-10-CM

## 2020-09-16 RX ORDER — NITROGLYCERIN 0.4 MG/1
TABLET SUBLINGUAL
Qty: 100 TABLET | Refills: 3 | Status: SHIPPED | OUTPATIENT
Start: 2020-09-16

## 2020-09-16 RX ORDER — INSULIN GLARGINE 100 [IU]/ML
10 INJECTION, SOLUTION SUBCUTANEOUS DAILY
Qty: 5 PEN | Refills: 5 | Status: SHIPPED | OUTPATIENT
Start: 2020-09-16 | End: 2020-09-21

## 2020-09-21 DIAGNOSIS — E11.9 TYPE 2 DIABETES MELLITUS WITHOUT COMPLICATION, WITH LONG-TERM CURRENT USE OF INSULIN (HCC): Chronic | ICD-10-CM

## 2020-09-21 DIAGNOSIS — Z79.4 TYPE 2 DIABETES MELLITUS WITHOUT COMPLICATION, WITH LONG-TERM CURRENT USE OF INSULIN (HCC): Chronic | ICD-10-CM

## 2020-09-21 RX ORDER — INSULIN GLARGINE 100 [IU]/ML
100 INJECTION, SOLUTION SUBCUTANEOUS
Qty: 9000 UNITS | Refills: 3 | Status: SHIPPED | OUTPATIENT
Start: 2020-09-21 | End: 2021-10-18 | Stop reason: SDUPTHER

## 2020-10-16 ENCOUNTER — LAB (OUTPATIENT)
Dept: LAB | Facility: CLINIC | Age: 60
End: 2020-10-16
Payer: COMMERCIAL

## 2020-10-16 ENCOUNTER — TRANSCRIBE ORDERS (OUTPATIENT)
Dept: LAB | Facility: CLINIC | Age: 60
End: 2020-10-16

## 2020-10-16 DIAGNOSIS — N17.9 ACUTE KIDNEY INJURY (HCC): Primary | ICD-10-CM

## 2020-10-16 LAB
BUN SERPL-MCNC: 11 MG/DL (ref 5–25)
CREAT SERPL-MCNC: 1.01 MG/DL (ref 0.6–1.3)
GFR SERPL CREATININE-BSD FRML MDRD: 61 ML/MIN/1.73SQ M

## 2020-10-16 PROCEDURE — 36415 COLL VENOUS BLD VENIPUNCTURE: CPT

## 2020-10-16 PROCEDURE — 84520 ASSAY OF UREA NITROGEN: CPT

## 2020-10-16 PROCEDURE — 82565 ASSAY OF CREATININE: CPT

## 2020-11-10 ENCOUNTER — LAB (OUTPATIENT)
Dept: LAB | Facility: CLINIC | Age: 60
End: 2020-11-10
Payer: COMMERCIAL

## 2020-11-10 DIAGNOSIS — E11.9 TYPE 2 DIABETES MELLITUS WITHOUT COMPLICATION, WITH LONG-TERM CURRENT USE OF INSULIN (HCC): ICD-10-CM

## 2020-11-10 DIAGNOSIS — E78.2 MIXED HYPERLIPIDEMIA: ICD-10-CM

## 2020-11-10 DIAGNOSIS — I10 ESSENTIAL HYPERTENSION: ICD-10-CM

## 2020-11-10 DIAGNOSIS — Z79.4 TYPE 2 DIABETES MELLITUS WITHOUT COMPLICATION, WITH LONG-TERM CURRENT USE OF INSULIN (HCC): ICD-10-CM

## 2020-11-10 LAB
ALBUMIN SERPL BCP-MCNC: 3.8 G/DL (ref 3.5–5)
ALP SERPL-CCNC: 130 U/L (ref 46–116)
ALT SERPL W P-5'-P-CCNC: 29 U/L (ref 12–78)
ANION GAP SERPL CALCULATED.3IONS-SCNC: 7 MMOL/L (ref 4–13)
AST SERPL W P-5'-P-CCNC: 16 U/L (ref 5–45)
BASOPHILS # BLD AUTO: 0.04 THOUSANDS/ΜL (ref 0–0.1)
BASOPHILS NFR BLD AUTO: 1 % (ref 0–1)
BILIRUB SERPL-MCNC: 0.7 MG/DL (ref 0.2–1)
BUN SERPL-MCNC: 15 MG/DL (ref 5–25)
CALCIUM SERPL-MCNC: 10 MG/DL (ref 8.3–10.1)
CHLORIDE SERPL-SCNC: 103 MMOL/L (ref 100–108)
CHOLEST SERPL-MCNC: 282 MG/DL (ref 50–200)
CO2 SERPL-SCNC: 28 MMOL/L (ref 21–32)
CREAT SERPL-MCNC: 1.13 MG/DL (ref 0.6–1.3)
EOSINOPHIL # BLD AUTO: 0.08 THOUSAND/ΜL (ref 0–0.61)
EOSINOPHIL NFR BLD AUTO: 1 % (ref 0–6)
ERYTHROCYTE [DISTWIDTH] IN BLOOD BY AUTOMATED COUNT: 13.2 % (ref 11.6–15.1)
EST. AVERAGE GLUCOSE BLD GHB EST-MCNC: 326 MG/DL
GFR SERPL CREATININE-BSD FRML MDRD: 53 ML/MIN/1.73SQ M
GLUCOSE P FAST SERPL-MCNC: 214 MG/DL (ref 65–99)
HBA1C MFR BLD: 13 %
HCT VFR BLD AUTO: 45.2 % (ref 34.8–46.1)
HDLC SERPL-MCNC: 52 MG/DL
HGB BLD-MCNC: 14.9 G/DL (ref 11.5–15.4)
IMM GRANULOCYTES # BLD AUTO: 0.01 THOUSAND/UL (ref 0–0.2)
IMM GRANULOCYTES NFR BLD AUTO: 0 % (ref 0–2)
LYMPHOCYTES # BLD AUTO: 3.4 THOUSANDS/ΜL (ref 0.6–4.47)
LYMPHOCYTES NFR BLD AUTO: 43 % (ref 14–44)
MCH RBC QN AUTO: 30.6 PG (ref 26.8–34.3)
MCHC RBC AUTO-ENTMCNC: 33 G/DL (ref 31.4–37.4)
MCV RBC AUTO: 93 FL (ref 82–98)
MONOCYTES # BLD AUTO: 0.47 THOUSAND/ΜL (ref 0.17–1.22)
MONOCYTES NFR BLD AUTO: 6 % (ref 4–12)
NEUTROPHILS # BLD AUTO: 3.86 THOUSANDS/ΜL (ref 1.85–7.62)
NEUTS SEG NFR BLD AUTO: 49 % (ref 43–75)
NONHDLC SERPL-MCNC: 230 MG/DL
NRBC BLD AUTO-RTO: 0 /100 WBCS
PLATELET # BLD AUTO: 350 THOUSANDS/UL (ref 149–390)
PMV BLD AUTO: 10.3 FL (ref 8.9–12.7)
POTASSIUM SERPL-SCNC: 3.8 MMOL/L (ref 3.5–5.3)
PROT SERPL-MCNC: 8.1 G/DL (ref 6.4–8.2)
RBC # BLD AUTO: 4.87 MILLION/UL (ref 3.81–5.12)
SODIUM SERPL-SCNC: 138 MMOL/L (ref 136–145)
TRIGL SERPL-MCNC: 480 MG/DL
TSH SERPL DL<=0.05 MIU/L-ACNC: 3.67 UIU/ML (ref 0.36–3.74)
WBC # BLD AUTO: 7.86 THOUSAND/UL (ref 4.31–10.16)

## 2020-11-10 PROCEDURE — 84443 ASSAY THYROID STIM HORMONE: CPT

## 2020-11-10 PROCEDURE — 36415 COLL VENOUS BLD VENIPUNCTURE: CPT

## 2020-11-10 PROCEDURE — 85025 COMPLETE CBC W/AUTO DIFF WBC: CPT

## 2020-11-10 PROCEDURE — 80061 LIPID PANEL: CPT

## 2020-11-10 PROCEDURE — 83036 HEMOGLOBIN GLYCOSYLATED A1C: CPT

## 2020-11-10 PROCEDURE — 80053 COMPREHEN METABOLIC PANEL: CPT

## 2020-11-11 RX ORDER — DEXLANSOPRAZOLE 60 MG/1
60 CAPSULE, DELAYED RELEASE ORAL DAILY
COMMUNITY

## 2020-11-11 RX ORDER — TRAMADOL HYDROCHLORIDE 50 MG/1
TABLET ORAL
COMMUNITY
Start: 2020-09-30

## 2020-11-11 RX ORDER — ROPINIROLE 1 MG/1
TABLET, FILM COATED ORAL
COMMUNITY
Start: 2020-09-30

## 2020-11-11 RX ORDER — ANASTROZOLE 1 MG/1
1 TABLET ORAL DAILY
COMMUNITY
Start: 2020-08-18 | End: 2022-01-24

## 2020-11-11 RX ORDER — TIZANIDINE 4 MG/1
TABLET ORAL
COMMUNITY
Start: 2020-09-30

## 2020-11-13 ENCOUNTER — OFFICE VISIT (OUTPATIENT)
Dept: FAMILY MEDICINE CLINIC | Facility: CLINIC | Age: 60
End: 2020-11-13
Payer: COMMERCIAL

## 2020-11-13 VITALS
DIASTOLIC BLOOD PRESSURE: 82 MMHG | WEIGHT: 158 LBS | SYSTOLIC BLOOD PRESSURE: 122 MMHG | HEART RATE: 79 BPM | OXYGEN SATURATION: 98 % | TEMPERATURE: 96.3 F | BODY MASS INDEX: 29.08 KG/M2 | HEIGHT: 62 IN

## 2020-11-13 DIAGNOSIS — I10 ESSENTIAL HYPERTENSION: ICD-10-CM

## 2020-11-13 DIAGNOSIS — Z00.00 ANNUAL PHYSICAL EXAM: Primary | ICD-10-CM

## 2020-11-13 DIAGNOSIS — Z13.31 NEGATIVE DEPRESSION SCREENING: ICD-10-CM

## 2020-11-13 DIAGNOSIS — Z79.4 TYPE 2 DIABETES MELLITUS WITHOUT COMPLICATION, WITH LONG-TERM CURRENT USE OF INSULIN (HCC): Chronic | ICD-10-CM

## 2020-11-13 DIAGNOSIS — E11.9 TYPE 2 DIABETES MELLITUS WITHOUT COMPLICATION, WITH LONG-TERM CURRENT USE OF INSULIN (HCC): Chronic | ICD-10-CM

## 2020-11-13 DIAGNOSIS — Z11.4 SCREENING FOR HIV WITHOUT PRESENCE OF RISK FACTORS: ICD-10-CM

## 2020-11-13 DIAGNOSIS — E78.2 MIXED HYPERLIPIDEMIA: ICD-10-CM

## 2020-11-13 DIAGNOSIS — R11.0 NAUSEA: ICD-10-CM

## 2020-11-13 DIAGNOSIS — Z23 ENCOUNTER FOR IMMUNIZATION: ICD-10-CM

## 2020-11-13 PROCEDURE — 3074F SYST BP LT 130 MM HG: CPT | Performed by: FAMILY MEDICINE

## 2020-11-13 PROCEDURE — 99396 PREV VISIT EST AGE 40-64: CPT | Performed by: FAMILY MEDICINE

## 2020-11-13 PROCEDURE — 3725F SCREEN DEPRESSION PERFORMED: CPT | Performed by: FAMILY MEDICINE

## 2020-11-13 PROCEDURE — 3079F DIAST BP 80-89 MM HG: CPT | Performed by: FAMILY MEDICINE

## 2020-11-13 RX ORDER — ONDANSETRON HYDROCHLORIDE 8 MG/1
8 TABLET, FILM COATED ORAL EVERY 8 HOURS PRN
Qty: 20 TABLET | Refills: 1 | Status: SHIPPED | OUTPATIENT
Start: 2020-11-13 | End: 2021-05-21 | Stop reason: SDUPTHER

## 2020-11-21 ENCOUNTER — HOSPITAL ENCOUNTER (OUTPATIENT)
Facility: HOSPITAL | Age: 60
Setting detail: OBSERVATION
Discharge: HOME | End: 2020-11-22
Attending: EMERGENCY MEDICINE | Admitting: INTERNAL MEDICINE
Payer: COMMERCIAL

## 2020-11-21 DIAGNOSIS — E11.9 TYPE 2 DIABETES MELLITUS WITHOUT COMPLICATION, WITH LONG-TERM CURRENT USE OF INSULIN (HCC): Chronic | ICD-10-CM

## 2020-11-21 DIAGNOSIS — Z79.4 TYPE 2 DIABETES MELLITUS WITHOUT COMPLICATION, WITH LONG-TERM CURRENT USE OF INSULIN (HCC): Chronic | ICD-10-CM

## 2020-11-21 DIAGNOSIS — I20.8 ANGINAL EQUIVALENT (HCC): Primary | ICD-10-CM

## 2020-11-21 DIAGNOSIS — I10 ESSENTIAL HYPERTENSION: ICD-10-CM

## 2020-11-21 DIAGNOSIS — R07.89 OTHER CHEST PAIN: ICD-10-CM

## 2020-11-21 DIAGNOSIS — R73.9 HYPERGLYCEMIA: ICD-10-CM

## 2020-11-21 DIAGNOSIS — E78.2 MIXED HYPERLIPIDEMIA: ICD-10-CM

## 2020-11-21 LAB
ALBUMIN SERPL BCP-MCNC: 4.5 G/DL (ref 3.5–5.7)
ALP SERPL-CCNC: 127 U/L (ref 55–165)
ALT SERPL W P-5'-P-CCNC: 21 U/L (ref 7–52)
ANION GAP SERPL CALCULATED.3IONS-SCNC: 15 MMOL/L (ref 4–13)
AST SERPL W P-5'-P-CCNC: 26 U/L (ref 13–39)
BASOPHILS # BLD AUTO: 0 THOUSANDS/ΜL (ref 0–0.1)
BASOPHILS NFR BLD AUTO: 1 % (ref 0–2)
BILIRUB SERPL-MCNC: 0.5 MG/DL (ref 0.2–1)
BILIRUB UR QL STRIP: NEGATIVE
BUN SERPL-MCNC: 24 MG/DL (ref 7–25)
CALCIUM SERPL-MCNC: 10 MG/DL (ref 8.6–10.5)
CHLORIDE SERPL-SCNC: 82 MMOL/L (ref 98–107)
CLARITY UR: CLEAR
CO2 SERPL-SCNC: 26 MMOL/L (ref 21–31)
COLOR UR: YELLOW
CREAT SERPL-MCNC: 1.3 MG/DL (ref 0.6–1.2)
EOSINOPHIL # BLD AUTO: 0 THOUSAND/ΜL (ref 0–0.61)
EOSINOPHIL NFR BLD AUTO: 0 % (ref 0–5)
ERYTHROCYTE [DISTWIDTH] IN BLOOD BY AUTOMATED COUNT: 14.2 % (ref 11.5–14.5)
GFR SERPL CREATININE-BSD FRML MDRD: 45 ML/MIN/1.73SQ M
GLUCOSE SERPL-MCNC: 1126 MG/DL (ref 65–99)
GLUCOSE UR STRIP-MCNC: ABNORMAL MG/DL
HCT VFR BLD AUTO: 46.9 % (ref 42–47)
HGB BLD-MCNC: 14.9 G/DL (ref 12–16)
HGB UR QL STRIP.AUTO: NEGATIVE
KETONES UR STRIP-MCNC: ABNORMAL MG/DL
LEUKOCYTE ESTERASE UR QL STRIP: NEGATIVE
LIPASE SERPL-CCNC: 103 U/L (ref 11–82)
LYMPHOCYTES # BLD AUTO: 2.2 THOUSANDS/ΜL (ref 0.6–4.47)
LYMPHOCYTES NFR BLD AUTO: 32 % (ref 21–51)
MCH RBC QN AUTO: 30.8 PG (ref 26–34)
MCHC RBC AUTO-ENTMCNC: 31.8 G/DL (ref 31–37)
MCV RBC AUTO: 97 FL (ref 81–99)
MONOCYTES # BLD AUTO: 0.3 THOUSAND/ΜL (ref 0.17–1.22)
MONOCYTES NFR BLD AUTO: 5 % (ref 2–12)
NEUTROPHILS # BLD AUTO: 4.3 THOUSANDS/ΜL (ref 1.4–6.5)
NEUTS SEG NFR BLD AUTO: 62 % (ref 42–75)
NITRITE UR QL STRIP: NEGATIVE
PH UR STRIP.AUTO: 6 [PH]
PLATELET # BLD AUTO: 264 THOUSANDS/UL (ref 149–390)
PMV BLD AUTO: 8.5 FL (ref 8.6–11.7)
POTASSIUM SERPL-SCNC: 4.7 MMOL/L (ref 3.5–5.5)
PROT SERPL-MCNC: 8.1 G/DL (ref 6.4–8.9)
PROT UR STRIP-MCNC: NEGATIVE MG/DL
RBC # BLD AUTO: 4.84 MILLION/UL (ref 3.9–5.2)
SODIUM SERPL-SCNC: 123 MMOL/L (ref 134–143)
SP GR UR STRIP.AUTO: <=1.005 (ref 1–1.03)
UROBILINOGEN UR QL STRIP.AUTO: 0.2 E.U./DL
WBC # BLD AUTO: 6.9 THOUSAND/UL (ref 4.8–10.8)

## 2020-11-21 PROCEDURE — 93005 ELECTROCARDIOGRAM TRACING: CPT

## 2020-11-21 PROCEDURE — 99285 EMERGENCY DEPT VISIT HI MDM: CPT | Performed by: EMERGENCY MEDICINE

## 2020-11-21 PROCEDURE — 84484 ASSAY OF TROPONIN QUANT: CPT | Performed by: EMERGENCY MEDICINE

## 2020-11-21 PROCEDURE — 99284 EMERGENCY DEPT VISIT MOD MDM: CPT

## 2020-11-21 PROCEDURE — 36415 COLL VENOUS BLD VENIPUNCTURE: CPT | Performed by: EMERGENCY MEDICINE

## 2020-11-21 PROCEDURE — 80053 COMPREHEN METABOLIC PANEL: CPT | Performed by: EMERGENCY MEDICINE

## 2020-11-21 PROCEDURE — 81003 URINALYSIS AUTO W/O SCOPE: CPT | Performed by: EMERGENCY MEDICINE

## 2020-11-21 PROCEDURE — 96361 HYDRATE IV INFUSION ADD-ON: CPT

## 2020-11-21 PROCEDURE — 96374 THER/PROPH/DIAG INJ IV PUSH: CPT

## 2020-11-21 PROCEDURE — 85025 COMPLETE CBC W/AUTO DIFF WBC: CPT | Performed by: EMERGENCY MEDICINE

## 2020-11-21 PROCEDURE — 83690 ASSAY OF LIPASE: CPT | Performed by: EMERGENCY MEDICINE

## 2020-11-21 RX ORDER — ONDANSETRON 2 MG/ML
4 INJECTION INTRAMUSCULAR; INTRAVENOUS ONCE
Status: COMPLETED | OUTPATIENT
Start: 2020-11-21 | End: 2020-11-21

## 2020-11-21 RX ADMIN — ONDANSETRON 4 MG: 2 INJECTION INTRAMUSCULAR; INTRAVENOUS at 23:32

## 2020-11-21 RX ADMIN — SODIUM CHLORIDE 1000 ML: 0.9 INJECTION, SOLUTION INTRAVENOUS at 23:31

## 2020-11-22 VITALS
BODY MASS INDEX: 28.84 KG/M2 | OXYGEN SATURATION: 93 % | TEMPERATURE: 98.3 F | RESPIRATION RATE: 18 BRPM | HEIGHT: 61 IN | HEART RATE: 72 BPM | WEIGHT: 152.78 LBS | DIASTOLIC BLOOD PRESSURE: 80 MMHG | SYSTOLIC BLOOD PRESSURE: 136 MMHG

## 2020-11-22 PROBLEM — Z92.3 HISTORY OF RADIATION THERAPY: Status: ACTIVE | Noted: 2020-08-18

## 2020-11-22 PROBLEM — Z79.811 USE OF ANASTROZOLE (ARIMIDEX): Status: ACTIVE | Noted: 2020-04-07

## 2020-11-22 PROBLEM — E11.65 HYPERGLYCEMIA DUE TO DIABETES MELLITUS (HCC): Status: ACTIVE | Noted: 2020-11-22

## 2020-11-22 PROBLEM — C50.411 MALIGNANT NEOPLASM OF UPPER-OUTER QUADRANT OF RIGHT BREAST IN FEMALE, ESTROGEN RECEPTOR POSITIVE (HCC): Status: ACTIVE | Noted: 2020-04-07

## 2020-11-22 PROBLEM — Z98.890 S/P LUMPECTOMY, RIGHT BREAST: Status: ACTIVE | Noted: 2020-08-18

## 2020-11-22 PROBLEM — M94.0 COSTOCHONDRITIS: Status: ACTIVE | Noted: 2020-08-18

## 2020-11-22 PROBLEM — Z17.0 MALIGNANT NEOPLASM OF UPPER-OUTER QUADRANT OF RIGHT BREAST IN FEMALE, ESTROGEN RECEPTOR POSITIVE (HCC): Status: ACTIVE | Noted: 2020-04-07

## 2020-11-22 PROBLEM — E87.1 HYPONATREMIA: Status: ACTIVE | Noted: 2020-11-22

## 2020-11-22 PROBLEM — M26.623 BILATERAL TEMPOROMANDIBULAR JOINT PAIN: Status: ACTIVE | Noted: 2020-08-18

## 2020-11-22 LAB
ANION GAP SERPL CALCULATED.3IONS-SCNC: 13 MMOL/L (ref 4–13)
ATRIAL RATE: 54 BPM
ATRIAL RATE: 83 BPM
BUN SERPL-MCNC: 18 MG/DL (ref 7–25)
CALCIUM SERPL-MCNC: 8.7 MG/DL (ref 8.6–10.5)
CHLORIDE SERPL-SCNC: 96 MMOL/L (ref 98–107)
CO2 SERPL-SCNC: 24 MMOL/L (ref 21–31)
CREAT SERPL-MCNC: 0.89 MG/DL (ref 0.6–1.2)
ERYTHROCYTE [DISTWIDTH] IN BLOOD BY AUTOMATED COUNT: 14 % (ref 11.5–14.5)
GFR SERPL CREATININE-BSD FRML MDRD: 71 ML/MIN/1.73SQ M
GLUCOSE P FAST SERPL-MCNC: 621 MG/DL (ref 65–99)
GLUCOSE P FAST SERPL-MCNC: 920 MG/DL (ref 65–99)
GLUCOSE SERPL-MCNC: 327 MG/DL (ref 65–140)
GLUCOSE SERPL-MCNC: 462 MG/DL (ref 65–99)
GLUCOSE SERPL-MCNC: >500 MG/DL (ref 65–140)
HCT VFR BLD AUTO: 37.1 % (ref 42–47)
HGB BLD-MCNC: 12.2 G/DL (ref 12–16)
MCH RBC QN AUTO: 30.8 PG (ref 26–34)
MCHC RBC AUTO-ENTMCNC: 33 G/DL (ref 31–37)
MCV RBC AUTO: 93 FL (ref 81–99)
P AXIS: 10 DEGREES
P AXIS: 35 DEGREES
PLATELET # BLD AUTO: 235 THOUSANDS/UL (ref 149–390)
PMV BLD AUTO: 8.1 FL (ref 8.6–11.7)
POTASSIUM SERPL-SCNC: 3.9 MMOL/L (ref 3.5–5.5)
PR INTERVAL: 150 MS
PR INTERVAL: 156 MS
QRS AXIS: 59 DEGREES
QRS AXIS: 6 DEGREES
QRSD INTERVAL: 78 MS
QRSD INTERVAL: 80 MS
QT INTERVAL: 394 MS
QT INTERVAL: 468 MS
QTC INTERVAL: 443 MS
QTC INTERVAL: 462 MS
RBC # BLD AUTO: 3.98 MILLION/UL (ref 3.9–5.2)
SODIUM SERPL-SCNC: 133 MMOL/L (ref 134–143)
T WAVE AXIS: 15 DEGREES
T WAVE AXIS: 65 DEGREES
TROPONIN I SERPL-MCNC: <0.03 NG/ML
VENTRICULAR RATE: 54 BPM
VENTRICULAR RATE: 83 BPM
WBC # BLD AUTO: 7.1 THOUSAND/UL (ref 4.8–10.8)

## 2020-11-22 PROCEDURE — 80048 BASIC METABOLIC PNL TOTAL CA: CPT | Performed by: PHYSICIAN ASSISTANT

## 2020-11-22 PROCEDURE — 99236 HOSP IP/OBS SAME DATE HI 85: CPT | Performed by: FAMILY MEDICINE

## 2020-11-22 PROCEDURE — 82948 REAGENT STRIP/BLOOD GLUCOSE: CPT

## 2020-11-22 PROCEDURE — 96375 TX/PRO/DX INJ NEW DRUG ADDON: CPT

## 2020-11-22 PROCEDURE — 93005 ELECTROCARDIOGRAM TRACING: CPT

## 2020-11-22 PROCEDURE — 85027 COMPLETE CBC AUTOMATED: CPT | Performed by: PHYSICIAN ASSISTANT

## 2020-11-22 PROCEDURE — 82947 ASSAY GLUCOSE BLOOD QUANT: CPT | Performed by: INTERNAL MEDICINE

## 2020-11-22 PROCEDURE — 99244 OFF/OP CNSLTJ NEW/EST MOD 40: CPT | Performed by: INTERNAL MEDICINE

## 2020-11-22 PROCEDURE — 84484 ASSAY OF TROPONIN QUANT: CPT | Performed by: PHYSICIAN ASSISTANT

## 2020-11-22 PROCEDURE — 93010 ELECTROCARDIOGRAM REPORT: CPT | Performed by: INTERNAL MEDICINE

## 2020-11-22 RX ORDER — PANTOPRAZOLE SODIUM 40 MG/1
40 TABLET, DELAYED RELEASE ORAL
Status: DISCONTINUED | OUTPATIENT
Start: 2020-11-22 | End: 2020-11-22 | Stop reason: HOSPADM

## 2020-11-22 RX ORDER — ROPINIROLE 1 MG/1
1 TABLET, FILM COATED ORAL DAILY
Status: DISCONTINUED | OUTPATIENT
Start: 2020-11-22 | End: 2020-11-22 | Stop reason: HOSPADM

## 2020-11-22 RX ORDER — ACETAMINOPHEN 325 MG/1
650 TABLET ORAL EVERY 6 HOURS PRN
Status: DISCONTINUED | OUTPATIENT
Start: 2020-11-22 | End: 2020-11-22 | Stop reason: HOSPADM

## 2020-11-22 RX ORDER — ONDANSETRON 2 MG/ML
4 INJECTION INTRAMUSCULAR; INTRAVENOUS EVERY 6 HOURS PRN
Status: DISCONTINUED | OUTPATIENT
Start: 2020-11-22 | End: 2020-11-22 | Stop reason: HOSPADM

## 2020-11-22 RX ORDER — LISINOPRIL 20 MG/1
20 TABLET ORAL DAILY
Status: DISCONTINUED | OUTPATIENT
Start: 2020-11-22 | End: 2020-11-22 | Stop reason: HOSPADM

## 2020-11-22 RX ORDER — TEMAZEPAM 15 MG/1
30 CAPSULE ORAL
Status: DISCONTINUED | OUTPATIENT
Start: 2020-11-22 | End: 2020-11-22 | Stop reason: HOSPADM

## 2020-11-22 RX ORDER — TIZANIDINE 4 MG/1
4 TABLET ORAL EVERY 8 HOURS PRN
Status: DISCONTINUED | OUTPATIENT
Start: 2020-11-22 | End: 2020-11-22 | Stop reason: HOSPADM

## 2020-11-22 RX ORDER — ROPINIROLE 1 MG/1
2 TABLET, FILM COATED ORAL
Status: DISCONTINUED | OUTPATIENT
Start: 2020-11-22 | End: 2020-11-22 | Stop reason: HOSPADM

## 2020-11-22 RX ORDER — ANASTROZOLE 1 MG/1
1 TABLET ORAL DAILY
Status: DISCONTINUED | OUTPATIENT
Start: 2020-11-22 | End: 2020-11-22 | Stop reason: HOSPADM

## 2020-11-22 RX ORDER — AMLODIPINE BESYLATE 5 MG/1
5 TABLET ORAL DAILY
Status: DISCONTINUED | OUTPATIENT
Start: 2020-11-22 | End: 2020-11-22 | Stop reason: HOSPADM

## 2020-11-22 RX ORDER — HEPARIN SODIUM 5000 [USP'U]/ML
5000 INJECTION, SOLUTION INTRAVENOUS; SUBCUTANEOUS EVERY 12 HOURS SCHEDULED
Status: DISCONTINUED | OUTPATIENT
Start: 2020-11-22 | End: 2020-11-22 | Stop reason: HOSPADM

## 2020-11-22 RX ORDER — SODIUM CHLORIDE 9 MG/ML
125 INJECTION, SOLUTION INTRAVENOUS CONTINUOUS
Status: DISCONTINUED | OUTPATIENT
Start: 2020-11-22 | End: 2020-11-22 | Stop reason: HOSPADM

## 2020-11-22 RX ORDER — TRIAMCINOLONE ACETONIDE 1 MG/G
CREAM TOPICAL 2 TIMES DAILY
Status: DISCONTINUED | OUTPATIENT
Start: 2020-11-22 | End: 2020-11-22 | Stop reason: HOSPADM

## 2020-11-22 RX ORDER — TRAMADOL HYDROCHLORIDE 50 MG/1
50 TABLET ORAL EVERY 8 HOURS
Status: DISCONTINUED | OUTPATIENT
Start: 2020-11-22 | End: 2020-11-22 | Stop reason: HOSPADM

## 2020-11-22 RX ORDER — NITROGLYCERIN 0.4 MG/1
0.4 TABLET SUBLINGUAL
Status: DISCONTINUED | OUTPATIENT
Start: 2020-11-22 | End: 2020-11-22 | Stop reason: HOSPADM

## 2020-11-22 RX ORDER — ATORVASTATIN CALCIUM 40 MG/1
40 TABLET, FILM COATED ORAL
Status: DISCONTINUED | OUTPATIENT
Start: 2020-11-22 | End: 2020-11-22 | Stop reason: HOSPADM

## 2020-11-22 RX ORDER — ASPIRIN 81 MG/1
81 TABLET ORAL DAILY
Status: DISCONTINUED | OUTPATIENT
Start: 2020-11-22 | End: 2020-11-22 | Stop reason: HOSPADM

## 2020-11-22 RX ADMIN — SODIUM CHLORIDE 500 ML: 0.9 INJECTION, SOLUTION INTRAVENOUS at 02:43

## 2020-11-22 RX ADMIN — SODIUM CHLORIDE 125 ML/HR: 0.9 INJECTION, SOLUTION INTRAVENOUS at 07:34

## 2020-11-22 RX ADMIN — INSULIN LISPRO 8 UNITS: 100 INJECTION, SOLUTION INTRAVENOUS; SUBCUTANEOUS at 11:08

## 2020-11-22 RX ADMIN — TRAMADOL HYDROCHLORIDE 50 MG: 50 TABLET, FILM COATED ORAL at 09:13

## 2020-11-22 RX ADMIN — ACETAMINOPHEN 650 MG: 325 TABLET ORAL at 09:20

## 2020-11-22 RX ADMIN — INSULIN HUMAN 10 UNITS: 100 INJECTION, SOLUTION PARENTERAL at 00:06

## 2020-11-22 RX ADMIN — INSULIN LISPRO 10 UNITS: 100 INJECTION, SOLUTION INTRAVENOUS; SUBCUTANEOUS at 02:35

## 2020-11-22 RX ADMIN — ASPIRIN 81 MG: 81 TABLET, COATED ORAL at 09:14

## 2020-11-22 RX ADMIN — HEPARIN SODIUM 5000 UNITS: 5000 INJECTION INTRAVENOUS; SUBCUTANEOUS at 02:08

## 2020-11-22 RX ADMIN — PANTOPRAZOLE SODIUM 40 MG: 40 TABLET, DELAYED RELEASE ORAL at 05:03

## 2020-11-22 RX ADMIN — METOPROLOL TARTRATE 25 MG: 25 TABLET, FILM COATED ORAL at 11:15

## 2020-11-22 RX ADMIN — AMLODIPINE BESYLATE 5 MG: 5 TABLET ORAL at 11:15

## 2020-11-22 RX ADMIN — ROPINIROLE HYDROCHLORIDE 2 MG: 1 TABLET, FILM COATED ORAL at 03:14

## 2020-11-22 RX ADMIN — SERTRALINE HYDROCHLORIDE 50 MG: 50 TABLET ORAL at 09:15

## 2020-11-22 RX ADMIN — ONDANSETRON 4 MG: 2 INJECTION INTRAMUSCULAR; INTRAVENOUS at 11:15

## 2020-11-22 RX ADMIN — INSULIN LISPRO 12 UNITS: 100 INJECTION, SOLUTION INTRAVENOUS; SUBCUTANEOUS at 07:44

## 2020-11-22 RX ADMIN — ROPINIROLE HYDROCHLORIDE 1 MG: 1 TABLET, FILM COATED ORAL at 09:13

## 2020-11-22 RX ADMIN — TRIAMCINOLONE ACETONIDE: 1 CREAM TOPICAL at 09:20

## 2020-11-22 RX ADMIN — SODIUM CHLORIDE 125 ML/HR: 0.9 INJECTION, SOLUTION INTRAVENOUS at 01:58

## 2020-11-22 RX ADMIN — LISINOPRIL 20 MG: 20 TABLET ORAL at 11:15

## 2020-11-22 RX ADMIN — ONDANSETRON 4 MG: 2 INJECTION INTRAMUSCULAR; INTRAVENOUS at 05:10

## 2020-11-22 RX ADMIN — TRAMADOL HYDROCHLORIDE 50 MG: 50 TABLET, FILM COATED ORAL at 03:09

## 2020-11-22 RX ADMIN — METOPROLOL TARTRATE 25 MG: 25 TABLET, FILM COATED ORAL at 03:00

## 2020-11-23 DIAGNOSIS — Z79.4 TYPE 2 DIABETES MELLITUS WITH COMPLICATION, WITH LONG-TERM CURRENT USE OF INSULIN (HCC): ICD-10-CM

## 2020-11-23 DIAGNOSIS — E11.8 TYPE 2 DIABETES MELLITUS WITH COMPLICATION, WITH LONG-TERM CURRENT USE OF INSULIN (HCC): ICD-10-CM

## 2020-11-23 LAB
ATRIAL RATE: 54 BPM
P AXIS: 35 DEGREES
PR INTERVAL: 150 MS
QRS AXIS: 59 DEGREES
QRSD INTERVAL: 80 MS
QT INTERVAL: 468 MS
QTC INTERVAL: 443 MS
T WAVE AXIS: 65 DEGREES
VENTRICULAR RATE: 54 BPM

## 2020-11-23 PROCEDURE — 93010 ELECTROCARDIOGRAM REPORT: CPT | Performed by: INTERNAL MEDICINE

## 2020-11-23 RX ORDER — PEN NEEDLE, DIABETIC 32GX 5/32"
NEEDLE, DISPOSABLE MISCELLANEOUS
Qty: 100 EACH | Refills: 3 | Status: SHIPPED | OUTPATIENT
Start: 2020-11-23 | End: 2020-11-24 | Stop reason: SDUPTHER

## 2020-11-24 DIAGNOSIS — E11.8 TYPE 2 DIABETES MELLITUS WITH COMPLICATION, WITH LONG-TERM CURRENT USE OF INSULIN (HCC): ICD-10-CM

## 2020-11-24 DIAGNOSIS — Z79.4 TYPE 2 DIABETES MELLITUS WITH COMPLICATION, WITH LONG-TERM CURRENT USE OF INSULIN (HCC): ICD-10-CM

## 2020-11-25 RX ORDER — PEN NEEDLE, DIABETIC 32GX 5/32"
NEEDLE, DISPOSABLE MISCELLANEOUS 3 TIMES DAILY
Qty: 100 EACH | Refills: 3 | Status: SHIPPED | OUTPATIENT
Start: 2020-11-25 | End: 2021-06-08 | Stop reason: SDUPTHER

## 2020-12-03 ENCOUNTER — HOSPITAL ENCOUNTER (OUTPATIENT)
Dept: NON INVASIVE DIAGNOSTICS | Facility: CLINIC | Age: 60
Discharge: HOME | End: 2020-12-03
Payer: COMMERCIAL

## 2020-12-03 DIAGNOSIS — Z79.4 TYPE 2 DIABETES MELLITUS WITHOUT COMPLICATION, WITH LONG-TERM CURRENT USE OF INSULIN (HCC): Chronic | ICD-10-CM

## 2020-12-03 DIAGNOSIS — I10 ESSENTIAL HYPERTENSION: ICD-10-CM

## 2020-12-03 DIAGNOSIS — E78.2 MIXED HYPERLIPIDEMIA: ICD-10-CM

## 2020-12-03 DIAGNOSIS — R07.89 OTHER CHEST PAIN: ICD-10-CM

## 2020-12-03 DIAGNOSIS — E11.9 TYPE 2 DIABETES MELLITUS WITHOUT COMPLICATION, WITH LONG-TERM CURRENT USE OF INSULIN (HCC): Chronic | ICD-10-CM

## 2020-12-03 PROCEDURE — 93350 STRESS TTE ONLY: CPT

## 2020-12-03 PROCEDURE — 93351 STRESS TTE COMPLETE: CPT | Performed by: INTERNAL MEDICINE

## 2020-12-04 LAB
ARRHY DURING EX: NORMAL
CHEST PAIN STATEMENT: NORMAL
MAX DIASTOLIC BP: 104 MMHG
MAX HEART RATE: 136 BPM
MAX PREDICTED HEART RATE: 160 BPM
MAX. SYSTOLIC BP: 168 MMHG
PROTOCOL NAME: NORMAL
TARGET HR FORMULA: NORMAL
TEST INDICATION: NORMAL
TIME IN EXERCISE PHASE: NORMAL

## 2020-12-16 DIAGNOSIS — I10 ESSENTIAL HYPERTENSION: ICD-10-CM

## 2020-12-16 RX ORDER — AMLODIPINE BESYLATE 5 MG/1
5 TABLET ORAL DAILY
Qty: 90 TABLET | Refills: 1 | Status: SHIPPED | OUTPATIENT
Start: 2020-12-16 | End: 2021-03-12 | Stop reason: SDUPTHER

## 2020-12-29 DIAGNOSIS — Z20.822 ENCOUNTER FOR SCREENING LABORATORY TESTING FOR COVID-19 VIRUS IN ASYMPTOMATIC PATIENT: Primary | ICD-10-CM

## 2020-12-30 DIAGNOSIS — Z20.822 ENCOUNTER FOR SCREENING LABORATORY TESTING FOR COVID-19 VIRUS IN ASYMPTOMATIC PATIENT: ICD-10-CM

## 2020-12-30 PROCEDURE — U0003 INFECTIOUS AGENT DETECTION BY NUCLEIC ACID (DNA OR RNA); SEVERE ACUTE RESPIRATORY SYNDROME CORONAVIRUS 2 (SARS-COV-2) (CORONAVIRUS DISEASE [COVID-19]), AMPLIFIED PROBE TECHNIQUE, MAKING USE OF HIGH THROUGHPUT TECHNOLOGIES AS DESCRIBED BY CMS-2020-01-R: HCPCS | Performed by: FAMILY MEDICINE

## 2020-12-31 LAB — SARS-COV-2 RNA SPEC QL NAA+PROBE: NOT DETECTED

## 2021-02-23 ENCOUNTER — TRANSCRIBE ORDERS (OUTPATIENT)
Dept: URGENT CARE | Facility: CLINIC | Age: 61
End: 2021-02-23

## 2021-02-23 ENCOUNTER — LAB (OUTPATIENT)
Dept: LAB | Facility: CLINIC | Age: 61
End: 2021-02-23
Payer: COMMERCIAL

## 2021-02-23 DIAGNOSIS — Z79.4 TYPE 2 DIABETES MELLITUS WITHOUT COMPLICATION, WITH LONG-TERM CURRENT USE OF INSULIN (HCC): Primary | Chronic | ICD-10-CM

## 2021-02-23 DIAGNOSIS — Z11.4 SCREENING FOR HIV WITHOUT PRESENCE OF RISK FACTORS: ICD-10-CM

## 2021-02-23 DIAGNOSIS — E11.9 TYPE 2 DIABETES MELLITUS WITHOUT COMPLICATION, WITH LONG-TERM CURRENT USE OF INSULIN (HCC): Primary | Chronic | ICD-10-CM

## 2021-02-23 DIAGNOSIS — C50.411 MALIGNANT NEOPLASM OF UPPER-OUTER QUADRANT OF RIGHT BREAST IN FEMALE, ESTROGEN RECEPTOR NEGATIVE (HCC): Primary | ICD-10-CM

## 2021-02-23 DIAGNOSIS — Z17.1 MALIGNANT NEOPLASM OF UPPER-OUTER QUADRANT OF RIGHT BREAST IN FEMALE, ESTROGEN RECEPTOR NEGATIVE (HCC): Primary | ICD-10-CM

## 2021-02-23 LAB
ALBUMIN SERPL BCP-MCNC: 3.6 G/DL (ref 3.5–5)
ALP SERPL-CCNC: 127 U/L (ref 46–116)
ALT SERPL W P-5'-P-CCNC: 26 U/L (ref 12–78)
ANION GAP SERPL CALCULATED.3IONS-SCNC: 6 MMOL/L (ref 4–13)
AST SERPL W P-5'-P-CCNC: 27 U/L (ref 5–45)
BASOPHILS # BLD AUTO: 0.04 THOUSANDS/ΜL (ref 0–0.1)
BASOPHILS NFR BLD AUTO: 1 % (ref 0–1)
BILIRUB SERPL-MCNC: 0.53 MG/DL (ref 0.2–1)
BUN SERPL-MCNC: 11 MG/DL (ref 5–25)
CALCIUM SERPL-MCNC: 10.1 MG/DL (ref 8.3–10.1)
CHLORIDE SERPL-SCNC: 111 MMOL/L (ref 100–108)
CO2 SERPL-SCNC: 27 MMOL/L (ref 21–32)
CREAT SERPL-MCNC: 0.91 MG/DL (ref 0.6–1.3)
EOSINOPHIL # BLD AUTO: 0.14 THOUSAND/ΜL (ref 0–0.61)
EOSINOPHIL NFR BLD AUTO: 2 % (ref 0–6)
ERYTHROCYTE [DISTWIDTH] IN BLOOD BY AUTOMATED COUNT: 12.9 % (ref 11.6–15.1)
EST. AVERAGE GLUCOSE BLD GHB EST-MCNC: 200 MG/DL
GFR SERPL CREATININE-BSD FRML MDRD: 69 ML/MIN/1.73SQ M
GLUCOSE P FAST SERPL-MCNC: 107 MG/DL (ref 65–99)
HBA1C MFR BLD: 8.6 %
HCT VFR BLD AUTO: 44.2 % (ref 34.8–46.1)
HGB BLD-MCNC: 14 G/DL (ref 11.5–15.4)
IMM GRANULOCYTES # BLD AUTO: 0.02 THOUSAND/UL (ref 0–0.2)
IMM GRANULOCYTES NFR BLD AUTO: 0 % (ref 0–2)
LYMPHOCYTES # BLD AUTO: 2.61 THOUSANDS/ΜL (ref 0.6–4.47)
LYMPHOCYTES NFR BLD AUTO: 33 % (ref 14–44)
MCH RBC QN AUTO: 29.8 PG (ref 26.8–34.3)
MCHC RBC AUTO-ENTMCNC: 31.7 G/DL (ref 31.4–37.4)
MCV RBC AUTO: 94 FL (ref 82–98)
MONOCYTES # BLD AUTO: 0.47 THOUSAND/ΜL (ref 0.17–1.22)
MONOCYTES NFR BLD AUTO: 6 % (ref 4–12)
NEUTROPHILS # BLD AUTO: 4.66 THOUSANDS/ΜL (ref 1.85–7.62)
NEUTS SEG NFR BLD AUTO: 58 % (ref 43–75)
NRBC BLD AUTO-RTO: 0 /100 WBCS
PLATELET # BLD AUTO: 387 THOUSANDS/UL (ref 149–390)
PMV BLD AUTO: 9.2 FL (ref 8.9–12.7)
POTASSIUM SERPL-SCNC: 3.9 MMOL/L (ref 3.5–5.3)
PROT SERPL-MCNC: 8.2 G/DL (ref 6.4–8.2)
RBC # BLD AUTO: 4.7 MILLION/UL (ref 3.81–5.12)
SODIUM SERPL-SCNC: 144 MMOL/L (ref 136–145)
WBC # BLD AUTO: 7.94 THOUSAND/UL (ref 4.31–10.16)

## 2021-02-23 PROCEDURE — 36415 COLL VENOUS BLD VENIPUNCTURE: CPT

## 2021-02-23 PROCEDURE — 83036 HEMOGLOBIN GLYCOSYLATED A1C: CPT

## 2021-02-23 PROCEDURE — 80053 COMPREHEN METABOLIC PANEL: CPT

## 2021-02-23 PROCEDURE — 87389 HIV-1 AG W/HIV-1&-2 AB AG IA: CPT

## 2021-02-23 PROCEDURE — 3052F HG A1C>EQUAL 8.0%<EQUAL 9.0%: CPT | Performed by: FAMILY MEDICINE

## 2021-02-23 PROCEDURE — 85025 COMPLETE CBC W/AUTO DIFF WBC: CPT

## 2021-02-24 DIAGNOSIS — I21.4 NSTEMI (NON-ST ELEVATED MYOCARDIAL INFARCTION) (HCC): ICD-10-CM

## 2021-02-24 LAB — HIV 1+2 AB+HIV1 P24 AG SERPL QL IA: NORMAL

## 2021-02-24 RX ORDER — ATORVASTATIN CALCIUM 40 MG/1
40 TABLET, FILM COATED ORAL
Qty: 90 TABLET | Refills: 2 | Status: SHIPPED | OUTPATIENT
Start: 2021-02-24 | End: 2021-11-29 | Stop reason: SDUPTHER

## 2021-02-26 ENCOUNTER — OFFICE VISIT (OUTPATIENT)
Dept: FAMILY MEDICINE CLINIC | Facility: CLINIC | Age: 61
End: 2021-02-26
Payer: COMMERCIAL

## 2021-02-26 VITALS
HEIGHT: 61 IN | OXYGEN SATURATION: 98 % | DIASTOLIC BLOOD PRESSURE: 88 MMHG | BODY MASS INDEX: 31.53 KG/M2 | HEART RATE: 71 BPM | WEIGHT: 167 LBS | TEMPERATURE: 97.8 F | SYSTOLIC BLOOD PRESSURE: 139 MMHG

## 2021-02-26 DIAGNOSIS — I10 ESSENTIAL HYPERTENSION: ICD-10-CM

## 2021-02-26 DIAGNOSIS — E11.9 TYPE 2 DIABETES MELLITUS WITHOUT COMPLICATION, WITH LONG-TERM CURRENT USE OF INSULIN (HCC): Primary | ICD-10-CM

## 2021-02-26 DIAGNOSIS — Z23 ENCOUNTER FOR IMMUNIZATION: ICD-10-CM

## 2021-02-26 DIAGNOSIS — E66.3 OVERWEIGHT (BMI 25.0-29.9): ICD-10-CM

## 2021-02-26 DIAGNOSIS — F32.A DEPRESSION, UNSPECIFIED DEPRESSION TYPE: ICD-10-CM

## 2021-02-26 DIAGNOSIS — Z79.4 TYPE 2 DIABETES MELLITUS WITHOUT COMPLICATION, WITH LONG-TERM CURRENT USE OF INSULIN (HCC): Primary | ICD-10-CM

## 2021-02-26 DIAGNOSIS — E11.9 ENCOUNTER FOR DIABETIC FOOT EXAM (HCC): ICD-10-CM

## 2021-02-26 PROCEDURE — 1036F TOBACCO NON-USER: CPT | Performed by: FAMILY MEDICINE

## 2021-02-26 PROCEDURE — 99214 OFFICE O/P EST MOD 30 MIN: CPT | Performed by: FAMILY MEDICINE

## 2021-02-26 RX ORDER — SERTRALINE HYDROCHLORIDE 100 MG/1
100 TABLET, FILM COATED ORAL DAILY
Qty: 30 TABLET | Refills: 6 | Status: SHIPPED | OUTPATIENT
Start: 2021-02-26

## 2021-02-26 NOTE — PROGRESS NOTES
Assessment/Plan:    No problem-specific Assessment & Plan notes found for this encounter  Diagnoses and all orders for this visit:    Type 2 diabetes mellitus without complication, with long-term current use of insulin ([facility])  Comments:  titrate lantus to 110  Orders:  -     Glucose, fasting; Future  -     Hemoglobin A1C; Future    Essential hypertension    Encounter for immunization    Encounter for diabetic foot exam ([facility] [address] )  -     Diabetic foot exam; Future    Overweight (BMI 25 0-29  9)    Depression, unspecified depression type  -     sertraline (ZOLOFT) 100 mg tablet; Take 1 tablet (100 mg total) by mouth daily    Other orders  -     Cancel: influenza vaccine, quadrivalent, recombinant, PF, 0 5 mL, for patients 18 yr+ (FLUBLOK)  -     Cancel: Zoster Vaccine Recombinant IM          PHQ-9 Depression Screening    PHQ-9:   Frequency of the following problems over the past two weeks:      Little interest or pleasure in doing things: 2 - more than half the days  Feeling down, depressed, or hopeless: 2 - more than half the days  Trouble falling or staying asleep, or sleeping too much: 3 - nearly every day  Feeling tired or having little energy: 3 - nearly every day  Poor appetite or overeatin - several days  Feeling bad about yourself - or that you are a failure or have let yourself or your family down: 2 - more than half the days  Trouble concentrating on things, such as reading the newspaper or watching television: 2 - more than half the days  Moving or speaking so slowly that other people could have noticed  Or the opposite - being so fidgety or restless that you have been moving around a lot more than usual: 2 - more than half the days  Thoughts that you would be better off dead, or of hurting yourself in some way: 0 - not at all  PHQ-2 Score: 4  PHQ-9 Score: 17        BMI Counseling: Body mass index is 31 55 kg/m²   The BMI is above normal  Nutrition recommendations include reducing portion sizes and 3-5 servings of fruits/vegetables daily  Exercise recommendations include moderate aerobic physical activity for 150 minutes/week and exercising 3-5 times per week  Depression Screening Follow-up Plan: Patient's depression screening was positive with a PHQ-2 score of 4  Their PHQ-9 score was 17  Patient assessed for underlying major depression  They have no active suicidal ideations  Brief counseling provided and recommend additional follow-up/re-evaluation next office visit  Subjective:      Patient ID: Ely Monday is a 61 y o  female  Diabetes  She presents for her follow-up diabetic visit  She has type 2 diabetes mellitus  Her disease course has been improving  There are no hypoglycemic associated symptoms  Pertinent negatives for diabetes include no chest pain  There are no hypoglycemic complications  Symptoms are stable  There are no diabetic complications  Risk factors for coronary artery disease include sedentary lifestyle and post-menopausal  Current diabetic treatment includes oral agent (monotherapy) and insulin injections  She is compliant with treatment most of the time  Her weight is increasing steadily  She is following a diabetic diet  Her breakfast blood glucose range is generally 180-200 mg/dl  The following portions of the patient's history were reviewed and updated as appropriate: allergies, current medications, past family history, past medical history, past social history, past surgical history and problem list     Review of Systems   Eyes: Negative for visual disturbance  Cardiovascular: Negative for chest pain and palpitations  Gastrointestinal: Negative for abdominal pain, nausea and vomiting  Genitourinary: Negative for frequency  Skin: Negative for wound  Neurological: Negative for numbness           Objective:    /88   Pulse 71   Temp 97 8 °F (36 6 °C) (Tympanic)   Ht 5' 1" (1 549 m)   Wt 75 8 kg (167 lb)   SpO2 98%   BMI 31 55 kg/m²      Physical Exam  Vitals signs and nursing note reviewed  Constitutional:       General: She is not in acute distress  Appearance: She is well-developed  She is not ill-appearing, toxic-appearing or diaphoretic  HENT:      Head: Normocephalic and atraumatic  Eyes:      General: No scleral icterus  Conjunctiva/sclera: Conjunctivae normal       Pupils: Pupils are equal, round, and reactive to light  Neck:      Musculoskeletal: Normal range of motion and neck supple  Cardiovascular:      Rate and Rhythm: Normal rate and regular rhythm  Pulses: no weak pulses          Dorsalis pedis pulses are 2+ on the right side and 2+ on the left side  Heart sounds: Normal heart sounds  No murmur  Pulmonary:      Effort: Pulmonary effort is normal  No respiratory distress  Breath sounds: Normal breath sounds  No wheezing or rales  Abdominal:      General: Bowel sounds are normal  There is no distension  Palpations: Abdomen is soft  There is no mass  Tenderness: There is no abdominal tenderness  There is no guarding or rebound  Musculoskeletal: Normal range of motion  General: No deformity  Right lower leg: No edema  Left lower leg: No edema  Feet:      Right foot:      Skin integrity: No ulcer, skin breakdown, erythema, warmth, callus or dry skin  Left foot:      Skin integrity: No ulcer, skin breakdown, erythema, warmth, callus or dry skin  Lymphadenopathy:      Cervical: No cervical adenopathy  Skin:     General: Skin is warm and dry  Neurological:      General: No focal deficit present  Mental Status: She is alert and oriented to person, place, and time  Psychiatric:         Mood and Affect: Mood normal          Behavior: Behavior normal          Thought Content: Thought content normal          Judgment: Judgment normal        Patient's shoes and socks removed  Right Foot/Ankle   Right Foot Inspection  Skin Exam: skin normal and skin intact no dry skin, no warmth, no callus, no erythema, no maceration, no abnormal color, no pre-ulcer, no ulcer and no callus                          Toe Exam: ROM and strength within normal limits no right toe deformity  Sensory       Monofilament testing: intact  Vascular  Capillary refills: < 3 seconds  The right DP pulse is 2+  Left Foot/Ankle  Left Foot Inspection  Skin Exam: skin normal and skin intactno dry skin, no warmth, no erythema, no maceration, normal color, no pre-ulcer, no ulcer and no callus                         Toe Exam: ROM and strength within normal limitsno left toe deformity                   Sensory       Monofilament: intact  Vascular  Capillary refills: < 3 seconds  The left DP pulse is 2+  Assign Risk Category:  No deformity present; No loss of protective sensation;  No weak pulses       Risk: 0

## 2021-03-05 ENCOUNTER — APPOINTMENT (EMERGENCY)
Dept: CT IMAGING | Facility: HOSPITAL | Age: 61
DRG: 204 | End: 2021-03-05
Payer: COMMERCIAL

## 2021-03-05 ENCOUNTER — HOSPITAL ENCOUNTER (INPATIENT)
Facility: HOSPITAL | Age: 61
LOS: 1 days | Discharge: HOME | DRG: 204 | End: 2021-03-06
Attending: EMERGENCY MEDICINE | Admitting: FAMILY MEDICINE
Payer: COMMERCIAL

## 2021-03-05 ENCOUNTER — APPOINTMENT (EMERGENCY)
Dept: RADIOLOGY | Facility: HOSPITAL | Age: 61
DRG: 204 | End: 2021-03-05
Payer: COMMERCIAL

## 2021-03-05 DIAGNOSIS — I95.9 HYPOTENSION: Primary | ICD-10-CM

## 2021-03-05 DIAGNOSIS — R55 NEAR SYNCOPE: ICD-10-CM

## 2021-03-05 LAB
ANION GAP SERPL CALCULATED.3IONS-SCNC: 9 MMOL/L (ref 4–13)
BASOPHILS # BLD AUTO: 0 THOUSANDS/ΜL (ref 0–0.1)
BASOPHILS NFR BLD AUTO: 1 % (ref 0–2)
BUN SERPL-MCNC: 19 MG/DL (ref 7–25)
CALCIUM SERPL-MCNC: 8.6 MG/DL (ref 8.6–10.5)
CHLORIDE SERPL-SCNC: 106 MMOL/L (ref 98–107)
CO2 SERPL-SCNC: 23 MMOL/L (ref 21–31)
CREAT SERPL-MCNC: 1.13 MG/DL (ref 0.6–1.2)
D DIMER PPP FEU-MCNC: 1.59 UG/ML FEU
EOSINOPHIL # BLD AUTO: 0.1 THOUSAND/ΜL (ref 0–0.61)
EOSINOPHIL NFR BLD AUTO: 1 % (ref 0–5)
ERYTHROCYTE [DISTWIDTH] IN BLOOD BY AUTOMATED COUNT: 14.2 % (ref 11.5–14.5)
GFR SERPL CREATININE-BSD FRML MDRD: 53 ML/MIN/1.73SQ M
GLUCOSE SERPL-MCNC: 203 MG/DL (ref 65–99)
HCT VFR BLD AUTO: 35.9 % (ref 42–47)
HGB BLD-MCNC: 11.8 G/DL (ref 12–16)
LYMPHOCYTES # BLD AUTO: 1.8 THOUSANDS/ΜL (ref 0.6–4.47)
LYMPHOCYTES NFR BLD AUTO: 26 % (ref 21–51)
MCH RBC QN AUTO: 30.2 PG (ref 26–34)
MCHC RBC AUTO-ENTMCNC: 32.9 G/DL (ref 31–37)
MCV RBC AUTO: 92 FL (ref 81–99)
MONOCYTES # BLD AUTO: 0.5 THOUSAND/ΜL (ref 0.17–1.22)
MONOCYTES NFR BLD AUTO: 7 % (ref 2–12)
NEUTROPHILS # BLD AUTO: 4.4 THOUSANDS/ΜL (ref 1.4–6.5)
NEUTS SEG NFR BLD AUTO: 66 % (ref 42–75)
PLATELET # BLD AUTO: 296 THOUSANDS/UL (ref 149–390)
PMV BLD AUTO: 7.3 FL (ref 8.6–11.7)
POTASSIUM SERPL-SCNC: 3.7 MMOL/L (ref 3.5–5.5)
RBC # BLD AUTO: 3.91 MILLION/UL (ref 3.9–5.2)
SODIUM SERPL-SCNC: 138 MMOL/L (ref 134–143)
TROPONIN I SERPL-MCNC: <0.03 NG/ML
WBC # BLD AUTO: 6.8 THOUSAND/UL (ref 4.8–10.8)

## 2021-03-05 PROCEDURE — 80048 BASIC METABOLIC PNL TOTAL CA: CPT | Performed by: EMERGENCY MEDICINE

## 2021-03-05 PROCEDURE — 99285 EMERGENCY DEPT VISIT HI MDM: CPT

## 2021-03-05 PROCEDURE — 85379 FIBRIN DEGRADATION QUANT: CPT | Performed by: EMERGENCY MEDICINE

## 2021-03-05 PROCEDURE — 99285 EMERGENCY DEPT VISIT HI MDM: CPT | Performed by: EMERGENCY MEDICINE

## 2021-03-05 PROCEDURE — 36415 COLL VENOUS BLD VENIPUNCTURE: CPT | Performed by: EMERGENCY MEDICINE

## 2021-03-05 PROCEDURE — 71275 CT ANGIOGRAPHY CHEST: CPT

## 2021-03-05 PROCEDURE — 85025 COMPLETE CBC W/AUTO DIFF WBC: CPT | Performed by: EMERGENCY MEDICINE

## 2021-03-05 PROCEDURE — 93005 ELECTROCARDIOGRAM TRACING: CPT

## 2021-03-05 PROCEDURE — 84484 ASSAY OF TROPONIN QUANT: CPT | Performed by: EMERGENCY MEDICINE

## 2021-03-05 PROCEDURE — 71045 X-RAY EXAM CHEST 1 VIEW: CPT

## 2021-03-05 RX ORDER — ASPIRIN 81 MG/1
324 TABLET, CHEWABLE ORAL ONCE
Status: COMPLETED | OUTPATIENT
Start: 2021-03-05 | End: 2021-03-05

## 2021-03-05 RX ORDER — SODIUM CHLORIDE 9 MG/ML
3 INJECTION INTRAVENOUS
Status: DISCONTINUED | OUTPATIENT
Start: 2021-03-05 | End: 2021-03-06 | Stop reason: HOSPADM

## 2021-03-05 RX ADMIN — ASPIRIN 324 MG: 81 TABLET, CHEWABLE ORAL at 22:35

## 2021-03-05 RX ADMIN — IOHEXOL 100 ML: 350 INJECTION, SOLUTION INTRAVENOUS at 23:40

## 2021-03-06 VITALS
HEIGHT: 64 IN | WEIGHT: 167.44 LBS | HEART RATE: 80 BPM | DIASTOLIC BLOOD PRESSURE: 79 MMHG | RESPIRATION RATE: 18 BRPM | SYSTOLIC BLOOD PRESSURE: 151 MMHG | TEMPERATURE: 98.3 F | BODY MASS INDEX: 28.59 KG/M2 | OXYGEN SATURATION: 98 %

## 2021-03-06 PROBLEM — R55 NEAR SYNCOPE: Status: ACTIVE | Noted: 2021-03-06

## 2021-03-06 PROBLEM — I95.9 HYPOTENSION: Status: ACTIVE | Noted: 2021-03-06

## 2021-03-06 LAB
FLUAV RNA RESP QL NAA+PROBE: NEGATIVE
FLUBV RNA RESP QL NAA+PROBE: NEGATIVE
GLUCOSE SERPL-MCNC: 143 MG/DL (ref 65–140)
GLUCOSE SERPL-MCNC: 190 MG/DL (ref 65–140)
GLUCOSE SERPL-MCNC: 196 MG/DL (ref 65–140)
RSV RNA RESP QL NAA+PROBE: NEGATIVE
SARS-COV-2 RNA RESP QL NAA+PROBE: NEGATIVE
TROPONIN I SERPL-MCNC: <0.03 NG/ML
TSH SERPL DL<=0.05 MIU/L-ACNC: 1.87 UIU/ML (ref 0.45–5.33)

## 2021-03-06 PROCEDURE — 84484 ASSAY OF TROPONIN QUANT: CPT | Performed by: EMERGENCY MEDICINE

## 2021-03-06 PROCEDURE — 82948 REAGENT STRIP/BLOOD GLUCOSE: CPT

## 2021-03-06 PROCEDURE — 84443 ASSAY THYROID STIM HORMONE: CPT | Performed by: HOSPITALIST

## 2021-03-06 PROCEDURE — 36415 COLL VENOUS BLD VENIPUNCTURE: CPT | Performed by: EMERGENCY MEDICINE

## 2021-03-06 PROCEDURE — 99254 IP/OBS CNSLTJ NEW/EST MOD 60: CPT | Performed by: INTERNAL MEDICINE

## 2021-03-06 PROCEDURE — 93005 ELECTROCARDIOGRAM TRACING: CPT

## 2021-03-06 PROCEDURE — 94760 N-INVAS EAR/PLS OXIMETRY 1: CPT

## 2021-03-06 PROCEDURE — 84484 ASSAY OF TROPONIN QUANT: CPT | Performed by: HOSPITALIST

## 2021-03-06 PROCEDURE — 0241U HB NFCT DS VIR RESP RNA 4 TRGT: CPT | Performed by: EMERGENCY MEDICINE

## 2021-03-06 RX ORDER — AMLODIPINE BESYLATE 5 MG/1
5 TABLET ORAL DAILY
Status: DISCONTINUED | OUTPATIENT
Start: 2021-03-06 | End: 2021-03-06

## 2021-03-06 RX ORDER — CYCLOBENZAPRINE HCL 10 MG
5 TABLET ORAL DAILY
Status: DISCONTINUED | OUTPATIENT
Start: 2021-03-06 | End: 2021-03-06

## 2021-03-06 RX ORDER — ROPINIROLE 0.25 MG/1
0.25 TABLET, FILM COATED ORAL
Status: DISCONTINUED | OUTPATIENT
Start: 2021-03-06 | End: 2021-03-06

## 2021-03-06 RX ORDER — FAMOTIDINE 20 MG/1
20 TABLET, FILM COATED ORAL DAILY
Status: DISCONTINUED | OUTPATIENT
Start: 2021-03-06 | End: 2021-03-06 | Stop reason: HOSPADM

## 2021-03-06 RX ORDER — ASPIRIN 81 MG/1
81 TABLET ORAL DAILY
Status: DISCONTINUED | OUTPATIENT
Start: 2021-03-06 | End: 2021-03-06 | Stop reason: HOSPADM

## 2021-03-06 RX ORDER — CYCLOBENZAPRINE HCL 10 MG
5 TABLET ORAL DAILY
Status: DISCONTINUED | OUTPATIENT
Start: 2021-03-06 | End: 2021-03-06 | Stop reason: HOSPADM

## 2021-03-06 RX ORDER — PANTOPRAZOLE SODIUM 40 MG/1
40 TABLET, DELAYED RELEASE ORAL
Status: DISCONTINUED | OUTPATIENT
Start: 2021-03-06 | End: 2021-03-06

## 2021-03-06 RX ORDER — ROPINIROLE 1 MG/1
2 TABLET, FILM COATED ORAL
Status: DISCONTINUED | OUTPATIENT
Start: 2021-03-06 | End: 2021-03-06 | Stop reason: HOSPADM

## 2021-03-06 RX ORDER — ATORVASTATIN CALCIUM 40 MG/1
40 TABLET, FILM COATED ORAL
Status: DISCONTINUED | OUTPATIENT
Start: 2021-03-06 | End: 2021-03-06

## 2021-03-06 RX ORDER — ANASTROZOLE 1 MG/1
1 TABLET ORAL DAILY
Status: DISCONTINUED | OUTPATIENT
Start: 2021-03-06 | End: 2021-03-06

## 2021-03-06 RX ORDER — ROPINIROLE 1 MG/1
1 TABLET, FILM COATED ORAL DAILY
Status: DISCONTINUED | OUTPATIENT
Start: 2021-03-06 | End: 2021-03-06 | Stop reason: SDUPTHER

## 2021-03-06 RX ORDER — NITROGLYCERIN 0.4 MG/1
0.4 TABLET SUBLINGUAL
Status: DISCONTINUED | OUTPATIENT
Start: 2021-03-06 | End: 2021-03-06 | Stop reason: HOSPADM

## 2021-03-06 RX ORDER — TIZANIDINE 2 MG/1
4 TABLET ORAL EVERY 8 HOURS PRN
Status: DISCONTINUED | OUTPATIENT
Start: 2021-03-06 | End: 2021-03-06 | Stop reason: HOSPADM

## 2021-03-06 RX ORDER — ACETAMINOPHEN 325 MG/1
650 TABLET ORAL EVERY 6 HOURS PRN
Status: DISCONTINUED | OUTPATIENT
Start: 2021-03-06 | End: 2021-03-06 | Stop reason: HOSPADM

## 2021-03-06 RX ORDER — LISINOPRIL 20 MG/1
20 TABLET ORAL DAILY
Status: DISCONTINUED | OUTPATIENT
Start: 2021-03-06 | End: 2021-03-06

## 2021-03-06 RX ORDER — ANASTROZOLE 1 MG/1
1 TABLET ORAL DAILY
Status: DISCONTINUED | OUTPATIENT
Start: 2021-03-06 | End: 2021-03-06 | Stop reason: HOSPADM

## 2021-03-06 RX ORDER — DOCUSATE SODIUM 100 MG/1
100 CAPSULE, LIQUID FILLED ORAL 2 TIMES DAILY
Status: DISCONTINUED | OUTPATIENT
Start: 2021-03-06 | End: 2021-03-06 | Stop reason: HOSPADM

## 2021-03-06 RX ORDER — ROPINIROLE 1 MG/1
1 TABLET, FILM COATED ORAL DAILY
Status: DISCONTINUED | OUTPATIENT
Start: 2021-03-06 | End: 2021-03-06 | Stop reason: HOSPADM

## 2021-03-06 RX ORDER — INSULIN GLARGINE 100 [IU]/ML
100 INJECTION, SOLUTION SUBCUTANEOUS
Status: DISCONTINUED | OUTPATIENT
Start: 2021-03-06 | End: 2021-03-06 | Stop reason: HOSPADM

## 2021-03-06 RX ORDER — TRAMADOL HYDROCHLORIDE 50 MG/1
50 TABLET ORAL EVERY 6 HOURS PRN
Status: DISCONTINUED | OUTPATIENT
Start: 2021-03-06 | End: 2021-03-06 | Stop reason: HOSPADM

## 2021-03-06 RX ORDER — ATORVASTATIN CALCIUM 40 MG/1
40 TABLET, FILM COATED ORAL
Status: DISCONTINUED | OUTPATIENT
Start: 2021-03-06 | End: 2021-03-06 | Stop reason: HOSPADM

## 2021-03-06 RX ORDER — SERTRALINE HYDROCHLORIDE 100 MG/1
100 TABLET, FILM COATED ORAL DAILY
Status: DISCONTINUED | OUTPATIENT
Start: 2021-03-06 | End: 2021-03-06 | Stop reason: HOSPADM

## 2021-03-06 RX ORDER — ONDANSETRON 2 MG/ML
4 INJECTION INTRAMUSCULAR; INTRAVENOUS EVERY 6 HOURS PRN
Status: DISCONTINUED | OUTPATIENT
Start: 2021-03-06 | End: 2021-03-06 | Stop reason: HOSPADM

## 2021-03-06 RX ORDER — TEMAZEPAM 15 MG/1
30 CAPSULE ORAL
Status: DISCONTINUED | OUTPATIENT
Start: 2021-03-06 | End: 2021-03-06 | Stop reason: HOSPADM

## 2021-03-06 RX ORDER — LISINOPRIL 20 MG/1
20 TABLET ORAL DAILY
Status: DISCONTINUED | OUTPATIENT
Start: 2021-03-06 | End: 2021-03-06 | Stop reason: HOSPADM

## 2021-03-06 RX ORDER — SERTRALINE HYDROCHLORIDE 100 MG/1
100 TABLET, FILM COATED ORAL DAILY
Status: DISCONTINUED | OUTPATIENT
Start: 2021-03-06 | End: 2021-03-06 | Stop reason: SDUPTHER

## 2021-03-06 RX ORDER — AMLODIPINE BESYLATE 5 MG/1
5 TABLET ORAL DAILY
Status: DISCONTINUED | OUTPATIENT
Start: 2021-03-06 | End: 2021-03-06 | Stop reason: HOSPADM

## 2021-03-06 RX ORDER — ASPIRIN 81 MG/1
81 TABLET ORAL DAILY
Status: DISCONTINUED | OUTPATIENT
Start: 2021-03-06 | End: 2021-03-06

## 2021-03-06 RX ADMIN — SERTRALINE HYDROCHLORIDE 100 MG: 100 TABLET ORAL at 08:53

## 2021-03-06 RX ADMIN — AMLODIPINE BESYLATE 5 MG: 5 TABLET ORAL at 08:53

## 2021-03-06 RX ADMIN — INSULIN LISPRO 2 UNITS: 100 INJECTION, SOLUTION INTRAVENOUS; SUBCUTANEOUS at 11:51

## 2021-03-06 RX ADMIN — DOCUSATE SODIUM 100 MG: 100 CAPSULE, LIQUID FILLED ORAL at 08:53

## 2021-03-06 RX ADMIN — ROPINIROLE HYDROCHLORIDE 1 MG: 1 TABLET, FILM COATED ORAL at 08:54

## 2021-03-06 RX ADMIN — ASPIRIN 81 MG: 81 TABLET, COATED ORAL at 08:53

## 2021-03-06 RX ADMIN — FAMOTIDINE 20 MG: 20 TABLET ORAL at 08:53

## 2021-03-06 RX ADMIN — LISINOPRIL 20 MG: 20 TABLET ORAL at 08:53

## 2021-03-06 RX ADMIN — ANASTROZOLE 1 MG: 1 TABLET, COATED ORAL at 08:53

## 2021-03-06 RX ADMIN — ACETAMINOPHEN 650 MG: 325 TABLET ORAL at 09:44

## 2021-03-06 RX ADMIN — PANTOPRAZOLE SODIUM 40 MG: 40 TABLET, DELAYED RELEASE ORAL at 08:53

## 2021-03-06 NOTE — CASE MANAGEMENT
Pt is not a 30 day readmission risk score 16, pt was made aware of cm role, pt lives with her spouse in a 4 story home(basement, 1st & 2nd & attic,) br on 1st & 2nd floor, 12-14 steps inside to each level  , 5 steps in front, 4 steps side door and 1 step sin back outside, pt is independent except for driving, her  does the drive, no hx of hhhc, RX plan [facility] [address] [facility], pt ha hx of depression & anxiety, pt does not smoke and states she drinks alcohol on occasion, DME: walker, commode, shower chiar, glucometer, pulse ox,bp cuff, , pt denies any d/c needs, d/c order written, cm met again with the pt she denies any needs she is in agreement with the d/c an d/c plan home with her spouse, her spouse will transport the pt home, CM reviewed d/c planning process including the following: identifying help at home, patient preference for d/c planning needs, availability of treatment team to discuss questions or concerns patient and/or family may have regarding understanding medications and recognizing signs and symptoms once discharged  CM also encouraged patient to follow up with all recommended appointments after discharge  Patient advised of importance for patient and family to participate in managing patients medical well being  Patient/caregiver received discharge checklist   Content reviewed  Patient/caregiver encouraged to participate in discharge plan of care prior to discharge home

## 2021-03-06 NOTE — NURSING NOTE
Patient discharged home  IV removed  Verbalizes understanding of discharge instructions  Patient has all belongings   Escorted out by PCA

## 2021-03-06 NOTE — NURSING NOTE
Results for [facility]bs (MRN [id number]) as of 3/6/2021 07:15   Ref   Range 3/5/2021 22:39 3/5/2021 22:57 3/6/2021 01:17 3/6/2021 01:59 3/6/2021 04:50   Troponin I Latest Ref Range: <=0 03 ng/mL <0 03  <0 03  <0 03

## 2021-03-06 NOTE — NURSING NOTE
Patient admitted to med/surg  Pt is A&O x4  She denies chest pain at this time  Call bell within reach  Bed alarm on  Will continue to monitor

## 2021-03-06 NOTE — RESPIRATORY THERAPY NOTE
RT Protocol Note  Radha Augustine 61 y o  female MRN: [id number]  Unit/Bed#: -01 Encounter: [id number]    Assessment    Principal Problem:    Near syncope  Active Problems:    Hypotension      Home Pulmonary Medications:  None     Home Devices/Therapy: Other (Comment)(None)    Past Medical History:   Diagnosis Date    CAD (coronary artery disease)     Chronic back pain     Coronary artery disease     Diabetes mellitus ([place] [address] )     Family history of early CAD     Hyperlipidemia     Hypertension     Myocardial infarction ([place] [address] )     Restless leg syndrome     Tuberculosis     patient was less than 11years old    Type 2 diabetes mellitus ([place] [address] )      Social History     Socioeconomic History    Marital status: /Civil Union     Spouse name: None    Number of children: None    Years of education: None    Highest education level: None   Occupational History    None   Social Needs    Financial resource strain: None    Food insecurity     Worry: None     Inability: None    Transportation needs     Medical: None     Non-medical: None   Tobacco Use    Smoking status: Never Smoker    Smokeless tobacco: Never Used   Substance and Sexual Activity    Alcohol use: Yes     Frequency: 2-4 times a month     Drinks per session: 1 or 2     Binge frequency: Never     Comment: occasionally    Drug use: No    Sexual activity: None   Lifestyle    Physical activity     Days per week: None     Minutes per session: None    Stress: None   Relationships    Social connections     Talks on phone: None     Gets together: None     Attends [employer] service: None     Active member of club or organization: None     Attends meetings of clubs or organizations: None     Relationship status: None    Intimate partner violence     Fear of current or ex partner: None     Emotionally abused: None     Physically abused: None     Forced sexual activity: None   Other Topics Concern    None   Social History Narrative    None Subjective  Patient resting comfortably on her bed  No SOB or distress noted  Objective    Physical Exam:   Assessment Type: Assess only  General Appearance: Alert, Awake  Respiratory Pattern: Normal  Chest Assessment: Chest expansion symmetrical  Bilateral Breath Sounds: Clear    Vitals:  Blood pressure 115/64, pulse 80, temperature 98 1 °F (36 7 °C), temperature source Temporal, resp  rate 18, height 5' 4" (1 626 m), weight 76 kg (167 lb 7 oz), SpO2 94 %, not currently breastfeeding  Imaging and other studies: I have personally reviewed pertinent reports  Plan    Respiratory Plan: Discontinue Protocol        Resp Comments: Patient states she has no problems with her breathing  As per patient she has never had any breathing medication  No SOB or distress noted  Will discontinue protocol at this time

## 2021-03-06 NOTE — NURSING NOTE
I have not rcvd orders from Dr Julia Velazquez nor [address]  I left a voicemail for [address]  Spoke to Dr Felecia Garcia, who advised to reach out to Dr Brooklynn Guadalupe  I reached out to Dr Brooklynn Guadalupe to request orders  [facility]  Advised Dr Brooklynn Guadalupe that I was leaving for my shift to please follow up with Hawa Salvador

## 2021-03-06 NOTE — NURSING NOTE
Sent Tigertext to Dr Nydia Estrada to notify him of the patient admission and to request orders for PTA meds and diet orders

## 2021-03-06 NOTE — H&P
Jose Luis Allen#  OOS:[id number]  [id number]:[id number]    [id number]  Adm Date: 3/5/2021 2218  10:18 PM   ATT PHY: Jenn[facility] [place], [facility]         Chief Complaint:  Near syncopal episode    History of Presenting Illness: Alexei Giles is a(n) 61 y o  female presenting from the emergency department where she presented after feeling weak and like she was going to pass out at home  Patient reports on the evening of 03/05/2021, she took her nighttime medications and soon afterwards, she felt lightheaded with pain in her right arm  Patient also states she vomited and had an episode of urinary incontinence  Patient reports that she took her blood pressure at home which was 60/41 and called EMS  Patient's blood pressure improved to 120/60 with less than 1 L crystalloid  Patient was admitted to the medical-surgical floor for further assessment  Patient examined at bedside  Patient reports that all of her symptoms have resolved  She denies chest pain, shortness of breath, headache, change in vision, abdominal pain, nausea, dizziness, lightheadedness, palpitations  Patient reports history of 5 similar episodes over the past 2 years and is not aware of any cause despite medical workup  No Known Allergies    No current facility-administered medications on file prior to encounter        Current Outpatient Medications on File Prior to Encounter   Medication Sig Dispense Refill    ACCU-CHEK FASTCLIX LANCETS MISC TEST THREE TIMES A  each 2    ADMELOG SOLOSTAR 100 units/mL injection pen INJECT 10 UNITS UNDER THE SKIN AT DINNER TIME 30 mL 0    amLODIPine (NORVASC) 5 mg tablet Take 1 tablet (5 mg total) by mouth daily 90 tablet 1    anastrozole (ARIMIDEX) 1 mg tablet Take 1 mg by mouth daily      anastrozole (ARIMIDEX) 1 mg tablet Take 1 mg by mouth daily      aspirin (ECOTRIN LOW STRENGTH) 81 mg EC tablet Take 81 mg by mouth daily      atorvastatin (LIPITOR) 40 mg tablet Take 1 tablet (40 mg total) by mouth daily with dinner 90 tablet 2    benazepril (LOTENSIN) 20 mg tablet Take 1 tablet (20 mg total) by mouth daily 90 tablet 1    Blood Glucose Monitoring Suppl (OneTouch Verio Flex System) w/Device KIT Test four times daily 1 kit 0    cyclobenzaprine (FLEXERIL) 5 mg tablet Take 1 tablet (5 mg total) by mouth daily 30 tablet 1    dexlansoprazole (DEXILANT) 60 MG capsule Take 60 mg by mouth daily      glucose blood (Accu-Chek Guide) test strip Test bid 200 each 4    glucose blood (Accu-Chek Guide) test strip 1 each by Other route 4 (four) times a day Use as instructed 300 each 3    glucose blood (OneTouch Verio) test strip Use as instructed 400 each 5    insulin glargine (LANTUS) 100 units/mL subcutaneous injection Inject 100 Units under the skin daily at bedtime 9000 Units 3    Insulin Pen Needle (BD Pen Needle Sadia 2nd Gen) 32G X 4 MM MISC Use 3 (three) times a day 100 each 3    sertraline (ZOLOFT) 100 mg tablet Take 1 tablet (100 mg total) by mouth daily 30 tablet 6    betamethasone valerate (VALISONE) 0 1 % cream Apply topically 2 (two) times a day      famotidine (PEPCID) 20 mg tablet Take 1 tablet (20 mg total) by mouth daily for 14 days 14 tablet 0    En[employer]cean[employer] FINEPOINT LANCETS MISC Test 4 times daily 400 each 5    metoprolol tartrate (LOPRESSOR) 25 mg tablet Take 1 tablet (25 mg total) by mouth every 12 (twelve) hours 180 tablet 3    nitroglycerin (NITROSTAT) 0 4 mg SL tablet place 1 tablet under the tongue if needed every 5 minutes for chest pain for 3 doses IF NO RELIEF AFTER FIRST DOSE CALL PRESCRIBER   100 tablet 3    ondansetron (ZOFRAN) 8 mg tablet Take 1 tablet (8 mg total) by mouth every 8 (eight) hours as needed for nausea or vomiting for up to 14 days 20 tablet 1    pantoprazole (PROTONIX) 40 mg tablet Take 1 tablet (40 mg total) by mouth daily 30 tablet 3    rOPINIRole (REQUIP) 1 mg tablet Take 1 tablet (1 mg total) by mouth daily 90 tablet 1    rOPINIRole (REQUIP) 1 mg tablet One in am and two qhs      temazepam (RESTORIL) 30 mg capsule Take 1 capsule (30 mg total) by mouth daily at bedtime as needed for sleep 30 capsule 1    tiZANidine (ZANAFLEX) 4 mg tablet Take 4 mg by mouth every 8 (eight) hours as needed for muscle spasms 1/2-1      tiZANidine (ZANAFLEX) 4 mg tablet One qhs      traMADol (ULTRAM) 50 mg tablet Take 50 mg by mouth every 8 (eight) hours      traMADol (ULTRAM) 50 mg tablet One twice daily         Active Ambulatory Problems     Diagnosis Date Noted    Essential hypertension 08/02/2018    Chronic bilateral low back pain without sciatica 09/05/2018    Type 2 diabetes mellitus without complication, with long-term current use of insulin ([place] [address] ) 01/31/2014    Other chest pain 11/26/2018    Restless leg syndrome 11/26/2018    Disease of pancreas 01/27/2014    Mixed hyperlipidemia 11/28/2018    NSTEMI (non-ST elevated myocardial infarction) ([place] [address] ) 11/28/2018    SOB (shortness of breath) 02/27/2019    Coronary artery disease involving native coronary artery of native heart 02/27/2019    Depression 11/08/2019    Overweight (BMI 25 0-29 9) 01/20/2020    Obstructive sleep apnea (adult) (pediatric)     Bilateral temporomandibular joint pain 08/18/2020    Costochondritis 08/18/2020    History of radiation therapy 08/18/2020    Malignant neoplasm of upper-outer quadrant of right breast in female, estrogen receptor positive ([place] [address] ) 04/07/2020    S/P lumpectomy, right breast 08/18/2020    Use of anastrozole (Arimidex) 04/07/2020    Hyperglycemia due to diabetes mellitus ([place] [address] ) 11/22/2020    Hyponatremia 11/22/2020    Encounter for diabetic foot exam ([place] [address] ) 02/26/2021     Resolved Ambulatory Problems     Diagnosis Date Noted    No Resolved Ambulatory Problems     Past Medical History:   Diagnosis Date    CAD (coronary artery disease)     Chronic back pain     Coronary artery disease     Diabetes mellitus ([facility] )     Family history of early CAD     Hyperlipidemia     Hypertension     [facility]     Tuberculosis     Type 2 diabetes mellitus ([facility] )        Past Surgical History:   Procedure Laterality Date    ABDOMINAL SURGERY      phill    ANKLE SURGERY      tubal    BREAST SURGERY      partial mastectomy R breast    CARDIAC CATHETERIZATION  11/2018    Successful balloon angioplassty to OM1    CHOLECYSTECTOMY      FRACTURE SURGERY      right ankle    GALLBLADDER SURGERY      MASTECTOMY      TUBAL LIGATION         Social History:   Social History     Socioeconomic History    Marital status: /Civil Union     Spouse name: None    Number of children: None    Years of education: None    Highest education level: None   Occupational History    None   Social Needs    Financial resource strain: None    Food insecurity     Worry: None     Inability: None    Transportation needs     Medical: None     Non-medical: None   Tobacco Use    Smoking status: Never Smoker    Smokeless tobacco: Never Used   Substance and Sexual Activity    Alcohol use: Yes     Frequency: 2-4 times a month     Drinks per session: 1 or 2     Binge frequency: Never     Comment: occasionally    Drug use: No    Sexual activity: None   Lifestyle    Physical activity     Days per week: None     Minutes per session: None    Stress: None   Relationships    Social connections     Talks on phone: None     Gets together: None     Attends Hindu service: None     Active member of club or organization: None     Attends meetings of clubs or organizations: None     Relationship status: None    Intimate partner violence     Fear of current or ex partner: None     Emotionally abused: None     Physically abused: None     Forced sexual activity: None   Other Topics Concern    None   Social History Narrative    None       Family History:   Family History   Problem Relation Age of Onset    Breast cancer Mother     Diabetes Mother     Heart failure Mother     Heart disease Father     Stroke Father        Review of Systems   All other systems reviewed and are negative  Physical Exam   Constitutional: Awake and Alert  Well-developed and well-nourished  No distress  HENT: PERR,EOMI, conjunctiva normal  Head: Normocephalic and atraumatic  Mouth/Throat: Oropharynx is clear and moist     Eyes: Conjunctivae and EOM are normal  Pupils are equal, round, and reactive to light  Right and left eye exhibits no discharge  Neck: Neck supple  No tracheal deviation present  No thyromegaly present  Cardiovascular: Normal rate, regular rhythm and normal heart sounds  Exam reveals no friction rub  No murmur heard  Pulmonary/Chest: Effort normal and breath sounds normal  No respiratory distress  No wheezes  Abdominal: Soft  Bowel sounds are normal  No distension  No tenderness  No rebound tenderness and no guarding  Neurological: Cranial Nerves grossly intact  No sensory deficit  Coordination normal    Musculoskeletal:   Nontender spine  Skin: Skin is warm and dry  No rash noted  No diaphoresis  No erythema  No edema  No cyanosis  Assessment     Lala Tapia is a(n) 61 y o  female with near syncope and hypotension  1  Near syncopal episode with hypotension  Patient's symptoms have resolved and vital signs are within normal limits  Cardiology has been consulted for further assessment and recommendations  2  Hypertension/CAD/History of MI x5  Continue amlodipine 5 mg daily, aspirin 81 mg daily, lisinopril 20 mg daily and Lopressor 25 mg BID  3  Hyperlipidemia  Continue atorvastatin 40 mg daily with dinner  4  Type 2 diabetes mellitus  Patient's hemoglobin A1c was 8 6 on 02/23/2021  Continue fingerstick glucose 4 times daily before meals and at bedtime with sliding scale Humalog  Continue Lantus 100 units daily at bedtime  5  Restless leg syndrome    Continue ropinirole 1 mg in the morning and 2 mg at bedtime  6  GERD  Continue famotidine 20 mg daily  7  History of malignant neoplasm of breast   Patient is following with Oncology, no evidence of disease per last note on 03/01/2021  Continue anastrozole 1 mg daily  8  Constipation  Continue Colace 100 mg BID  9  MDD  Continue sertraline 100 mg daily  10  Chronic pain/Muscle spasms  Continue Flexeril 5 mg daily, Ultram 50 mg every 6 hours as needed, and tizanidine 4 mg every 8 hours as needed    Prognosis: Fair  Discharge Plan: In progress  Advanced Directives: I have discussed in detail with the patient the advanced directives  The patient's emergency contact is her , Naye Kilpatrick ([phone]) When discussing cardiac and pulmonary resuscitation efforts with the patient, the patient wishes to be full code  I have spent more than 50 minutes gathering data, doing physical examination, and discussing the advanced directives, which was witnessed by caring staff  The patient was discussed with Dr Andrea Garcia and he is in agreement with the above note

## 2021-03-06 NOTE — CONSULTS
Consultation - Cardiology   Gloria Black 61 y o  female MRN: [id number]  Unit/Bed#: -01 Encounter: [id number]    Assessment/Plan     Assessment:  Near Syncope  Diabetes Mellitus  Hypertension  CAD s/p PTCA OM1    Plan:  -Pre-syncope due to orthostasis after suddenly getting up; likely component of diabetic dysautonomia; unfortunately her DM is not optimally controlled   -I have not adjusted her medications since she reports having high BP at home often as well   -Encouraged hydration  -Encouraged her to check her BP prior to taking her medications and hold medication dose if below holding parameters  -If she has recurrent symptoms, it would not be unreasonable to do holter monitoring as an outpatient however given her current clinical presentation there is no indication for that   -Continue outpatient cardiology followup    History of Present Illness   Physician Requesting Consult: Neris Jacobs MD  Reason for Consult / Principal Problem: Pre-Syncope  HPI: Gloria Black is a 61y o  year old female with a history of uncontrolled DM, hypertension, CAD s/p PTCA to OM1 who is admitted due to a pre-syncopal episode  She reports feeling lightheaded, weak after she suddenly got up from a sitting position  He has had no chest pain, dyspnea on exertion, orthopnea  She denies loss of consciouscness  She reports checking her BP before she takes her medications but forgot that last night  She found her BP to be in the 60s when she had the symptoms hence came to the ER, where BP was found to be in the high 80s, currently in the 150s  She feels well now and is eager to go home  Inpatient consult to Cardiology  Consult performed by: Treva Glasgow MD  Consult ordered by: Roselyn Elliott PA-C          Review of Systems   All other systems reviewed and are negative        Historical Information   Past Medical History:   Diagnosis Date    CAD (coronary artery disease)     Chronic back pain     Coronary artery disease     Diabetes mellitus ([facility] )     Family history of early CAD     Hyperlipidemia     Hypertension     [facility]     Restless leg syndrome     Tuberculosis     patient was less than 11years old    Type 2 diabetes mellitus ([facility] )      Past Surgical History:   Procedure Laterality Date    ABDOMINAL SURGERY      phill    ANKLE SURGERY      tubal    BREAST SURGERY      partial mastectomy R breast    CARDIAC CATHETERIZATION  11/2018    Successful balloon angioplassty to OM1    CHOLECYSTECTOMY      FRACTURE SURGERY      right ankle    GALLBLADDER SURGERY      MASTECTOMY      TUBAL LIGATION       Social History     Substance and Sexual Activity   Alcohol Use Yes    Frequency: 2-4 times a month    Drinks per session: 1 or 2    Binge frequency: Never    Comment: occasionally     Social History     Substance and Sexual Activity   Drug Use No     E-Cigarette/Vaping    E-Cigarette Use Never User      E-Cigarette/Vaping Substances     Social History     Tobacco Use   Smoking Status Never Smoker   Smokeless Tobacco Never Used     Family History: non-contributory    Meds/Allergies   all current active meds have been reviewed  No Known Allergies    Objective   Vitals: Blood pressure 151/79, pulse 80, temperature 98 3 °F (36 8 °C), temperature source Temporal, resp  rate 18, height 5' 4" (1 626 m), weight 76 kg (167 lb 7 oz), SpO2 98 %, not currently breastfeeding  Orthostatic Blood Pressures      Most Recent Value   Blood Pressure  151/79 filed at 03/06/2021 1522   Patient Position - Orthostatic VS  Lying filed at 03/06/2021 1522            Intake/Output Summary (Last 24 hours) at 3/6/2021 1639  Last data filed at 3/6/2021 1117  Gross per 24 hour   Intake 360 ml   Output --   Net 360 ml       Invasive Devices     None                 Physical Exam  Vitals signs and nursing note reviewed  Constitutional:       Appearance: Normal appearance     HENT:      Head: Normocephalic and atraumatic  Nose: Nose normal    Eyes:      Extraocular Movements: Extraocular movements intact  Neck:      Musculoskeletal: Neck supple  Cardiovascular:      Rate and Rhythm: Normal rate and regular rhythm  Pulmonary:      Breath sounds: Normal breath sounds  Abdominal:      Palpations: Abdomen is soft  Musculoskeletal: Normal range of motion  Skin:     General: Skin is warm and dry  Neurological:      General: No focal deficit present  Mental Status: She is alert and oriented to person, place, and time  Psychiatric:         Mood and Affect: Mood normal          Behavior: Behavior normal          Lab Results:   I have personally reviewed pertinent lab results      CBC with diff:   Results from last 7 days   Lab Units 03/05/21  2239   WBC Thousand/uL 6 80   RBC Million/uL 3 91   HEMOGLOBIN g/dL 11 8*   HEMATOCRIT % 35 9*   MCV fL 92   MCH pg 30 2   MCHC g/dL 32 9   RDW % 14 2   MPV fL 7 3*   PLATELETS Thousands/uL 296     CMP:   Results from last 7 days   Lab Units 03/05/21  2239   SODIUM mmol/L 138   POTASSIUM mmol/L 3 7   CHLORIDE mmol/L 106   CO2 mmol/L 23   BUN mg/dL 19   CREATININE mg/dL 1 13   CALCIUM mg/dL 8 6   EGFR ml/min/1 73sq m 53     Troponin:   0   Lab Value Date/Time    TROPONINI <0 03 03/06/2021 1417    TROPONINI <0 03 03/06/2021 1043    TROPONINI <0 03 03/06/2021 0450    TROPONINI <0 03 03/06/2021 0117    TROPONINI <0 03 03/05/2021 2239    TROPONINI <0 03 11/22/2020 0547    TROPONINI <0 03 11/22/2020 0238    TROPONINI <0 03 11/21/2020 2309    TROPONINI <0 03 07/12/2019 2022    TROPONINI <0 03 07/12/2019 1636    TROPONINI <0 03 06/19/2019 1351    TROPONINI <0 03 05/25/2019 0016    TROPONINI <0 03 05/24/2019 2129    TROPONINI <0 03 05/09/2019 2331    TROPONINI <0 03 05/09/2019 1908    TROPONINI <0 03 05/09/2019 1337    TROPONINI 0 20 (H) 11/27/2018 2234    TROPONINI 0 31 (H) 11/27/2018 1948    TROPONINI 0 23 (H) 11/26/2018 2153    TROPONINI 0 07 (H) 11/26/2018 1724 TROPONINI 0 04 (H) 11/26/2018 1438    TROPONINI <0 03 11/19/2018 2329     BNP:   Results from last 7 days   Lab Units 03/05/21  2239   POTASSIUM mmol/L 3 7   CHLORIDE mmol/L 106   CO2 mmol/L 23   BUN mg/dL 19   CREATININE mg/dL 1 13   CALCIUM mg/dL 8 6   EGFR ml/min/1 73sq m 53     Coags:     TSH:   Results from last 7 days   Lab Units 03/06/21  0940   TSH 3RD GENERATON uIU/mL 1 870     Magnesium:     Lipid Profile:     Imaging: I have personally reviewed pertinent reports  Code Status: Level 1 - Full Code  Advance Directive and Living Will:      Power of :    POLST:      Counseling / Coordination of Care  Total floor / unit time spent today 30 minutes  Greater than 50% of total time was spent with the patient and / or family counseling and / or coordination of care

## 2021-03-06 NOTE — PLAN OF CARE
Problem: Potential for Falls  Goal: Patient will remain free of falls  Description: INTERVENTIONS:  - Assess patient frequently for physical needs  -  Identify cognitive and physical deficits and behaviors that affect risk of falls  -  [place] fall precautions as indicated by assessment   - Educate patient/family on patient safety including physical limitations  - Instruct patient to call for assistance with activity based on assessment  - Modify environment to reduce risk of injury  - Consider OT/PT consult to assist with strengthening/mobility  Outcome: Progressing     Problem: Nutrition/Hydration-ADULT  Goal: Nutrient/Hydration intake appropriate for improving, restoring or maintaining nutritional needs  Description: Monitor and assess patient's nutrition/hydration status for malnutrition  Collaborate with interdisciplinary team and initiate plan and interventions as ordered  Monitor patient's weight and dietary intake as ordered or per policy  Utilize nutrition screening tool and intervene as necessary  Determine patient's food preferences and provide high-protein, high-caloric foods as appropriate       INTERVENTIONS:  - Monitor oral intake, urinary output, labs, and treatment plans  - Assess nutrition and hydration status and recommend course of action  - Evaluate amount of meals eaten  - Assist patient with eating if necessary   - Allow adequate time for meals  - Recommend/ encourage appropriate diets, oral nutritional supplements, and vitamin/mineral supplements  - Order, calculate, and assess calorie counts as needed  - Recommend, monitor, and adjust tube feedings and TPN/PPN based on assessed needs  - Assess need for intravenous fluids  - Provide specific nutrition/hydration education as appropriate  - Include patient/family/caregiver in decisions related to nutrition  Outcome: Progressing     Problem: PAIN - ADULT  Goal: Verbalizes/displays adequate comfort level or baseline comfort level  Description: Interventions:  - Encourage patient to monitor pain and request assistance  - Assess pain using appropriate pain scale  - Administer analgesics based on type and severity of pain and evaluate response  - Implement non-pharmacological measures as appropriate and evaluate response  - Consider cultural and social influences on pain and pain management  - Notify physician/advanced practitioner if interventions unsuccessful or patient reports new pain  Outcome: Progressing     Problem: INFECTION - ADULT  Goal: Absence or prevention of progression during hospitalization  Description: INTERVENTIONS:  - Assess and monitor for signs and symptoms of infection  - Monitor lab/diagnostic results  - Monitor all insertion sites, i e  indwelling lines, tubes, and drains  - Monitor endotracheal if appropriate and nasal secretions for changes in amount and color  - [place] appropriate cooling/warming therapies per order  - Administer medications as ordered  - Instruct and encourage patient and family to use good hand hygiene technique  - Identify and instruct in appropriate isolation precautions for identified infection/condition  Outcome: Progressing  Goal: Absence of fever/infection during neutropenic period  Description: INTERVENTIONS:  - Monitor WBC    Outcome: Progressing     Problem: SAFETY ADULT  Goal: Maintain or return to baseline ADL function  Description: INTERVENTIONS:  -  Assess patient's ability to carry out ADLs; assess patient's baseline for ADL function and identify physical deficits which impact ability to perform ADLs (bathing, care of mouth/teeth, toileting, grooming, dressing, etc )  - Assess/evaluate cause of self-care deficits   - Assess range of motion  - Assess patient's mobility; develop plan if impaired  - Assess patient's need for assistive devices and provide as appropriate  - Encourage maximum independence but intervene and supervise when necessary  - Involve family in performance of ADLs  - Assess for home care needs following discharge   - Consider OT consult to assist with ADL evaluation and planning for discharge  - Provide patient education as appropriate  Outcome: Progressing  Goal: Maintain or return mobility status to optimal level  Description: INTERVENTIONS:  - Assess patient's baseline mobility status (ambulation, transfers, stairs, etc )    - Identify cognitive and physical deficits and behaviors that affect mobility  - Identify mobility aids required to assist with transfers and/or ambulation (gait belt, sit-to-stand, lift, walker, cane, etc )  - [place] fall precautions as indicated by assessment  - Record patient progress and toleration of activity level on Mobility SBAR; progress patient to next Phase/Stage  - Instruct patient to call for assistance with activity based on assessment  - Consider rehabilitation consult to assist with strengthening/weightbearing, etc   Outcome: Progressing     Problem: DISCHARGE PLANNING  Goal: Discharge to home or other facility with appropriate resources  Description: INTERVENTIONS:  - Identify barriers to discharge w/patient and caregiver  - Arrange for needed discharge resources and transportation as appropriate  - Identify discharge learning needs (meds, wound care, etc )  - Arrange for interpretive services to assist at discharge as needed  - Refer to Case Management Department for coordinating discharge planning if the patient needs post-hospital services based on physician/advanced practitioner order or complex needs related to functional status, cognitive ability, or social support system  Outcome: Progressing     Problem: Knowledge Deficit  Goal: Patient/family/caregiver demonstrates understanding of disease process, treatment plan, medications, and discharge instructions  Description: Complete learning assessment and assess knowledge base    Interventions:  - Provide teaching at level of understanding  - Provide teaching via preferred learning methods  Outcome: Progressing

## 2021-03-06 NOTE — UTILIZATION REVIEW
Initial Clinical Review    Admission: Date/Time/Statement:   Admission Orders (From admission, onward)     Ordered        03/06/21 0143  Inpatient Admission  Once                   Orders Placed This Encounter   Procedures    Inpatient Admission     Standing Status:   Standing     Number of Occurrences:   1     Order Specific Question:   Level of Care     Answer:   Med Surg [16]     Order Specific Question:   Bed request comments     Answer:   tele     Order Specific Question:   Estimated length of stay     Answer:   More than 2 Midnights     Order Specific Question:   Certification     Answer:   I certify that inpatient services are medically necessary for this patient for a duration of greater than two midnights  See H&P and MD Progress Notes for additional information about the patient's course of treatment  ED Arrival Information     Expected Arrival Acuity Means of Arrival Escorted By Service Admission Type    - 3/5/2021 22:14 Emergent Ambulance [facility] WITH [facility] Hospitalist Emergency    [address]        Chief Complaint   Patient presents with    Weakness - Generalized     sudden onset of general weakness and hypotension  has felt otherwise normal today and recent past   patient also states this has happened multiple times in the past      Assessment/Plan: 60 yo f  Presents to the ED after feeling weak and like she was going to "pass out" at home  She does report a history of 5 similar episodes  over the past 2 years  She reports taking her nighttime meds and soon after felt lightheaded with pain in her R arm, she had one episode of emesis and and episode of urinary incontinence  She reports her BP was 60/41 and EMS was called, in [facility] ED after 1 L of NS her BP improved to 120/60  She is admitted inpatient for her near syncopal episode            ED Triage Vitals   Temperature Pulse Respirations Blood Pressure SpO2   03/05/21 2223 03/05/21 2223 03/05/21 2223 03/05/21 2223 03/05/21 2223   (!) 96 4 °F (35 8 °C) 68 16 110/62 97 %      Temp Source Heart Rate Source Patient Position - Orthostatic VS BP Location FiO2 (%)   03/05/21 2223 03/05/21 2223 03/05/21 2223 03/05/21 2223 --   Tympanic Monitor Lying Left arm       Pain Score       03/06/21 0015       No Pain          Wt Readings from Last 1 Encounters:   03/06/21 76 kg (167 lb 7 oz)     Additional Vital Signs:   Date/Time  Temp  Pulse  Resp  BP  MAP (mmHg)  SpO2  O2 Device  Patient Position - Orthostatic VS   03/06/21 1522  98 3 °F (36 8 °C)  80  18  151/79  105  98 %  None (Room air)  Lying   03/06/21 1117  98 5 °F (36 9 °C)  78  18  157/91  117  96 %  None (Room air)  Lying   03/06/21 1049  --  --  --  --  --  94 %  None (Room air)  --   03/06/21 0704  98 1 °F (36 7 °C)  80  18  115/64  --  95 %  None (Room air)  Lying   03/06/21 0345  97 1 °F (36 2 °C)Abnormal   70  18  158/87  --  96 %  None (Room air)  Sitting   03/06/21 0330  --  70  17  130/68  91  95 %  --  --   03/06/21 0300  --  69  19  123/69  90  95 %  --  --   03/06/21 0230  --  69  16  129/65  88  96 %  --  --   03/06/21 0200  --  63  15  118/64  86  94 %  --  --   03/06/21 0100  --  65  18  89/50Abnormal   63  94 %  --  --   03/06/21 0030  --  63  18  86/50Abnormal   63  94 %  --  --   03/06/21 0015  --  71  19  --  --  96 %  --  --   03/06/21 0000  --  63  17  93/56  69  96 %  --  --   03/05/21 2330  --  66  19  116/69  87  94 %  --  --   03/05/21 2315  --  60  16  --  --  95 %  --  --   03/05/21 2230  --  62  13  110/65  83  95 %  --               Pertinent Labs/Diagnostic Test Results:   Results from last 7 days   Lab Units 03/06/21  0159   SARS-COV-2  Negative     Results from last 7 days   Lab Units 03/05/21  2239   WBC Thousand/uL 6 80   HEMOGLOBIN g/dL 11 8*   HEMATOCRIT % 35 9*   PLATELETS Thousands/uL 296   NEUTROS ABS Thousands/µL 4 40         Results from last 7 days   Lab Units 03/05/21  2239   SODIUM mmol/L 138   POTASSIUM mmol/L 3 7   CHLORIDE mmol/L 106   CO2 mmol/L 23   ANION GAP mmol/L 9   BUN mg/dL 19   CREATININE mg/dL 1 13   EGFR ml/min/1 73sq m 53   CALCIUM mg/dL 8 6         Results from last 7 days   Lab Units 03/06/21  1116 03/06/21  0638   POC GLUCOSE mg/dl 196* 143*     Results from last 7 days   Lab Units 03/05/21  2239   GLUCOSE RANDOM mg/dL 203*             BETA-HYDROXYBUTYRATE   Date Value Ref Range Status   08/14/2020 0 1 <0 6 mmol/L Final        Results from last 7 days   Lab Units 03/06/21  1417 03/06/21  1043 03/06/21  0450 03/06/21  0117 03/05/21  2239   TROPONIN I ng/mL <0 03 <0 03 <0 03 <0 03 <0 03     Results from last 7 days   Lab Units 03/05/21  2239   D-DIMER QUANTITATIVE ug/ml FEU 1 59*       Results from last 7 days   Lab Units 03/06/21  0940   TSH 3RD GENERATON uIU/mL 1 870     Results from last 7 days   Lab Units 03/06/21  0159   INFLUENZA A PCR  Negative   INFLUENZA B PCR  Negative   RSV PCR  Negative       CTA Chest - 3/6 - No evidence of pulmonary embolism is seen  A band of linear scarring in the anterior right lung is similar to November 26, 2018  Atherosclerosis   Coronary artery disease  Small hiatal hernia     There are postoperative changes involving the right breast   Reportedly, the patient has a prior history of breast carcinoma       CXR 3/6 - Elevated right hemidiaphragm with plate like atelectasis of the right hilum       ED Treatment:   Medication Administration from 03/05/2021 2213 to 03/06/2021 0343       Date/Time Order Dose Route Action Comments     03/05/2021 2235 aspirin chewable tablet 324 mg 324 mg Oral Given      03/05/2021 2340 iohexol (OMNIPAQUE) 350 MG/ML injection (SINGLE-DOSE) 100 mL 100 mL Intravenous Given         Past Medical History:   Diagnosis Date    CAD (coronary artery disease)     Chronic back pain     Coronary artery disease     Diabetes mellitus [facility]     Family history of early CAD     Hyperlipidemia     Hypertension     Myocardial infarction ([facility]     Restless leg syndrome     Tuberculosis     patient was less than 11years old    Type 2 diabetes mellitus ([facility] [address] )      Present on Admission:  **None**      Admitting Diagnosis: Weakness [R53 1]  Hypotension [I95 9]  Near syncope [R55]  Age/Sex: 61 y o  female       Admission Orders:  Scheduled Medications:  amLODIPine, 5 mg, Oral, Daily  anastrozole, 1 mg, Oral, Daily  aspirin, 81 mg, Oral, Daily  atorvastatin, 40 mg, Oral, Daily With Dinner  cyclobenzaprine, 5 mg, Oral, Daily  docusate sodium, 100 mg, Oral, BID  famotidine, 20 mg, Oral, Daily  insulin glargine, 100 Units, Subcutaneous, HS  insulin lispro, 1-6 Units, Subcutaneous, TID AC  insulin lispro, 1-6 Units, Subcutaneous, HS  insulin lispro, 10 Units, Subcutaneous, HS  lisinopril, 20 mg, Oral, Daily  metoprolol tartrate, 25 mg, Oral, Q12H MANUEL  rOPINIRole, 1 mg, Oral, Daily  rOPINIRole, 2 mg, Oral, HS  sertraline, 100 mg, Oral, Daily      Continuous IV Infusions:     PRN Meds:  acetaminophen, 650 mg, Oral, Q6H PRN  nitroglycerin, 0 4 mg, Sublingual, Q5 Min PRN  ondansetron, 4 mg, Intravenous, Q6H PRN  sodium chloride (PF), 3 mL, Intravenous, Q1H PRN  temazepam, 30 mg, Oral, HS PRN  tiZANidine, 4 mg, Oral, Q8H PRN  traMADol, 50 mg, Oral, Q6H PRN            Network Utilization Review Department  ATTENTION: Please call with any questions or concerns to [phone] and carefully listen to the prompts so that you are directed to the right person  All voicemails are confidential   [facility]da [place] all requests for admission clinical reviews, approved or denied determinations and any other requests to dedicated fax number below belonging to the campus where the patient is receiving treatment   List of dedicated fax numbers for the Facilities:  [address] DENIALS (Administrative/Medical Necessity) [phone]   [address] [place] [address] (Maternity/NICU/Pediatrics) 261 [facility] Floor [phone]   [address] [phone] [address] ([address]) [address] [place] [address] [facility] [phone]   [address] 951 [phone]

## 2021-03-06 NOTE — ED PROVIDER NOTES
History  Chief Complaint   Patient presents with    Weakness - Generalized     sudden onset of general weakness and hypotension  has felt otherwise normal today and recent past   patient also states this has happened multiple times in the past      Immediately PTA, pt felt generally weak, presyncopal, noted low bp at  Home  EMS noted bp of 60/41 on arrival  With less than 1L crystalloid BP changed to 120/60  No full syncope  No focal neuro  Had ache in right arm and nausea, which have resolved  No cp per se  Some sob which has also resolved  No f/c/sore throat/cough/abdo pain/urinary/bowel symp  Had urinary incontinence during episode  Pt has had 5x similar episodes over 2 years  She is not aware of any cause attributed by doctors  Pt has dm, htn, hc, MI x5, CAD not amenable to PCI, has declined CABG  Prior to Admission Medications   Prescriptions Last Dose Informant Patient Reported? Taking?    ACCU-CHEK FASTCLIX LANCETS MISC 3/5/2021 at Unknown time Self No Yes   Sig: TEST THREE TIMES A DAY   ADMELOG SOLOSTAR 100 units/mL injection pen 3/5/2021 at Unknown time Self No Yes   Sig: INJECT 10 UNITS UNDER THE SKIN AT DINNER TIME   Blood Glucose Monitoring Suppl (OneTouch Verio Flex System) w/Device KIT 3/5/2021 at Unknown time Self No Yes   Sig: Test four times daily   Insulin Pen Needle (BD Pen Needle Sadia 2nd Gen) 32G X 4 MM MISC 3/5/2021 at Unknown time Self No Yes   Sig: Use 3 (three) times a day   LIFESCAN FINEPOINT LANCETS MISC  Self No No   Sig: Test 4 times daily   amLODIPine (NORVASC) 5 mg tablet 3/5/2021 at Unknown time Self No Yes   Sig: Take 1 tablet (5 mg total) by mouth daily   anastrozole (ARIMIDEX) 1 mg tablet 3/5/2021 at Unknown time Self Yes Yes   Sig: Take 1 mg by mouth daily   anastrozole (ARIMIDEX) 1 mg tablet 3/5/2021 at Unknown time Self Yes Yes   Sig: Take 1 mg by mouth daily   aspirin (ECOTRIN LOW STRENGTH) 81 mg EC tablet 3/5/2021 at Unknown time Self Yes Yes   Sig: Take 81 mg by mouth daily   atorvastatin (LIPITOR) 40 mg tablet 3/5/2021 at Unknown time Self No Yes   Sig: Take 1 tablet (40 mg total) by mouth daily with dinner   benazepril (LOTENSIN) 20 mg tablet 3/5/2021 at Unknown time Self No Yes   Sig: Take 1 tablet (20 mg total) by mouth daily   betamethasone valerate (VALISONE) 0 1 % cream Not Taking at Unknown time Self Yes No   Sig: Apply topically 2 (two) times a day   cyclobenzaprine (FLEXERIL) 5 mg tablet 3/5/2021 at Unknown time Self No Yes   Sig: Take 1 tablet (5 mg total) by mouth daily   dexlansoprazole (DEXILANT) 60 MG capsule 3/5/2021 at Unknown time Self Yes Yes   Sig: Take 60 mg by mouth daily   famotidine (PEPCID) 20 mg tablet  Self No No   Sig: Take 1 tablet (20 mg total) by mouth daily for 14 days   glucose blood (Accu-Chek Guide) test strip 3/5/2021 at Unknown time Self No Yes   Sig: Test bid   glucose blood (Accu-Chek Guide) test strip 3/5/2021 at Unknown time Self No Yes   Si each by Other route 4 (four) times a day Use as instructed   glucose blood (OneTouch Verio) test strip 3/5/2021 at Unknown time Self No Yes   Sig: Use as instructed   insulin glargine (LANTUS) 100 units/mL subcutaneous injection 3/5/2021 at Unknown time Self No Yes   Sig: Inject 100 Units under the skin daily at bedtime   metoprolol tartrate (LOPRESSOR) 25 mg tablet  Self No No   Sig: Take 1 tablet (25 mg total) by mouth every 12 (twelve) hours   nitroglycerin (NITROSTAT) 0 4 mg SL tablet  Self No No   Sig: place 1 tablet under the tongue if needed every 5 minutes for chest pain for 3 doses IF NO RELIEF AFTER FIRST DOSE CALL PRESCRIBER      ondansetron (ZOFRAN) 8 mg tablet  Self No No   Sig: Take 1 tablet (8 mg total) by mouth every 8 (eight) hours as needed for nausea or vomiting for up to 14 days   pantoprazole (PROTONIX) 40 mg tablet  Self No No   Sig: Take 1 tablet (40 mg total) by mouth daily   rOPINIRole (REQUIP) 1 mg tablet  Self No No   Sig: Take 1 tablet (1 mg total) by mouth daily   rOPINIRole (REQUIP) 1 mg tablet  Self Yes No   Sig: One in am and two qhs   sertraline (ZOLOFT) 100 mg tablet 3/5/2021 at Unknown time  No Yes   Sig: Take 1 tablet (100 mg total) by mouth daily   temazepam (RESTORIL) 30 mg capsule  Self No No   Sig: Take 1 capsule (30 mg total) by mouth daily at bedtime as needed for sleep   tiZANidine (ZANAFLEX) 4 mg tablet  Self Yes No   Sig: Take 4 mg by mouth every 8 (eight) hours as needed for muscle spasms 1/2-1   tiZANidine (ZANAFLEX) 4 mg tablet Unknown at Unknown time Self Yes No   Sig: One qhs   traMADol (ULTRAM) 50 mg tablet Unknown at Unknown time Self Yes No   Sig: Take 50 mg by mouth every 8 (eight) hours   traMADol (ULTRAM) 50 mg tablet Unknown at Unknown time Self Yes No   Sig: One twice daily      Facility-Administered Medications: None       Past Medical History:   Diagnosis Date    CAD (coronary artery disease)     Chronic back pain     Coronary artery disease     [facility]     Family history of early CAD     Hyperlipidemia     Hypertension     [facility]     Restless leg syndrome     Tuberculosis     patient was less than 11years old    Type 2 diabetes mellitus ([facility] )        Past Surgical History:   Procedure Laterality Date    ABDOMINAL SURGERY      phill    ANKLE SURGERY      tubal    BREAST SURGERY      partial mastectomy R breast    CARDIAC CATHETERIZATION  11/2018    Successful balloon angioplassty to OM1    CHOLECYSTECTOMY      FRACTURE SURGERY      right ankle    GALLBLADDER SURGERY      MASTECTOMY      TUBAL LIGATION         Family History   Problem Relation Age of Onset    Breast cancer Mother     Diabetes Mother     Heart failure Mother     Heart disease Father     Stroke Father      I have reviewed and agree with the history as documented      E-Cigarette/Vaping    E-Cigarette Use Never User      E-Cigarette/Vaping Substances     Social History     Tobacco Use    Smoking status: Never Smoker    Smokeless tobacco: Never Used   Substance Use Topics    Alcohol use: Yes     Frequency: 2-4 times a month     Drinks per session: 1 or 2     Binge frequency: Never     Comment: occasionally    Drug use: No       Review of Systems   Constitutional: Positive for fatigue  Negative for fever  HENT: Negative for rhinorrhea  Eyes: Negative for visual disturbance  Respiratory: Positive for shortness of breath  Cardiovascular: Negative for chest pain  Gastrointestinal: Negative for abdominal pain, diarrhea and vomiting  Endocrine: Negative for polydipsia  Genitourinary: Negative for dysuria, frequency and hematuria  Musculoskeletal: Negative for neck stiffness  Skin: Negative for rash  Allergic/Immunologic: Negative for immunocompromised state  Neurological: Negative for speech difficulty and numbness  General non-lateralizing weakness   Psychiatric/Behavioral: Negative for suicidal ideas  Physical Exam  Physical Exam  Constitutional:       General: She is not in acute distress  HENT:      Head: Normocephalic and atraumatic  Eyes:      Conjunctiva/sclera: Conjunctivae normal    Neck:      Musculoskeletal: Normal range of motion and neck supple  Cardiovascular:      Rate and Rhythm: Normal rate and regular rhythm  Heart sounds: Normal heart sounds  Pulmonary:      Effort: Pulmonary effort is normal       Breath sounds: Normal breath sounds  Abdominal:      General: Bowel sounds are normal       Palpations: Abdomen is soft  There is no mass  Tenderness: There is no abdominal tenderness  There is no guarding  Musculoskeletal: Normal range of motion  Skin:     General: Skin is warm and dry  Capillary Refill: Capillary refill takes less than 2 seconds  Coloration: Skin is not pale  Neurological:      General: No focal deficit present  Mental Status: She is alert and oriented to person, place, and time     Psychiatric:         Mood and Affect: Mood normal          Vital Signs  ED Triage Vitals   Temperature Pulse Respirations Blood Pressure SpO2   03/05/21 2223 03/05/21 2223 03/05/21 2223 03/05/21 2223 03/05/21 2223   (!) 96 4 °F (35 8 °C) 68 16 110/62 97 %      Temp Source Heart Rate Source Patient Position - Orthostatic VS BP Location FiO2 (%)   03/05/21 2223 03/05/21 2223 03/05/21 2223 03/05/21 2223 --   Tympanic Monitor Lying Left arm       Pain Score       03/06/21 0015       No Pain           Vitals:    03/06/21 0345 03/06/21 0704 03/06/21 1117 03/06/21 1522   BP: 158/87 115/64 157/91 151/79   Pulse: 70 80 78 80   Patient Position - Orthostatic VS: Sitting Lying Lying Lying         Visual Acuity      ED Medications  Medications   aspirin chewable tablet 324 mg (324 mg Oral Given 3/5/21 2235)   iohexol (OMNIPAQUE) 350 MG/ML injection (SINGLE-DOSE) 100 mL (100 mL Intravenous Given 3/5/21 2340)       Diagnostic Studies  Results Reviewed     Procedure Component Value Units Date/Time    Troponin I repeat in 6 hrs [[id number]]  (Normal) Collected: 03/06/21 0450    Lab Status: Final result Specimen: Blood from Arm, Left Updated: 03/06/21 0552     Troponin I <0 03 ng/mL     [facility], [facility]/B, [facility] [[id number]]  (Normal) Collected: 03/06/21 0159    Lab Status: Final result Specimen: Nares from Nasopharyngeal Swab Updated: 03/06/21 0249     SARS-CoV-2 Negative     INFLUENZA A PCR Negative     INFLUENZA B PCR Negative     RSV PCR Negative    Narrative: This test has been authorized by FDA under an EUA (Emergency Use Assay) for use by authorized laboratories  Clinical caution and judgement should be used with the interpretation of these results with consideration of the clinical impression and other laboratory testing  Testing reported as "Positive" or "Negative" has been proven to be accurate according to standard laboratory validation requirements    All testing is performed with control materials showing appropriate reactivity at standard intervals      Troponin I repeat in 3 hrs [[id number]]  (Normal) Collected: 03/06/21 0117    Lab Status: Final result Specimen: Blood from Arm, Left Updated: 03/06/21 0139     Troponin I <0 03 ng/mL     Troponin I [[id number]]  (Normal) Collected: 03/05/21 2239    Lab Status: Final result Specimen: Blood from Arm, Left Updated: 03/05/21 2304     Troponin I <0 03 ng/mL     Basic metabolic panel [[id number]]  (Abnormal) Collected: 03/05/21 2239    Lab Status: Final result Specimen: Blood from Arm, Left Updated: 03/05/21 2302     Sodium 138 mmol/L      Potassium 3 7 mmol/L      Chloride 106 mmol/L      CO2 23 mmol/L      ANION GAP 9 mmol/L      BUN 19 mg/dL      Creatinine 1 13 mg/dL      Glucose 203 mg/dL      Calcium 8 6 mg/dL      eGFR 53 ml/min/1 73sq m     Narrative:      [facility] guidelines for Chronic Kidney Disease (CKD):     Stage 1 with normal or high GFR (GFR > 90 mL/min/1 73 square meters)    Stage 2 Mild CKD (GFR = 60-89 mL/min/1 73 square meters)    Stage 3A Moderate CKD (GFR = 45-59 mL/min/1 73 square meters)    Stage 3B Moderate CKD (GFR = 30-44 mL/min/1 73 square meters)    Stage 4 Severe CKD (GFR = 15-29 mL/min/1 73 square meters)    Stage 5 End Stage CKD (GFR <15 mL/min/1 73 square meters)  Note: GFR calculation is accurate only with a steady state creatinine    D-dimer, quantitative [[id number]]  (Abnormal) Collected: 03/05/21 2239    Lab Status: Final result Specimen: Blood from Arm, Left Updated: 03/05/21 2302     D-Dimer, Quant 1 59 ug/ml FEU     CBC and differential [[id number]]  (Abnormal) Collected: 03/05/21 2239    Lab Status: Final result Specimen: Blood from Arm, Left Updated: 03/05/21 2250     WBC 6 80 Thousand/uL      RBC 3 91 Million/uL      Hemoglobin 11 8 g/dL      Hematocrit 35 9 %      MCV 92 fL      MCH 30 2 pg      MCHC 32 9 g/dL      RDW 14 2 %      MPV 7 3 fL      Platelets 872 Thousands/uL      Neutrophils Relative 66 %      Lymphocytes Relative 26 %      Monocytes Relative 7 %      Eosinophils Relative 1 %      Basophils Relative 1 %      Neutrophils Absolute 4 40 Thousands/µL      Lymphocytes Absolute 1 80 Thousands/µL      Monocytes Absolute 0 50 Thousand/µL      Eosinophils Absolute 0 10 Thousand/µL      Basophils Absolute 0 00 Thousands/µL                  CTA ED chest PE study   Final Result by Stephan Hansen MD (03/06 0046)      No evidence of pulmonary embolism is seen  A band of linear scarring in the anterior right lung is similar to November 26, 2018  Atherosclerosis  Coronary artery disease  Small hiatal hernia  There are postoperative changes involving the right breast   Reportedly, the patient has a prior history of breast carcinoma  According to the patient's radiology records, the patient's last mammogram was February 12, 2018  Nonemergent outpatient    mammography is recommended  This examination demonstrates findings for which imaging follow-up is recommended and was logged as such in [address] [place] [address] Rd  Workstation performed: [id number]         X-ray chest 1 view portable   Final Result by Emiliana Silvestre MD (03/06 1013)      Elevated right hemidiaphragm with plate like atelectasis of the right hilum                  Workstation performed: [id number]                    Procedures  Procedures         ED Course             HEART Risk Score      Most Recent Value   Heart Score Risk Calculator   History  1 Filed at: 03/07/2021 1924   ECG  0 Filed at: 03/07/2021 1924   Age  1 Filed at: 03/07/2021 1924   Risk Factors  2 Filed at: 03/07/2021 1924   Troponin  0 Filed at: 03/07/2021 1924   HEART Score  4 Filed at: 03/07/2021 1924                      SBIRT 22yo+      Most Recent Value   SBIRT (23 yo +)   In order to provide better care to our patients, we are screening all of our patients for alcohol and drug use  Would it be okay to ask you these screening questions?   Yes Filed at: 03/06/2021 0130 Initial Alcohol Screen: US AUDIT-C    1  How often do you have a drink containing alcohol?  0 Filed at: 03/06/2021 0130   2  How many drinks containing alcohol do you have on a typical day you are drinking? 0 Filed at: 03/06/2021 0130   3a  Male UNDER 65: How often do you have five or more drinks on one occasion? 0 Filed at: 03/06/2021 0130   3b  FEMALE Any Age, or MALE 65+: How often do you have 4 or more drinks on one occassion? 0 Filed at: 03/06/2021 0130   Audit-C Score  0 Filed at: 03/06/2021 0130   CUCA: How many times in the past year have you    Used an illegal drug or used a prescription medication for non-medical reasons? Never Filed at: 03/06/2021 0130                    MDM  Number of Diagnoses or Management Options  Hypotension:   Near syncope:   Diagnosis management comments: Given PMH of CAD, symptoms may be CP equivalent, will plan for obs  EKG taken by EMS shows no acute ischemic changes  Obs with hospitalist       Disposition  Final diagnoses:   Hypotension   Near syncope     Time reflects when diagnosis was documented in both MDM as applicable and the Disposition within this note     Time User Action Codes Description Comment    3/6/2021  1:42 AM Sonu Rowe Add [I95 9] Hypotension     3/6/2021  1:42 AM Arelis Delgadillo Add [R55] Near syncope     3/6/2021  8:29 AM Tara Reynoso Add [I10] Essential hypertension     3/6/2021  8:29 AM Austin Finder [I10] Essential hypertension       ED Disposition     ED Disposition Condition Date/Time Comment    Admit Stable Sat Mar 6, 2021  1:42 AM Case was discussed with teresa and the patient's admission status was agreed to be Admission Status: inpatient status to the service of Dr Kori Rivas           Follow-up Information     Follow up With Specialties Details Why Contact Info    Carlos Anderson, DO Family Medicine Follow up in 1 week(s) message was sent to [facility] office to gerard you with a folow up appointment, if you do not receive a gerard please call to schedule an appointment  [address] [place] [address]  [phone]            Discharge Medication List as of 3/6/2021  3:31 PM      CONTINUE these medications which have NOT CHANGED    Details   ! ! ACCU-CHEK FASTCLIX LANCETS MISC TEST THREE TIMES A DAY, Normal      ADMELOG SOLOSTAR 100 units/mL injection pen INJECT 10 UNITS UNDER THE SKIN AT DINNER TIME, Normal      amLODIPine (NORVASC) 5 mg tablet Take 1 tablet (5 mg total) by mouth daily, Starting Wed 12/16/2020, Normal      !! anastrozole (ARIMIDEX) 1 mg tablet Take 1 mg by mouth daily, Starting Tue 4/7/2020, Until Wed 4/7/2021, Historical Med      !! anastrozole (ARIMIDEX) 1 mg tablet Take 1 mg by mouth daily, Starting Tue 8/18/2020, Until Wed 8/18/2021, Historical Med      aspirin (ECOTRIN LOW STRENGTH) 81 mg EC tablet Take 81 mg by mouth daily, Historical Med      atorvastatin (LIPITOR) 40 mg tablet Take 1 tablet (40 mg total) by mouth daily with dinner, Starting Wed 2/24/2021, Normal      benazepril (LOTENSIN) 20 mg tablet Take 1 tablet (20 mg total) by mouth daily, Starting Mon 3/30/2020, Normal      Blood Glucose Monitoring Suppl (OneTouch Verio Flex System) w/Device KIT Test four times daily, Normal      cyclobenzaprine (FLEXERIL) 5 mg tablet Take 1 tablet (5 mg total) by mouth daily, Starting Tue 2/18/2020, Normal      dexlansoprazole (DEXILANT) 60 MG capsule Take 60 mg by mouth daily, Historical Med      !! glucose blood (Accu-Chek Guide) test strip Test bid, Normal      !! glucose blood (Accu-Chek Guide) test strip 1 each by Other route 4 (four) times a day Use as instructed, Starting Tue 5/12/2020, Normal      !! glucose blood (OneTouch Verio) test strip Use as instructed, Normal      insulin glargine (LANTUS) 100 units/mL subcutaneous injection Inject 100 Units under the skin daily at bedtime, Starting Mon 9/21/2020, Normal      Insulin Pen Needle (BD Pen Needle Sadia 2nd Gen) 32G X 4 MM MISC Use 3 (three) times a day, Starting Wed 11/25/2020, Normal      sertraline (ZOLOFT) 100 mg tablet Take 1 tablet (100 mg total) by mouth daily, Starting Fri 2/26/2021, Normal      betamethasone valerate (VALISONE) 0 1 % cream Apply topically 2 (two) times a day, Starting Mon 7/6/2020, Until Tue 7/6/2021, Historical Med      !! Pebbles InterfacesCAN FINEPOINT LANCETS MISC Test 4 times daily, Normal      metoprolol tartrate (LOPRESSOR) 25 mg tablet Take 1 tablet (25 mg total) by mouth every 12 (twelve) hours, Starting Wed 7/1/2020, Normal      nitroglycerin (NITROSTAT) 0 4 mg SL tablet place 1 tablet under the tongue if needed every 5 minutes for chest pain for 3 doses IF NO RELIEF AFTER FIRST DOSE CALL PRESCRIBER  , Normal      ondansetron (ZOFRAN) 8 mg tablet Take 1 tablet (8 mg total) by mouth every 8 (eight) hours as needed for nausea or vomiting for up to 14 days, Starting Fri 11/13/2020, Until Fri 2/26/2021, Normal      pantoprazole (PROTONIX) 40 mg tablet Take 1 tablet (40 mg total) by mouth daily, Starting Mon 4/20/2020, Normal      !! rOPINIRole (REQUIP) 1 mg tablet Take 1 tablet (1 mg total) by mouth daily, Starting Wed 10/16/2019, Normal      !! rOPINIRole (REQUIP) 1 mg tablet One in am and two qhs, Historical Med      temazepam (RESTORIL) 30 mg capsule Take 1 capsule (30 mg total) by mouth daily at bedtime as needed for sleep, Starting Fri 8/28/2020, Normal      !! tiZANidine (ZANAFLEX) 4 mg tablet Take 4 mg by mouth every 8 (eight) hours as needed for muscle spasms 1/2-1, Historical Med      !! tiZANidine (ZANAFLEX) 4 mg tablet One qhs, Historical Med      !! traMADol (ULTRAM) 50 mg tablet Take 50 mg by mouth every 8 (eight) hours, Historical Med      !! traMADol (ULTRAM) 50 mg tablet One twice daily, Historical Med       !! - Potential duplicate medications found  Please discuss with provider        STOP taking these medications       famotidine (PEPCID) 20 mg tablet Comments:   Reason for Stopping:             No discharge procedures on file     PDMP Review     None          ED Provider  Electronically Signed by           Soco Humphrey MD  03/07/21 2743       Soco Humphrey MD  03/07/21 5447

## 2021-03-06 NOTE — DISCHARGE SUMMARY
Discharge Summary -   Heather Dooley 61 y o  female MRN: [id number]  Unit/Bed#: -01 Encounter: [id number]    Admission Date: 3/5/2021     Discharge Date: 3/6/2021    Admitting Diagnosis: Weakness [R53 1]  Hypotension [I95 9]  Near syncope [R55]    Discharge Diagnosis: Resolved Weakness  Resolved Hypotension  Improved Near Syncope    Attending:  Brenna Acuña MD    Consulting Physician(s): Dr Jacy Curiel, Cardiology    Hospital Course: Heather Dooley is a(n) 61 y o  female presenting from the emergency department where she presented after feeling weak and like she was going to pass out at home  Patient reports on the evening of 03/05/2021, she took her nighttime medications and soon afterwards, she felt lightheaded with pain in her right arm  Patient also states she vomited and had an episode of urinary incontinence  Patient reports that she took her blood pressure at home which was 60/41 and called EMS  Patient's blood pressure improved to 120/60 with less than 1 L crystalloid  Patient was admitted to the medical-surgical floor for further assessment      Patient examined at bedside on day of discharge  Patient reports that all of her symptoms have resolved  She denies chest pain, shortness of breath, headache, change in vision, abdominal pain, nausea, dizziness, lightheadedness, palpitations      Patient reports history of 5 similar episodes over the past 2 years and is not aware of any cause despite medical workup  Patient was seen by Dr Jacy Curiel, Cardiology, for assessment of cardiac origin  Discussed case with Dr Jacy Curiel, who has cleared patient from cardiology standpoint  Patient will follow up with her PCP for better control or diabetes  Condition at Discharge:  FAIR     Discharge instructions/Information to patient and family:   See after visit summary for information provided to patient and family        Provisions for Follow-Up Care:  See after visit summary for information related to follow-up care and any pertinent home health orders  Disposition: Home    Discharge Statement   I spent 50 minutes discharging the patient  This time was spent on the day of discharge  I had direct contact with the patient on the day of discharge  Discharge Medications:  See after visit summary for reconciled discharge medications provided to patient and family  The patient was discussed with Dr Jimmy Mancini and he is in agreement with the above note

## 2021-03-07 LAB
ATRIAL RATE: 64 BPM
ATRIAL RATE: 75 BPM
ATRIAL RATE: 86 BPM
P AXIS: 15 DEGREES
P AXIS: 20 DEGREES
P AXIS: 38 DEGREES
PR INTERVAL: 148 MS
PR INTERVAL: 154 MS
PR INTERVAL: 164 MS
QRS AXIS: 42 DEGREES
QRS AXIS: 43 DEGREES
QRS AXIS: 51 DEGREES
QRSD INTERVAL: 82 MS
QT INTERVAL: 412 MS
QT INTERVAL: 428 MS
QT INTERVAL: 468 MS
QTC INTERVAL: 477 MS
QTC INTERVAL: 482 MS
QTC INTERVAL: 493 MS
T WAVE AXIS: 40 DEGREES
T WAVE AXIS: 40 DEGREES
T WAVE AXIS: 49 DEGREES
VENTRICULAR RATE: 64 BPM
VENTRICULAR RATE: 75 BPM
VENTRICULAR RATE: 86 BPM

## 2021-03-07 PROCEDURE — 93010 ELECTROCARDIOGRAM REPORT: CPT | Performed by: INTERNAL MEDICINE

## 2021-03-08 ENCOUNTER — TRANSITIONAL CARE MANAGEMENT (OUTPATIENT)
Dept: FAMILY MEDICINE CLINIC | Facility: CLINIC | Age: 61
End: 2021-03-08

## 2021-03-08 LAB
ATRIAL RATE: 63 BPM
ATRIAL RATE: 65 BPM
P AXIS: 33 DEGREES
P AXIS: 54 DEGREES
PR INTERVAL: 160 MS
PR INTERVAL: 160 MS
QRS AXIS: 60 DEGREES
QRS AXIS: 69 DEGREES
QRSD INTERVAL: 76 MS
QRSD INTERVAL: 76 MS
QT INTERVAL: 470 MS
QT INTERVAL: 482 MS
QTC INTERVAL: 488 MS
QTC INTERVAL: 493 MS
T WAVE AXIS: 64 DEGREES
T WAVE AXIS: 65 DEGREES
VENTRICULAR RATE: 63 BPM
VENTRICULAR RATE: 65 BPM

## 2021-03-08 PROCEDURE — 93010 ELECTROCARDIOGRAM REPORT: CPT | Performed by: INTERNAL MEDICINE

## 2021-03-08 NOTE — UTILIZATION REVIEW
Initial Clinical Review    Admission: Date/Time/Statement:   Admission Orders (From admission, onward)     Ordered        03/06/21 0143  Inpatient Admission  Once                   Orders Placed This Encounter   Procedures    Inpatient Admission     Standing Status:   Standing     Number of Occurrences:   1     Order Specific Question:   Level of Care     Answer:   Med Surg [16]     Order Specific Question:   Bed request comments     Answer:   tele     Order Specific Question:   Estimated length of stay     Answer:   More than 2 Midnights     Order Specific Question:   Certification     Answer:   I certify that inpatient services are medically necessary for this patient for a duration of greater than two midnights  See H&P and MD Progress Notes for additional information about the patient's course of treatment  ED Arrival Information     Expected Arrival Acuity Means of Arrival Escorted By Service Admission Type    - 3/5/2021 22:14 Emergent Ambulance [facility] Hospitalist Emergency    [address]        Chief Complaint   Patient presents with    Weakness - Generalized     sudden onset of general weakness and hypotension  has felt otherwise normal today and recent past   patient also states this has happened multiple times in the past      Assessment/Plan: 60 yo f  Presents to the ED after feeling weak and like she was going to "pass out" at home  She does report a history of 5 similar episodes  over the past 2 years  She reports taking her nighttime meds and soon after felt lightheaded with pain in her R arm, she had one episode of emesis and and episode of urinary incontinence  She reports her BP was 60/41 and EMS was called, in [facility] after 1 L of NS her BP improved to 120/60  She is admitted inpatient for her near syncopal episode            ED Triage Vitals   Temperature Pulse Respirations Blood Pressure SpO2   03/05/21 2223 03/05/21 2223 03/05/21 2223 03/05/21 2223 03/05/21 2223   (!) 96 4 °F (35 8 °C) 68 16 110/62 97 %      Temp Source Heart Rate Source Patient Position - Orthostatic VS BP Location FiO2 (%)   03/05/21 2223 03/05/21 2223 03/05/21 2223 03/05/21 2223 --   Tympanic Monitor Lying Left arm       Pain Score       03/06/21 0015       No Pain          Wt Readings from Last 1 Encounters:   03/06/21 76 kg (167 lb 7 oz)     Additional Vital Signs:   Date/Time  Temp  Pulse  Resp  BP  MAP (mmHg)  SpO2  O2 Device  Patient Position - Orthostatic VS   03/06/21 1522  98 3 °F (36 8 °C)  80  18  151/79  105  98 %  None (Room air)  Lying   03/06/21 1117  98 5 °F (36 9 °C)  78  18  157/91  117  96 %  None (Room air)  Lying   03/06/21 1049  --  --  --  --  --  94 %  None (Room air)  --   03/06/21 0704  98 1 °F (36 7 °C)  80  18  115/64  --  95 %  None (Room air)  Lying   03/06/21 0345  97 1 °F (36 2 °C)Abnormal   70  18  158/87  --  96 %  None (Room air)  Sitting   03/06/21 0330  --  70  17  130/68  91  95 %  --  --   03/06/21 0300  --  69  19  123/69  90  95 %  --  --   03/06/21 0230  --  69  16  129/65  88  96 %  --  --   03/06/21 0200  --  63  15  118/64  86  94 %  --  --   03/06/21 0100  --  65  18  89/50Abnormal   63  94 %  --  --   03/06/21 0030  --  63  18  86/50Abnormal   63  94 %  --  --   03/06/21 0015  --  71  19  --  --  96 %  --  --   03/06/21 0000  --  63  17  93/56  69  96 %  --  --   03/05/21 2330  --  66  19  116/69  87  94 %  --  --   03/05/21 2315  --  60  16  --  --  95 %  --  --   03/05/21 2230  --  62  13  110/65  83  95 %  --               Pertinent Labs/Diagnostic Test Results:   Results from last 7 days   Lab Units 03/06/21  0159   SARS-COV-2  Negative     Results from last 7 days   Lab Units 03/05/21  2239   WBC Thousand/uL 6 80   HEMOGLOBIN g/dL 11 8*   HEMATOCRIT % 35 9*   PLATELETS Thousands/uL 296   NEUTROS ABS Thousands/µL 4 40         Results from last 7 days   Lab Units 03/05/21  2239   SODIUM mmol/L 138   POTASSIUM mmol/L 3 7   CHLORIDE mmol/L 106   CO2 mmol/L 23   ANION GAP mmol/L 9   BUN mg/dL 19   CREATININE mg/dL 1 13   EGFR ml/min/1 73sq m 53   CALCIUM mg/dL 8 6         Results from last 7 days   Lab Units 03/06/21  1511 03/06/21  1116 03/06/21  0638   POC GLUCOSE mg/dl 190* 196* 143*     Results from last 7 days   Lab Units 03/05/21  2239   GLUCOSE RANDOM mg/dL 203*             BETA-HYDROXYBUTYRATE   Date Value Ref Range Status   08/14/2020 0 1 <0 6 mmol/L Final        Results from last 7 days   Lab Units 03/06/21  1417 03/06/21  1043 03/06/21  0450 03/06/21  0117 03/05/21  2239   TROPONIN I ng/mL <0 03 <0 03 <0 03 <0 03 <0 03     Results from last 7 days   Lab Units 03/05/21  2239   D-DIMER QUANTITATIVE ug/ml FEU 1 59*       Results from last 7 days   Lab Units 03/06/21  0940   TSH 3RD GENERATON uIU/mL 1 870     Results from last 7 days   Lab Units 03/06/21  0159   INFLUENZA A PCR  Negative   INFLUENZA B PCR  Negative   RSV PCR  Negative       CTA Chest - 3/6 - No evidence of pulmonary embolism is seen  A band of linear scarring in the anterior right lung is similar to November 26, 2018  Atherosclerosis   Coronary artery disease  Small hiatal hernia     There are postoperative changes involving the right breast   Reportedly, the patient has a prior history of breast carcinoma       CXR 3/6 - Elevated right hemidiaphragm with plate like atelectasis of the right hilum       ED Treatment:   Medication Administration from 03/05/2021 2213 to 03/06/2021 0343       Date/Time Order Dose Route Action Comments     03/05/2021 2235 aspirin chewable tablet 324 mg 324 mg Oral Given      03/05/2021 2340 iohexol (OMNIPAQUE) 350 MG/ML injection (SINGLE-DOSE) 100 mL 100 mL Intravenous Given         Past Medical History:   Diagnosis Date    CAD (coronary artery disease)     Chronic back pain     Coronary artery disease     [facility] [address] [facility]     Family history of early CAD     Hyperlipidemia     Hypertension     Myocardial infarction [facility] [address] [facility]     Restless leg syndrome     Tuberculosis     patient was less than 11years old    Type 2 diabetes mellitus ([facility] [address] [facility]      Present on Admission:  **None**      Admitting Diagnosis: Weakness [R53 1]  Hypotension [I95 9]  Near syncope [R55]  Age/Sex: 61 y o  female       Admission Orders:  Scheduled Medications:  amLODIPine, 5 mg, Oral, Daily  anastrozole, 1 mg, Oral, Daily  aspirin, 81 mg, Oral, Daily  atorvastatin, 40 mg, Oral, Daily With Dinner  cyclobenzaprine, 5 mg, Oral, Daily  docusate sodium, 100 mg, Oral, BID  famotidine, 20 mg, Oral, Daily  insulin glargine, 100 Units, Subcutaneous, HS  insulin lispro, 1-6 Units, Subcutaneous, TID AC  insulin lispro, 1-6 Units, Subcutaneous, HS  insulin lispro, 10 Units, Subcutaneous, HS  lisinopril, 20 mg, Oral, Daily  metoprolol tartrate, 25 mg, Oral, Q12H MANUEL  rOPINIRole, 1 mg, Oral, Daily  rOPINIRole, 2 mg, Oral, HS  sertraline, 100 mg, Oral, Daily      Continuous IV Infusions:  No current facility-administered medications for this encounter  PRN Meds:  acetaminophen, 650 mg, Oral, Q6H PRN  nitroglycerin, 0 4 mg, Sublingual, Q5 Min PRN  ondansetron, 4 mg, Intravenous, Q6H PRN  sodium chloride (PF), 3 mL, Intravenous, Q1H PRN  temazepam, 30 mg, Oral, HS PRN  tiZANidine, 4 mg, Oral, Q8H PRN  traMADol, 50 mg, Oral, Q6H PRN            Network Utilization Review Department  ATTENTION: Please call with any questions or concerns to [phone] and carefully listen to the prompts so that you are directed to the right person  All voicemails are confidential   Germain Hannah all requests for admission clinical reviews, approved or denied determinations and any other requests to dedicated fax number below belonging to the campus where the patient is receiving treatment   List of dedicated fax numbers for the Facilities:  [address] DENIALS (Administrative/Medical Necessity) [phone]   [address] (Maternity/NICU/Pediatrics) [phone] [address] [facility] [phone]   [address]  [phone]   [facility] [address] (Marilyn Xiong) [address] [place] [address] [place] [address] [phone]   [place] [address] [phone]

## 2021-03-12 ENCOUNTER — OFFICE VISIT (OUTPATIENT)
Dept: FAMILY MEDICINE CLINIC | Facility: CLINIC | Age: 61
End: 2021-03-12
Payer: COMMERCIAL

## 2021-03-12 VITALS
SYSTOLIC BLOOD PRESSURE: 144 MMHG | WEIGHT: 166 LBS | HEART RATE: 99 BPM | BODY MASS INDEX: 28.34 KG/M2 | DIASTOLIC BLOOD PRESSURE: 89 MMHG | OXYGEN SATURATION: 98 % | TEMPERATURE: 96.5 F | HEIGHT: 64 IN

## 2021-03-12 DIAGNOSIS — R55 NEAR SYNCOPE: Primary | ICD-10-CM

## 2021-03-12 DIAGNOSIS — I10 ESSENTIAL HYPERTENSION: ICD-10-CM

## 2021-03-12 DIAGNOSIS — M62.838 MUSCLE SPASM: ICD-10-CM

## 2021-03-12 DIAGNOSIS — F32.A DEPRESSION, UNSPECIFIED DEPRESSION TYPE: ICD-10-CM

## 2021-03-12 PROCEDURE — 4010F ACE/ARB THERAPY RXD/TAKEN: CPT | Performed by: FAMILY MEDICINE

## 2021-03-12 PROCEDURE — 3008F BODY MASS INDEX DOCD: CPT | Performed by: FAMILY MEDICINE

## 2021-03-12 PROCEDURE — 1111F DSCHRG MED/CURRENT MED MERGE: CPT | Performed by: FAMILY MEDICINE

## 2021-03-12 PROCEDURE — 99495 TRANSJ CARE MGMT MOD F2F 14D: CPT | Performed by: FAMILY MEDICINE

## 2021-03-12 RX ORDER — AMLODIPINE BESYLATE 5 MG/1
5 TABLET ORAL DAILY
Qty: 90 TABLET | Refills: 1 | Status: SHIPPED | OUTPATIENT
Start: 2021-03-12 | End: 2021-10-18 | Stop reason: SDUPTHER

## 2021-03-12 RX ORDER — BENAZEPRIL HYDROCHLORIDE 20 MG/1
20 TABLET ORAL DAILY
Qty: 90 TABLET | Refills: 1 | Status: SHIPPED | OUTPATIENT
Start: 2021-03-12 | End: 2021-09-20 | Stop reason: SDUPTHER

## 2021-03-12 RX ORDER — CYCLOBENZAPRINE HCL 5 MG
5 TABLET ORAL DAILY
Qty: 30 TABLET | Refills: 1 | Status: SHIPPED | OUTPATIENT
Start: 2021-03-12

## 2021-03-12 NOTE — PROGRESS NOTES
Assessment/Plan:    No problem-specific Assessment & Plan notes found for this encounter  Diagnoses and all orders for this visit:    Near syncope    Essential hypertension  -     amLODIPine (NORVASC) 5 mg tablet; Take 1 tablet (5 mg total) by mouth daily  -     benazepril (LOTENSIN) 20 mg tablet; Take 1 tablet (20 mg total) by mouth daily    Muscle spasm  -     cyclobenzaprine (FLEXERIL) 5 mg tablet; Take 1 tablet (5 mg total) by mouth daily    Depression, unspecified depression type  Comments:  no change in the medication          PHQ-9 Depression Screening    PHQ-9:   Frequency of the following problems over the past two weeks:      Little interest or pleasure in doing things: 3 - nearly every day  Feeling down, depressed, or hopeless: 3 - nearly every day  Trouble falling or staying asleep, or sleeping too much: 3 - nearly every day  Feeling tired or having little energy: 3 - nearly every day  Poor appetite or overeatin - not at all  Feeling bad about yourself - or that you are a failure or have let yourself or your family down: 3 - nearly every day  Trouble concentrating on things, such as reading the newspaper or watching television: 2 - more than half the days  Moving or speaking so slowly that other people could have noticed   Or the opposite - being so fidgety or restless that you have been moving around a lot more than usual: 3 - nearly every day  Thoughts that you would be better off dead, or of hurting yourself in some way: 0 - not at all  PHQ-2 Score: 6  PHQ-9 Score: 20        TCM Call (since 2021)     Date and time call was made  3/8/2021 10:30 AM    Hospital care reviewed  Records reviewed    Patient was hospitialized at  [address] [place] [address]        Date of Admission  21    Date of discharge  21    Diagnosis  near syncope    Disposition  Home    Were the patients medications reviewed and updated  Yes    Current Symptoms  None      TCM Call (since 2021)     Post hospital issues  None    Should patient be enrolled in anticoag monitoring? No    Scheduled for follow up? Yes    Did you obtain your prescribed medications  Yes    Do you need help managing your prescriptions or medications  No    Is transportation to your appointment needed  No    I have advised the patient to call PCP with any new or worsening symptoms  jevon de los santos      Depression Screening Follow-up Plan: Patient's depression screening was positive with a PHQ-2 score of 6  Their PHQ-9 score was 20  Patient assessed for underlying major depression  They have no active suicidal ideations  Brief counseling provided and recommend additional follow-up/re-evaluation next office visit  Subjective:      Patient ID: Marlen Sams is a 61 y o  female  Hospital follow up for near syncope, pt had an episode of hypotension which caused her to feel like she was going to pass out but she did not pass out      The following portions of the patient's history were reviewed and updated as appropriate: allergies, current medications, past family history, past medical history, past social history, past surgical history and problem list     Review of Systems   Constitutional: Negative for chills, fatigue and fever  Eyes: Negative for visual disturbance  Gastrointestinal: Negative for nausea and vomiting  Neurological: Negative for dizziness, speech difficulty, weakness, light-headedness and headaches  Objective:    /89   Pulse 99   Temp (!) 96 5 °F (35 8 °C) (Tympanic)   Ht 5' 4" (1 626 m)   Wt 75 3 kg (166 lb)   SpO2 98%   BMI 28 49 kg/m²      Physical Exam  Vitals signs and nursing note reviewed  Constitutional:       General: She is not in acute distress  Appearance: Normal appearance  She is well-developed  She is not ill-appearing, toxic-appearing or diaphoretic  HENT:      Head: Normocephalic and atraumatic        Right Ear: Tympanic membrane, ear canal and external ear normal  There is no impacted cerumen  Left Ear: Tympanic membrane, ear canal and external ear normal  There is no impacted cerumen  Nose: Nose normal  No congestion or rhinorrhea  Mouth/Throat:      Mouth: Mucous membranes are moist       Pharynx: No oropharyngeal exudate or posterior oropharyngeal erythema  Eyes:      General: No scleral icterus  Conjunctiva/sclera: Conjunctivae normal       Pupils: Pupils are equal, round, and reactive to light  Neck:      Musculoskeletal: Normal range of motion and neck supple  Cardiovascular:      Rate and Rhythm: Normal rate and regular rhythm  Heart sounds: Normal heart sounds  No murmur  Pulmonary:      Effort: Pulmonary effort is normal  No respiratory distress  Breath sounds: Normal breath sounds  No wheezing or rales  Lymphadenopathy:      Cervical: No cervical adenopathy  Skin:     General: Skin is warm and dry  Neurological:      Mental Status: She is alert and oriented to person, place, and time  Psychiatric:         Behavior: Behavior normal          Thought Content:  Thought content normal          Judgment: Judgment normal

## 2021-03-15 DIAGNOSIS — G25.81 RESTLESS LEG SYNDROME: Chronic | ICD-10-CM

## 2021-03-15 RX ORDER — TEMAZEPAM 30 MG/1
30 CAPSULE ORAL
Qty: 30 CAPSULE | Refills: 1 | Status: SHIPPED | OUTPATIENT
Start: 2021-03-15 | End: 2021-05-20 | Stop reason: SDUPTHER

## 2021-04-08 ENCOUNTER — OFFICE VISIT (OUTPATIENT)
Dept: CARDIOLOGY CLINIC | Facility: CLINIC | Age: 61
End: 2021-04-08
Payer: COMMERCIAL

## 2021-04-08 VITALS
HEIGHT: 64 IN | DIASTOLIC BLOOD PRESSURE: 80 MMHG | SYSTOLIC BLOOD PRESSURE: 132 MMHG | HEART RATE: 80 BPM | BODY MASS INDEX: 28.85 KG/M2 | WEIGHT: 169 LBS

## 2021-04-08 DIAGNOSIS — I10 ESSENTIAL HYPERTENSION: Primary | ICD-10-CM

## 2021-04-08 DIAGNOSIS — R07.89 OTHER CHEST PAIN: ICD-10-CM

## 2021-04-08 DIAGNOSIS — I25.10 CORONARY ARTERY DISEASE INVOLVING NATIVE CORONARY ARTERY OF NATIVE HEART WITHOUT ANGINA PECTORIS: ICD-10-CM

## 2021-04-08 PROCEDURE — 3008F BODY MASS INDEX DOCD: CPT | Performed by: INTERNAL MEDICINE

## 2021-04-08 PROCEDURE — 3075F SYST BP GE 130 - 139MM HG: CPT | Performed by: INTERNAL MEDICINE

## 2021-04-08 PROCEDURE — 99214 OFFICE O/P EST MOD 30 MIN: CPT | Performed by: INTERNAL MEDICINE

## 2021-04-08 PROCEDURE — 1036F TOBACCO NON-USER: CPT | Performed by: INTERNAL MEDICINE

## 2021-04-08 PROCEDURE — 3079F DIAST BP 80-89 MM HG: CPT | Performed by: INTERNAL MEDICINE

## 2021-04-09 NOTE — PROGRESS NOTES
Patient ID: Angie Love is a 61 y o  female  Plan:      Essential hypertension  Patient reports elevated blood pressure readings at home  Blood pressure stable on last several encounters   Advised that she continue to monitor blood pressures at home  Continue benazepril 20 mg daily, amlodipine 5 mg daily, and lopressor 25 mg bid--no changes    Coronary artery disease involving native coronary artery of native heart  S/p POBA to OM1 11/28/18  No intervention on 100% left posterolateral  Continue BB, statin, asa    Other chest pain  Atypical chest pain  stable       Follow up Plan/Summary Comments:  Blood pressure appears controlled on her last several encounters as well as during her office visit today  We discussed bringing in her automatic blood pressure cuff to her next appointment to compare readings  She reports that if her blood pressure is less than 150/90 and she takes her blood pressure medication, her blood pressure often drops significantly  Of note she was recently hospitalized for a presyncopal episode felt to be related to orthostasis, likely a component of diabetic dysutonomia according to the cardiology consult  Jase Hanson should continue to monitor her blood pressures at home, no more than twice daily  Follow-up in 6 months, sooner if needed  HPI:   I had the pleasure of meeting Jase Hanson in the office today, accompanied by her , for evaluation of elevated blood pressure  Her initial report today was fatigue, and wanting to sleep all the time  She reports this has been ongoing for years and has not recently changed  I reviewed a recent note from her PCP visit, revealing an elevated depression screening score  She is maintained on antidepressants per her family physician  She was hospitalized at [facility] early in March for a near syncopal episode  Her symptoms were felt to be related to orthostasis    No medication changes were made at that time and she was advised to stay hydrated and follow holding parameters for her blood pressure medications  She notes that she does not take all of her blood pressure medication blood pressure is less than 150/90  She reports that she has been monitoring her blood pressure at home and has had some elevated readings in the 160s to 70s over low 100's  She has been checking her blood pressure several times throughout the day sometimes after running up the steps  She reports occasional chest discomfort at rest, described as a pressure  Her symptoms improve after a relaxing shower with aromatherapy  She denies any exertional chest pain or dyspnea  She reports frequent headaches  She had previously noted exertional dyspnea which has improved  She denies excess caffeine she is a never smoker  Prior cardiac history is significant for CAD status post balloon angioplasty a of the OM1 11/2018  There was no intervention on a 100% left posterolateral occlusion  She has had several encounters for atypical chest pain  Recent evaluation has been negative  She is maintained on medication for hyperlipidemia and hypertension  Review of Systems   10  point ROS  was otherwise non pertinent or negative except as per HPI or as below  Gait: Normal      Most recent or relevant cardiac/vascular testing:    EKG 03/06/2021 normal sinus rhythm    Stress echo 12/03/2020 normal study    MPI 5/31/2019 normal study    Objective:     /80   Pulse 80   Ht 5' 4" (1 626 m)   Wt 76 7 kg (169 lb)   BMI 29 01 kg/m²     PHYSICAL EXAM:    General:  Normal appearance, no acute distress  Eyes:  Anicteric  Oral mucosa:   Wearing a mask  Neck:  No JVD  Carotid upstrokes are brisk without bruits  No masses  Chest:  Clear to auscultation   Cardiac:  No palpable PMI  Normal S1 and S2  No murmur gallop or rub  Abdomen:  Soft and nontender  No palpable organomegaly or aortic enlargement  Extremities:  No peripheral edema    Musculoskeletal: Symmetric  Vascular:  Pedal pulses are intact  Neuro:  Grossly symmetric  Psych:  Alert and oriented x3      No Known Allergies    Current Outpatient Medications:     ACCU-CHEK FASTCLIX LANCETS MISC, TEST THREE TIMES A DAY, Disp: 100 each, Rfl: 2    ADMELOG SOLOSTAR 100 units/mL injection pen, INJECT 10 UNITS UNDER THE SKIN AT DINNER TIME, Disp: 30 mL, Rfl: 0    amLODIPine (NORVASC) 5 mg tablet, Take 1 tablet (5 mg total) by mouth daily, Disp: 90 tablet, Rfl: 1    anastrozole (ARIMIDEX) 1 mg tablet, Take 1 mg by mouth daily, Disp: , Rfl:     aspirin (ECOTRIN LOW STRENGTH) 81 mg EC tablet, Take 81 mg by mouth daily, Disp: , Rfl:     atorvastatin (LIPITOR) 40 mg tablet, Take 1 tablet (40 mg total) by mouth daily with dinner, Disp: 90 tablet, Rfl: 2    benazepril (LOTENSIN) 20 mg tablet, Take 1 tablet (20 mg total) by mouth daily, Disp: 90 tablet, Rfl: 1    betamethasone valerate (VALISONE) 0 1 % cream, Apply topically 2 (two) times a day, Disp: , Rfl:     Blood Glucose Monitoring Suppl (OneTouch Verio Flex System) w/Device KIT, Test four times daily, Disp: 1 kit, Rfl: 0    cyclobenzaprine (FLEXERIL) 5 mg tablet, Take 1 tablet (5 mg total) by mouth daily, Disp: 30 tablet, Rfl: 1    dexlansoprazole (DEXILANT) 60 MG capsule, Take 60 mg by mouth daily, Disp: , Rfl:     glucose blood (Accu-Chek Guide) test strip, Test bid, Disp: 200 each, Rfl: 4    glucose blood (Accu-Chek Guide) test strip, 1 each by Other route 4 (four) times a day Use as instructed, Disp: 300 each, Rfl: 3    glucose blood (OneTouch Verio) test strip, Use as instructed, Disp: 400 each, Rfl: 5    insulin glargine (LANTUS) 100 units/mL subcutaneous injection, Inject 100 Units under the skin daily at bedtime, Disp: 9000 Units, Rfl: 3    Insulin Pen Needle (BD Pen Needle Sadia 2nd Gen) 32G X 4 MM MISC, Use 3 (three) times a day, Disp: 100 each, Rfl: 3    [employer] FINEPOINT LANCETS MISC, Test 4 times daily, Disp: 400 each, Rfl: 5   metoprolol tartrate (LOPRESSOR) 25 mg tablet, Take 1 tablet (25 mg total) by mouth every 12 (twelve) hours, Disp: 180 tablet, Rfl: 3    nitroglycerin (NITROSTAT) 0 4 mg SL tablet, place 1 tablet under the tongue if needed every 5 minutes for chest pain for 3 doses IF NO RELIEF AFTER FIRST DOSE CALL PRESCRIBER  , Disp: 100 tablet, Rfl: 3    pantoprazole (PROTONIX) 40 mg tablet, Take 1 tablet (40 mg total) by mouth daily, Disp: 30 tablet, Rfl: 3    rOPINIRole (REQUIP) 1 mg tablet, Take 1 tablet (1 mg total) by mouth daily, Disp: 90 tablet, Rfl: 1    sertraline (ZOLOFT) 100 mg tablet, Take 1 tablet (100 mg total) by mouth daily, Disp: 30 tablet, Rfl: 6    temazepam (RESTORIL) 30 mg capsule, Take 1 capsule (30 mg total) by mouth daily at bedtime as needed for sleep, Disp: 30 capsule, Rfl: 1    tiZANidine (ZANAFLEX) 4 mg tablet, Take 4 mg by mouth every 8 (eight) hours as needed for muscle spasms 1/2-1, Disp: , Rfl:     traMADol (ULTRAM) 50 mg tablet, Take 50 mg by mouth every 8 (eight) hours, Disp: , Rfl:     traMADol (ULTRAM) 50 mg tablet, One twice daily, Disp: , Rfl:     anastrozole (ARIMIDEX) 1 mg tablet, Take 1 mg by mouth daily, Disp: , Rfl:     ondansetron (ZOFRAN) 8 mg tablet, Take 1 tablet (8 mg total) by mouth every 8 (eight) hours as needed for nausea or vomiting for up to 14 days, Disp: 20 tablet, Rfl: 1    rOPINIRole (REQUIP) 1 mg tablet, One in am and two qhs, Disp: , Rfl:     tiZANidine (ZANAFLEX) 4 mg tablet, One qhs, Disp: , Rfl:   Past Medical History:   Diagnosis Date    CAD (coronary artery disease)     Chronic back pain     Coronary artery disease     [facility] mellitus ([facility] [address]     Family history of early CAD     Hyperlipidemia     Hypertension     Myocardial infarction ([facility] [address] [facility]     Restless leg syndrome     Tuberculosis     patient was less than 11years old    Type 2 diabetes mellitus ([facility] [address] [facility]      Past Surgical History:   Procedure Laterality Date    ABDOMINAL SURGERY phill    ANKLE SURGERY      tubal    BREAST SURGERY      partial mastectomy R breast    CARDIAC CATHETERIZATION  11/2018    Successful balloon angioplassty to OM1    CHOLECYSTECTOMY      FRACTURE SURGERY      right ankle    GALLBLADDER SURGERY      MASTECTOMY      TUBAL LIGATION         CMP:   Lab Results   Component Value Date     06/26/2014    K 3 7 03/05/2021    K 4 5 06/26/2014     03/05/2021     06/26/2014    CO2 23 03/05/2021    CO2 26 4 06/26/2014    BUN 19 03/05/2021    BUN 13 06/26/2014    CREATININE 1 13 03/05/2021    CREATININE 0 81 06/26/2014    GLUCOSE 121 06/26/2014    EGFR 53 03/05/2021     Lipid Profile:    Lab Results   Component Value Date    CHOL 235 06/26/2014    TRIG 480 (H) 11/10/2020    TRIG 358 06/26/2014    HDL 52 11/10/2020    HDL 31 06/26/2014         Social History     Tobacco Use   Smoking Status Never Smoker   Smokeless Tobacco Never Used

## 2021-04-09 NOTE — ASSESSMENT & PLAN NOTE
Patient reports elevated blood pressure readings at home  Blood pressure stable on last several encounters   Advised that she continue to monitor blood pressures at home  Continue benazepril 20 mg daily, amlodipine 5 mg daily, and lopressor 25 mg bid--no changes

## 2021-05-20 DIAGNOSIS — G25.81 RESTLESS LEG SYNDROME: Chronic | ICD-10-CM

## 2021-05-20 RX ORDER — TEMAZEPAM 30 MG/1
30 CAPSULE ORAL
Qty: 30 CAPSULE | Refills: 1 | Status: SHIPPED | OUTPATIENT
Start: 2021-05-20 | End: 2021-09-14 | Stop reason: SDUPTHER

## 2021-05-21 DIAGNOSIS — R11.0 NAUSEA: ICD-10-CM

## 2021-05-24 ENCOUNTER — TRANSCRIBE ORDERS (OUTPATIENT)
Dept: URGENT CARE | Facility: CLINIC | Age: 61
End: 2021-05-24

## 2021-05-24 ENCOUNTER — APPOINTMENT (OUTPATIENT)
Dept: LAB | Facility: CLINIC | Age: 61
End: 2021-05-24
Payer: COMMERCIAL

## 2021-05-24 DIAGNOSIS — E11.9 TYPE 2 DIABETES MELLITUS WITHOUT COMPLICATION, WITH LONG-TERM CURRENT USE OF INSULIN (HCC): ICD-10-CM

## 2021-05-24 DIAGNOSIS — Z17.1 MALIGNANT NEOPLASM OF UPPER-OUTER QUADRANT OF RIGHT BREAST IN FEMALE, ESTROGEN RECEPTOR NEGATIVE (HCC): Primary | ICD-10-CM

## 2021-05-24 DIAGNOSIS — C50.411 MALIGNANT NEOPLASM OF UPPER-OUTER QUADRANT OF RIGHT BREAST IN FEMALE, ESTROGEN RECEPTOR NEGATIVE (HCC): Primary | ICD-10-CM

## 2021-05-24 DIAGNOSIS — Z79.4 TYPE 2 DIABETES MELLITUS WITHOUT COMPLICATION, WITH LONG-TERM CURRENT USE OF INSULIN (HCC): ICD-10-CM

## 2021-05-24 LAB
ALBUMIN SERPL BCP-MCNC: 3.8 G/DL (ref 3.5–5)
ALP SERPL-CCNC: 132 U/L (ref 46–116)
ALT SERPL W P-5'-P-CCNC: 33 U/L (ref 12–78)
ANION GAP SERPL CALCULATED.3IONS-SCNC: 4 MMOL/L (ref 4–13)
AST SERPL W P-5'-P-CCNC: 21 U/L (ref 5–45)
BASOPHILS # BLD AUTO: 0.04 THOUSANDS/ΜL (ref 0–0.1)
BASOPHILS NFR BLD AUTO: 1 % (ref 0–1)
BILIRUB SERPL-MCNC: 0.62 MG/DL (ref 0.2–1)
BUN SERPL-MCNC: 11 MG/DL (ref 5–25)
CALCIUM SERPL-MCNC: 10.1 MG/DL (ref 8.3–10.1)
CHLORIDE SERPL-SCNC: 109 MMOL/L (ref 100–108)
CO2 SERPL-SCNC: 26 MMOL/L (ref 21–32)
CREAT SERPL-MCNC: 0.9 MG/DL (ref 0.6–1.3)
EOSINOPHIL # BLD AUTO: 0.17 THOUSAND/ΜL (ref 0–0.61)
EOSINOPHIL NFR BLD AUTO: 2 % (ref 0–6)
ERYTHROCYTE [DISTWIDTH] IN BLOOD BY AUTOMATED COUNT: 14.2 % (ref 11.6–15.1)
EST. AVERAGE GLUCOSE BLD GHB EST-MCNC: 240 MG/DL
GFR SERPL CREATININE-BSD FRML MDRD: 70 ML/MIN/1.73SQ M
GLUCOSE P FAST SERPL-MCNC: 152 MG/DL (ref 65–99)
HBA1C MFR BLD: 10 %
HCT VFR BLD AUTO: 42.5 % (ref 34.8–46.1)
HGB BLD-MCNC: 13.9 G/DL (ref 11.5–15.4)
IMM GRANULOCYTES # BLD AUTO: 0.03 THOUSAND/UL (ref 0–0.2)
IMM GRANULOCYTES NFR BLD AUTO: 0 % (ref 0–2)
LYMPHOCYTES # BLD AUTO: 3.2 THOUSANDS/ΜL (ref 0.6–4.47)
LYMPHOCYTES NFR BLD AUTO: 36 % (ref 14–44)
MCH RBC QN AUTO: 30 PG (ref 26.8–34.3)
MCHC RBC AUTO-ENTMCNC: 32.7 G/DL (ref 31.4–37.4)
MCV RBC AUTO: 92 FL (ref 82–98)
MONOCYTES # BLD AUTO: 0.58 THOUSAND/ΜL (ref 0.17–1.22)
MONOCYTES NFR BLD AUTO: 7 % (ref 4–12)
NEUTROPHILS # BLD AUTO: 4.81 THOUSANDS/ΜL (ref 1.85–7.62)
NEUTS SEG NFR BLD AUTO: 54 % (ref 43–75)
NRBC BLD AUTO-RTO: 0 /100 WBCS
PLATELET # BLD AUTO: 350 THOUSANDS/UL (ref 149–390)
PMV BLD AUTO: 9.6 FL (ref 8.9–12.7)
POTASSIUM SERPL-SCNC: 3.9 MMOL/L (ref 3.5–5.3)
PROT SERPL-MCNC: 8 G/DL (ref 6.4–8.2)
RBC # BLD AUTO: 4.63 MILLION/UL (ref 3.81–5.12)
SODIUM SERPL-SCNC: 139 MMOL/L (ref 136–145)
WBC # BLD AUTO: 8.83 THOUSAND/UL (ref 4.31–10.16)

## 2021-05-24 PROCEDURE — 83036 HEMOGLOBIN GLYCOSYLATED A1C: CPT

## 2021-05-24 PROCEDURE — 36415 COLL VENOUS BLD VENIPUNCTURE: CPT

## 2021-05-24 PROCEDURE — 85025 COMPLETE CBC W/AUTO DIFF WBC: CPT

## 2021-05-24 PROCEDURE — 80053 COMPREHEN METABOLIC PANEL: CPT

## 2021-05-24 PROCEDURE — 3046F HEMOGLOBIN A1C LEVEL >9.0%: CPT | Performed by: FAMILY MEDICINE

## 2021-05-24 RX ORDER — ONDANSETRON HYDROCHLORIDE 8 MG/1
8 TABLET, FILM COATED ORAL EVERY 8 HOURS PRN
Qty: 20 TABLET | Refills: 1 | Status: SHIPPED | OUTPATIENT
Start: 2021-05-24 | End: 2021-05-26 | Stop reason: SDUPTHER

## 2021-05-26 ENCOUNTER — OFFICE VISIT (OUTPATIENT)
Dept: FAMILY MEDICINE CLINIC | Facility: CLINIC | Age: 61
End: 2021-05-26
Payer: COMMERCIAL

## 2021-05-26 VITALS
SYSTOLIC BLOOD PRESSURE: 124 MMHG | TEMPERATURE: 98.5 F | OXYGEN SATURATION: 98 % | HEART RATE: 87 BPM | WEIGHT: 171.13 LBS | DIASTOLIC BLOOD PRESSURE: 76 MMHG | HEIGHT: 64 IN | BODY MASS INDEX: 29.21 KG/M2

## 2021-05-26 DIAGNOSIS — E11.9 TYPE 2 DIABETES MELLITUS WITHOUT COMPLICATION, WITH LONG-TERM CURRENT USE OF INSULIN (HCC): Primary | Chronic | ICD-10-CM

## 2021-05-26 DIAGNOSIS — Z23 ENCOUNTER FOR IMMUNIZATION: ICD-10-CM

## 2021-05-26 DIAGNOSIS — R11.0 NAUSEA: ICD-10-CM

## 2021-05-26 DIAGNOSIS — Z79.4 TYPE 2 DIABETES MELLITUS WITHOUT COMPLICATION, WITH LONG-TERM CURRENT USE OF INSULIN (HCC): Primary | Chronic | ICD-10-CM

## 2021-05-26 PROCEDURE — 3074F SYST BP LT 130 MM HG: CPT | Performed by: FAMILY MEDICINE

## 2021-05-26 PROCEDURE — 99213 OFFICE O/P EST LOW 20 MIN: CPT | Performed by: FAMILY MEDICINE

## 2021-05-26 PROCEDURE — 3078F DIAST BP <80 MM HG: CPT | Performed by: FAMILY MEDICINE

## 2021-05-26 PROCEDURE — 1036F TOBACCO NON-USER: CPT | Performed by: FAMILY MEDICINE

## 2021-05-26 PROCEDURE — 3008F BODY MASS INDEX DOCD: CPT | Performed by: FAMILY MEDICINE

## 2021-05-26 PROCEDURE — 3725F SCREEN DEPRESSION PERFORMED: CPT | Performed by: FAMILY MEDICINE

## 2021-05-26 RX ORDER — ONDANSETRON HYDROCHLORIDE 8 MG/1
8 TABLET, FILM COATED ORAL EVERY 8 HOURS PRN
Qty: 20 TABLET | Refills: 1 | Status: SHIPPED | OUTPATIENT
Start: 2021-05-26 | End: 2021-11-26 | Stop reason: SDUPTHER

## 2021-05-26 RX ORDER — INSULIN LISPRO 100 U/ML
10 INJECTION, SOLUTION SUBCUTANEOUS
Qty: 30 ML | Refills: 0 | Status: SHIPPED | OUTPATIENT
Start: 2021-05-26 | End: 2021-10-15 | Stop reason: SDUPTHER

## 2021-05-28 ENCOUNTER — TELEPHONE (OUTPATIENT)
Dept: ADMINISTRATIVE | Facility: OTHER | Age: 61
End: 2021-05-28

## 2021-05-28 NOTE — TELEPHONE ENCOUNTER
----- Message from Beata Huddleston sent at 5/26/2021  2:43 PM EDT -----  05/26/21 2:43 PM    Hello, our patient Escobar Wynne has had Diabetic Eye Exam completed/performed  Please assist in updating the patient chart by making an External outreach to [facility] facility located in [address] [place] [address]  [phone]  The date of service is 04/2021      Thank you,  Beata [facility]

## 2021-05-28 NOTE — LETTER
Diabetic Eye Exam Form    Date Requested: 21  Patient: Phan Rose  Patient : 1960   Referring Provider: Elina Bundy,       Dilated Retinal Exam, Optomap-Iris Exam, or Fundus Photography Done           Yes ([place] one above)         No     Date of Diabetic Eye Exam ______________________________    Left Eye      Exam did show retinopathy    Exam did not show retinopathy         Mild       Moderate       None       Proliferative       Severe     Right Eye     Exam did show retinopathy    Exam did not show retinopathy         Mild       Moderate       None       Proliferative       Severe     Comments DOS: 2021  __________________________________________________________    Practice Providing Exam ______________________________________________    Exam Performed By (print name) _______________________________________      Provider Signature ___________________________________________________      These reports are needed for  compliance    Please fax this completed form and a copy of the Diabetic Eye Exam report to our office located at [place] [address] as soon as possible to [phone] ellis Jones: Phone [phone]    We thank you for your assistance in treating our mutual patient

## 2021-06-01 NOTE — TELEPHONE ENCOUNTER
Upon review of the In Basket request and the patient's chart, initial outreach has been made via fax, please see Contacts section for details       Thank you  Romeo Zacarias

## 2021-06-08 DIAGNOSIS — E11.8 TYPE 2 DIABETES MELLITUS WITH COMPLICATION, WITH LONG-TERM CURRENT USE OF INSULIN (HCC): ICD-10-CM

## 2021-06-08 DIAGNOSIS — Z79.4 TYPE 2 DIABETES MELLITUS WITH COMPLICATION, WITH LONG-TERM CURRENT USE OF INSULIN (HCC): ICD-10-CM

## 2021-06-08 RX ORDER — PEN NEEDLE, DIABETIC 32GX 5/32"
NEEDLE, DISPOSABLE MISCELLANEOUS 3 TIMES DAILY
Qty: 100 EACH | Refills: 3 | Status: SHIPPED | OUTPATIENT
Start: 2021-06-08 | End: 2022-04-08 | Stop reason: SDUPTHER

## 2021-06-14 NOTE — TELEPHONE ENCOUNTER
Upon review of the In Basket request we have found as a result of outreach that patient did not have the requested item(s) completed  Any additional questions or concerns should be emailed to the Practice Liaisons via [email or web address] email, please do not reply via In Basket      Thank you  Miki Andrews

## 2021-09-14 DIAGNOSIS — G25.81 RESTLESS LEG SYNDROME: Chronic | ICD-10-CM

## 2021-09-16 RX ORDER — TEMAZEPAM 30 MG/1
30 CAPSULE ORAL
Qty: 30 CAPSULE | Refills: 1 | Status: SHIPPED | OUTPATIENT
Start: 2021-09-16 | End: 2021-12-03 | Stop reason: SDUPTHER

## 2021-09-20 DIAGNOSIS — I10 ESSENTIAL HYPERTENSION: ICD-10-CM

## 2021-09-20 RX ORDER — BENAZEPRIL HYDROCHLORIDE 20 MG/1
20 TABLET ORAL DAILY
Qty: 90 TABLET | Refills: 1 | Status: SHIPPED | OUTPATIENT
Start: 2021-09-20 | End: 2022-02-23

## 2021-09-24 DIAGNOSIS — E11.9 TYPE 2 DIABETES MELLITUS WITHOUT COMPLICATION, WITH LONG-TERM CURRENT USE OF INSULIN (HCC): Chronic | ICD-10-CM

## 2021-09-24 DIAGNOSIS — Z79.4 TYPE 2 DIABETES MELLITUS WITHOUT COMPLICATION, WITH LONG-TERM CURRENT USE OF INSULIN (HCC): Chronic | ICD-10-CM

## 2021-09-27 RX ORDER — BLOOD SUGAR DIAGNOSTIC
STRIP MISCELLANEOUS
Qty: 400 STRIP | Refills: 1 | Status: SHIPPED | OUTPATIENT
Start: 2021-09-27 | End: 2021-09-28 | Stop reason: SDUPTHER

## 2021-09-28 DIAGNOSIS — E11.9 TYPE 2 DIABETES MELLITUS WITHOUT COMPLICATION, WITH LONG-TERM CURRENT USE OF INSULIN (HCC): Chronic | ICD-10-CM

## 2021-09-28 DIAGNOSIS — Z79.4 TYPE 2 DIABETES MELLITUS WITHOUT COMPLICATION, WITH LONG-TERM CURRENT USE OF INSULIN (HCC): Chronic | ICD-10-CM

## 2021-09-28 RX ORDER — BLOOD SUGAR DIAGNOSTIC
STRIP MISCELLANEOUS
Qty: 400 STRIP | Refills: 1 | Status: SHIPPED | OUTPATIENT
Start: 2021-09-28 | End: 2022-04-08 | Stop reason: SDUPTHER

## 2021-10-15 ENCOUNTER — APPOINTMENT (OUTPATIENT)
Dept: LAB | Facility: CLINIC | Age: 61
End: 2021-10-15
Payer: COMMERCIAL

## 2021-10-15 DIAGNOSIS — Z79.4 TYPE 2 DIABETES MELLITUS WITHOUT COMPLICATION, WITH LONG-TERM CURRENT USE OF INSULIN (HCC): Chronic | ICD-10-CM

## 2021-10-15 DIAGNOSIS — E11.9 TYPE 2 DIABETES MELLITUS WITHOUT COMPLICATION, WITH LONG-TERM CURRENT USE OF INSULIN (HCC): Chronic | ICD-10-CM

## 2021-10-15 LAB
EST. AVERAGE GLUCOSE BLD GHB EST-MCNC: 266 MG/DL
GLUCOSE P FAST SERPL-MCNC: 176 MG/DL (ref 65–99)
HBA1C MFR BLD: 10.9 %

## 2021-10-15 PROCEDURE — 36415 COLL VENOUS BLD VENIPUNCTURE: CPT

## 2021-10-15 PROCEDURE — 82947 ASSAY GLUCOSE BLOOD QUANT: CPT

## 2021-10-15 PROCEDURE — 3046F HEMOGLOBIN A1C LEVEL >9.0%: CPT | Performed by: FAMILY MEDICINE

## 2021-10-15 PROCEDURE — 83036 HEMOGLOBIN GLYCOSYLATED A1C: CPT

## 2021-10-15 RX ORDER — INSULIN LISPRO 100 U/ML
10 INJECTION, SOLUTION SUBCUTANEOUS
Qty: 30 ML | Refills: 0 | Status: SHIPPED | OUTPATIENT
Start: 2021-10-15 | End: 2021-12-02 | Stop reason: SDUPTHER

## 2021-10-18 ENCOUNTER — OFFICE VISIT (OUTPATIENT)
Dept: FAMILY MEDICINE CLINIC | Facility: CLINIC | Age: 61
End: 2021-10-18
Payer: COMMERCIAL

## 2021-10-18 ENCOUNTER — OFFICE VISIT (OUTPATIENT)
Dept: CARDIOLOGY CLINIC | Facility: CLINIC | Age: 61
End: 2021-10-18
Payer: COMMERCIAL

## 2021-10-18 VITALS
HEIGHT: 64 IN | OXYGEN SATURATION: 96 % | BODY MASS INDEX: 29.53 KG/M2 | TEMPERATURE: 96.9 F | SYSTOLIC BLOOD PRESSURE: 138 MMHG | DIASTOLIC BLOOD PRESSURE: 76 MMHG | HEART RATE: 104 BPM | WEIGHT: 173 LBS

## 2021-10-18 VITALS
DIASTOLIC BLOOD PRESSURE: 80 MMHG | SYSTOLIC BLOOD PRESSURE: 160 MMHG | HEART RATE: 80 BPM | WEIGHT: 173 LBS | HEIGHT: 64 IN | BODY MASS INDEX: 29.53 KG/M2

## 2021-10-18 DIAGNOSIS — E11.9 TYPE 2 DIABETES MELLITUS WITHOUT COMPLICATION, WITH LONG-TERM CURRENT USE OF INSULIN (HCC): Primary | ICD-10-CM

## 2021-10-18 DIAGNOSIS — Z79.4 TYPE 2 DIABETES MELLITUS WITHOUT COMPLICATION, WITH LONG-TERM CURRENT USE OF INSULIN (HCC): Chronic | ICD-10-CM

## 2021-10-18 DIAGNOSIS — I10 ESSENTIAL HYPERTENSION: ICD-10-CM

## 2021-10-18 DIAGNOSIS — F32.A DEPRESSION, UNSPECIFIED DEPRESSION TYPE: ICD-10-CM

## 2021-10-18 DIAGNOSIS — E78.2 MIXED HYPERLIPIDEMIA: ICD-10-CM

## 2021-10-18 DIAGNOSIS — I10 ESSENTIAL HYPERTENSION: Primary | ICD-10-CM

## 2021-10-18 DIAGNOSIS — Z79.4 TYPE 2 DIABETES MELLITUS WITHOUT COMPLICATION, WITH LONG-TERM CURRENT USE OF INSULIN (HCC): Primary | ICD-10-CM

## 2021-10-18 DIAGNOSIS — I25.10 CORONARY ARTERY DISEASE INVOLVING NATIVE CORONARY ARTERY OF NATIVE HEART WITHOUT ANGINA PECTORIS: ICD-10-CM

## 2021-10-18 DIAGNOSIS — Z23 ENCOUNTER FOR IMMUNIZATION: ICD-10-CM

## 2021-10-18 DIAGNOSIS — E11.9 TYPE 2 DIABETES MELLITUS WITHOUT COMPLICATION, WITH LONG-TERM CURRENT USE OF INSULIN (HCC): Chronic | ICD-10-CM

## 2021-10-18 PROCEDURE — 99214 OFFICE O/P EST MOD 30 MIN: CPT | Performed by: INTERNAL MEDICINE

## 2021-10-18 PROCEDURE — 99213 OFFICE O/P EST LOW 20 MIN: CPT | Performed by: FAMILY MEDICINE

## 2021-10-18 PROCEDURE — 3075F SYST BP GE 130 - 139MM HG: CPT | Performed by: FAMILY MEDICINE

## 2021-10-18 PROCEDURE — 3078F DIAST BP <80 MM HG: CPT | Performed by: FAMILY MEDICINE

## 2021-10-18 RX ORDER — AMLODIPINE BESYLATE 5 MG/1
5 TABLET ORAL DAILY
Qty: 90 TABLET | Refills: 1 | Status: SHIPPED | OUTPATIENT
Start: 2021-10-18 | End: 2022-04-07

## 2021-10-18 RX ORDER — METOPROLOL SUCCINATE 100 MG/1
100 TABLET, EXTENDED RELEASE ORAL DAILY
Qty: 90 TABLET | Refills: 5 | Status: SHIPPED | OUTPATIENT
Start: 2021-10-18

## 2021-10-18 RX ORDER — INSULIN GLARGINE 100 [IU]/ML
100 INJECTION, SOLUTION SUBCUTANEOUS
Qty: 90 ML | Refills: 1 | Status: SHIPPED | OUTPATIENT
Start: 2021-10-18 | End: 2021-12-02 | Stop reason: SDUPTHER

## 2021-11-03 DIAGNOSIS — E11.8 TYPE 2 DIABETES MELLITUS WITH COMPLICATION, WITH LONG-TERM CURRENT USE OF INSULIN (HCC): ICD-10-CM

## 2021-11-03 DIAGNOSIS — Z79.4 TYPE 2 DIABETES MELLITUS WITH COMPLICATION, WITH LONG-TERM CURRENT USE OF INSULIN (HCC): ICD-10-CM

## 2021-11-03 RX ORDER — LANCETS
EACH MISCELLANEOUS
Qty: 100 EACH | Refills: 2 | Status: SHIPPED | OUTPATIENT
Start: 2021-11-03 | End: 2021-12-02 | Stop reason: SDUPTHER

## 2021-11-26 ENCOUNTER — APPOINTMENT (OUTPATIENT)
Dept: RADIOLOGY | Facility: CLINIC | Age: 61
End: 2021-11-26
Payer: COMMERCIAL

## 2021-11-26 ENCOUNTER — OFFICE VISIT (OUTPATIENT)
Dept: URGENT CARE | Facility: CLINIC | Age: 61
End: 2021-11-26
Payer: COMMERCIAL

## 2021-11-26 ENCOUNTER — TELEPHONE (OUTPATIENT)
Dept: URGENT CARE | Facility: CLINIC | Age: 61
End: 2021-11-26

## 2021-11-26 VITALS
SYSTOLIC BLOOD PRESSURE: 110 MMHG | HEART RATE: 57 BPM | TEMPERATURE: 97.1 F | OXYGEN SATURATION: 91 % | RESPIRATION RATE: 18 BRPM | DIASTOLIC BLOOD PRESSURE: 66 MMHG

## 2021-11-26 DIAGNOSIS — S97.82XA CRUSH INJURY OF LEFT FOOT, INITIAL ENCOUNTER: ICD-10-CM

## 2021-11-26 DIAGNOSIS — R11.0 NAUSEA: ICD-10-CM

## 2021-11-26 DIAGNOSIS — S97.82XA CRUSH INJURY OF LEFT FOOT, INITIAL ENCOUNTER: Primary | ICD-10-CM

## 2021-11-26 DIAGNOSIS — M79.672 LEFT FOOT PAIN: ICD-10-CM

## 2021-11-26 DIAGNOSIS — I21.4 NSTEMI (NON-ST ELEVATED MYOCARDIAL INFARCTION) (HCC): ICD-10-CM

## 2021-11-26 PROCEDURE — 99213 OFFICE O/P EST LOW 20 MIN: CPT | Performed by: NURSE PRACTITIONER

## 2021-11-26 PROCEDURE — 73630 X-RAY EXAM OF FOOT: CPT

## 2021-11-26 RX ORDER — ATORVASTATIN CALCIUM 40 MG/1
40 TABLET, FILM COATED ORAL
Qty: 90 TABLET | Refills: 2 | Status: CANCELLED | OUTPATIENT
Start: 2021-11-26

## 2021-11-29 DIAGNOSIS — I21.4 NSTEMI (NON-ST ELEVATED MYOCARDIAL INFARCTION) (HCC): ICD-10-CM

## 2021-11-29 RX ORDER — ATORVASTATIN CALCIUM 40 MG/1
40 TABLET, FILM COATED ORAL
Qty: 90 TABLET | Refills: 2 | Status: SHIPPED | OUTPATIENT
Start: 2021-11-29

## 2021-12-01 RX ORDER — ONDANSETRON HYDROCHLORIDE 8 MG/1
8 TABLET, FILM COATED ORAL EVERY 8 HOURS PRN
Qty: 20 TABLET | Refills: 1 | Status: SHIPPED | OUTPATIENT
Start: 2021-12-01 | End: 2022-01-24

## 2021-12-02 DIAGNOSIS — Z79.4 TYPE 2 DIABETES MELLITUS WITHOUT COMPLICATION, WITH LONG-TERM CURRENT USE OF INSULIN (HCC): Chronic | ICD-10-CM

## 2021-12-02 DIAGNOSIS — E11.9 TYPE 2 DIABETES MELLITUS WITHOUT COMPLICATION, WITH LONG-TERM CURRENT USE OF INSULIN (HCC): Chronic | ICD-10-CM

## 2021-12-02 DIAGNOSIS — E11.8 TYPE 2 DIABETES MELLITUS WITH COMPLICATION, WITH LONG-TERM CURRENT USE OF INSULIN (HCC): ICD-10-CM

## 2021-12-02 DIAGNOSIS — Z79.4 TYPE 2 DIABETES MELLITUS WITH COMPLICATION, WITH LONG-TERM CURRENT USE OF INSULIN (HCC): ICD-10-CM

## 2021-12-02 RX ORDER — INSULIN LISPRO 100 U/ML
10 INJECTION, SOLUTION SUBCUTANEOUS
Qty: 30 ML | Refills: 0 | Status: SHIPPED | OUTPATIENT
Start: 2021-12-02

## 2021-12-02 RX ORDER — LANCETS
EACH MISCELLANEOUS
Qty: 100 EACH | Refills: 0 | Status: SHIPPED | OUTPATIENT
Start: 2021-12-02

## 2021-12-02 RX ORDER — INSULIN GLARGINE 100 [IU]/ML
100 INJECTION, SOLUTION SUBCUTANEOUS
Qty: 90 ML | Refills: 0 | Status: SHIPPED | OUTPATIENT
Start: 2021-12-02

## 2021-12-03 DIAGNOSIS — Z79.4 TYPE 2 DIABETES MELLITUS WITHOUT COMPLICATION, WITH LONG-TERM CURRENT USE OF INSULIN (HCC): Primary | ICD-10-CM

## 2021-12-03 DIAGNOSIS — E11.9 TYPE 2 DIABETES MELLITUS WITHOUT COMPLICATION, WITH LONG-TERM CURRENT USE OF INSULIN (HCC): Primary | ICD-10-CM

## 2021-12-03 DIAGNOSIS — G25.81 RESTLESS LEG SYNDROME: Chronic | ICD-10-CM

## 2021-12-03 RX ORDER — INSULIN GLARGINE 100 [IU]/ML
100 INJECTION, SOLUTION SUBCUTANEOUS
Qty: 30 ML | Refills: 2 | Status: SHIPPED | OUTPATIENT
Start: 2021-12-03 | End: 2022-04-11

## 2021-12-03 RX ORDER — TEMAZEPAM 30 MG/1
30 CAPSULE ORAL
Qty: 30 CAPSULE | Refills: 0 | Status: SHIPPED | OUTPATIENT
Start: 2021-12-03 | End: 2022-01-10 | Stop reason: SDUPTHER

## 2021-12-06 DIAGNOSIS — E11.9 TYPE 2 DIABETES MELLITUS WITHOUT COMPLICATION, WITH LONG-TERM CURRENT USE OF INSULIN (HCC): Primary | ICD-10-CM

## 2021-12-06 DIAGNOSIS — Z79.4 TYPE 2 DIABETES MELLITUS WITHOUT COMPLICATION, WITH LONG-TERM CURRENT USE OF INSULIN (HCC): Primary | ICD-10-CM

## 2021-12-07 RX ORDER — INSULIN LISPRO 100 [IU]/ML
10 INJECTION, SOLUTION INTRAVENOUS; SUBCUTANEOUS
Qty: 27 ML | Refills: 2 | Status: SHIPPED | OUTPATIENT
Start: 2021-12-07

## 2021-12-07 RX ORDER — LANCETS 33 GAUGE
EACH MISCELLANEOUS 3 TIMES DAILY
Qty: 300 EACH | Refills: 2 | Status: SHIPPED | OUTPATIENT
Start: 2021-12-07 | End: 2022-04-08 | Stop reason: SDUPTHER

## 2022-01-10 DIAGNOSIS — G25.81 RESTLESS LEG SYNDROME: Chronic | ICD-10-CM

## 2022-01-11 RX ORDER — TEMAZEPAM 30 MG/1
30 CAPSULE ORAL
Qty: 30 CAPSULE | Refills: 0 | Status: SHIPPED | OUTPATIENT
Start: 2022-01-11 | End: 2022-02-24

## 2022-01-19 ENCOUNTER — APPOINTMENT (OUTPATIENT)
Dept: LAB | Facility: CLINIC | Age: 62
End: 2022-01-19
Payer: COMMERCIAL

## 2022-01-19 DIAGNOSIS — Z79.4 TYPE 2 DIABETES MELLITUS WITHOUT COMPLICATION, WITH LONG-TERM CURRENT USE OF INSULIN (HCC): ICD-10-CM

## 2022-01-19 DIAGNOSIS — C50.411 MALIGNANT NEOPLASM OF UPPER-OUTER QUADRANT OF RIGHT FEMALE BREAST, UNSPECIFIED ESTROGEN RECEPTOR STATUS (HCC): Primary | ICD-10-CM

## 2022-01-19 DIAGNOSIS — E11.9 TYPE 2 DIABETES MELLITUS WITHOUT COMPLICATION, WITH LONG-TERM CURRENT USE OF INSULIN (HCC): ICD-10-CM

## 2022-01-19 LAB
ALBUMIN SERPL BCP-MCNC: 3.8 G/DL (ref 3.5–5)
ALP SERPL-CCNC: 147 U/L (ref 46–116)
ALT SERPL W P-5'-P-CCNC: 26 U/L (ref 12–78)
ANION GAP SERPL CALCULATED.3IONS-SCNC: 8 MMOL/L (ref 4–13)
AST SERPL W P-5'-P-CCNC: 16 U/L (ref 5–45)
BASOPHILS # BLD AUTO: 0.04 THOUSANDS/ΜL (ref 0–0.1)
BASOPHILS NFR BLD AUTO: 1 % (ref 0–1)
BILIRUB SERPL-MCNC: 0.79 MG/DL (ref 0.2–1)
BUN SERPL-MCNC: 14 MG/DL (ref 5–25)
CALCIUM SERPL-MCNC: 9.4 MG/DL (ref 8.3–10.1)
CHLORIDE SERPL-SCNC: 101 MMOL/L (ref 100–108)
CO2 SERPL-SCNC: 25 MMOL/L (ref 21–32)
CREAT SERPL-MCNC: 0.96 MG/DL (ref 0.6–1.3)
EOSINOPHIL # BLD AUTO: 0.1 THOUSAND/ΜL (ref 0–0.61)
EOSINOPHIL NFR BLD AUTO: 1 % (ref 0–6)
ERYTHROCYTE [DISTWIDTH] IN BLOOD BY AUTOMATED COUNT: 13.9 % (ref 11.6–15.1)
GFR SERPL CREATININE-BSD FRML MDRD: 64 ML/MIN/1.73SQ M
GLUCOSE P FAST SERPL-MCNC: 331 MG/DL (ref 65–99)
HCT VFR BLD AUTO: 46 % (ref 34.8–46.1)
HGB BLD-MCNC: 14.9 G/DL (ref 11.5–15.4)
IMM GRANULOCYTES # BLD AUTO: 0.01 THOUSAND/UL (ref 0–0.2)
IMM GRANULOCYTES NFR BLD AUTO: 0 % (ref 0–2)
LYMPHOCYTES # BLD AUTO: 3.11 THOUSANDS/ΜL (ref 0.6–4.47)
LYMPHOCYTES NFR BLD AUTO: 38 % (ref 14–44)
MCH RBC QN AUTO: 29.9 PG (ref 26.8–34.3)
MCHC RBC AUTO-ENTMCNC: 32.4 G/DL (ref 31.4–37.4)
MCV RBC AUTO: 92 FL (ref 82–98)
MONOCYTES # BLD AUTO: 0.42 THOUSAND/ΜL (ref 0.17–1.22)
MONOCYTES NFR BLD AUTO: 5 % (ref 4–12)
NEUTROPHILS # BLD AUTO: 4.41 THOUSANDS/ΜL (ref 1.85–7.62)
NEUTS SEG NFR BLD AUTO: 55 % (ref 43–75)
NRBC BLD AUTO-RTO: 0 /100 WBCS
PLATELET # BLD AUTO: 329 THOUSANDS/UL (ref 149–390)
PMV BLD AUTO: 10.6 FL (ref 8.9–12.7)
POTASSIUM SERPL-SCNC: 3.9 MMOL/L (ref 3.5–5.3)
PROT SERPL-MCNC: 8.4 G/DL (ref 6.4–8.2)
RBC # BLD AUTO: 4.98 MILLION/UL (ref 3.81–5.12)
SODIUM SERPL-SCNC: 134 MMOL/L (ref 136–145)
WBC # BLD AUTO: 8.09 THOUSAND/UL (ref 4.31–10.16)

## 2022-01-19 PROCEDURE — 36415 COLL VENOUS BLD VENIPUNCTURE: CPT

## 2022-01-19 PROCEDURE — 83036 HEMOGLOBIN GLYCOSYLATED A1C: CPT

## 2022-01-19 PROCEDURE — 80053 COMPREHEN METABOLIC PANEL: CPT

## 2022-01-19 PROCEDURE — 85025 COMPLETE CBC W/AUTO DIFF WBC: CPT

## 2022-01-19 PROCEDURE — 84681 ASSAY OF C-PEPTIDE: CPT

## 2022-01-20 LAB
C PEPTIDE SERPL-MCNC: 1.5 NG/ML (ref 1.1–4.4)
EST. AVERAGE GLUCOSE BLD GHB EST-MCNC: 292 MG/DL
HBA1C MFR BLD: 11.8 %

## 2022-01-24 ENCOUNTER — OFFICE VISIT (OUTPATIENT)
Dept: FAMILY MEDICINE CLINIC | Facility: CLINIC | Age: 62
End: 2022-01-24
Payer: COMMERCIAL

## 2022-01-24 VITALS
WEIGHT: 164.38 LBS | TEMPERATURE: 97.1 F | BODY MASS INDEX: 28.06 KG/M2 | HEIGHT: 64 IN | HEART RATE: 70 BPM | DIASTOLIC BLOOD PRESSURE: 70 MMHG | SYSTOLIC BLOOD PRESSURE: 126 MMHG | OXYGEN SATURATION: 96 %

## 2022-01-24 DIAGNOSIS — Z23 ENCOUNTER FOR IMMUNIZATION: ICD-10-CM

## 2022-01-24 DIAGNOSIS — Z13.5 SCREENING FOR DIABETIC RETINOPATHY: ICD-10-CM

## 2022-01-24 DIAGNOSIS — E11.9 TYPE 2 DIABETES MELLITUS WITHOUT COMPLICATION, WITH LONG-TERM CURRENT USE OF INSULIN (HCC): Primary | Chronic | ICD-10-CM

## 2022-01-24 DIAGNOSIS — I10 ESSENTIAL HYPERTENSION: ICD-10-CM

## 2022-01-24 DIAGNOSIS — Z79.4 TYPE 2 DIABETES MELLITUS WITHOUT COMPLICATION, WITH LONG-TERM CURRENT USE OF INSULIN (HCC): Primary | Chronic | ICD-10-CM

## 2022-01-24 LAB
LEFT EYE DIABETIC RETINOPATHY: NORMAL
LEFT EYE IMAGE QUALITY: NORMAL
LEFT EYE MACULAR EDEMA: NORMAL
LEFT EYE OTHER RETINOPATHY: NORMAL
RIGHT EYE DIABETIC RETINOPATHY: NORMAL
RIGHT EYE IMAGE QUALITY: NORMAL
RIGHT EYE MACULAR EDEMA: NORMAL
RIGHT EYE OTHER RETINOPATHY: NORMAL
SEVERITY (EYE EXAM): NORMAL

## 2022-01-24 PROCEDURE — 99213 OFFICE O/P EST LOW 20 MIN: CPT | Performed by: FAMILY MEDICINE

## 2022-01-24 PROCEDURE — 92250 FUNDUS PHOTOGRAPHY W/I&R: CPT | Performed by: FAMILY MEDICINE

## 2022-01-24 RX ORDER — FAMOTIDINE 40 MG/1
TABLET, FILM COATED ORAL
COMMUNITY
Start: 2022-01-01

## 2022-01-24 NOTE — PROGRESS NOTES
Assessment/Plan:    No problem-specific Assessment & Plan notes found for this encounter  Diagnoses and all orders for this visit:    Type 2 diabetes mellitus without complication, with long-term current use of insulin ([facility] [address] )  Comments:  pt counseled to use her insulin  Orders:  -     Glucose, fasting; Future  -     Hemoglobin A1C; Future    Essential hypertension    Screening for diabetic retinopathy  -     IRIS Diabetic eye exam    Encounter for immunization  -     PNEUMOCOCCAL CONJUGATE VACCINE 13-VALENT GREATER THAN 6 MONTHS    Other orders  -     famotidine (PEPCID) 40 MG tablet; take 1 tablet by mouth twice a day 30 MINUTES PRIOR TO BREAKFAST AND DINNER          PHQ-2/9 Depression Screening              Subjective:      Patient ID: Charli Charles is a 64 y o  female  Diabetes  She presents for her follow-up diabetic visit  She has type 2 diabetes mellitus  Her disease course has been worsening  There are no hypoglycemic associated symptoms  There are no diabetic associated symptoms  Pertinent negatives for diabetes include no chest pain  Symptoms are stable  Current diabetic treatment includes insulin injections  She is compliant with treatment none of the time  Her weight is decreasing steadily  The following portions of the patient's history were reviewed and updated as appropriate: allergies, current medications, past family history, past medical history, past social history, past surgical history and problem list     Review of Systems   Eyes: Negative for visual disturbance  Cardiovascular: Negative for chest pain and palpitations  Gastrointestinal: Negative for abdominal pain, nausea and vomiting  Genitourinary: Negative for frequency  Skin: Negative for wound  Neurological: Negative for numbness           Objective:    /70 (BP Location: Left arm, Patient Position: Sitting, Cuff Size: Large)   Pulse 70   Temp (!) 97 1 °F (36 2 °C) (Tympanic)   Ht 5' 4" (1 626 m)   Wt 74 6 kg (164 lb 6 oz)   SpO2 96%   BMI 28 21 kg/m²      Physical Exam  Vitals and nursing note reviewed  Constitutional:       General: She is not in acute distress  Appearance: Normal appearance  She is not ill-appearing or diaphoretic  HENT:      Head: Normocephalic and atraumatic  Mouth/Throat:      Mouth: Mucous membranes are moist    Eyes:      Conjunctiva/sclera: Conjunctivae normal    Cardiovascular:      Rate and Rhythm: Normal rate and regular rhythm  Pulses: no weak pulses          Dorsalis pedis pulses are 2+ on the right side and 2+ on the left side  Heart sounds: Normal heart sounds  No murmur heard  Pulmonary:      Effort: Pulmonary effort is normal  No respiratory distress  Breath sounds: Normal breath sounds  No wheezing, rhonchi or rales  Abdominal:      General: There is no distension  Palpations: Abdomen is soft  There is no mass  Tenderness: There is no abdominal tenderness  There is no guarding or rebound  Musculoskeletal:      Cervical back: Normal range of motion  No rigidity  Right lower leg: No edema  Left lower leg: No edema  Feet:      Right foot:      Skin integrity: No ulcer, skin breakdown, erythema, warmth, callus or dry skin  Left foot:      Skin integrity: No ulcer, skin breakdown, erythema, warmth, callus or dry skin  Lymphadenopathy:      Cervical: No cervical adenopathy  Neurological:      Mental Status: She is alert and oriented to person, place, and time  Psychiatric:         Mood and Affect: Mood normal          Behavior: Behavior normal          Thought Content: Thought content normal          Judgment: Judgment normal        Patient's shoes and socks removed  Right Foot/Ankle   Right Foot Inspection  Skin Exam: skin normal and skin intact  No dry skin, no warmth, no callus, no erythema, no maceration, no abnormal color, no pre-ulcer, no ulcer and no callus       Toe Exam: ROM and strength within normal limits  no right toe deformity    Sensory   Monofilament testing: intact    Vascular  Capillary refills: < 3 seconds  The right DP pulse is 2+  Left Foot/Ankle  Left Foot Inspection  Skin Exam: skin normal and skin intact  No dry skin, no warmth, no erythema, no maceration, normal color, no pre-ulcer, no ulcer and no callus  Toe Exam: ROM and strength within normal limits  No left toe deformity  Sensory   Monofilament testing: intact    Vascular  Capillary refills: < 3 seconds  The left DP pulse is 2+       Assign Risk Category  No deformity present  No loss of protective sensation  No weak pulses  Risk: 0

## 2022-01-31 ENCOUNTER — HOSPITAL ENCOUNTER (OUTPATIENT)
Dept: NUCLEAR MEDICINE | Facility: HOSPITAL | Age: 62
Discharge: HOME | End: 2022-01-31
Attending: PHYSICIAN ASSISTANT
Payer: COMMERCIAL

## 2022-01-31 DIAGNOSIS — R68.81 EARLY SATIETY: ICD-10-CM

## 2022-01-31 DIAGNOSIS — R11.2 NAUSEA AND VOMITING, INTRACTABILITY OF VOMITING NOT SPECIFIED, UNSPECIFIED VOMITING TYPE: ICD-10-CM

## 2022-01-31 DIAGNOSIS — R10.816 EPIGASTRIC ABDOMINAL TENDERNESS, REBOUND TENDERNESS PRESENCE NOT SPECIFIED: ICD-10-CM

## 2022-01-31 PROCEDURE — A9541 TC99M SULFUR COLLOID: HCPCS

## 2022-01-31 PROCEDURE — 78264 GASTRIC EMPTYING IMG STUDY: CPT

## 2022-02-23 DIAGNOSIS — I10 ESSENTIAL HYPERTENSION: ICD-10-CM

## 2022-02-23 RX ORDER — BENAZEPRIL HYDROCHLORIDE 20 MG/1
TABLET ORAL
Qty: 90 TABLET | Refills: 1 | Status: SHIPPED | OUTPATIENT
Start: 2022-02-23

## 2022-02-24 DIAGNOSIS — G25.81 RESTLESS LEG SYNDROME: Chronic | ICD-10-CM

## 2022-02-24 RX ORDER — TEMAZEPAM 30 MG/1
CAPSULE ORAL
Qty: 30 CAPSULE | Refills: 0 | Status: SHIPPED | OUTPATIENT
Start: 2022-02-24

## 2022-04-07 DIAGNOSIS — I10 ESSENTIAL HYPERTENSION: ICD-10-CM

## 2022-04-07 RX ORDER — AMLODIPINE BESYLATE 5 MG/1
TABLET ORAL
Qty: 90 TABLET | Refills: 1 | Status: SHIPPED | OUTPATIENT
Start: 2022-04-07

## 2022-04-08 DIAGNOSIS — E11.8 TYPE 2 DIABETES MELLITUS WITH COMPLICATION, WITH LONG-TERM CURRENT USE OF INSULIN (HCC): ICD-10-CM

## 2022-04-08 DIAGNOSIS — E11.9 TYPE 2 DIABETES MELLITUS WITHOUT COMPLICATION, WITH LONG-TERM CURRENT USE OF INSULIN (HCC): Chronic | ICD-10-CM

## 2022-04-08 DIAGNOSIS — Z79.4 TYPE 2 DIABETES MELLITUS WITHOUT COMPLICATION, WITH LONG-TERM CURRENT USE OF INSULIN (HCC): Chronic | ICD-10-CM

## 2022-04-08 DIAGNOSIS — Z79.4 TYPE 2 DIABETES MELLITUS WITH COMPLICATION, WITH LONG-TERM CURRENT USE OF INSULIN (HCC): ICD-10-CM

## 2022-04-08 RX ORDER — LANCETS 33 GAUGE
EACH MISCELLANEOUS 3 TIMES DAILY
Qty: 300 EACH | Refills: 2 | Status: SHIPPED | OUTPATIENT
Start: 2022-04-08

## 2022-04-08 RX ORDER — BLOOD SUGAR DIAGNOSTIC
STRIP MISCELLANEOUS
Qty: 400 STRIP | Refills: 1 | Status: SHIPPED | OUTPATIENT
Start: 2022-04-08

## 2022-04-08 RX ORDER — PEN NEEDLE, DIABETIC 32GX 5/32"
NEEDLE, DISPOSABLE MISCELLANEOUS 3 TIMES DAILY
Qty: 100 EACH | Refills: 3 | Status: SHIPPED | OUTPATIENT
Start: 2022-04-08

## 2022-04-11 DIAGNOSIS — Z79.4 TYPE 2 DIABETES MELLITUS WITHOUT COMPLICATION, WITH LONG-TERM CURRENT USE OF INSULIN (HCC): ICD-10-CM

## 2022-04-11 DIAGNOSIS — E11.9 TYPE 2 DIABETES MELLITUS WITHOUT COMPLICATION, WITH LONG-TERM CURRENT USE OF INSULIN (HCC): ICD-10-CM

## 2022-04-11 RX ORDER — INSULIN GLARGINE 100 [IU]/ML
INJECTION, SOLUTION SUBCUTANEOUS
Qty: 30 ML | Refills: 2 | Status: SHIPPED | OUTPATIENT
Start: 2022-04-11

## 2022-04-18 DIAGNOSIS — E11.9 TYPE 2 DIABETES MELLITUS WITHOUT COMPLICATION, WITH LONG-TERM CURRENT USE OF INSULIN (HCC): Chronic | ICD-10-CM

## 2022-04-18 DIAGNOSIS — Z79.4 TYPE 2 DIABETES MELLITUS WITHOUT COMPLICATION, WITH LONG-TERM CURRENT USE OF INSULIN (HCC): Chronic | ICD-10-CM

## 2022-04-18 RX ORDER — BLOOD-GLUCOSE METER
EACH MISCELLANEOUS
Qty: 1 KIT | Refills: 0 | Status: SHIPPED | OUTPATIENT
Start: 2022-04-18

## 2022-04-22 ENCOUNTER — APPOINTMENT (OUTPATIENT)
Dept: LAB | Facility: CLINIC | Age: 62
End: 2022-04-22
Payer: COMMERCIAL

## 2022-04-22 DIAGNOSIS — E11.9 TYPE 2 DIABETES MELLITUS WITHOUT COMPLICATION, WITH LONG-TERM CURRENT USE OF INSULIN (HCC): Chronic | ICD-10-CM

## 2022-04-22 DIAGNOSIS — Z79.4 TYPE 2 DIABETES MELLITUS WITHOUT COMPLICATION, WITH LONG-TERM CURRENT USE OF INSULIN (HCC): Chronic | ICD-10-CM

## 2022-04-22 LAB
EST. AVERAGE GLUCOSE BLD GHB EST-MCNC: 321 MG/DL
GLUCOSE P FAST SERPL-MCNC: 233 MG/DL (ref 65–99)
HBA1C MFR BLD: 12.8 %

## 2022-04-22 PROCEDURE — 36415 COLL VENOUS BLD VENIPUNCTURE: CPT

## 2022-04-22 PROCEDURE — 83036 HEMOGLOBIN GLYCOSYLATED A1C: CPT

## 2022-04-22 PROCEDURE — 82947 ASSAY GLUCOSE BLOOD QUANT: CPT

## 2022-04-25 PROBLEM — I21.4 NSTEMI (NON-ST ELEVATED MYOCARDIAL INFARCTION) (HCC): Status: RESOLVED | Noted: 2018-11-28 | Resolved: 2022-04-25

## 2022-06-21 DIAGNOSIS — G25.81 RESTLESS LEG SYNDROME: Chronic | ICD-10-CM

## 2022-06-22 RX ORDER — TEMAZEPAM 30 MG/1
30 CAPSULE ORAL
Qty: 30 CAPSULE | Refills: 0 | OUTPATIENT
Start: 2022-06-22

## 2022-08-15 DIAGNOSIS — I21.4 NSTEMI (NON-ST ELEVATED MYOCARDIAL INFARCTION) (HCC): ICD-10-CM

## 2022-08-20 DIAGNOSIS — I10 ESSENTIAL HYPERTENSION: ICD-10-CM

## 2022-08-20 RX ORDER — BENAZEPRIL HYDROCHLORIDE 20 MG/1
TABLET ORAL
Qty: 90 TABLET | Refills: 1 | Status: SHIPPED | OUTPATIENT
Start: 2022-08-20

## 2022-08-23 RX ORDER — ATORVASTATIN CALCIUM 40 MG/1
TABLET, FILM COATED ORAL
Qty: 90 TABLET | Refills: 2 | Status: SHIPPED | OUTPATIENT
Start: 2022-08-23

## 2022-08-29 ENCOUNTER — OFFICE VISIT (OUTPATIENT)
Dept: FAMILY MEDICINE CLINIC | Facility: CLINIC | Age: 62
End: 2022-08-29
Payer: COMMERCIAL

## 2022-08-29 VITALS
TEMPERATURE: 97.7 F | SYSTOLIC BLOOD PRESSURE: 134 MMHG | OXYGEN SATURATION: 98 % | BODY MASS INDEX: 26.98 KG/M2 | WEIGHT: 158 LBS | HEART RATE: 89 BPM | HEIGHT: 64 IN | DIASTOLIC BLOOD PRESSURE: 80 MMHG

## 2022-08-29 DIAGNOSIS — K31.84 GASTROPARESIS: ICD-10-CM

## 2022-08-29 DIAGNOSIS — R11.2 NAUSEA AND VOMITING, UNSPECIFIED VOMITING TYPE: Primary | ICD-10-CM

## 2022-08-29 DIAGNOSIS — F32.A DEPRESSION, UNSPECIFIED DEPRESSION TYPE: ICD-10-CM

## 2022-08-29 DIAGNOSIS — Z79.4 TYPE 2 DIABETES MELLITUS WITHOUT COMPLICATION, WITH LONG-TERM CURRENT USE OF INSULIN (HCC): ICD-10-CM

## 2022-08-29 DIAGNOSIS — Z23 ENCOUNTER FOR IMMUNIZATION: ICD-10-CM

## 2022-08-29 DIAGNOSIS — E11.9 TYPE 2 DIABETES MELLITUS WITHOUT COMPLICATION, WITH LONG-TERM CURRENT USE OF INSULIN (HCC): ICD-10-CM

## 2022-08-29 LAB
SL AMB POCT GLUCOSE BLD: 164
SL AMB POCT HEMOGLOBIN AIC: 15 (ref ?–6.5)

## 2022-08-29 PROCEDURE — 3079F DIAST BP 80-89 MM HG: CPT | Performed by: FAMILY MEDICINE

## 2022-08-29 PROCEDURE — 83036 HEMOGLOBIN GLYCOSYLATED A1C: CPT | Performed by: FAMILY MEDICINE

## 2022-08-29 PROCEDURE — 3075F SYST BP GE 130 - 139MM HG: CPT | Performed by: FAMILY MEDICINE

## 2022-08-29 PROCEDURE — 99214 OFFICE O/P EST MOD 30 MIN: CPT | Performed by: FAMILY MEDICINE

## 2022-08-29 PROCEDURE — 82948 REAGENT STRIP/BLOOD GLUCOSE: CPT | Performed by: FAMILY MEDICINE

## 2022-08-29 PROCEDURE — 3046F HEMOGLOBIN A1C LEVEL >9.0%: CPT | Performed by: FAMILY MEDICINE

## 2022-08-29 RX ORDER — METOCLOPRAMIDE 5 MG/1
5 TABLET ORAL 3 TIMES DAILY
Qty: 90 TABLET | Refills: 3 | Status: SHIPPED | OUTPATIENT
Start: 2022-08-29

## 2022-08-29 RX ORDER — ONDANSETRON 4 MG/1
8 TABLET, ORALLY DISINTEGRATING ORAL ONCE
Status: COMPLETED | OUTPATIENT
Start: 2022-08-29 | End: 2022-08-29

## 2022-08-29 RX ADMIN — ONDANSETRON 8 MG: 4 TABLET, ORALLY DISINTEGRATING ORAL at 15:39

## 2022-08-29 NOTE — PROGRESS NOTES
Assessment/Plan:    No problem-specific Assessment & Plan notes found for this encounter  Diagnoses and all orders for this visit:    Nausea and vomiting, unspecified vomiting type  -     POCT blood glucose  -     ondansetron (ZOFRAN-ODT) dispersible tablet 8 mg    Gastroparesis  -     Ambulatory Referral to Gastroenterology; Future  -     metoclopramide (REGLAN) 5 mg tablet; Take 1 tablet (5 mg total) by mouth 3 (three) times a day    Encounter for immunization  -     Cancel: Zoster Vaccine Recombinant IM    Type 2 diabetes mellitus without complication, with long-term current use of insulin (HCC)  -     POCT blood glucose  -     POCT hemoglobin A1c  -     Ambulatory Referral to Endocrinology; Future  -     Glucose, fasting; Future  -     Hemoglobin A1C; Future  -     C-peptide; Future    Depression, unspecified depression type          PHQ-2/9 Depression Screening    Little interest or pleasure in doing things: 0 - not at all  Feeling down, depressed, or hopeless: 0 - not at all  Trouble falling or staying asleep, or sleeping too much: 3 - nearly every day  Feeling tired or having little energy: 3 - nearly every day  Poor appetite or overeating: 3 - nearly every day  Feeling bad about yourself - or that you are a failure or have let yourself or your family down: 0 - not at all  Trouble concentrating on things, such as reading the newspaper or watching television: 1 - several days  Moving or speaking so slowly that other people could have noticed  Or the opposite - being so fidgety or restless that you have been moving around a lot more than usual: 1 - several days  Thoughts that you would be better off dead, or of hurting yourself in some way: 0 - not at all  PHQ-9 Score: 11   PHQ-9 Interpretation: Moderate depression         Depression Screening Follow-up Plan: Patient's depression screening was positive with a PHQ-2 score of   Their PHQ-9 score was 11  Patient assessed for underlying major depression   They have no active suicidal ideations  Brief counseling provided and recommend additional follow-up/re-evaluation next office visit  Subjective:      Patient ID: Liliana Jansen is a 58 y o  female  Nausea  This is a new problem  The current episode started more than 1 month ago  The problem occurs intermittently  The problem has been unchanged  Associated symptoms include nausea and vomiting  Pertinent negatives include no chills or fever  The following portions of the patient's history were reviewed and updated as appropriate: allergies, current medications, past family history, past medical history, past social history, past surgical history and problem list     Review of Systems   Constitutional: Negative for chills and fever  Gastrointestinal: Positive for nausea and vomiting  Negative for diarrhea  Genitourinary: Positive for dysuria  Objective:    /80 (BP Location: Left arm, Patient Position: Sitting)   Pulse 89   Temp 97 7 °F (36 5 °C) (Tympanic)   Ht 5' 4" (1 626 m)   Wt 71 7 kg (158 lb)   SpO2 98%   BMI 27 12 kg/m²      Physical Exam  Vitals and nursing note reviewed  Constitutional:       General: She is not in acute distress  Appearance: Normal appearance  She is not ill-appearing, toxic-appearing or diaphoretic  HENT:      Head: Normocephalic and atraumatic  Mouth/Throat:      Mouth: Mucous membranes are moist    Eyes:      Conjunctiva/sclera: Conjunctivae normal    Cardiovascular:      Rate and Rhythm: Normal rate and regular rhythm  Heart sounds: Normal heart sounds  No murmur heard  Pulmonary:      Effort: Pulmonary effort is normal  No respiratory distress  Breath sounds: Normal breath sounds  No wheezing, rhonchi or rales  Abdominal:      General: There is no distension  Palpations: Abdomen is soft  There is no mass  Tenderness: There is no abdominal tenderness  There is no guarding or rebound     Musculoskeletal:      Cervical back: Normal range of motion  No rigidity  Right lower leg: No edema  Left lower leg: No edema  Lymphadenopathy:      Cervical: No cervical adenopathy  Neurological:      Mental Status: She is alert and oriented to person, place, and time  Psychiatric:         Mood and Affect: Mood normal          Behavior: Behavior normal          Thought Content:  Thought content normal          Judgment: Judgment normal

## 2022-09-02 ENCOUNTER — TELEPHONE (OUTPATIENT)
Dept: GASTROENTEROLOGY | Facility: CLINIC | Age: 62
End: 2022-09-02

## 2022-09-08 ENCOUNTER — TELEPHONE (OUTPATIENT)
Dept: OTHER | Facility: OTHER | Age: 62
End: 2022-09-08

## 2022-09-23 ENCOUNTER — OFFICE VISIT (OUTPATIENT)
Dept: URGENT CARE | Facility: CLINIC | Age: 62
End: 2022-09-23
Payer: COMMERCIAL

## 2022-09-23 VITALS
RESPIRATION RATE: 20 BRPM | OXYGEN SATURATION: 85 % | SYSTOLIC BLOOD PRESSURE: 120 MMHG | DIASTOLIC BLOOD PRESSURE: 82 MMHG | BODY MASS INDEX: 27.12 KG/M2 | TEMPERATURE: 97.8 F | WEIGHT: 158 LBS | HEART RATE: 83 BPM

## 2022-09-23 DIAGNOSIS — R05.1 ACUTE COUGH: ICD-10-CM

## 2022-09-23 DIAGNOSIS — J02.8 ACUTE PHARYNGITIS DUE TO OTHER SPECIFIED ORGANISMS: ICD-10-CM

## 2022-09-23 DIAGNOSIS — U07.1 COVID-19: Primary | ICD-10-CM

## 2022-09-23 LAB
S PYO AG THROAT QL: NEGATIVE
SARS-COV-2 AG UPPER RESP QL IA: POSITIVE
VALID CONTROL: ABNORMAL

## 2022-09-23 PROCEDURE — 87070 CULTURE OTHR SPECIMN AEROBIC: CPT | Performed by: NURSE PRACTITIONER

## 2022-09-23 PROCEDURE — 87880 STREP A ASSAY W/OPTIC: CPT | Performed by: NURSE PRACTITIONER

## 2022-09-23 PROCEDURE — 99213 OFFICE O/P EST LOW 20 MIN: CPT | Performed by: NURSE PRACTITIONER

## 2022-09-23 PROCEDURE — 87811 SARS-COV-2 COVID19 W/OPTIC: CPT | Performed by: NURSE PRACTITIONER

## 2022-09-23 RX ORDER — LACTULOSE 10 G/15ML
SOLUTION ORAL
COMMUNITY
Start: 2022-08-27

## 2022-09-23 RX ORDER — METOCLOPRAMIDE 5 MG/1
5 TABLET ORAL 3 TIMES DAILY PRN
COMMUNITY
Start: 2022-07-26

## 2022-09-23 NOTE — PATIENT INSTRUCTIONS
+ covid  Strep A is negative  You are to take plain robitussin for cough, Vitamin C and D3  Rest  You are to call your PCP for the Paxlovid treatment if you are interested  You have a throat culture pending  You will be notified if the results are + and an antibiotic will be called in for you   You are to go to the ED if symptoms worsen  Take tylenol or motrin for fever or pain  Mask x 10 days      Follow up with your PCp

## 2022-09-23 NOTE — PROGRESS NOTES
[facility]        NAME: Laureano Long is a 58 y o  female  : 1960    MRN: [id number]  DATE: 2022  TIME: 4:07 PM    Assessment and Plan   COVID-19 [U07 1]  1  COVID-19     2  Acute cough  Poct Covid 19 Rapid Antigen Test   3  Acute pharyngitis due to other specified organisms  POCT rapid strepA    Throat culture         Patient Instructions       Follow up with PCP in 3-5 days  + covid  Strep A is negative  You are to take plain robitussin for cough, Vitamin C and D3  Rest  You are to call your PCP for the Paxlovid treatment if you are interested  You have a throat culture pending  You will be notified if the results are + and an antibiotic will be called in for you   You are to go to the ED if symptoms worsen  Take tylenol or motrin for fever or pain  Mask x 10 days  Follow up with your PCp          Proceed to  ER if symptoms worsen  Chief Complaint     Chief Complaint   Patient presents with    Cold Like Symptoms    Sore Throat    Cough         History of Present Illness       This is a 58year old female who states her mother passed away on Wednesday and was + covid  She states that this am she woke up with headache, nasal congestion, sorethroat, chills  She has not taken anything for her symptoms  She states she has a PCP  Dry cough  Review of Systems   Review of Systems   Constitutional: Positive for chills  HENT: Positive for congestion and sore throat  Eyes: Negative  Respiratory: Positive for cough  Cardiovascular: Negative  Gastrointestinal: Negative  Endocrine: Negative  Genitourinary: Negative  Musculoskeletal: Positive for myalgias  Skin: Negative  Allergic/Immunologic: Negative  Neurological: Positive for headaches  Hematological: Negative  Psychiatric/Behavioral: Negative            Current Medications       Current Outpatient Medications:     metoclopramide (REGLAN) 5 mg tablet, Take 5 mg by mouth Three times daily as needed, Disp: , Rfl:     Accu-Chek FastClix Lancets MISC, TEST THREE TIMES A DAY, Disp: 100 each, Rfl: 0    Admelog SoloStar 100 units/mL injection pen, Inject 10 Units under the skin 3 (three) times a day with meals, Disp: 30 mL, Rfl: 0    amLODIPine (NORVASC) 5 mg tablet, take 1 tablet by mouth once daily, Disp: 90 tablet, Rfl: 1    anastrozole (ARIMIDEX) 1 mg tablet, Take 1 mg by mouth daily (Patient not taking: Reported on 8/29/2022), Disp: , Rfl:     anastrozole (ARIMIDEX) 1 mg tablet, Take 1 mg by mouth daily (Patient not taking: Reported on 8/29/2022), Disp: , Rfl:     aspirin (ECOTRIN LOW STRENGTH) 81 mg EC tablet, Take 81 mg by mouth daily, Disp: , Rfl:     atorvastatin (LIPITOR) 40 mg tablet, take 1 tablet by mouth once daily with dinner, Disp: 90 tablet, Rfl: 2    benazepril (LOTENSIN) 20 mg tablet, take 1 tablet by mouth once daily, Disp: 90 tablet, Rfl: 1    betamethasone valerate (VALISONE) 0 1 % cream, Apply topically 2 (two) times a day (Patient not taking: Reported on 8/29/2022), Disp: , Rfl:     Blood Glucose Monitoring Suppl (OneTouch Verio Flex System) w/Device KIT, Test four times daily, Disp: 1 kit, Rfl: 0    cyclobenzaprine (FLEXERIL) 5 mg tablet, Take 1 tablet (5 mg total) by mouth daily, Disp: 30 tablet, Rfl: 1    dexlansoprazole (DEXILANT) 60 MG capsule, Take 60 mg by mouth daily (Patient not taking: Reported on 8/29/2022), Disp: , Rfl:     famotidine (PEPCID) 40 MG tablet, take 1 tablet by mouth twice a day 30 MINUTES PRIOR TO BREAKFAST AND DINNER, Disp: , Rfl:     glucose blood (Accu-Chek Guide) test strip, Test bid, Disp: 200 each, Rfl: 4    glucose blood (Accu-Chek Guide) test strip, 1 each by Other route 4 (four) times a day Use as instructed, Disp: 300 each, Rfl: 3    glucose blood (OneTouch Verio) test strip, as directed use 1 TEST STRIP to TEST BLOOD SUGAR four times a day, Disp: 400 strip, Rfl: 1    insulin aspart, w/niacinamide, (FIASP) 100 Units/mL injection pen, Inject 10 Units under the skin, Disp: , Rfl:     insulin glargine (LANTUS) 100 units/mL subcutaneous injection, Inject 100 Units under the skin daily at bedtime, Disp: 90 mL, Rfl: 0    insulin lispro (HumaLOG) 100 units/mL injection pen, Inject 10 Units under the skin 3 (three) times a day with meals, Disp: 27 mL, Rfl: 2    Insulin Pen Needle (BD Pen Needle Sadia 2nd Gen) 32G X 4 MM MISC, Use 3 (three) times a day, Disp: 100 each, Rfl: 3    lactulose (CHRONULAC) 10 g/15 mL solution, take 30 milliliters by mouth once daily, Disp: , Rfl:     Lantus SoloStar 100 units/mL injection pen, inject 100 units subcutaneously at bedtime, Disp: 30 mL, Rfl: 2    LIFESCAN FINEPOINT LANCETS MISC, Test 4 times daily, Disp: 400 each, Rfl: 5    metoclopramide (REGLAN) 5 mg tablet, Take 1 tablet (5 mg total) by mouth 3 (three) times a day, Disp: 90 tablet, Rfl: 3    metoprolol succinate (TOPROL-XL) 100 mg 24 hr tablet, Take 1 tablet (100 mg total) by mouth daily, Disp: 90 tablet, Rfl: 5    nitroglycerin (NITROSTAT) 0 4 mg SL tablet, place 1 tablet under the tongue if needed every 5 minutes for chest pain for 3 doses IF NO RELIEF AFTER FIRST DOSE CALL PRESCRIBER    (Patient not taking: Reported on 8/29/2022), Disp: 100 tablet, Rfl: 3    ondansetron (ZOFRAN) 8 mg tablet, Take 1 tablet (8 mg total) by mouth every 8 (eight) hours as needed for nausea or vomiting for up to 14 days, Disp: 20 tablet, Rfl: 1    OneTouch Delica Lancets 11N MISC, Use 3 (three) times a day Test 3 times daily, Disp: 300 each, Rfl: 2    pantoprazole (PROTONIX) 40 mg tablet, Take 1 tablet (40 mg total) by mouth daily, Disp: 30 tablet, Rfl: 3    rOPINIRole (REQUIP) 1 mg tablet, Take 1 tablet (1 mg total) by mouth daily, Disp: 90 tablet, Rfl: 1    rOPINIRole (REQUIP) 1 mg tablet, One in am and two qhs (Patient not taking: No sig reported), Disp: , Rfl:     sertraline (ZOLOFT) 100 mg tablet, Take 1 tablet (100 mg total) by mouth daily, Disp: 30 tablet, Rfl: 6    temazepam (RESTORIL) 30 mg capsule, take 1 capsule by mouth at bedtime if needed for sleep, Disp: 30 capsule, Rfl: 0    tiZANidine (ZANAFLEX) 4 mg tablet, Take 4 mg by mouth every 8 (eight) hours as needed for muscle spasms 1/2-1, Disp: , Rfl:     tiZANidine (ZANAFLEX) 4 mg tablet, One qhs (Patient not taking: Reported on 8/29/2022), Disp: , Rfl:     traMADol (ULTRAM) 50 mg tablet, Take 50 mg by mouth every 8 (eight) hours, Disp: , Rfl:     traMADol (ULTRAM) 50 mg tablet, One twice daily, Disp: , Rfl:     Current Allergies     Allergies as of 09/23/2022    (No Known Allergies)            The following portions of the patient's history were reviewed and updated as appropriate: allergies, current medications, past family history, past medical history, past social history, past surgical history and problem list      Past Medical History:   Diagnosis Date    CAD (coronary artery disease)     Cancer ([facility] [address] )     right breast    Chronic back pain     Coronary artery disease     Diabetes mellitus ([facility] [address] )     Family history of early CAD     Hyperlipidemia     Hypertension     Myocardial infarction ([facility] [address] )     NSTEMI (non-ST elevated myocardial infarction) ([facility] [address] ) 11/28/2018    Status post coronary angioplasty 1st obtuse marginal 11/28/2018 at [facility] stent could not be placed    Restless leg syndrome     Tuberculosis     patient was less than 11years old    Type 2 diabetes mellitus ([facility] [address] )        Past Surgical History:   Procedure Laterality Date    ABDOMINAL SURGERY      phill    ANKLE SURGERY      tubal    BREAST SURGERY      partial mastectomy R breast    CARDIAC CATHETERIZATION  11/2018    Successful balloon angioplassty to OM1    CHOLECYSTECTOMY      FRACTURE SURGERY      right ankle    GALLBLADDER SURGERY      MASTECTOMY      TUBAL LIGATION         Family History   Problem Relation Age of Onset    Breast cancer Mother     Diabetes Mother     Heart failure Mother     Heart disease Father     Stroke Father          Medications have been verified  Objective   /82   Pulse 83   Temp 97 8 °F (36 6 °C)   Resp 20   Wt 71 7 kg (158 lb)   SpO2 (!) 85%   BMI 27 12 kg/m²   No LMP recorded  Patient is postmenopausal        Physical Exam     Physical Exam  Vitals and nursing note reviewed  Constitutional:       General: She is not in acute distress  Appearance: She is well-developed  She is obese  She is not ill-appearing, toxic-appearing or diaphoretic  HENT:      Head: Normocephalic and atraumatic  Right Ear: Tympanic membrane and ear canal normal       Left Ear: Tympanic membrane and ear canal normal       Nose: Congestion and rhinorrhea present  Mouth/Throat:      Mouth: Mucous membranes are moist  No oral lesions  Pharynx: Oropharynx is clear  Uvula midline  Posterior oropharyngeal erythema present  No pharyngeal swelling, oropharyngeal exudate or uvula swelling  Tonsils: No tonsillar exudate or tonsillar abscesses  Eyes:      Extraocular Movements:      Right eye: Normal extraocular motion  Left eye: Normal extraocular motion  Conjunctiva/sclera: Conjunctivae normal       Pupils: Pupils are equal, round, and reactive to light  Cardiovascular:      Rate and Rhythm: Normal rate and regular rhythm  Heart sounds: Normal heart sounds  No murmur heard  Pulmonary:      Effort: Pulmonary effort is normal  No respiratory distress  Breath sounds: Normal breath sounds  No stridor  No wheezing, rhonchi or rales  Chest:      Chest wall: No tenderness  Abdominal:      General: Bowel sounds are normal       Palpations: Abdomen is soft  Skin:     General: Skin is warm and dry  Capillary Refill: Capillary refill takes less than 2 seconds  Neurological:      General: No focal deficit present  Mental Status: She is alert and oriented to person, place, and time     Psychiatric:         Mood and Affect: Mood normal          Behavior: Behavior normal

## 2022-09-25 LAB — BACTERIA THROAT CULT: NORMAL

## 2022-09-26 ENCOUNTER — TELEPHONE (OUTPATIENT)
Dept: FAMILY MEDICINE CLINIC | Facility: CLINIC | Age: 62
End: 2022-09-26

## 2022-09-26 NOTE — TELEPHONE ENCOUNTER
Patient was diagnosed with Covid, has been dehydrated, coughing, fatigue dizziness  Seen in the emergency room and the urgent care   Would like to know if there is anything else besides over the counter medication she could be taking

## 2022-09-27 NOTE — TELEPHONE ENCOUNTER
Patient was only advised to increase fluids for dehydration and being taking over the counter medications   Patient seems to be doing okay at the moment advised to call back with any changes

## 2022-09-29 DIAGNOSIS — U07.1 COVID-19: Primary | ICD-10-CM

## 2022-09-29 RX ORDER — NIRMATRELVIR AND RITONAVIR 300-100 MG
3 KIT ORAL 2 TIMES DAILY
Qty: 30 TABLET | Refills: 0 | Status: SHIPPED | OUTPATIENT
Start: 2022-09-29 | End: 2022-10-04

## 2022-09-30 DIAGNOSIS — I10 ESSENTIAL HYPERTENSION: ICD-10-CM

## 2022-09-30 RX ORDER — AMLODIPINE BESYLATE 5 MG/1
TABLET ORAL
Qty: 90 TABLET | Refills: 1 | Status: SHIPPED | OUTPATIENT
Start: 2022-09-30

## 2022-10-25 DIAGNOSIS — E11.9 TYPE 2 DIABETES MELLITUS WITHOUT COMPLICATION, WITH LONG-TERM CURRENT USE OF INSULIN (HCC): ICD-10-CM

## 2022-10-25 DIAGNOSIS — Z79.4 TYPE 2 DIABETES MELLITUS WITHOUT COMPLICATION, WITH LONG-TERM CURRENT USE OF INSULIN (HCC): Chronic | ICD-10-CM

## 2022-10-25 DIAGNOSIS — Z79.4 TYPE 2 DIABETES MELLITUS WITHOUT COMPLICATION, WITH LONG-TERM CURRENT USE OF INSULIN (HCC): ICD-10-CM

## 2022-10-25 DIAGNOSIS — E11.9 TYPE 2 DIABETES MELLITUS WITHOUT COMPLICATION, WITH LONG-TERM CURRENT USE OF INSULIN (HCC): Chronic | ICD-10-CM

## 2022-10-25 RX ORDER — INSULIN LISPRO 100 U/ML
10 INJECTION, SOLUTION SUBCUTANEOUS
Qty: 30 ML | Refills: 0 | Status: SHIPPED | OUTPATIENT
Start: 2022-10-25

## 2022-10-25 RX ORDER — INSULIN GLARGINE 100 [IU]/ML
INJECTION, SOLUTION SUBCUTANEOUS
Qty: 30 ML | Refills: 2 | Status: SHIPPED | OUTPATIENT
Start: 2022-10-25

## 2022-12-14 DIAGNOSIS — R11.0 NAUSEA: ICD-10-CM

## 2022-12-14 DIAGNOSIS — Z79.4 TYPE 2 DIABETES MELLITUS WITH COMPLICATION, WITH LONG-TERM CURRENT USE OF INSULIN (HCC): ICD-10-CM

## 2022-12-14 DIAGNOSIS — E11.8 TYPE 2 DIABETES MELLITUS WITH COMPLICATION, WITH LONG-TERM CURRENT USE OF INSULIN (HCC): ICD-10-CM

## 2022-12-14 RX ORDER — PEN NEEDLE, DIABETIC 32GX 5/32"
NEEDLE, DISPOSABLE MISCELLANEOUS 3 TIMES DAILY
Qty: 100 EACH | Refills: 3 | Status: SHIPPED | OUTPATIENT
Start: 2022-12-14

## 2022-12-14 RX ORDER — ONDANSETRON HYDROCHLORIDE 8 MG/1
8 TABLET, FILM COATED ORAL EVERY 8 HOURS PRN
Qty: 20 TABLET | Refills: 1 | Status: SHIPPED | OUTPATIENT
Start: 2022-12-14 | End: 2022-12-28

## 2023-02-23 DIAGNOSIS — I10 ESSENTIAL HYPERTENSION: ICD-10-CM

## 2023-02-23 RX ORDER — BENAZEPRIL HYDROCHLORIDE 20 MG/1
TABLET ORAL
Qty: 90 TABLET | Refills: 1 | Status: SHIPPED | OUTPATIENT
Start: 2023-02-23

## 2023-04-04 NOTE — ASSESSMENT & PLAN NOTE
Lab Results   Component Value Date    HGBA1C 10 6 (H) 11/27/2018       Recent Labs      11/27/18   1051  11/27/18   2045  11/28/18   0609  11/28/18   1232   POCGLU  194*  258*  148*  159*       Blood Sugar Average: Last 72 hrs:  (P) 899 [id number]     · poorly controlled as evidenced by A1c and hyperglycemia  · Patient needs tighter control of blood sugar endocrine consult is pending  · Continue for now will continue Accu-Cheks a c  HS with SSI  · Continue patient's home regimen  · Hold metformin, resume at time of discharge unless Endocrine discontinues medication Concern for molar pregnancy 06-Apr-2018 12:57

## 2023-04-17 DIAGNOSIS — E11.8 TYPE 2 DIABETES MELLITUS WITH COMPLICATION, WITH LONG-TERM CURRENT USE OF INSULIN (HCC): ICD-10-CM

## 2023-04-17 DIAGNOSIS — Z79.4 TYPE 2 DIABETES MELLITUS WITHOUT COMPLICATION, WITH LONG-TERM CURRENT USE OF INSULIN (HCC): ICD-10-CM

## 2023-04-17 DIAGNOSIS — E11.9 TYPE 2 DIABETES MELLITUS WITHOUT COMPLICATION, WITH LONG-TERM CURRENT USE OF INSULIN (HCC): ICD-10-CM

## 2023-04-17 DIAGNOSIS — Z79.4 TYPE 2 DIABETES MELLITUS WITH COMPLICATION, WITH LONG-TERM CURRENT USE OF INSULIN (HCC): ICD-10-CM

## 2023-04-24 ENCOUNTER — APPOINTMENT (OUTPATIENT)
Dept: LAB | Facility: CLINIC | Age: 63
End: 2023-04-24

## 2023-04-24 ENCOUNTER — APPOINTMENT (OUTPATIENT)
Dept: RADIOLOGY | Facility: CLINIC | Age: 63
End: 2023-04-24

## 2023-04-24 DIAGNOSIS — M54.2 NECK PAIN: ICD-10-CM

## 2023-04-24 DIAGNOSIS — G89.29 CHRONIC BILATERAL LOW BACK PAIN WITHOUT SCIATICA: ICD-10-CM

## 2023-04-24 DIAGNOSIS — E11.9 TYPE 2 DIABETES MELLITUS WITHOUT COMPLICATION, WITH LONG-TERM CURRENT USE OF INSULIN (HCC): ICD-10-CM

## 2023-04-24 DIAGNOSIS — M54.50 CHRONIC BILATERAL LOW BACK PAIN WITHOUT SCIATICA: ICD-10-CM

## 2023-04-24 DIAGNOSIS — Z79.4 TYPE 2 DIABETES MELLITUS WITHOUT COMPLICATION, WITH LONG-TERM CURRENT USE OF INSULIN (HCC): ICD-10-CM

## 2023-04-24 LAB
25(OH)D3 SERPL-MCNC: 12.4 NG/ML (ref 30–100)
ALBUMIN SERPL BCP-MCNC: 3.5 G/DL (ref 3.5–5)
ALP SERPL-CCNC: 111 U/L (ref 46–116)
ALT SERPL W P-5'-P-CCNC: 24 U/L (ref 12–78)
ANION GAP SERPL CALCULATED.3IONS-SCNC: 8 MMOL/L (ref 4–13)
AST SERPL W P-5'-P-CCNC: 15 U/L (ref 5–45)
BASOPHILS # BLD AUTO: 0.03 THOUSANDS/ΜL (ref 0–0.1)
BASOPHILS NFR BLD AUTO: 0 % (ref 0–1)
BILIRUB SERPL-MCNC: 0.59 MG/DL (ref 0.2–1)
BUN SERPL-MCNC: 11 MG/DL (ref 5–25)
CALCIUM SERPL-MCNC: 10 MG/DL (ref 8.3–10.1)
CHLORIDE SERPL-SCNC: 104 MMOL/L (ref 96–108)
CHOLEST SERPL-MCNC: 361 MG/DL
CO2 SERPL-SCNC: 25 MMOL/L (ref 21–32)
CREAT SERPL-MCNC: 0.97 MG/DL (ref 0.6–1.3)
EOSINOPHIL # BLD AUTO: 0.1 THOUSAND/ΜL (ref 0–0.61)
EOSINOPHIL NFR BLD AUTO: 1 % (ref 0–6)
ERYTHROCYTE [DISTWIDTH] IN BLOOD BY AUTOMATED COUNT: 13.6 % (ref 11.6–15.1)
GFR SERPL CREATININE-BSD FRML MDRD: 62 ML/MIN/1.73SQ M
GLUCOSE P FAST SERPL-MCNC: 192 MG/DL (ref 65–99)
HCT VFR BLD AUTO: 45.7 % (ref 34.8–46.1)
HDLC SERPL-MCNC: 37 MG/DL
HGB BLD-MCNC: 14.6 G/DL (ref 11.5–15.4)
IMM GRANULOCYTES # BLD AUTO: 0.02 THOUSAND/UL (ref 0–0.2)
IMM GRANULOCYTES NFR BLD AUTO: 0 % (ref 0–2)
LDLC SERPL CALC-MCNC: 255 MG/DL (ref 0–100)
LYMPHOCYTES # BLD AUTO: 2.94 THOUSANDS/ΜL (ref 0.6–4.47)
LYMPHOCYTES NFR BLD AUTO: 31 % (ref 14–44)
MCH RBC QN AUTO: 29.5 PG (ref 26.8–34.3)
MCHC RBC AUTO-ENTMCNC: 31.9 G/DL (ref 31.4–37.4)
MCV RBC AUTO: 92 FL (ref 82–98)
MONOCYTES # BLD AUTO: 0.47 THOUSAND/ΜL (ref 0.17–1.22)
MONOCYTES NFR BLD AUTO: 5 % (ref 4–12)
NEUTROPHILS # BLD AUTO: 5.85 THOUSANDS/ΜL (ref 1.85–7.62)
NEUTS SEG NFR BLD AUTO: 63 % (ref 43–75)
NONHDLC SERPL-MCNC: 324 MG/DL
NRBC BLD AUTO-RTO: 0 /100 WBCS
PLATELET # BLD AUTO: 389 THOUSANDS/UL (ref 149–390)
PMV BLD AUTO: 9.7 FL (ref 8.9–12.7)
POTASSIUM SERPL-SCNC: 3.9 MMOL/L (ref 3.5–5.3)
PROT SERPL-MCNC: 8.3 G/DL (ref 6.4–8.4)
RBC # BLD AUTO: 4.95 MILLION/UL (ref 3.81–5.12)
SODIUM SERPL-SCNC: 137 MMOL/L (ref 135–147)
T4 FREE SERPL-MCNC: 0.84 NG/DL (ref 0.76–1.46)
TRIGL SERPL-MCNC: 347 MG/DL
TSH SERPL DL<=0.05 MIU/L-ACNC: 5.62 UIU/ML (ref 0.45–4.5)
WBC # BLD AUTO: 9.41 THOUSAND/UL (ref 4.31–10.16)

## 2023-04-26 RX ORDER — INSULIN GLARGINE 100 [IU]/ML
100 INJECTION, SOLUTION SUBCUTANEOUS
Qty: 30 ML | Refills: 2 | Status: SHIPPED | OUTPATIENT
Start: 2023-04-26

## 2023-04-26 RX ORDER — BLOOD SUGAR DIAGNOSTIC
STRIP MISCELLANEOUS
Qty: 400 STRIP | Refills: 1 | Status: SHIPPED | OUTPATIENT
Start: 2023-04-26

## 2023-04-26 RX ORDER — LANCETS 33 GAUGE
EACH MISCELLANEOUS 3 TIMES DAILY
Qty: 300 EACH | Refills: 2 | Status: SHIPPED | OUTPATIENT
Start: 2023-04-26

## 2023-05-11 DIAGNOSIS — E11.9 TYPE 2 DIABETES MELLITUS WITHOUT COMPLICATION, WITH LONG-TERM CURRENT USE OF INSULIN (HCC): ICD-10-CM

## 2023-05-11 DIAGNOSIS — Z79.4 TYPE 2 DIABETES MELLITUS WITHOUT COMPLICATION, WITH LONG-TERM CURRENT USE OF INSULIN (HCC): ICD-10-CM

## 2023-05-11 RX ORDER — BLOOD SUGAR DIAGNOSTIC
STRIP MISCELLANEOUS
Qty: 200 STRIP | Refills: 1 | Status: SHIPPED | OUTPATIENT
Start: 2023-05-11

## 2023-06-02 ENCOUNTER — VBI (OUTPATIENT)
Dept: ADMINISTRATIVE | Facility: OTHER | Age: 63
End: 2023-06-02

## 2023-06-08 DIAGNOSIS — I25.10 CORONARY ARTERY DISEASE INVOLVING NATIVE CORONARY ARTERY OF NATIVE HEART WITHOUT ANGINA PECTORIS: ICD-10-CM

## 2023-06-08 RX ORDER — NITROGLYCERIN 0.4 MG/1
0.4 TABLET SUBLINGUAL
Qty: 25 TABLET | Refills: 0 | Status: SHIPPED | OUTPATIENT
Start: 2023-06-08

## 2023-06-08 NOTE — TELEPHONE ENCOUNTER
Magdalena GAY  [facility]  [facility]     [facility]  [facility]       Lm for patient to schedule an appt

## 2023-06-30 DIAGNOSIS — Z79.4 TYPE 2 DIABETES MELLITUS WITHOUT COMPLICATION, WITH LONG-TERM CURRENT USE OF INSULIN (HCC): Chronic | ICD-10-CM

## 2023-06-30 DIAGNOSIS — E11.9 TYPE 2 DIABETES MELLITUS WITHOUT COMPLICATION, WITH LONG-TERM CURRENT USE OF INSULIN (HCC): Chronic | ICD-10-CM

## 2023-07-07 DIAGNOSIS — Z79.4 TYPE 2 DIABETES MELLITUS WITHOUT COMPLICATION, WITH LONG-TERM CURRENT USE OF INSULIN (HCC): Chronic | ICD-10-CM

## 2023-07-07 DIAGNOSIS — E11.9 TYPE 2 DIABETES MELLITUS WITHOUT COMPLICATION, WITH LONG-TERM CURRENT USE OF INSULIN (HCC): Chronic | ICD-10-CM

## 2023-07-07 RX ORDER — INSULIN LISPRO 100 [IU]/ML
INJECTION, SOLUTION INTRAVENOUS; SUBCUTANEOUS
Qty: 6 ML | Refills: 1 | Status: SHIPPED | OUTPATIENT
Start: 2023-07-07

## 2023-07-11 RX ORDER — INSULIN GLARGINE 100 [IU]/ML
100 INJECTION, SOLUTION SUBCUTANEOUS
Qty: 90 ML | Refills: 0 | Status: SHIPPED | OUTPATIENT
Start: 2023-07-11

## 2023-07-18 ENCOUNTER — OFFICE VISIT (OUTPATIENT)
Dept: URGENT CARE | Facility: CLINIC | Age: 63
End: 2023-07-18
Payer: COMMERCIAL

## 2023-07-18 VITALS
OXYGEN SATURATION: 99 % | DIASTOLIC BLOOD PRESSURE: 91 MMHG | TEMPERATURE: 97.7 F | HEART RATE: 92 BPM | RESPIRATION RATE: 18 BRPM | SYSTOLIC BLOOD PRESSURE: 168 MMHG

## 2023-07-18 DIAGNOSIS — N30.00 ACUTE CYSTITIS WITHOUT HEMATURIA: Primary | ICD-10-CM

## 2023-07-18 DIAGNOSIS — R30.9 PAINFUL URINATION: ICD-10-CM

## 2023-07-18 LAB
LEFT EYE DIABETIC RETINOPATHY: NORMAL
RIGHT EYE DIABETIC RETINOPATHY: NORMAL
SL AMB  POCT GLUCOSE, UA: 1000
SL AMB LEUKOCYTE ESTERASE,UA: ABNORMAL
SL AMB POCT BILIRUBIN,UA: ABNORMAL
SL AMB POCT BLOOD,UA: ABNORMAL
SL AMB POCT CLARITY,UA: ABNORMAL
SL AMB POCT COLOR,UA: YELLOW
SL AMB POCT KETONES,UA: ABNORMAL
SL AMB POCT NITRITE,UA: NEGATIVE
SL AMB POCT PH,UA: 6
SL AMB POCT SPECIFIC GRAVITY,UA: 1.03
SL AMB POCT URINE PROTEIN: 100
SL AMB POCT UROBILINOGEN: 0.2

## 2023-07-18 PROCEDURE — 87186 SC STD MICRODIL/AGAR DIL: CPT | Performed by: NURSE PRACTITIONER

## 2023-07-18 PROCEDURE — 81002 URINALYSIS NONAUTO W/O SCOPE: CPT | Performed by: NURSE PRACTITIONER

## 2023-07-18 PROCEDURE — 87077 CULTURE AEROBIC IDENTIFY: CPT | Performed by: NURSE PRACTITIONER

## 2023-07-18 PROCEDURE — 99214 OFFICE O/P EST MOD 30 MIN: CPT | Performed by: NURSE PRACTITIONER

## 2023-07-18 PROCEDURE — 87086 URINE CULTURE/COLONY COUNT: CPT | Performed by: NURSE PRACTITIONER

## 2023-07-18 RX ORDER — CEPHALEXIN 500 MG/1
500 CAPSULE ORAL EVERY 12 HOURS SCHEDULED
Qty: 14 CAPSULE | Refills: 0 | Status: SHIPPED | OUTPATIENT
Start: 2023-07-18 | End: 2023-07-25

## 2023-07-18 NOTE — PROGRESS NOTES
[facility]        NAME: Franci Rodriguez is a 61 y.o. female  : 1960    MRN: [id number]  DATE: 2023  TIME: 3:41 PM    Assessment and Plan   Acute cystitis without hematuria [N30.00]  1. Acute cystitis without hematuria  cephalexin (KEFLEX) 500 mg capsule      2. Painful urination  POCT urine dip    Urine culture    cephalexin (KEFLEX) 500 mg capsule            Patient Instructions       Follow up with PCP in 3-5 days. Proceed to  ER if symptoms worsen. Your urine shows bacteria. You have been prescribed an antibiotic. You are to take all medication as prescribed. You are to avoid alcohol and caffeine - they are bladder irritants. Drink water. Take tylenol or motrin for pain or fever. You are to download Violet for the results in 3-5 days. You will be notified if the antibiotic needs changed. Follow up with your PCP in 2-3 days. Go to the ED if symptoms worsen. You have been prescribed cephalexin - take all medication as prescribed        Chief Complaint     Chief Complaint   Patient presents with   • Urinary Frequency     C/o urinary frequency and dysuria onset "today". Denies fever. Denies any OTC medications. History of Present Illness       This is a 61year old female who states has had urinary frequency, dysuria that started today. Denies fevers, chills, n/v/d. Denies taking any medications for symptoms. Pt is diabetic. Denies hematuria. Has lower back pain. Review of Systems   Review of Systems   Constitutional: Negative. HENT: Negative. Eyes: Negative. Respiratory: Negative. Cardiovascular: Negative. Gastrointestinal: Negative. Endocrine: Negative. Genitourinary: Positive for dysuria, frequency and urgency. Musculoskeletal: Positive for back pain. Skin: Negative. Allergic/Immunologic: Negative. Neurological: Negative. Hematological: Negative. Psychiatric/Behavioral: Negative.           Current Medications Current Outpatient Medications:   •  cephalexin (KEFLEX) 500 mg capsule, Take 1 capsule (500 mg total) by mouth every 12 (twelve) hours for 7 days, Disp: 14 capsule, Rfl: 0  •  Accu-Chek FastClix Lancets MISC, TEST THREE TIMES A DAY, Disp: 100 each, Rfl: 0  •  amLODIPine (NORVASC) 5 mg tablet, take 1 tablet by mouth once daily, Disp: 90 tablet, Rfl: 1  •  anastrozole (ARIMIDEX) 1 mg tablet, Take 1 mg by mouth daily, Disp: , Rfl:   •  aspirin (ECOTRIN LOW STRENGTH) 81 mg EC tablet, Take 81 mg by mouth daily, Disp: , Rfl:   •  atorvastatin (LIPITOR) 40 mg tablet, take 1 tablet by mouth once daily with dinner, Disp: 90 tablet, Rfl: 2  •  benazepril (LOTENSIN) 20 mg tablet, take 1 tablet by mouth once daily, Disp: 90 tablet, Rfl: 1  •  betamethasone valerate (VALISONE) 0.1 % cream, Apply topically 2 (two) times a day, Disp: , Rfl:   •  Blood Glucose Monitoring Suppl (OneTouch Verio Flex System) w/Device KIT, Test four times daily, Disp: 1 kit, Rfl: 0  •  cyclobenzaprine (FLEXERIL) 5 mg tablet, Take 1 tablet (5 mg total) by mouth 3 (three) times a day as needed for muscle spasms for up to 10 days, Disp: 30 tablet, Rfl: 0  •  dexlansoprazole (DEXILANT) 60 MG capsule, Take 60 mg by mouth daily (Patient not taking: Reported on 8/29/2022), Disp: , Rfl:   •  dulaglutide (Trulicity) 6.85 YO/1.4MX injection, Inject 0.5 mL (0.75 mg total) under the skin every 7 days, Disp: 6 mL, Rfl: 1  •  famotidine (PEPCID) 20 mg tablet, Take 1 tablet (20 mg total) by mouth 2 (two) times a day before meals, Disp: 60 tablet, Rfl: 3  •  glucose blood (Accu-Chek Guide) test strip, Test bid (Patient not taking: Reported on 4/12/2023), Disp: 200 each, Rfl: 4  •  glucose blood (Accu-Chek Guide) test strip, 1 each by Other route 4 (four) times a day Use as instructed (Patient not taking: Reported on 4/12/2023), Disp: 300 each, Rfl: 3  •  glucose blood (OneTouch Verio) test strip, as directed use 1 TEST STRIP to TEST BLOOD SUGAR four times a day, Disp: 200 strip, Rfl: 1  •  insulin aspart, w/niacinamide, (FIASP) 100 Units/mL injection pen, Inject 10 Units under the skin 3 (three) times a day with meals, Disp: 15 mL, Rfl: 3  •  insulin glargine (LANTUS) 100 units/mL subcutaneous injection, Inject 100 Units under the skin daily at bedtime, Disp: 90 mL, Rfl: 0  •  Insulin Glargine Solostar (Lantus SoloStar) 100 UNIT/ML SOPN, Inject 1 mL (100 Units total) under the skin daily at bedtime, Disp: 30 mL, Rfl: 2  •  insulin lispro (HumaLOG) 100 units/mL injection pen, Inject 10 Units under the skin 3 (three) times a day with meals, Disp: 27 mL, Rfl: 2  •  insulin lispro (HumaLOG) 100 units/mL injection pen, inject 10 units subcutaneously three times a day with food, Disp: 6 mL, Rfl: 1  •  Insulin Pen Needle (BD Pen Needle Sadia 2nd Gen) 32G X 4 MM MISC, Use 3 (three) times a day, Disp: 100 each, Rfl: 3  •  lactulose (CHRONULAC) 10 g/15 mL solution, take 30 milliliters by mouth once daily, Disp: , Rfl:   •  [employer] FINEPOINT LANCETS MISC, Test 4 times daily (Patient not taking: Reported on 4/12/2023), Disp: 400 each, Rfl: 5  •  metoclopramide (REGLAN) 5 mg tablet, Take 1 tablet (5 mg total) by mouth 3 (three) times a day (Patient not taking: Reported on 4/12/2023), Disp: 90 tablet, Rfl: 3  •  metoclopramide (REGLAN) 5 mg tablet, Take 5 mg by mouth Three times daily as needed (Patient not taking: Reported on 4/12/2023), Disp: , Rfl:   •  metoprolol succinate (TOPROL-XL) 100 mg 24 hr tablet, take 1 tablet by mouth once daily, Disp: 90 tablet, Rfl: 3  •  nitroglycerin (NITROSTAT) 0.4 mg SL tablet, Place 1 tablet (0.4 mg total) under the tongue every 5 (five) minutes as needed for chest pain If no relief after first dose call prescriber or 911, Disp: 25 tablet, Rfl: 0  •  ondansetron (ZOFRAN) 8 mg tablet, Take 1 tablet (8 mg total) by mouth every 8 (eight) hours as needed for nausea or vomiting for up to 14 days, Disp: 20 tablet, Rfl: 1  •  OneTouch Delica Lancets 33G MISC, Use 3 (three) times a day Test 3 times daily, Disp: 300 each, Rfl: 2  •  pantoprazole (PROTONIX) 40 mg tablet, Take 1 tablet (40 mg total) by mouth daily (Patient not taking: Reported on 4/12/2023), Disp: 30 tablet, Rfl: 3  •  rOPINIRole (REQUIP) 1 mg tablet, Take 1 tablet (1 mg total) by mouth daily (Patient not taking: Reported on 4/12/2023), Disp: 90 tablet, Rfl: 1  •  rOPINIRole (REQUIP) 1 mg tablet, One in am and two qhs, Disp: , Rfl:   •  sertraline (ZOLOFT) 100 mg tablet, Take 1 tablet (100 mg total) by mouth daily, Disp: 30 tablet, Rfl: 6  •  temazepam (RESTORIL) 30 mg capsule, take 1 capsule by mouth at bedtime if needed for sleep, Disp: 30 capsule, Rfl: 0    Current Allergies     Allergies as of 07/18/2023   • (No Known Allergies)            The following portions of the patient's history were reviewed and updated as appropriate: allergies, current medications, past family history, past medical history, past social history, past surgical history and problem list.     Past Medical History:   Diagnosis Date   • CAD (coronary artery disease)    • Cancer ([address])     right breast   • Chronic back pain    • Coronary artery disease    • Diabetes mellitus ([address])    • Family history of early CAD    • Hyperlipidemia    • Hypertension    • Myocardial infarction [facility])    • NSTEMI (non-ST elevated myocardial infarction) ([address]) 11/28/2018    Status post coronary angioplasty 1st obtuse marginal 11/28/2018 at [facility] stent could not be placed   • Restless leg syndrome    • Tuberculosis     patient was less than 11years old   • Type 2 diabetes mellitus ([address])        Past Surgical History:   Procedure Laterality Date   • ABDOMINAL SURGERY      phill   • ANKLE SURGERY      tubal   • BREAST SURGERY      partial mastectomy R breast   • CARDIAC CATHETERIZATION  11/2018    Successful balloon angioplassty to OM1   • CHOLECYSTECTOMY     • FRACTURE SURGERY      right ankle   • GALLBLADDER SURGERY     • MASTECTOMY • TUBAL LIGATION         Family History   Problem Relation Age of Onset   • Breast cancer Mother    • Diabetes Mother    • Heart failure Mother    • Heart disease Father    • Stroke Father          Medications have been verified. Objective   /91 (BP Location: Left arm, Patient Position: Sitting)   Pulse 92   Temp 97.7 °F (36.5 °C) (Temporal)   Resp 18   SpO2 99%   No LMP recorded. Patient is postmenopausal.       Physical Exam     Physical Exam  Vitals and nursing note reviewed. Constitutional:       General: She is not in acute distress. Appearance: Normal appearance. She is obese. She is not ill-appearing, toxic-appearing or diaphoretic. HENT:      Head: Normocephalic and atraumatic. Nose: Nose normal.      Mouth/Throat:      Mouth: Mucous membranes are moist.   Eyes:      Extraocular Movements: Extraocular movements intact. Cardiovascular:      Rate and Rhythm: Normal rate and regular rhythm. Pulses: Normal pulses. Heart sounds: Normal heart sounds. Pulmonary:      Effort: Pulmonary effort is normal.      Breath sounds: Normal breath sounds. Abdominal:      Palpations: Abdomen is soft. Tenderness: There is no right CVA tenderness or left CVA tenderness. Musculoskeletal:         General: Normal range of motion. Cervical back: Normal range of motion and neck supple. Skin:     General: Skin is warm and dry. Capillary Refill: Capillary refill takes less than 2 seconds. Neurological:      General: No focal deficit present. Mental Status: She is alert and oriented to person, place, and time. Psychiatric:         Mood and Affect: Mood normal.         Behavior: Behavior normal.         Thought Content:  Thought content normal.         Judgment: Judgment normal.             poct urine with 1000 glucose   100 protein  Mod leuks  Will send for cx    SG 1.030

## 2023-07-18 NOTE — PATIENT INSTRUCTIONS
Your urine shows bacteria. You have been prescribed an antibiotic. You are to take all medication as prescribed. You are to avoid alcohol and caffeine - they are bladder irritants. Drink water. Take tylenol or motrin for pain or fever. You are to download [employer] for the results in 3-5 days. You will be notified if the antibiotic needs changed. Follow up with your PCP in 2-3 days. Go to the ED if symptoms worsen.        You have been prescribed cephalexin - take all medication as prescribed

## 2023-07-20 LAB
BACTERIA UR CULT: ABNORMAL
BACTERIA UR CULT: ABNORMAL

## 2023-07-21 NOTE — RESULT ENCOUNTER NOTE
60,000-69,000 cfu/ml Enterococcus faecalis Abnormal    <10,000 cfu/ml Escherichia coli Abnormal   Pt on cephalexin.   + susceptibility       Please have your office call patient for follow up

## 2023-08-05 DIAGNOSIS — E11.9 TYPE 2 DIABETES MELLITUS WITHOUT COMPLICATION, WITH LONG-TERM CURRENT USE OF INSULIN (HCC): Chronic | ICD-10-CM

## 2023-08-05 DIAGNOSIS — Z79.4 TYPE 2 DIABETES MELLITUS WITHOUT COMPLICATION, WITH LONG-TERM CURRENT USE OF INSULIN (HCC): Chronic | ICD-10-CM

## 2023-08-05 RX ORDER — FAMOTIDINE 20 MG/1
20 TABLET, FILM COATED ORAL
Qty: 60 TABLET | Refills: 3 | Status: SHIPPED | OUTPATIENT
Start: 2023-08-05

## 2023-08-18 ENCOUNTER — OFFICE VISIT (OUTPATIENT)
Dept: CARDIOLOGY CLINIC | Facility: CLINIC | Age: 63
End: 2023-08-18
Payer: COMMERCIAL

## 2023-08-18 VITALS
SYSTOLIC BLOOD PRESSURE: 130 MMHG | BODY MASS INDEX: 27.49 KG/M2 | HEART RATE: 80 BPM | HEIGHT: 64 IN | DIASTOLIC BLOOD PRESSURE: 80 MMHG | WEIGHT: 161 LBS

## 2023-08-18 DIAGNOSIS — E78.2 MIXED HYPERLIPIDEMIA: ICD-10-CM

## 2023-08-18 DIAGNOSIS — I10 ESSENTIAL HYPERTENSION: ICD-10-CM

## 2023-08-18 DIAGNOSIS — I25.10 CORONARY ARTERY DISEASE INVOLVING NATIVE CORONARY ARTERY OF NATIVE HEART WITHOUT ANGINA PECTORIS: Primary | ICD-10-CM

## 2023-08-18 PROCEDURE — 99214 OFFICE O/P EST MOD 30 MIN: CPT | Performed by: INTERNAL MEDICINE

## 2023-08-18 NOTE — PROGRESS NOTES
Patient ID: Adriane Maria is a 61 y.o. female. Plan:      Coronary artery disease involving native coronary artery of native heart  Possible recent angina. Will obtain [place]. Mixed hyperlipidemia  Tolerating high-intensity statin therapy and will continue current regimen. Essential hypertension  Well controlled and would continue current regimen. Follow up Plan/Other summary comments:  If the stress test is normal then follow-up here will be in 1 year. HPI: Patient is seen in follow-up today. She has had intermittent chest pain and on occasion discomfort into the back. Symptoms can come on at rest and lasts for a minute or sometimes longer. By her admission ambulation is very limited though she cannot come up with a good explanation for this. To reiterate, in November of 2018 heart catheterization revealed normal LAD and right coronary artery. There was a 90% marginal stenosis treated with plain old balloon angioplasty. A posterolateral branch was occluded. Most recent or relevant cardiac/vascular testing:       Myoview 05/31/2019:  Normal.      Past Surgical History:   Procedure Laterality Date   • ABDOMINAL SURGERY      phill   • ANKLE SURGERY      tubal   • BREAST SURGERY      partial mastectomy R breast   • CARDIAC CATHETERIZATION  11/2018    Successful balloon angioplassty to OM1   • CHOLECYSTECTOMY     • FRACTURE SURGERY      right ankle   • GALLBLADDER SURGERY     • MASTECTOMY     • TUBAL LIGATION         Lipid Profile:   Lab Results   Component Value Date    CHOL 235 06/26/2014    TRIG 347 (H) 04/24/2023    TRIG 358 06/26/2014    HDL 37 (L) 04/24/2023    HDL 31 06/26/2014         Review of Systems   10  point ROS  was otherwise non pertinent or negative except as per HPI or as below.    Gait: Normal.        Objective:     /80   Pulse 80   Ht 5' 4" (1.626 m)   Wt 73 kg (161 lb)   BMI 27.64 kg/m²     PHYSICAL EXAM:    General:  Normal appearance in no distress. Eyes:  Anicteric. Oral mucosa:  Moist.  Neck:  No JVD. Carotid upstrokes are brisk without bruits. No masses. Chest:  Clear to auscultation. Cardiac:  No palpable PMI. Normal S1 and S2. No murmur gallop or rub. Abdomen:  Soft and nontender. No palpable organomegaly or aortic enlargement. Extremities:  No peripheral edema. Musculoskeletal:  Symmetric. Vascular:  Femoral pulses are brisk without bruits. Popliteal pulses are intact bilaterally. Pedal pulses are intact. Neuro:  Grossly symmetric. Psych:  Alert and oriented x3.         Current Outpatient Medications:   •  Accu-Chek FastClix Lancets MISC, TEST THREE TIMES A DAY, Disp: 100 each, Rfl: 0  •  amLODIPine (NORVASC) 5 mg tablet, take 1 tablet by mouth once daily, Disp: 90 tablet, Rfl: 1  •  anastrozole (ARIMIDEX) 1 mg tablet, Take 1 mg by mouth daily, Disp: , Rfl:   •  aspirin (ECOTRIN LOW STRENGTH) 81 mg EC tablet, Take 81 mg by mouth daily, Disp: , Rfl:   •  atorvastatin (LIPITOR) 40 mg tablet, take 1 tablet by mouth once daily with dinner, Disp: 90 tablet, Rfl: 2  •  benazepril (LOTENSIN) 20 mg tablet, take 1 tablet by mouth once daily, Disp: 90 tablet, Rfl: 1  •  Blood Glucose Monitoring Suppl (OneTouch Verio Flex System) w/Device KIT, Test four times daily, Disp: 1 kit, Rfl: 0  •  dulaglutide (Trulicity) 2.47 LG/8.4HC injection, Inject 0.5 mL (0.75 mg total) under the skin every 7 days, Disp: 6 mL, Rfl: 1  •  famotidine (PEPCID) 20 mg tablet, take 1 tablet by mouth twice a day before meals, Disp: 60 tablet, Rfl: 3  •  glucose blood (OneTouch Verio) test strip, as directed use 1 TEST STRIP to TEST BLOOD SUGAR four times a day, Disp: 200 strip, Rfl: 1  •  insulin aspart, w/niacinamide, (FIASP) 100 Units/mL injection pen, Inject 10 Units under the skin 3 (three) times a day with meals, Disp: 15 mL, Rfl: 3  •  insulin glargine (LANTUS) 100 units/mL subcutaneous injection, Inject 100 Units under the skin daily at bedtime, Disp: 90 mL, Rfl: 0  •  insulin lispro (HumaLOG) 100 units/mL injection pen, inject 10 units subcutaneously three times a day with food, Disp: 6 mL, Rfl: 1  •  Insulin Pen Needle (BD Pen Needle Sadia 2nd Gen) 32G X 4 MM MISC, Use 3 (three) times a day, Disp: 100 each, Rfl: 3  •  lactulose (CHRONULAC) 10 g/15 mL solution, take 30 milliliters by mouth once daily, Disp: , Rfl:   •  metoprolol succinate (TOPROL-XL) 100 mg 24 hr tablet, take 1 tablet by mouth once daily, Disp: 90 tablet, Rfl: 3  •  nitroglycerin (NITROSTAT) 0.4 mg SL tablet, Place 1 tablet (0.4 mg total) under the tongue every 5 (five) minutes as needed for chest pain If no relief after first dose call prescriber or 911, Disp: 25 tablet, Rfl: 0  •  OneTouch Delica Lancets 08L MISC, Use 3 (three) times a day Test 3 times daily, Disp: 300 each, Rfl: 2  •  rOPINIRole (REQUIP) 1 mg tablet, One in am and two qhs, Disp: , Rfl:   •  sertraline (ZOLOFT) 100 mg tablet, Take 1 tablet (100 mg total) by mouth daily, Disp: 30 tablet, Rfl: 6  •  temazepam (RESTORIL) 30 mg capsule, take 1 capsule by mouth at bedtime if needed for sleep, Disp: 30 capsule, Rfl: 0  •  betamethasone valerate (VALISONE) 0.1 % cream, Apply topically 2 (two) times a day, Disp: , Rfl:   •  cyclobenzaprine (FLEXERIL) 5 mg tablet, Take 1 tablet (5 mg total) by mouth 3 (three) times a day as needed for muscle spasms for up to 10 days, Disp: 30 tablet, Rfl: 0  •  dexlansoprazole (DEXILANT) 60 MG capsule, Take 60 mg by mouth daily (Patient not taking: Reported on 8/29/2022), Disp: , Rfl:   •  glucose blood (Accu-Chek Guide) test strip, Test bid (Patient not taking: Reported on 4/12/2023), Disp: 200 each, Rfl: 4  •  glucose blood (Accu-Chek Guide) test strip, 1 each by Other route 4 (four) times a day Use as instructed (Patient not taking: Reported on 4/12/2023), Disp: 300 each, Rfl: 3  •  Insulin Glargine Solostar (Lantus SoloStar) 100 UNIT/ML SOPN, Inject 1 mL (100 Units total) under the skin daily at bedtime (Patient not taking: Reported on 8/18/2023), Disp: 30 mL, Rfl: 2  •  insulin lispro (HumaLOG) 100 units/mL injection pen, Inject 10 Units under the skin 3 (three) times a day with meals (Patient not taking: Reported on 8/18/2023), Disp: 27 mL, Rfl: 2  •  LIFESCAN FINEPOINT LANCETS MISC, Test 4 times daily (Patient not taking: Reported on 4/12/2023), Disp: 400 each, Rfl: 5  •  metoclopramide (REGLAN) 5 mg tablet, Take 1 tablet (5 mg total) by mouth 3 (three) times a day (Patient not taking: Reported on 4/12/2023), Disp: 90 tablet, Rfl: 3  •  metoclopramide (REGLAN) 5 mg tablet, Take 5 mg by mouth Three times daily as needed (Patient not taking: Reported on 4/12/2023), Disp: , Rfl:   •  ondansetron (ZOFRAN) 8 mg tablet, Take 1 tablet (8 mg total) by mouth every 8 (eight) hours as needed for nausea or vomiting for up to 14 days, Disp: 20 tablet, Rfl: 1  •  pantoprazole (PROTONIX) 40 mg tablet, Take 1 tablet (40 mg total) by mouth daily (Patient not taking: Reported on 4/12/2023), Disp: 30 tablet, Rfl: 3  •  rOPINIRole (REQUIP) 1 mg tablet, Take 1 tablet (1 mg total) by mouth daily (Patient not taking: Reported on 4/12/2023), Disp: 90 tablet, Rfl: 1  No Known Allergies  Past Medical History:   Diagnosis Date   • CAD (coronary artery disease)    • Cancer ([address])     right breast   • Chronic back pain    • Coronary artery disease    • Diabetes mellitus ([address] [facility])    • Family history of early CAD    • Hyperlipidemia    • Hypertension    • Myocardial infarction [facility])    • NSTEMI (non-ST elevated myocardial infarction) ([address] 11/28/2018    Status post coronary angioplasty 1st obtuse marginal 11/28/2018 at [facility] stent could not be placed   • Restless leg syndrome    • Tuberculosis     patient was less than 11years old   • Type 2 diabetes mellitus ([address] [facility])            Social History     Tobacco Use   Smoking Status Never   Smokeless Tobacco Never

## 2023-08-27 DIAGNOSIS — I10 ESSENTIAL HYPERTENSION: ICD-10-CM

## 2023-08-27 RX ORDER — BENAZEPRIL HYDROCHLORIDE 20 MG/1
TABLET ORAL
Qty: 90 TABLET | Refills: 1 | Status: SHIPPED | OUTPATIENT
Start: 2023-08-27

## 2023-09-07 ENCOUNTER — HOSPITAL ENCOUNTER (OUTPATIENT)
Dept: NON INVASIVE DIAGNOSTICS | Facility: HOSPITAL | Age: 63
Discharge: HOME | End: 2023-09-07
Attending: INTERNAL MEDICINE
Payer: COMMERCIAL

## 2023-09-07 ENCOUNTER — HOSPITAL ENCOUNTER (OUTPATIENT)
Dept: NUCLEAR MEDICINE | Facility: HOSPITAL | Age: 63
End: 2023-09-07
Attending: INTERNAL MEDICINE
Payer: COMMERCIAL

## 2023-09-07 VITALS
HEART RATE: 74 BPM | SYSTOLIC BLOOD PRESSURE: 154 MMHG | WEIGHT: 161 LBS | OXYGEN SATURATION: 96 % | HEIGHT: 64 IN | BODY MASS INDEX: 27.49 KG/M2 | DIASTOLIC BLOOD PRESSURE: 86 MMHG

## 2023-09-07 DIAGNOSIS — I25.10 CORONARY ARTERY DISEASE INVOLVING NATIVE CORONARY ARTERY OF NATIVE HEART WITHOUT ANGINA PECTORIS: ICD-10-CM

## 2023-09-07 DIAGNOSIS — E78.2 MIXED HYPERLIPIDEMIA: ICD-10-CM

## 2023-09-07 LAB
CHEST PAIN STATEMENT: NORMAL
MAX DIASTOLIC BP: 96 MMHG
MAX HEART RATE: 106 BPM
MAX HR PERCENT: 67 %
MAX HR: 106 BPM
MAX PREDICTED HEART RATE: 157 BPM
MAX. SYSTOLIC BP: 168 MMHG
NUC STRESS EJECTION FRACTION: 83 %
PROTOCOL NAME: NORMAL
RATE PRESSURE PRODUCT: NORMAL
REASON FOR TERMINATION: NORMAL
SL CV REST NUCLEAR ISOTOPE DOSE: 11 MCI
SL CV STRESS NUCLEAR ISOTOPE DOSE: 33 MCI
SL CV STRESS RECOVERY BP: NORMAL MMHG
SL CV STRESS RECOVERY HR: 80 BPM
SL CV STRESS RECOVERY O2 SAT: 96 %
STRESS ANGINA INDEX: 0
STRESS BASELINE BP: NORMAL MMHG
STRESS BASELINE HR: 74 BPM
STRESS O2 SAT REST: 96 %
STRESS PEAK HR: 106 BPM
STRESS POST EXERCISE DUR MIN: 3 MIN
STRESS POST EXERCISE DUR SEC: 0 SEC
STRESS POST O2 SAT PEAK: 96 %
STRESS POST PEAK BP: 158 MMHG
TARGET HR FORMULA: NORMAL
TEST INDICATION: NORMAL
TIME IN EXERCISE PHASE: NORMAL

## 2023-09-07 PROCEDURE — G1004 CDSM NDSC: HCPCS

## 2023-09-07 PROCEDURE — 93017 CV STRESS TEST TRACING ONLY: CPT

## 2023-09-07 PROCEDURE — 78452 HT MUSCLE IMAGE SPECT MULT: CPT

## 2023-09-07 PROCEDURE — 93018 CV STRESS TEST I&R ONLY: CPT | Performed by: INTERNAL MEDICINE

## 2023-09-07 PROCEDURE — 78452 HT MUSCLE IMAGE SPECT MULT: CPT | Performed by: INTERNAL MEDICINE

## 2023-09-07 PROCEDURE — A9502 TC99M TETROFOSMIN: HCPCS

## 2023-09-07 PROCEDURE — 93016 CV STRESS TEST SUPVJ ONLY: CPT | Performed by: INTERNAL MEDICINE

## 2023-09-07 RX ORDER — REGADENOSON 0.08 MG/ML
0.4 INJECTION, SOLUTION INTRAVENOUS ONCE
Status: COMPLETED | OUTPATIENT
Start: 2023-09-07 | End: 2023-09-07

## 2023-09-07 RX ADMIN — REGADENOSON 0.4 MG: 0.08 INJECTION, SOLUTION INTRAVENOUS at 09:08

## 2023-10-04 DIAGNOSIS — I10 ESSENTIAL HYPERTENSION: ICD-10-CM

## 2023-10-04 RX ORDER — AMLODIPINE BESYLATE 5 MG/1
5 TABLET ORAL DAILY
Qty: 90 TABLET | Refills: 1 | Status: SHIPPED | OUTPATIENT
Start: 2023-10-04

## 2023-10-20 DIAGNOSIS — Z79.4 TYPE 2 DIABETES MELLITUS WITHOUT COMPLICATION, WITH LONG-TERM CURRENT USE OF INSULIN (HCC): Chronic | ICD-10-CM

## 2023-10-20 DIAGNOSIS — E11.8 TYPE 2 DIABETES MELLITUS WITH COMPLICATION, WITH LONG-TERM CURRENT USE OF INSULIN (HCC): ICD-10-CM

## 2023-10-20 DIAGNOSIS — E11.9 TYPE 2 DIABETES MELLITUS WITHOUT COMPLICATION, WITH LONG-TERM CURRENT USE OF INSULIN (HCC): Chronic | ICD-10-CM

## 2023-10-20 DIAGNOSIS — Z79.4 TYPE 2 DIABETES MELLITUS WITH COMPLICATION, WITH LONG-TERM CURRENT USE OF INSULIN (HCC): ICD-10-CM

## 2023-10-20 NOTE — TELEPHONE ENCOUNTER
Patient needs refills of the following:    Insulin lispro 100 units/ml injection and   insulin pen needle 4mg, 100 per box

## 2023-10-24 RX ORDER — PEN NEEDLE, DIABETIC 32GX 5/32"
NEEDLE, DISPOSABLE MISCELLANEOUS
OUTPATIENT
Start: 2023-10-24

## 2023-10-24 RX ORDER — INSULIN LISPRO 100 [IU]/ML
INJECTION, SOLUTION INTRAVENOUS; SUBCUTANEOUS
Qty: 6 ML | Refills: 1 | OUTPATIENT
Start: 2023-10-24

## 2023-10-24 NOTE — TELEPHONE ENCOUNTER
Medication denied pt has not followed up. Pt is an uncontrolled diabetic who failed to have labs done, please schedule ASAP.

## 2023-10-27 ENCOUNTER — HOSPITAL ENCOUNTER (EMERGENCY)
Facility: HOSPITAL | Age: 63
Discharge: HOME | End: 2023-10-27
Attending: EMERGENCY MEDICINE | Admitting: EMERGENCY MEDICINE
Payer: COMMERCIAL

## 2023-10-27 ENCOUNTER — OFFICE VISIT (OUTPATIENT)
Dept: FAMILY MEDICINE CLINIC | Facility: CLINIC | Age: 63
End: 2023-10-27
Payer: COMMERCIAL

## 2023-10-27 VITALS
HEART RATE: 76 BPM | SYSTOLIC BLOOD PRESSURE: 114 MMHG | OXYGEN SATURATION: 96 % | HEIGHT: 64 IN | DIASTOLIC BLOOD PRESSURE: 62 MMHG | TEMPERATURE: 96.3 F | WEIGHT: 154 LBS | BODY MASS INDEX: 26.29 KG/M2

## 2023-10-27 VITALS
RESPIRATION RATE: 18 BRPM | DIASTOLIC BLOOD PRESSURE: 75 MMHG | OXYGEN SATURATION: 95 % | TEMPERATURE: 97.5 F | SYSTOLIC BLOOD PRESSURE: 122 MMHG | HEART RATE: 76 BPM

## 2023-10-27 DIAGNOSIS — E86.0 DEHYDRATION: ICD-10-CM

## 2023-10-27 DIAGNOSIS — E11.9 TYPE 2 DIABETES MELLITUS WITHOUT COMPLICATION, WITH LONG-TERM CURRENT USE OF INSULIN (HCC): Chronic | ICD-10-CM

## 2023-10-27 DIAGNOSIS — Z79.4 TYPE 2 DIABETES MELLITUS WITHOUT COMPLICATION, WITH LONG-TERM CURRENT USE OF INSULIN (HCC): Chronic | ICD-10-CM

## 2023-10-27 DIAGNOSIS — R73.9 HYPERGLYCEMIA: Primary | ICD-10-CM

## 2023-10-27 DIAGNOSIS — R11.0 NAUSEOUS: ICD-10-CM

## 2023-10-27 DIAGNOSIS — R61 DIAPHORESIS: ICD-10-CM

## 2023-10-27 LAB
ANION GAP SERPL CALCULATED.3IONS-SCNC: 11 MMOL/L
BACTERIA UR QL AUTO: ABNORMAL /HPF
BASOPHILS # BLD AUTO: 0.02 THOUSANDS/ÂΜL (ref 0–0.1)
BASOPHILS NFR BLD AUTO: 0 % (ref 0–1)
BILIRUB UR QL STRIP: NEGATIVE
BUN SERPL-MCNC: 30 MG/DL (ref 5–25)
CALCIUM SERPL-MCNC: 9.9 MG/DL (ref 8.4–10.2)
CAOX CRY URNS QL MICRO: ABNORMAL /HPF
CHLORIDE SERPL-SCNC: 94 MMOL/L (ref 96–108)
CLARITY UR: CLEAR
CO2 SERPL-SCNC: 26 MMOL/L (ref 21–32)
COLOR UR: YELLOW
CREAT SERPL-MCNC: 1.54 MG/DL (ref 0.6–1.3)
EOSINOPHIL # BLD AUTO: 0.13 THOUSAND/ÂΜL (ref 0–0.61)
EOSINOPHIL NFR BLD AUTO: 1 % (ref 0–6)
ERYTHROCYTE [DISTWIDTH] IN BLOOD BY AUTOMATED COUNT: 13.4 % (ref 11.6–15.1)
GFR SERPL CREATININE-BSD FRML MDRD: 35 ML/MIN/1.73SQ M
GLUCOSE SERPL-MCNC: 265 MG/DL (ref 65–140)
GLUCOSE SERPL-MCNC: 326 MG/DL (ref 65–140)
GLUCOSE SERPL-MCNC: 374 MG/DL (ref 65–140)
GLUCOSE UR STRIP-MCNC: ABNORMAL MG/DL
HCT VFR BLD AUTO: 49.1 % (ref 34.8–46.1)
HGB BLD-MCNC: 15.9 G/DL (ref 11.5–15.4)
HGB UR QL STRIP.AUTO: NEGATIVE
HYALINE CASTS #/AREA URNS LPF: ABNORMAL /LPF
IMM GRANULOCYTES # BLD AUTO: 0.02 THOUSAND/UL (ref 0–0.2)
IMM GRANULOCYTES NFR BLD AUTO: 0 % (ref 0–2)
KETONES UR STRIP-MCNC: ABNORMAL MG/DL
LEUKOCYTE ESTERASE UR QL STRIP: NEGATIVE
LYMPHOCYTES # BLD AUTO: 3.32 THOUSANDS/ÂΜL (ref 0.6–4.47)
LYMPHOCYTES NFR BLD AUTO: 35 % (ref 14–44)
MCH RBC QN AUTO: 30.3 PG (ref 26.8–34.3)
MCHC RBC AUTO-ENTMCNC: 32.4 G/DL (ref 31.4–37.4)
MCV RBC AUTO: 94 FL (ref 82–98)
MONOCYTES # BLD AUTO: 0.49 THOUSAND/ÂΜL (ref 0.17–1.22)
MONOCYTES NFR BLD AUTO: 5 % (ref 4–12)
MUCOUS THREADS UR QL AUTO: ABNORMAL
NEUTROPHILS # BLD AUTO: 5.62 THOUSANDS/ÂΜL (ref 1.85–7.62)
NEUTS SEG NFR BLD AUTO: 59 % (ref 43–75)
NITRITE UR QL STRIP: NEGATIVE
NON-SQ EPI CELLS URNS QL MICRO: ABNORMAL /HPF
NRBC BLD AUTO-RTO: 0 /100 WBCS
PH UR STRIP.AUTO: 5 [PH]
PLATELET # BLD AUTO: 350 THOUSANDS/UL (ref 149–390)
PMV BLD AUTO: 9.8 FL (ref 8.9–12.7)
POTASSIUM SERPL-SCNC: 4.4 MMOL/L (ref 3.5–5.3)
PROT UR STRIP-MCNC: ABNORMAL MG/DL
RBC # BLD AUTO: 5.24 MILLION/UL (ref 3.81–5.12)
RBC #/AREA URNS AUTO: ABNORMAL /HPF
SL AMB POCT GLUCOSE BLD: 350
SL AMB POCT HEMOGLOBIN AIC: 14.1 (ref ?–6.5)
SODIUM SERPL-SCNC: 131 MMOL/L (ref 135–147)
SP GR UR STRIP.AUTO: >=1.03
UROBILINOGEN UR QL STRIP.AUTO: 0.2 E.U./DL
WBC # BLD AUTO: 9.6 THOUSAND/UL (ref 4.31–10.16)
WBC #/AREA URNS AUTO: ABNORMAL /HPF

## 2023-10-27 PROCEDURE — 99215 OFFICE O/P EST HI 40 MIN: CPT

## 2023-10-27 PROCEDURE — 81001 URINALYSIS AUTO W/SCOPE: CPT | Performed by: EMERGENCY MEDICINE

## 2023-10-27 PROCEDURE — 96360 HYDRATION IV INFUSION INIT: CPT

## 2023-10-27 PROCEDURE — 99284 EMERGENCY DEPT VISIT MOD MDM: CPT

## 2023-10-27 PROCEDURE — 96361 HYDRATE IV INFUSION ADD-ON: CPT

## 2023-10-27 PROCEDURE — 80048 BASIC METABOLIC PNL TOTAL CA: CPT | Performed by: EMERGENCY MEDICINE

## 2023-10-27 PROCEDURE — 36415 COLL VENOUS BLD VENIPUNCTURE: CPT | Performed by: EMERGENCY MEDICINE

## 2023-10-27 PROCEDURE — 83036 HEMOGLOBIN GLYCOSYLATED A1C: CPT

## 2023-10-27 PROCEDURE — 85025 COMPLETE CBC W/AUTO DIFF WBC: CPT | Performed by: EMERGENCY MEDICINE

## 2023-10-27 PROCEDURE — 82948 REAGENT STRIP/BLOOD GLUCOSE: CPT

## 2023-10-27 PROCEDURE — 99284 EMERGENCY DEPT VISIT MOD MDM: CPT | Performed by: EMERGENCY MEDICINE

## 2023-10-27 RX ADMIN — SODIUM CHLORIDE 1000 ML: 0.9 INJECTION, SOLUTION INTRAVENOUS at 16:25

## 2023-10-27 RX ADMIN — SODIUM CHLORIDE 1000 ML: 0.9 INJECTION, SOLUTION INTRAVENOUS at 15:21

## 2023-10-27 NOTE — PROGRESS NOTES
Name: dEith Wise      : 1960      MRN: [id number]  Encounter Provider: RUBEN Seay  Encounter Date: 10/27/2023   Encounter department: [address] [place] [address] 60     1. Hyperglycemia    2. Type 2 diabetes mellitus without complication, with long-term current use of insulin (HCC)  -     POCT hemoglobin A1c  -     POCT blood glucose    3. Diaphoresis  -     POCT blood glucose    4. Nauseous  -     POCT blood glucose      POCT A1c in office is currently 14.1%. Patient is diaphoretic & nauseous on exam. States her sugar was 291 this AM and she took 13 units of Humalog. She states she did not eat breakfast with this. Patient's  present and states she has been like this all day. POCT glucose currently 350. Proceed to ER for further evaluation of symptomatic hyperglycemia. Declined ambulance -  transporting. BMI Counseling: Body mass index is 26.43 kg/m². The BMI is above normal. Nutrition recommendations include decreasing portion sizes, encouraging healthy choices of fruits and vegetables, decreasing fast food intake, consuming healthier snacks, limiting drinks that contain sugar, moderation in carbohydrate intake, increasing intake of lean protein, reducing intake of saturated and trans fat and reducing intake of cholesterol. Exercise recommendations include moderate physical activity 150 minutes/week and exercising 3-5 times per week. No pharmacotherapy was ordered. Rationale for BMI follow-up plan is due to patient being overweight or obese. Marilin Kapadia presents in office today for DM follow-up. This is my first encounter with the patient. Notified by MA that POCT A1c is 14.1%. Upon entering the room, patient requesting an emesis bag to throw-up and is diffusely sweating. She states she took her Humalog this AM and did not eat breakfast. POCT blood glucose is currently 350. She reports compliance with all medications.  She's denies recent illness. She states normally she eats a banana for breakfast with her 13 units of Humalog. Review of Systems   Constitutional:  Positive for diaphoresis. Negative for chills, fatigue and fever. HENT:  Negative for congestion, ear pain, facial swelling, hearing loss, rhinorrhea, sinus pressure, sneezing, sore throat and trouble swallowing. Eyes:  Negative for pain, redness and visual disturbance. Respiratory:  Negative for cough, chest tightness, shortness of breath and wheezing. Cardiovascular:  Negative for chest pain and palpitations. Gastrointestinal:  Positive for nausea. Negative for abdominal pain, diarrhea and vomiting. Genitourinary:  Negative for dysuria, flank pain, hematuria and pelvic pain. Musculoskeletal:  Negative for arthralgias, back pain and myalgias. Skin:  Negative for color change and rash. Neurological:  Negative for dizziness, seizures, syncope, weakness, light-headedness and headaches. Psychiatric/Behavioral:  Negative for confusion, hallucinations and sleep disturbance. The patient is not nervous/anxious. All other systems reviewed and are negative.       Current Outpatient Medications on File Prior to Visit   Medication Sig    Accu-Chek FastClix Lancets MISC TEST THREE TIMES A DAY    amLODIPine (NORVASC) 5 mg tablet swallow 1 tablet once daily    anastrozole (ARIMIDEX) 1 mg tablet Take 1 mg by mouth daily    aspirin (ECOTRIN LOW STRENGTH) 81 mg EC tablet Take 81 mg by mouth daily    atorvastatin (LIPITOR) 40 mg tablet take 1 tablet by mouth once daily with dinner    benazepril (LOTENSIN) 20 mg tablet take 1 tablet by mouth once daily    betamethasone valerate (VALISONE) 0.1 % cream Apply topically 2 (two) times a day    Blood Glucose Monitoring Suppl (OneTouch Verio Flex System) w/Device KIT Test four times daily    cyclobenzaprine (FLEXERIL) 5 mg tablet Take 1 tablet (5 mg total) by mouth 3 (three) times a day as needed for muscle spasms for up to 10 days    dulaglutide (Trulicity) 2.81 ZU/9.7RN injection Inject 0.5 mL (0.75 mg total) under the skin every 7 days    famotidine (PEPCID) 20 mg tablet take 1 tablet by mouth twice a day before meals    glucose blood (OneTouch Verio) test strip as directed use 1 TEST STRIP to TEST BLOOD SUGAR four times a day    insulin aspart, w/niacinamide, (FIASP) 100 Units/mL injection pen Inject 10 Units under the skin 3 (three) times a day with meals    insulin glargine (LANTUS) 100 units/mL subcutaneous injection Inject 100 Units under the skin daily at bedtime    insulin lispro (HumaLOG) 100 units/mL injection pen Inject 10 Units under the skin 3 (three) times a day with meals    insulin lispro (HumaLOG) 100 units/mL injection pen inject 10 units subcutaneously three times a day with food    Insulin Pen Needle (BD Pen Needle Sadia 2nd Gen) 32G X 4 MM MISC Use 3 (three) times a day    lactulose (CHRONULAC) 10 g/15 mL solution take 30 milliliters by mouth once daily    metoprolol succinate (TOPROL-XL) 100 mg 24 hr tablet take 1 tablet by mouth once daily    nitroglycerin (NITROSTAT) 0.4 mg SL tablet Place 1 tablet (0.4 mg total) under the tongue every 5 (five) minutes as needed for chest pain If no relief after first dose call prescriber or 911    ondansetron (ZOFRAN) 8 mg tablet Take 1 tablet (8 mg total) by mouth every 8 (eight) hours as needed for nausea or vomiting for up to 14 days    OneTouch Delica Lancets 84P MISC Use 3 (three) times a day Test 3 times daily    rOPINIRole (REQUIP) 1 mg tablet One in am and two qhs    sertraline (ZOLOFT) 100 mg tablet Take 1 tablet (100 mg total) by mouth daily    temazepam (RESTORIL) 30 mg capsule take 1 capsule by mouth at bedtime if needed for sleep    dexlansoprazole (DEXILANT) 60 MG capsule Take 60 mg by mouth daily (Patient not taking: Reported on 8/29/2022)    glucose blood (Accu-Chek Guide) test strip Test bid (Patient not taking: Reported on 4/12/2023)    glucose blood (Accu-Chek Guide) test strip 1 each by Other route 4 (four) times a day Use as instructed (Patient not taking: Reported on 4/12/2023)    Insulin Glargine Solostar (Lantus SoloStar) 100 UNIT/ML SOPN Inject 1 mL (100 Units total) under the skin daily at bedtime (Patient not taking: Reported on 8/18/2023)    PeerTrader[employer] FINEPOINT LANCETS MISC Test 4 times daily (Patient not taking: Reported on 4/12/2023)    metoclopramide (REGLAN) 5 mg tablet Take 1 tablet (5 mg total) by mouth 3 (three) times a day (Patient not taking: Reported on 4/12/2023)    metoclopramide (REGLAN) 5 mg tablet Take 5 mg by mouth Three times daily as needed (Patient not taking: Reported on 4/12/2023)    pantoprazole (PROTONIX) 40 mg tablet Take 1 tablet (40 mg total) by mouth daily (Patient not taking: Reported on 10/27/2023)    rOPINIRole (REQUIP) 1 mg tablet Take 1 tablet (1 mg total) by mouth daily (Patient not taking: Reported on 4/12/2023)       Objective     /62   Pulse 76   Temp (!) 96.3 °F (35.7 °C)   Ht 5' 4" (1.626 m)   Wt 69.9 kg (154 lb)   SpO2 96%   BMI 26.43 kg/m²     Physical Exam  Vitals reviewed. Constitutional:       General: She is awake. She is in acute distress. Appearance: Normal appearance. She is well-developed. She is diaphoretic. She is not toxic-appearing. HENT:      Head: Normocephalic. Jaw: There is normal jaw occlusion. Right Ear: Tympanic membrane normal. Tympanic membrane is not erythematous or bulging. Left Ear: Tympanic membrane normal. Tympanic membrane is not erythematous or bulging. Nose: No congestion or rhinorrhea. Right Sinus: No maxillary sinus tenderness or frontal sinus tenderness. Left Sinus: No maxillary sinus tenderness or frontal sinus tenderness. Mouth/Throat:      Pharynx: Uvula midline. No oropharyngeal exudate, posterior oropharyngeal erythema or uvula swelling. Tonsils: No tonsillar exudate or tonsillar abscesses.    Eyes:      Extraocular Movements: Extraocular movements intact. Conjunctiva/sclera: Conjunctivae normal.      Pupils: Pupils are equal, round, and reactive to light. Neck:      Thyroid: No thyroid mass. Vascular: No JVD. Trachea: Trachea and phonation normal.   Cardiovascular:      Rate and Rhythm: Normal rate and regular rhythm. Pulses: Normal pulses. Heart sounds: Normal heart sounds. Pulmonary:      Effort: Pulmonary effort is normal. No tachypnea or respiratory distress. Breath sounds: Normal breath sounds and air entry. No decreased breath sounds, wheezing, rhonchi or rales. Chest:      Chest wall: No tenderness. Abdominal:      General: Bowel sounds are normal. There is no distension. Palpations: Abdomen is soft. Tenderness: There is no abdominal tenderness. There is no right CVA tenderness, left CVA tenderness, guarding or rebound. Musculoskeletal:         General: Normal range of motion. Cervical back: Normal range of motion. Right lower leg: No edema. Left lower leg: No edema. Lymphadenopathy:      Cervical: No cervical adenopathy. Skin:     General: Skin is warm. Findings: No rash. Neurological:      General: No focal deficit present. Mental Status: She is alert and oriented to person, place, and time. Sensory: Sensation is intact. Motor: Motor function is intact. Coordination: Coordination is intact. Gait: Gait is intact. Deep Tendon Reflexes: Reflexes are normal and symmetric. Psychiatric:         Attention and Perception: Attention normal.         Mood and Affect: Mood normal.         Speech: Speech normal.         Behavior: Behavior normal. Behavior is cooperative.          Cognition and Memory: Cognition normal.       RUBEN Dwyer

## 2023-10-27 NOTE — DISCHARGE INSTRUCTIONS
Return if symptoms worsen or if new symptoms develop  Call your PCP to discuss your diabetes regimen  Drink plenty of non-sugary fluids

## 2023-10-29 NOTE — ED CARE HANDOFF
Emergency Department Sign Out Note        Sign out and transfer of care from [facility]. See Separate Emergency Department note. The patient, Devan Roberts, was evaluated by the previous provider for dehydration and hyperglycemia. Workup Completed:  CBC, CMP, UA    ED Course / Workup Pending (followup): On examination:  The patient is awake, alert and oriented  HEENT: Normocephalic/atraumatic  External examination of the ears is unremarkable  Pupils are equal round and reactive to light, there is no conjunctival injection or scleral icterus noted  Nares are patent without rhinorrhea. The oropharynx is moist without injection  The neck is supple  Lungs: Equal chest rise  Heart: Good peripheral perfusion  Abdomen: Non distended  Musculoskeletal: Normal range of motion with grossly normal strength  Neuro: Nonfocal exam  Skin: No rash noted  Psych: Mood and affect normal    57-year-old female was brought in for dehydration and hyperglycemia. Patient was signed out to me just awaiting fluids. I spoke with the patient who felt significantly better after receiving IV hydration. She was eager to return home. Discussed warning signs and symptoms with the patient as well as when to return to the emergency department versus follow up with [facility]. Patient states understanding and agreement with the plan. Patient care delayed due to critical capacity in the emergency department. This note was completed using dictation software. Procedures  Medical Decision Making  Amount and/or Complexity of Data Reviewed  Labs: ordered.             Disposition  Final diagnoses:   Hyperglycemia   Dehydration     Time reflects when diagnosis was documented in both MDM as applicable and the Disposition within this note       Time User Action Codes Description Comment    10/27/2023  4:42 PM Guero Ornelas Add [R73.9] Hyperglycemia     10/27/2023  4:42 PM Luis Manuel Raza [E86.0] Dehydration           ED Disposition       ED Disposition   Discharge    Condition   Stable    Date/Time   Fri Oct 27, 2023  4:42 PM    Comment   Devan Roberts discharge to home/self care. Follow-up Information       Follow up With Specialties Details Why Contact Jose Tyson DO Family Medicine Schedule an appointment as soon as possible for a visit   [address] [place] [address]  [phone]            Discharge Medication List as of 10/27/2023  4:43 PM        CONTINUE these medications which have NOT CHANGED    Details   ! !  Accu-Chek FastClix Lancets MISC TEST THREE TIMES A DAY, Normal      amLODIPine (NORVASC) 5 mg tablet swallow 1 tablet once daily, Starting Wed 10/4/2023, Normal      anastrozole (ARIMIDEX) 1 mg tablet Take 1 mg by mouth daily, Starting Tue 8/18/2020, Historical Med      aspirin (ECOTRIN LOW STRENGTH) 81 mg EC tablet Take 81 mg by mouth daily, Historical Med      atorvastatin (LIPITOR) 40 mg tablet take 1 tablet by mouth once daily with dinner, Normal      benazepril (LOTENSIN) 20 mg tablet take 1 tablet by mouth once daily, Normal      betamethasone valerate (VALISONE) 0.1 % cream Apply topically 2 (two) times a day, Starting Mon 7/6/2020, Until Fri 10/27/2023, Historical Med      Blood Glucose Monitoring Suppl (OneTouch Verio Flex System) w/Device KIT Test four times daily, Normal      cyclobenzaprine (FLEXERIL) 5 mg tablet Take 1 tablet (5 mg total) by mouth 3 (three) times a day as needed for muscle spasms for up to 10 days, Starting Wed 4/12/2023, Until Fri 10/27/2023 at 2359, Normal      dexlansoprazole (DEXILANT) 60 MG capsule Take 60 mg by mouth daily, Historical Med      dulaglutide (Trulicity) 7.57 MW/5.6DC injection Inject 0.5 mL (0.75 mg total) under the skin every 7 days, Starting Wed 4/12/2023, Until Fri 10/27/2023, Normal      famotidine (PEPCID) 20 mg tablet take 1 tablet by mouth twice a day before meals, Starting Sat 8/5/2023, Normal      !! glucose blood (Accu-Chek Guide) test strip Test bid, Normal      !! glucose blood (Accu-Chek Guide) test strip 1 each by Other route 4 (four) times a day Use as instructed, Starting Tue 5/12/2020, Normal      !! glucose blood (OneTouch Verio) test strip as directed use 1 TEST STRIP to TEST BLOOD SUGAR four times a day, Normal      insulin aspart, w/niacinamide, (FIASP) 100 Units/mL injection pen Inject 10 Units under the skin 3 (three) times a day with meals, Starting Tue 7/11/2023, Normal      insulin glargine (LANTUS) 100 units/mL subcutaneous injection Inject 100 Units under the skin daily at bedtime, Starting Tue 7/11/2023, Normal      Insulin Glargine Solostar (Lantus SoloStar) 100 UNIT/ML SOPN Inject 1 mL (100 Units total) under the skin daily at bedtime, Starting Wed 4/26/2023, Normal      !! insulin lispro (HumaLOG) 100 units/mL injection pen Inject 10 Units under the skin 3 (three) times a day with meals, Starting Tue 12/7/2021, Normal      !! insulin lispro (HumaLOG) 100 units/mL injection pen inject 10 units subcutaneously three times a day with food, Normal      Insulin Pen Needle (BD Pen Needle Sadia 2nd Gen) 32G X 4 MM MISC Use 3 (three) times a day, Starting Wed 12/14/2022, Normal      lactulose (CHRONULAC) 10 g/15 mL solution take 30 milliliters by mouth once daily, Historical Med      !!  LIFESCAN FINEPOINT LANCETS MISC Test 4 times daily, Normal      !! metoclopramide (REGLAN) 5 mg tablet Take 1 tablet (5 mg total) by mouth 3 (three) times a day, Starting Mon 8/29/2022, Normal      !! metoclopramide (REGLAN) 5 mg tablet Take 5 mg by mouth Three times daily as needed, Starting Tue 7/26/2022, Historical Med      metoprolol succinate (TOPROL-XL) 100 mg 24 hr tablet take 1 tablet by mouth once daily, Normal      nitroglycerin (NITROSTAT) 0.4 mg SL tablet Place 1 tablet (0.4 mg total) under the tongue every 5 (five) minutes as needed for chest pain If no relief after first dose call prescriber or [phone], Starting u 6/8/2023, Normal      ondansetron (ZOFRAN) 8 mg tablet Take 1 tablet (8 mg total) by mouth every 8 (eight) hours as needed for nausea or vomiting for up to 14 days, Starting Wed 12/14/2022, Until Fri 10/27/2023 at 2359, Normal      !! OneTouch Delica Lancets 90J MISC Use 3 (three) times a day Test 3 times daily, Starting Wed 4/26/2023, Normal      pantoprazole (PROTONIX) 40 mg tablet Take 1 tablet (40 mg total) by mouth daily, Starting Mon 4/20/2020, Normal      !! rOPINIRole (REQUIP) 1 mg tablet Take 1 tablet (1 mg total) by mouth daily, Starting Wed 10/16/2019, Normal      !! rOPINIRole (REQUIP) 1 mg tablet One in am and two qhs, Historical Med      sertraline (ZOLOFT) 100 mg tablet Take 1 tablet (100 mg total) by mouth daily, Starting Fri 2/26/2021, Normal      temazepam (RESTORIL) 30 mg capsule take 1 capsule by mouth at bedtime if needed for sleep, Normal       !! - Potential duplicate medications found. Please discuss with provider.                ED Provider  Electronically Signed by     Wen Tony MD  10/29/23 [id number]

## 2023-11-01 NOTE — ED PROVIDER NOTES
History  Chief Complaint   Patient presents with    Vomiting     Sent from pcp. Off balance nausea / vomiting   1 month patient has been having these symptoms  Glucose has been running in 400s      63F presents with nausea vomiting over the last several days. Patient says that her blood sugars been elevated as well. Denies any fevers or chills. Endorses polyuria, polydipsia. History of insulin nondependent diabetes        Prior to Admission Medications   Prescriptions Last Dose Informant Patient Reported? Taking?    Accu-Chek FastClix Lancets MISC  Self No No   Sig: TEST THREE TIMES A DAY   Blood Glucose Monitoring Suppl (OneTouch Verio Flex System) w/Device KIT   No No   Sig: Test four times daily   Insulin Glargine Solostar (Lantus SoloStar) 100 UNIT/ML SOPN   No No   Sig: Inject 1 mL (100 Units total) under the skin daily at bedtime   Patient not taking: Reported on 8/18/2023   Insulin Pen Needle (BD Pen Needle Sadia 2nd Gen) 32G X 4 MM MISC   No No   Sig: Use 3 (three) times a day   Domob FINEPOINT LANCETS MISC  Self No No   Sig: Test 4 times daily   Patient not taking: Reported on 3/57/2514   OneTouch Delica Lancets 59Y MISC   No No   Sig: Use 3 (three) times a day Test 3 times daily   amLODIPine (NORVASC) 5 mg tablet   No No   Sig: swallow 1 tablet once daily   anastrozole (ARIMIDEX) 1 mg tablet  Self Yes No   Sig: Take 1 mg by mouth daily   aspirin (ECOTRIN LOW STRENGTH) 81 mg EC tablet  Self Yes No   Sig: Take 81 mg by mouth daily   atorvastatin (LIPITOR) 40 mg tablet   No No   Sig: take 1 tablet by mouth once daily with dinner   benazepril (LOTENSIN) 20 mg tablet   No No   Sig: take 1 tablet by mouth once daily   betamethasone valerate (VALISONE) 0.1 % cream  Self Yes No   Sig: Apply topically 2 (two) times a day   cyclobenzaprine (FLEXERIL) 5 mg tablet   No No   Sig: Take 1 tablet (5 mg total) by mouth 3 (three) times a day as needed for muscle spasms for up to 10 days   dexlansoprazole (DEXILANT) 60 MG capsule  Self Yes No   Sig: Take 60 mg by mouth daily   Patient not taking: Reported on 2022   dulaglutide (Trulicity) 4.35 GG/5.2SM injection   No No   Sig: Inject 0.5 mL (0.75 mg total) under the skin every 7 days   famotidine (PEPCID) 20 mg tablet   No No   Sig: take 1 tablet by mouth twice a day before meals   glucose blood (Accu-Chek Guide) test strip  Self No No   Sig: Test bid   Patient not taking: Reported on 2023   glucose blood (Accu-Chek Guide) test strip  Self No No   Si each by Other route 4 (four) times a day Use as instructed   Patient not taking: Reported on 2023   glucose blood (OneTouch Verio) test strip   No No   Sig: as directed use 1 TEST STRIP to TEST BLOOD SUGAR four times a day   insulin aspart, w/niacinamide, (FIASP) 100 Units/mL injection pen   No No   Sig: Inject 10 Units under the skin 3 (three) times a day with meals   insulin glargine (LANTUS) 100 units/mL subcutaneous injection   No No   Sig: Inject 100 Units under the skin daily at bedtime   insulin lispro (HumaLOG) 100 units/mL injection pen  Self No No   Sig: Inject 10 Units under the skin 3 (three) times a day with meals   insulin lispro (HumaLOG) 100 units/mL injection pen   No No   Sig: inject 10 units subcutaneously three times a day with food   lactulose (CHRONULAC) 10 g/15 mL solution   Yes No   Sig: take 30 milliliters by mouth once daily   metoclopramide (REGLAN) 5 mg tablet   No No   Sig: Take 1 tablet (5 mg total) by mouth 3 (three) times a day   Patient not taking: Reported on 2023   metoclopramide (REGLAN) 5 mg tablet   Yes No   Sig: Take 5 mg by mouth Three times daily as needed   Patient not taking: Reported on 2023   metoprolol succinate (TOPROL-XL) 100 mg 24 hr tablet   No No   Sig: take 1 tablet by mouth once daily   nitroglycerin (NITROSTAT) 0.4 mg SL tablet   No No   Sig: Place 1 tablet (0.4 mg total) under the tongue every 5 (five) minutes as needed for chest pain If no relief after first dose call prescriber or 911   ondansetron (ZOFRAN) 8 mg tablet   No No   Sig: Take 1 tablet (8 mg total) by mouth every 8 (eight) hours as needed for nausea or vomiting for up to 14 days   pantoprazole (PROTONIX) 40 mg tablet  Self No No   Sig: Take 1 tablet (40 mg total) by mouth daily   Patient not taking: Reported on 10/27/2023   rOPINIRole (REQUIP) 1 mg tablet  Self No No   Sig: Take 1 tablet (1 mg total) by mouth daily   Patient not taking: Reported on 4/12/2023   rOPINIRole (REQUIP) 1 mg tablet  Self Yes No   Sig: One in am and two qhs   sertraline (ZOLOFT) 100 mg tablet  Self No No   Sig: Take 1 tablet (100 mg total) by mouth daily   temazepam (RESTORIL) 30 mg capsule   No No   Sig: take 1 capsule by mouth at bedtime if needed for sleep      Facility-Administered Medications: None       Past Medical History:   Diagnosis Date    CAD (coronary artery disease)     Cancer ([address])     right breast    Chronic back pain     Coronary artery disease     Diabetes mellitus ([address])     Family history of early CAD     Hyperlipidemia     Hypertension     Myocardial infarction ([address])     NSTEMI (non-ST elevated myocardial infarction) ([address]) 11/28/2018    Status post coronary angioplasty 1st obtuse marginal 11/28/2018 at [facility] stent could not be placed    Restless leg syndrome     Tuberculosis     patient was less than 11years old    Type 2 diabetes mellitus ([address])        Past Surgical History:   Procedure Laterality Date    ABDOMINAL SURGERY      phill    ANKLE SURGERY      tubal    BREAST SURGERY      partial mastectomy R breast    CARDIAC CATHETERIZATION  11/2018    Successful balloon angioplassty to OM1    CHOLECYSTECTOMY      FRACTURE SURGERY      right ankle    GALLBLADDER SURGERY      MASTECTOMY      TUBAL LIGATION         Family History   Problem Relation Age of Onset    Breast cancer Mother     Diabetes Mother     Heart failure Mother     Heart disease Father     Stroke Father      I have reviewed and agree with the history as documented. E-Cigarette/Vaping    E-Cigarette Use Never User      E-Cigarette/Vaping Substances    Nicotine No     THC No     CBD No     Flavoring No     Other No     Unknown No      Social History     Tobacco Use    Smoking status: Never    Smokeless tobacco: Never   Vaping Use    Vaping Use: Never used   Substance Use Topics    Alcohol use: Yes     Comment: occasionally    Drug use: No       Review of Systems    Physical Exam  Physical Exam  Vitals and nursing note reviewed. Constitutional:       Appearance: Normal appearance. She is well-developed. HENT:      Head: Normocephalic and atraumatic. Mouth/Throat:      Mouth: Mucous membranes are dry. Eyes:      Conjunctiva/sclera: Conjunctivae normal.      Pupils: Pupils are equal, round, and reactive to light. Neck:      Trachea: No tracheal deviation. Cardiovascular:      Rate and Rhythm: Normal rate and regular rhythm. Heart sounds: Normal heart sounds. No murmur heard. Pulmonary:      Effort: Pulmonary effort is normal. No respiratory distress. Breath sounds: Normal breath sounds. No wheezing or rales. Abdominal:      General: Bowel sounds are normal. There is no distension. Palpations: Abdomen is soft. Tenderness: There is no abdominal tenderness. Musculoskeletal:         General: No deformity. Cervical back: Normal range of motion and neck supple. Skin:     General: Skin is warm and dry. Capillary Refill: Capillary refill takes less than 2 seconds. Neurological:      General: No focal deficit present. Mental Status: She is alert and oriented to person, place, and time. Sensory: No sensory deficit.    Psychiatric:         Mood and Affect: Mood normal.         Judgment: Judgment normal.         Vital Signs  ED Triage Vitals [10/27/23 1408]   Temperature Pulse Respirations Blood Pressure SpO2   97.5 °F (36.4 °C) 76 18 122/75 95 %      Temp Source Heart Rate Source Patient Position - Orthostatic VS BP Location FiO2 (%)   Temporal Monitor Sitting Left arm --      Pain Score       --           Vitals:    10/27/23 1408   BP: 122/75   Pulse: 76   Patient Position - Orthostatic VS: Sitting         Visual Acuity      ED Medications  Medications   sodium chloride 0.9 % bolus 1,000 mL (0 mL Intravenous Stopped 10/27/23 1624)   sodium chloride 0.9 % bolus 1,000 mL (0 mL Intravenous Stopped 10/27/23 1736)       Diagnostic Studies  Results Reviewed       Procedure Component Value Units Date/Time    Fingerstick Glucose (POCT) [[id number]]  (Abnormal) Collected: 10/27/23 1648    Lab Status: Final result Updated: 10/27/23 1649     POC Glucose 265 mg/dl     Basic metabolic panel [[id number]]  (Abnormal) Collected: 10/27/23 1520    Lab Status: Final result Specimen: Blood from Arm, Left Updated: 10/27/23 1551     Sodium 131 mmol/L      Potassium 4.4 mmol/L      Chloride 94 mmol/L      CO2 26 mmol/L      ANION GAP 11 mmol/L      BUN 30 mg/dL      Creatinine 1.54 mg/dL      Glucose 326 mg/dL      Calcium 9.9 mg/dL      eGFR 35 ml/min/1.73sq m     Narrative:      [place] guidelines for Chronic Kidney Disease (CKD):     Stage 1 with normal or high GFR (GFR > 90 mL/min/1.73 square meters)    Stage 2 Mild CKD (GFR = 60-89 mL/min/1.73 square meters)    Stage 3A Moderate CKD (GFR = 45-59 mL/min/1.73 square meters)    Stage 3B Moderate CKD (GFR = 30-44 mL/min/1.73 square meters)    Stage 4 Severe CKD (GFR = 15-29 mL/min/1.73 square meters)    Stage 5 End Stage CKD (GFR <15 mL/min/1.73 square meters)  Note: GFR calculation is accurate only with a steady state creatinine    Urine Microscopic [[id number]]  (Abnormal) Collected: 10/27/23 1515    Lab Status: Final result Specimen: Urine, Clean Catch Updated: 10/27/23 1543     RBC, UA None Seen /hpf      WBC, UA 1-2 /hpf      Epithelial Cells Occasional /hpf      Bacteria, UA Occasional /hpf      Hyaline Casts, UA 1-2 /lpf      Ca Oxalate Hazel, UA Occasional /hpf      MUCUS THREADS Occasional    UA w Reflex to Microscopic w Reflex to Culture [[id number]]  (Abnormal) Collected: 10/27/23 1515    Lab Status: Final result Specimen: Urine, Clean Catch Updated: 10/27/23 1534     Color, UA Yellow     Clarity, UA Clear     Specific Gravity, UA >=1.030     pH, UA 5.0     Leukocytes, UA Negative     Nitrite, UA Negative     Protein, UA Trace mg/dl      Glucose, UA 2+ mg/dl      Ketones, UA Trace mg/dl      Urobilinogen, UA 0.2 E.U./dl      Bilirubin, UA Negative     Occult Blood, UA Negative    CBC and differential [[id number]]  (Abnormal) Collected: 10/27/23 1520    Lab Status: Final result Specimen: Blood from Arm, Left Updated: 10/27/23 1533     WBC 9.60 Thousand/uL      RBC 5.24 Million/uL      Hemoglobin 15.9 g/dL      Hematocrit 49.1 %      MCV 94 fL      MCH 30.3 pg      MCHC 32.4 g/dL      RDW 13.4 %      MPV 9.8 fL      Platelets 568 Thousands/uL      nRBC 0 /100 WBCs      Neutrophils Relative 59 %      Immat GRANS % 0 %      Lymphocytes Relative 35 %      Monocytes Relative 5 %      Eosinophils Relative 1 %      Basophils Relative 0 %      Neutrophils Absolute 5.62 Thousands/µL      Immature Grans Absolute 0.02 Thousand/uL      Lymphocytes Absolute 3.32 Thousands/µL      Monocytes Absolute 0.49 Thousand/µL      Eosinophils Absolute 0.13 Thousand/µL      Basophils Absolute 0.02 Thousands/µL     Fingerstick Glucose (POCT) [[id number]]  (Abnormal) Collected: 10/27/23 1414    Lab Status: Final result Updated: 10/27/23 1414     POC Glucose 374 mg/dl                    No orders to display              Procedures  Procedures         ED Course       Patient feeling better, tolerating p.o. Blood sugar under control. Given additional IV fluid, recommend she call her PCP for insulin change. Counseled on proper diet                        SBIRT 20yo+      Flowsheet Row Most Recent Value   Initial Alcohol Screen: US AUDIT-C     1.  How often do you have a drink containing alcohol? 1 Filed at: 10/27/2023 1408   2. How many drinks containing alcohol do you have on a typical day you are drinking? 1 Filed at: 10/27/2023 1408   3a. Male UNDER 65: How often do you have five or more drinks on one occasion? 0 Filed at: 10/27/2023 1408   3b. FEMALE Any Age, or MALE 65+: How often do you have 4 or more drinks on one occassion? 0 Filed at: 10/27/2023 1408   Audit-C Score 2 Filed at: 10/27/2023 1408   CUCA: How many times in the past year have you. .. Used an illegal drug or used a prescription medication for non-medical reasons? Never Filed at: 10/27/2023 1408                      Medical Decision Making  66-year-old female presenting with symptoms consistent with dehydration. Elevated blood sugar. Possible DKA although clinically unlikely. We will get chemistry look for acidosis, check urine for ketones, will give IV fluid. We will get chemistry look for electrolyte abnormality. Clinically doubt infection. Problems Addressed:  Dehydration: acute illness or injury  Hyperglycemia: acute illness or injury    Amount and/or Complexity of Data Reviewed  Labs: ordered. Decision-making details documented in ED Course. Risk  Prescription drug management. Drug therapy requiring intensive monitoring for toxicity. Decision regarding hospitalization. Disposition  Final diagnoses:   Hyperglycemia   Dehydration     Time reflects when diagnosis was documented in both MDM as applicable and the Disposition within this note       Time User Action Codes Description Comment    10/27/2023  4:42 PM Francesco Boles Add [R73.9] Hyperglycemia     10/27/2023  4:42 PM Francesco Boles Add [E86.0] Dehydration           ED Disposition       ED Disposition   Discharge    Condition   Stable    Date/Time   Fri Oct 27, 2023 [address] [facility] ([place], [place]) discharge to home/self care.                    Follow-up Information       Follow up With Specialties Details Why Contact Jose Hart DO Family Medicine Schedule an appointment as soon as possible for a visit   [address] [facility] [place] [id number] [phone]              Discharge Medication List as of 10/27/2023  4:43 PM        CONTINUE these medications which have NOT CHANGED    Details   ! !  Accu-Chek FastClix Lancets MISC TEST THREE TIMES A DAY, Normal      amLODIPine (NORVASC) 5 mg tablet swallow 1 tablet once daily, Starting Wed 10/4/2023, Normal      anastrozole (ARIMIDEX) 1 mg tablet Take 1 mg by mouth daily, Starting Tue 8/18/2020, Historical Med      aspirin (ECOTRIN LOW STRENGTH) 81 mg EC tablet Take 81 mg by mouth daily, Historical Med      atorvastatin (LIPITOR) 40 mg tablet take 1 tablet by mouth once daily with dinner, Normal      benazepril (LOTENSIN) 20 mg tablet take 1 tablet by mouth once daily, Normal      betamethasone valerate (VALISONE) 0.1 % cream Apply topically 2 (two) times a day, Starting Mon 7/6/2020, Until Fri 10/27/2023, Historical Med      Blood Glucose Monitoring Suppl (OneTouch Verio Flex System) w/Device KIT Test four times daily, Normal      cyclobenzaprine (FLEXERIL) 5 mg tablet Take 1 tablet (5 mg total) by mouth 3 (three) times a day as needed for muscle spasms for up to 10 days, Starting Wed 4/12/2023, Until Fri 10/27/2023 at 2359, Normal      dexlansoprazole (DEXILANT) 60 MG capsule Take 60 mg by mouth daily, Historical Med      dulaglutide (Trulicity) 1.90 SI/9.6SH injection Inject 0.5 mL (0.75 mg total) under the skin every 7 days, Starting Wed 4/12/2023, Until Fri 10/27/2023, Normal      famotidine (PEPCID) 20 mg tablet take 1 tablet by mouth twice a day before meals, Starting Sat 8/5/2023, Normal      !! glucose blood (Accu-Chek Guide) test strip Test bid, Normal      !! glucose blood (Accu-Chek Guide) test strip 1 each by Other route 4 (four) times a day Use as instructed, Starting Tue 5/12/2020, Normal      !! glucose blood (OneTouch Verio) test strip as directed use 1 TEST STRIP to TEST BLOOD SUGAR four times a day, Normal      insulin aspart, w/niacinamide, (FIASP) 100 Units/mL injection pen Inject 10 Units under the skin 3 (three) times a day with meals, Starting Tue 7/11/2023, Normal      insulin glargine (LANTUS) 100 units/mL subcutaneous injection Inject 100 Units under the skin daily at bedtime, Starting Tue 7/11/2023, Normal      Insulin Glargine Solostar (Lantus SoloStar) 100 UNIT/ML SOPN Inject 1 mL (100 Units total) under the skin daily at bedtime, Starting Wed 4/26/2023, Normal      !! insulin lispro (HumaLOG) 100 units/mL injection pen Inject 10 Units under the skin 3 (three) times a day with meals, Starting Tue 12/7/2021, Normal      !! insulin lispro (HumaLOG) 100 units/mL injection pen inject 10 units subcutaneously three times a day with food, Normal      Insulin Pen Needle (BD Pen Needle Sadia 2nd Gen) 32G X 4 MM MISC Use 3 (three) times a day, Starting Wed 12/14/2022, Normal      lactulose (CHRONULAC) 10 g/15 mL solution take 30 milliliters by mouth once daily, Historical Med      !! LIFESCAN FINEPOINT LANCETS MISC Test 4 times daily, Normal      !! metoclopramide (REGLAN) 5 mg tablet Take 1 tablet (5 mg total) by mouth 3 (three) times a day, Starting Mon 8/29/2022, Normal      !! metoclopramide (REGLAN) 5 mg tablet Take 5 mg by mouth Three times daily as needed, Starting Tue 7/26/2022, Historical Med      metoprolol succinate (TOPROL-XL) 100 mg 24 hr tablet take 1 tablet by mouth once daily, Normal      nitroglycerin (NITROSTAT) 0.4 mg SL tablet Place 1 tablet (0.4 mg total) under the tongue every 5 (five) minutes as needed for chest pain If no relief after first dose call prescriber or 911, Starting u 6/8/2023, Normal      ondansetron (ZOFRAN) 8 mg tablet Take 1 tablet (8 mg total) by mouth every 8 (eight) hours as needed for nausea or vomiting for up to 14 days, Starting Wed 12/14/2022, Until Fri 10/27/2023 at 2359, Normal      !!  OneTouch Delica Lancets 08C MISC Use 3 (three) times a day Test 3 times daily, Starting Wed 4/26/2023, Normal      pantoprazole (PROTONIX) 40 mg tablet Take 1 tablet (40 mg total) by mouth daily, Starting Mon 4/20/2020, Normal      !! rOPINIRole (REQUIP) 1 mg tablet Take 1 tablet (1 mg total) by mouth daily, Starting Wed 10/16/2019, Normal      !! rOPINIRole (REQUIP) 1 mg tablet One in am and two qhs, Historical Med      sertraline (ZOLOFT) 100 mg tablet Take 1 tablet (100 mg total) by mouth daily, Starting Fri 2/26/2021, Normal      temazepam (RESTORIL) 30 mg capsule take 1 capsule by mouth at bedtime if needed for sleep, Normal       !! - Potential duplicate medications found. Please discuss with provider.               PDMP Review         Value Time User    PDMP Reviewed  Yes 12/3/2021  4:16 PM Liz Verde MD            ED Provider  Electronically Signed by             Adams Subramanian DO  11/01/23 0504

## 2023-11-02 DIAGNOSIS — E11.9 TYPE 2 DIABETES MELLITUS WITHOUT COMPLICATION, WITH LONG-TERM CURRENT USE OF INSULIN (HCC): ICD-10-CM

## 2023-11-02 DIAGNOSIS — E11.9 TYPE 2 DIABETES MELLITUS WITHOUT COMPLICATION, WITH LONG-TERM CURRENT USE OF INSULIN (HCC): Chronic | ICD-10-CM

## 2023-11-02 DIAGNOSIS — Z79.4 TYPE 2 DIABETES MELLITUS WITHOUT COMPLICATION, WITH LONG-TERM CURRENT USE OF INSULIN (HCC): Chronic | ICD-10-CM

## 2023-11-02 DIAGNOSIS — Z79.4 TYPE 2 DIABETES MELLITUS WITHOUT COMPLICATION, WITH LONG-TERM CURRENT USE OF INSULIN (HCC): ICD-10-CM

## 2023-11-02 RX ORDER — INSULIN LISPRO 100 [IU]/ML
INJECTION, SOLUTION INTRAVENOUS; SUBCUTANEOUS
Qty: 6 ML | Refills: 1 | Status: SHIPPED | OUTPATIENT
Start: 2023-11-02

## 2023-11-02 RX ORDER — INSULIN LISPRO 100 [IU]/ML
10 INJECTION, SOLUTION INTRAVENOUS; SUBCUTANEOUS
Qty: 27 ML | Refills: 2 | Status: SHIPPED | OUTPATIENT
Start: 2023-11-02

## 2023-11-03 ENCOUNTER — OFFICE VISIT (OUTPATIENT)
Dept: FAMILY MEDICINE CLINIC | Facility: CLINIC | Age: 63
End: 2023-11-03
Payer: COMMERCIAL

## 2023-11-03 ENCOUNTER — APPOINTMENT (OUTPATIENT)
Dept: LAB | Facility: CLINIC | Age: 63
End: 2023-11-03
Payer: COMMERCIAL

## 2023-11-03 VITALS
DIASTOLIC BLOOD PRESSURE: 88 MMHG | TEMPERATURE: 96.9 F | BODY MASS INDEX: 27.68 KG/M2 | HEART RATE: 79 BPM | HEIGHT: 64 IN | OXYGEN SATURATION: 97 % | WEIGHT: 162.13 LBS | SYSTOLIC BLOOD PRESSURE: 128 MMHG

## 2023-11-03 DIAGNOSIS — E11.65 UNCONTROLLED TYPE 2 DIABETES MELLITUS WITH HYPERGLYCEMIA (HCC): ICD-10-CM

## 2023-11-03 DIAGNOSIS — N17.9 AKI (ACUTE KIDNEY INJURY) (HCC): ICD-10-CM

## 2023-11-03 DIAGNOSIS — E11.65 UNCONTROLLED TYPE 2 DIABETES MELLITUS WITH HYPERGLYCEMIA (HCC): Primary | ICD-10-CM

## 2023-11-03 DIAGNOSIS — B37.0 ORAL THRUSH: ICD-10-CM

## 2023-11-03 LAB
ALBUMIN SERPL BCP-MCNC: 3.9 G/DL (ref 3.5–5)
ALP SERPL-CCNC: 92 U/L (ref 34–104)
ALT SERPL W P-5'-P-CCNC: 18 U/L (ref 7–52)
ANION GAP SERPL CALCULATED.3IONS-SCNC: 8 MMOL/L
AST SERPL W P-5'-P-CCNC: 21 U/L (ref 13–39)
BILIRUB SERPL-MCNC: 0.59 MG/DL (ref 0.2–1)
BUN SERPL-MCNC: 11 MG/DL (ref 5–25)
CALCIUM SERPL-MCNC: 9.1 MG/DL (ref 8.4–10.2)
CHLORIDE SERPL-SCNC: 102 MMOL/L (ref 96–108)
CO2 SERPL-SCNC: 28 MMOL/L (ref 21–32)
CREAT SERPL-MCNC: 0.91 MG/DL (ref 0.6–1.3)
GFR SERPL CREATININE-BSD FRML MDRD: 67 ML/MIN/1.73SQ M
GLUCOSE SERPL-MCNC: 250 MG/DL (ref 65–140)
POTASSIUM SERPL-SCNC: 4.1 MMOL/L (ref 3.5–5.3)
PROT SERPL-MCNC: 7.5 G/DL (ref 6.4–8.4)
SODIUM SERPL-SCNC: 138 MMOL/L (ref 135–147)

## 2023-11-03 PROCEDURE — 36415 COLL VENOUS BLD VENIPUNCTURE: CPT

## 2023-11-03 PROCEDURE — 99214 OFFICE O/P EST MOD 30 MIN: CPT

## 2023-11-03 PROCEDURE — 80053 COMPREHEN METABOLIC PANEL: CPT

## 2023-11-03 NOTE — PROGRESS NOTES
Name: Karl Patrick      : 1960      MRN: [id number]  Encounter Provider: RUBEN Tabor  Encounter Date: 11/3/2023   Encounter department: [address] [place] [address]     1. Uncontrolled type 2 diabetes mellitus with hyperglycemia ([address])  Comments:  Most recent A1c 14.1%. BS ranging from 90s to 400s. Currently on short-acting, long-acting and GLP1. Consider CGM. Check sugars 2 hours after meals and keep log  Orders:  -     Comprehensive metabolic panel; Future  -     Ambulatory Referral to Endocrinology; Future  -     Ambulatory Referral to Nutrition Services; Future    2. PREMA (acute kidney injury) ([address])  Comments:  Cr 1.54 on recent hospital visit 10/27/23 in s/o hypovolemia. Recheck labs today. Orders:  -     Comprehensive metabolic panel; Future    3. Oral thrush  -     nystatin (MYCOSTATIN) 500,000 units/5 mL suspension; Apply 5 mL (500,000 Units total) to the mouth or throat 4 (four) times a day for 7 days    Uncontrolled DM2 - currently utilizing 100 units of long-acting HS and 10 units fast-acting TID with meals. Trulicity once a week. Discussed keeping log of sugars in AM, with meals, and 2 hours after meals. Insulin correction factor 13.8 with correction dose of  7.9. Will adjust pending sugar log. Obtain CMP - check for resolution of PREMA. Discussed diet and meal choices - patient states her culture consumes a lot of carbohydrates and she is trying to limit these choices. Discussed ER precautions related to both hyperglycemia & hypoglycemia. Lela Chaney presents in office today for ER follow-up. She is accompanied by her , Eduardo Guillen, and daughter who is on the phone. Morena Cristobal was seen at the [facility] on 10/27/23 for hyperglycemia. She expressed nausea, dizziness and overall fatigue at her office visit prior to this. She was found to have a blood sugar that peaked at 374 and an PREMA of 1.54.  She received 2L NS - trace ketones found in urine, no acidosis. Nereyda Sujey states today she is still feeling nauseous. She reports she has not been drinking enough water and knows she is dehydrated. Her blood sugar was 183 this AM when she woke up. She states yesterday she had porridge for breakfast, skipped lunch, and then ate a hamburger and hotdog for dinner. She reports one day this week her sugar was 91 when she woke up - states she felt fine. She states she has no "comfortable" blood sugar level and that her sugars range from 90s to 400s. Review of Systems   Constitutional:  Negative for chills, fatigue and fever. HENT:  Negative for congestion, ear pain, facial swelling, hearing loss, rhinorrhea, sinus pressure, sneezing, sore throat and trouble swallowing. Eyes:  Negative for pain, redness and visual disturbance. Respiratory:  Negative for cough, chest tightness, shortness of breath and wheezing. Cardiovascular:  Negative for chest pain and palpitations. Gastrointestinal:  Positive for nausea. Negative for abdominal pain, diarrhea and vomiting. Genitourinary:  Negative for dysuria, flank pain, hematuria and pelvic pain. Musculoskeletal:  Negative for arthralgias, back pain and myalgias. Skin:  Negative for color change and rash. Neurological:  Negative for dizziness, seizures, syncope, weakness, light-headedness and headaches. Psychiatric/Behavioral:  Negative for confusion, hallucinations and sleep disturbance. The patient is not nervous/anxious. All other systems reviewed and are negative.       Current Outpatient Medications on File Prior to Visit   Medication Sig    Accu-Chek FastClix Lancets MISC TEST THREE TIMES A DAY    amLODIPine (NORVASC) 5 mg tablet swallow 1 tablet once daily    anastrozole (ARIMIDEX) 1 mg tablet Take 1 mg by mouth daily    aspirin (ECOTRIN LOW STRENGTH) 81 mg EC tablet Take 81 mg by mouth daily    atorvastatin (LIPITOR) 40 mg tablet take 1 tablet by mouth once daily with dinner    benazepril (LOTENSIN) 20 mg tablet take 1 tablet by mouth once daily    betamethasone valerate (VALISONE) 0.1 % cream Apply topically 2 (two) times a day    Blood Glucose Monitoring Suppl (OneTouch Verio Flex System) w/Device KIT Test four times daily    cyclobenzaprine (FLEXERIL) 5 mg tablet Take 1 tablet (5 mg total) by mouth 3 (three) times a day as needed for muscle spasms for up to 10 days    dulaglutide (Trulicity) 5.56 FS/3.3DJ injection Inject 0.5 mL (0.75 mg total) under the skin every 7 days    famotidine (PEPCID) 20 mg tablet take 1 tablet by mouth twice a day before meals    glucose blood (OneTouch Verio) test strip as directed use 1 TEST STRIP to TEST BLOOD SUGAR four times a day    insulin aspart, w/niacinamide, (FIASP) 100 Units/mL injection pen Inject 10 Units under the skin 3 (three) times a day with meals    insulin glargine (LANTUS) 100 units/mL subcutaneous injection Inject 100 Units under the skin daily at bedtime    insulin lispro (HumaLOG) 100 units/mL injection pen inject 10 units subcutaneously three times a day with food    insulin lispro (HumaLOG) 100 units/mL injection pen Inject 10 Units under the skin 3 (three) times a day with meals    Insulin Pen Needle (BD Pen Needle Sadia 2nd Gen) 32G X 4 MM MISC Use 3 (three) times a day    lactulose (CHRONULAC) 10 g/15 mL solution take 30 milliliters by mouth once daily    metoprolol succinate (TOPROL-XL) 100 mg 24 hr tablet take 1 tablet by mouth once daily    nitroglycerin (NITROSTAT) 0.4 mg SL tablet Place 1 tablet (0.4 mg total) under the tongue every 5 (five) minutes as needed for chest pain If no relief after first dose call prescriber or 911    ondansetron (ZOFRAN) 8 mg tablet Take 1 tablet (8 mg total) by mouth every 8 (eight) hours as needed for nausea or vomiting for up to 14 days    OneTouch Delica Lancets 97C MISC Use 3 (three) times a day Test 3 times daily    rOPINIRole (REQUIP) 1 mg tablet Take 1 tablet (1 mg total) by mouth daily    rOPINIRole (REQUIP) 1 mg tablet One in am and two qhs    sertraline (ZOLOFT) 100 mg tablet Take 1 tablet (100 mg total) by mouth daily    temazepam (RESTORIL) 30 mg capsule take 1 capsule by mouth at bedtime if needed for sleep    dexlansoprazole (DEXILANT) 60 MG capsule Take 60 mg by mouth daily (Patient not taking: Reported on 8/29/2022)    glucose blood (Accu-Chek Guide) test strip Test bid (Patient not taking: Reported on 4/12/2023)    glucose blood (Accu-Chek Guide) test strip 1 each by Other route 4 (four) times a day Use as instructed (Patient not taking: Reported on 4/12/2023)    Insulin Glargine Solostar (Lantus SoloStar) 100 UNIT/ML SOPN Inject 1 mL (100 Units total) under the skin daily at bedtime (Patient not taking: Reported on 8/18/2023)    Recommendi FINEPOINT LANCETS MISC Test 4 times daily (Patient not taking: Reported on 4/12/2023)    metoclopramide (REGLAN) 5 mg tablet Take 1 tablet (5 mg total) by mouth 3 (three) times a day (Patient not taking: Reported on 4/12/2023)    metoclopramide (REGLAN) 5 mg tablet Take 5 mg by mouth Three times daily as needed (Patient not taking: Reported on 4/12/2023)    pantoprazole (PROTONIX) 40 mg tablet Take 1 tablet (40 mg total) by mouth daily (Patient not taking: Reported on 10/27/2023)       Objective     /88   Pulse 79   Temp (!) 96.9 °F (36.1 °C)   Ht 5' 4" (1.626 m)   Wt 73.5 kg (162 lb 2 oz)   SpO2 97%   BMI 27.83 kg/m²     Physical Exam  Vitals reviewed. Constitutional:       General: She is awake. She is not in acute distress. Appearance: Normal appearance. She is well-developed. She is not toxic-appearing. HENT:      Head: Normocephalic. Jaw: There is normal jaw occlusion. Right Ear: Tympanic membrane normal. Tympanic membrane is not erythematous or bulging. Left Ear: Tympanic membrane normal. Tympanic membrane is not erythematous or bulging. Nose: No congestion or rhinorrhea.       Right Sinus: No maxillary sinus tenderness or frontal sinus tenderness. Left Sinus: No maxillary sinus tenderness or frontal sinus tenderness. Mouth/Throat:      Tongue: Lesions (thrush present) present. Pharynx: Uvula midline. No oropharyngeal exudate, posterior oropharyngeal erythema or uvula swelling. Tonsils: No tonsillar exudate or tonsillar abscesses. Eyes:      Extraocular Movements: Extraocular movements intact. Conjunctiva/sclera: Conjunctivae normal.      Pupils: Pupils are equal, round, and reactive to light. Neck:      Thyroid: No thyroid mass. Vascular: No JVD. Trachea: Trachea and phonation normal.   Cardiovascular:      Rate and Rhythm: Normal rate and regular rhythm. Pulses: Normal pulses. Heart sounds: Normal heart sounds. Pulmonary:      Effort: Pulmonary effort is normal. No tachypnea or respiratory distress. Breath sounds: Normal breath sounds and air entry. No decreased breath sounds, wheezing, rhonchi or rales. Chest:      Chest wall: No tenderness. Abdominal:      General: Bowel sounds are normal. There is no distension. Palpations: Abdomen is soft. Tenderness: There is no abdominal tenderness. There is no right CVA tenderness, left CVA tenderness, guarding or rebound. Musculoskeletal:         General: Normal range of motion. Cervical back: Normal range of motion. Right lower leg: No edema. Left lower leg: No edema. Lymphadenopathy:      Cervical: No cervical adenopathy. Skin:     General: Skin is warm and dry. Findings: No rash. Neurological:      General: No focal deficit present. Mental Status: She is alert and oriented to person, place, and time. Sensory: Sensation is intact. Motor: Motor function is intact. Coordination: Coordination is intact. Gait: Gait is intact. Deep Tendon Reflexes: Reflexes are normal and symmetric.    Psychiatric:         Attention and Perception: Attention normal.         Mood and Affect: Mood normal.         Speech: Speech normal.         Behavior: Behavior normal. Behavior is cooperative.          Cognition and Memory: Cognition normal.       RUBEN Joyner

## 2023-11-17 ENCOUNTER — OFFICE VISIT (OUTPATIENT)
Dept: FAMILY MEDICINE CLINIC | Facility: CLINIC | Age: 63
End: 2023-11-17
Payer: COMMERCIAL

## 2023-11-17 VITALS
BODY MASS INDEX: 27.49 KG/M2 | WEIGHT: 161 LBS | SYSTOLIC BLOOD PRESSURE: 144 MMHG | OXYGEN SATURATION: 98 % | HEIGHT: 64 IN | HEART RATE: 76 BPM | TEMPERATURE: 96.6 F | DIASTOLIC BLOOD PRESSURE: 82 MMHG

## 2023-11-17 DIAGNOSIS — Z23 ENCOUNTER FOR IMMUNIZATION: ICD-10-CM

## 2023-11-17 DIAGNOSIS — R07.9 CHEST PAIN, UNSPECIFIED TYPE: ICD-10-CM

## 2023-11-17 DIAGNOSIS — E11.65 UNCONTROLLED TYPE 2 DIABETES MELLITUS WITH HYPERGLYCEMIA (HCC): Primary | ICD-10-CM

## 2023-11-17 DIAGNOSIS — G47.09 OTHER INSOMNIA: ICD-10-CM

## 2023-11-17 DIAGNOSIS — Z12.4 SCREENING FOR CERVICAL CANCER: ICD-10-CM

## 2023-11-17 PROCEDURE — 93000 ELECTROCARDIOGRAM COMPLETE: CPT

## 2023-11-17 PROCEDURE — 99214 OFFICE O/P EST MOD 30 MIN: CPT

## 2023-11-17 RX ORDER — TRAZODONE HYDROCHLORIDE 50 MG/1
50 TABLET ORAL
Qty: 30 TABLET | Refills: 5 | Status: SHIPPED | OUTPATIENT
Start: 2023-11-17

## 2023-11-17 NOTE — PROGRESS NOTES
Name: Marilyn Mcallister      : 1960      MRN: [id number]  Encounter Provider: RUBEN Young  Encounter Date: 2023   Encounter department: [address] [place] [address] 60     1. Uncontrolled type 2 diabetes mellitus with hyperglycemia ([address])  Comments:  Inconsistent sugars ranging from  in a week's span. Inconsistent insulin use with meals. A1c up to 14.1%. Referral to Endo. Need for CGM. Orders:  -     IRIS Diabetic eye exam    2. Other insomnia  Comments:  Reports trouble sleeping at night and believes chest pain can be anxiety. Orders:  -     traZODone (DESYREL) 50 mg tablet; Take 1 tablet (50 mg total) by mouth daily at bedtime    3. Chest pain, unspecified type  Comments:  Resolved per patient. No red flags on exam. EKG NSR - compared with prior EKG in . Discussed strict ER precautions. Orders:  -     POCT ECG    4. Encounter for immunization  -     Pneumococcal Conjugate Vaccine 20-valent (PCV20)  -     influenza vaccine, quadrivalent, 0.5 mL, preservative-free, for adult and pediatric patients 6 mos+ (AFLURIA, FLUARIX, FLULAVAL, FLUZONE)    5. Screening for cervical cancer  -     Liquid-based pap, screening           Subjective     Damon Montano presents in office today for f/u on DM. She reports making some diet modifications and feels like her sugars are getting better. See log below. She states last night her BP was 202/114 and her HR was 104 - she reported feeling chest pain at this time. She states this resolved and she feels better today. She denies HA, visual changes, palpitations, CP, SOB, N/V/D, arm pain, jaw pain, numbness, or weakness at this time. 23:    AM sugar 179 - 11 units of Humalog - egg sandwich & cup of tea  Lunch sugar 143 - 10 units of Humalog - farina & cup of tea    23:   2 AM sugar 302 - 100 units Lantus  5:30 AM sugar 165 - scrambled eggs with turkey & salami  PM sugar 90 - fish & mashed potatoes    23:   2 AM sugar 218 - 100 units Lantus  PM sugar 184 - chicken breast with bread    11/7/23:   3 AM sugar 214 - 100 units Lantus    11/8/23:   12 AM sugar - 77  8 am sugar 133  4:30 pm sugar 141 - 10 units of Humalog & ate pizza  6:40 pm recheck sugar 240  9:36 pm sugar 137 - 100 units Lantus    11/9/23:   2 AM sugar 187 - 100 units Lantus  5:30 AM sugar 165 - 10 units Humalog      11/12/23:   2:30 AM sugar 98 - 100 units Lantus  12 PM sugar 59 - ate sandwich & apple juice  5 PM sugar 83  9 PM sugar 126    11/13/23:   12:48 am sugar 77 - ate cereal  7 PM sugar 141      Review of Systems   Constitutional:  Negative for chills, fatigue and fever. HENT:  Negative for congestion, ear pain, facial swelling, hearing loss, rhinorrhea, sinus pressure, sneezing, sore throat and trouble swallowing. Eyes:  Negative for pain, redness and visual disturbance. Respiratory:  Negative for cough, chest tightness, shortness of breath and wheezing. Cardiovascular:  Negative for chest pain and palpitations. Gastrointestinal:  Negative for abdominal pain, diarrhea, nausea and vomiting. Genitourinary:  Negative for dysuria, flank pain, hematuria and pelvic pain. Musculoskeletal:  Negative for arthralgias, back pain and myalgias. Skin:  Negative for color change and rash. Neurological:  Negative for dizziness, seizures, syncope, weakness, light-headedness and headaches. Psychiatric/Behavioral:  Negative for confusion, hallucinations and sleep disturbance. The patient is not nervous/anxious. All other systems reviewed and are negative.       Current Outpatient Medications on File Prior to Visit   Medication Sig    Accu-Chek FastClix Lancets MISC TEST THREE TIMES A DAY    amLODIPine (NORVASC) 5 mg tablet swallow 1 tablet once daily    anastrozole (ARIMIDEX) 1 mg tablet Take 1 mg by mouth daily    aspirin (ECOTRIN LOW STRENGTH) 81 mg EC tablet Take 81 mg by mouth daily    atorvastatin (LIPITOR) 40 mg tablet take 1 tablet by mouth once daily with dinner    benazepril (LOTENSIN) 20 mg tablet take 1 tablet by mouth once daily    betamethasone valerate (VALISONE) 0.1 % cream Apply topically 2 (two) times a day    Blood Glucose Monitoring Suppl (OneTouch Verio Flex System) w/Device KIT Test four times daily    cyclobenzaprine (FLEXERIL) 5 mg tablet Take 1 tablet (5 mg total) by mouth 3 (three) times a day as needed for muscle spasms for up to 10 days    dulaglutide (Trulicity) 6.65 FO/0.2TW injection Inject 0.5 mL (0.75 mg total) under the skin every 7 days    glucose blood (OneTouch Verio) test strip as directed use 1 TEST STRIP to TEST BLOOD SUGAR four times a day    insulin glargine (LANTUS) 100 units/mL subcutaneous injection Inject 100 Units under the skin daily at bedtime    insulin lispro (HumaLOG) 100 units/mL injection pen inject 10 units subcutaneously three times a day with food    Insulin Pen Needle (BD Pen Needle Sadia 2nd Gen) 32G X 4 MM MISC Use 3 (three) times a day    lactulose (CHRONULAC) 10 g/15 mL solution as needed    metoprolol succinate (TOPROL-XL) 100 mg 24 hr tablet take 1 tablet by mouth once daily    nitroglycerin (NITROSTAT) 0.4 mg SL tablet Place 1 tablet (0.4 mg total) under the tongue every 5 (five) minutes as needed for chest pain If no relief after first dose call prescriber or 911    ondansetron (ZOFRAN) 8 mg tablet Take 1 tablet (8 mg total) by mouth every 8 (eight) hours as needed for nausea or vomiting for up to 14 days    OneTouch Delica Lancets 83K MISC Use 3 (three) times a day Test 3 times daily    pantoprazole (PROTONIX) 40 mg tablet Take 1 tablet (40 mg total) by mouth daily (Patient taking differently: Take 40 mg by mouth as needed)    rOPINIRole (REQUIP) 1 mg tablet Take 1 tablet (1 mg total) by mouth daily       Objective     /82 (BP Location: Left arm, Patient Position: Sitting)   Pulse 76   Temp (!) 96.6 °F (35.9 °C) (Tympanic)   Ht 5' 4" (1.626 m)   Wt 73 kg (161 lb)   SpO2 98% BMI 27.64 kg/m²     Physical Exam  Vitals reviewed. Constitutional:       General: She is awake. She is not in acute distress. Appearance: Normal appearance. She is well-developed. She is not toxic-appearing. HENT:      Head: Normocephalic. Jaw: There is normal jaw occlusion. Right Ear: Tympanic membrane normal. Tympanic membrane is not erythematous or bulging. Left Ear: Tympanic membrane normal. Tympanic membrane is not erythematous or bulging. Nose: No congestion or rhinorrhea. Right Sinus: No maxillary sinus tenderness or frontal sinus tenderness. Left Sinus: No maxillary sinus tenderness or frontal sinus tenderness. Mouth/Throat:      Pharynx: Uvula midline. No oropharyngeal exudate, posterior oropharyngeal erythema or uvula swelling. Tonsils: No tonsillar exudate or tonsillar abscesses. Eyes:      Extraocular Movements: Extraocular movements intact. Conjunctiva/sclera: Conjunctivae normal.      Pupils: Pupils are equal, round, and reactive to light. Neck:      Thyroid: No thyroid mass. Vascular: No JVD. Trachea: Trachea and phonation normal.   Cardiovascular:      Rate and Rhythm: Normal rate and regular rhythm. Pulses: Normal pulses. Heart sounds: Normal heart sounds. Pulmonary:      Effort: Pulmonary effort is normal. No tachypnea or respiratory distress. Breath sounds: Normal breath sounds and air entry. No decreased breath sounds, wheezing, rhonchi or rales. Chest:      Chest wall: No tenderness. Abdominal:      General: Bowel sounds are normal. There is no distension. Palpations: Abdomen is soft. Tenderness: There is no abdominal tenderness. There is no right CVA tenderness, left CVA tenderness, guarding or rebound. Musculoskeletal:         General: Normal range of motion. Cervical back: Normal range of motion. Right lower leg: No edema. Left lower leg: No edema.    Lymphadenopathy: Cervical: No cervical adenopathy. Skin:     General: Skin is warm and dry. Findings: No rash. Neurological:      General: No focal deficit present. Mental Status: She is alert and oriented to person, place, and time. Sensory: Sensation is intact. Motor: Motor function is intact. Coordination: Coordination is intact. Gait: Gait is intact. Deep Tendon Reflexes: Reflexes are normal and symmetric. Psychiatric:         Attention and Perception: Attention normal.         Mood and Affect: Mood normal.         Speech: Speech normal.         Behavior: Behavior normal. Behavior is cooperative.          Cognition and Memory: Cognition normal.       RUBEN Jimenez

## 2023-11-20 ENCOUNTER — HOSPITAL ENCOUNTER (EMERGENCY)
Facility: HOSPITAL | Age: 63
Discharge: HOME | End: 2023-11-20
Attending: EMERGENCY MEDICINE | Admitting: EMERGENCY MEDICINE
Payer: COMMERCIAL

## 2023-11-20 ENCOUNTER — APPOINTMENT (EMERGENCY)
Dept: RADIOLOGY | Facility: HOSPITAL | Age: 63
End: 2023-11-20
Payer: COMMERCIAL

## 2023-11-20 VITALS
DIASTOLIC BLOOD PRESSURE: 69 MMHG | RESPIRATION RATE: 14 BRPM | HEART RATE: 64 BPM | SYSTOLIC BLOOD PRESSURE: 119 MMHG | OXYGEN SATURATION: 94 % | TEMPERATURE: 97.1 F

## 2023-11-20 DIAGNOSIS — R07.9 CHEST PAIN: Primary | ICD-10-CM

## 2023-11-20 LAB
2HR DELTA HS TROPONIN: 2 NG/L
ALBUMIN SERPL BCP-MCNC: 4.1 G/DL (ref 3.5–5)
ALP SERPL-CCNC: 109 U/L (ref 34–104)
ALT SERPL W P-5'-P-CCNC: 14 U/L (ref 7–52)
ANION GAP SERPL CALCULATED.3IONS-SCNC: 12 MMOL/L
AST SERPL W P-5'-P-CCNC: 18 U/L (ref 13–39)
ATRIAL RATE: 67 BPM
ATRIAL RATE: 87 BPM
BASOPHILS # BLD AUTO: 0.02 THOUSANDS/ÂΜL (ref 0–0.1)
BASOPHILS NFR BLD AUTO: 0 % (ref 0–1)
BILIRUB SERPL-MCNC: 0.33 MG/DL (ref 0.2–1)
BUN SERPL-MCNC: 10 MG/DL (ref 5–25)
CALCIUM SERPL-MCNC: 9.4 MG/DL (ref 8.4–10.2)
CARDIAC TROPONIN I PNL SERPL HS: 7 NG/L
CARDIAC TROPONIN I PNL SERPL HS: 9 NG/L
CHLORIDE SERPL-SCNC: 104 MMOL/L (ref 96–108)
CO2 SERPL-SCNC: 23 MMOL/L (ref 21–32)
CREAT SERPL-MCNC: 0.79 MG/DL (ref 0.6–1.3)
EOSINOPHIL # BLD AUTO: 0.18 THOUSAND/ÂΜL (ref 0–0.61)
EOSINOPHIL NFR BLD AUTO: 2 % (ref 0–6)
ERYTHROCYTE [DISTWIDTH] IN BLOOD BY AUTOMATED COUNT: 13 % (ref 11.6–15.1)
GFR SERPL CREATININE-BSD FRML MDRD: 79 ML/MIN/1.73SQ M
GLUCOSE SERPL-MCNC: 261 MG/DL (ref 65–140)
HCT VFR BLD AUTO: 42.4 % (ref 34.8–46.1)
HGB BLD-MCNC: 13.6 G/DL (ref 11.5–15.4)
IMM GRANULOCYTES # BLD AUTO: 0.01 THOUSAND/UL (ref 0–0.2)
IMM GRANULOCYTES NFR BLD AUTO: 0 % (ref 0–2)
LYMPHOCYTES # BLD AUTO: 3.08 THOUSANDS/ÂΜL (ref 0.6–4.47)
LYMPHOCYTES NFR BLD AUTO: 40 % (ref 14–44)
MCH RBC QN AUTO: 30.6 PG (ref 26.8–34.3)
MCHC RBC AUTO-ENTMCNC: 32.1 G/DL (ref 31.4–37.4)
MCV RBC AUTO: 96 FL (ref 82–98)
MONOCYTES # BLD AUTO: 0.36 THOUSAND/ÂΜL (ref 0.17–1.22)
MONOCYTES NFR BLD AUTO: 5 % (ref 4–12)
NEUTROPHILS # BLD AUTO: 4.01 THOUSANDS/ÂΜL (ref 1.85–7.62)
NEUTS SEG NFR BLD AUTO: 53 % (ref 43–75)
NRBC BLD AUTO-RTO: 0 /100 WBCS
P AXIS: 50 DEGREES
P AXIS: 50 DEGREES
PLATELET # BLD AUTO: 339 THOUSANDS/UL (ref 149–390)
PMV BLD AUTO: 9.2 FL (ref 8.9–12.7)
POTASSIUM SERPL-SCNC: 4.3 MMOL/L (ref 3.5–5.3)
PR INTERVAL: 160 MS
PR INTERVAL: 164 MS
PROT SERPL-MCNC: 7.9 G/DL (ref 6.4–8.4)
QRS AXIS: 57 DEGREES
QRS AXIS: 66 DEGREES
QRSD INTERVAL: 76 MS
QRSD INTERVAL: 76 MS
QT INTERVAL: 398 MS
QT INTERVAL: 452 MS
QTC INTERVAL: 477 MS
QTC INTERVAL: 478 MS
RBC # BLD AUTO: 4.44 MILLION/UL (ref 3.81–5.12)
SODIUM SERPL-SCNC: 139 MMOL/L (ref 135–147)
T WAVE AXIS: 58 DEGREES
T WAVE AXIS: 61 DEGREES
VENTRICULAR RATE: 67 BPM
VENTRICULAR RATE: 87 BPM
WBC # BLD AUTO: 7.66 THOUSAND/UL (ref 4.31–10.16)

## 2023-11-20 PROCEDURE — 36415 COLL VENOUS BLD VENIPUNCTURE: CPT | Performed by: EMERGENCY MEDICINE

## 2023-11-20 PROCEDURE — 93010 ELECTROCARDIOGRAM REPORT: CPT | Performed by: INTERNAL MEDICINE

## 2023-11-20 PROCEDURE — 93005 ELECTROCARDIOGRAM TRACING: CPT

## 2023-11-20 PROCEDURE — 85025 COMPLETE CBC W/AUTO DIFF WBC: CPT | Performed by: EMERGENCY MEDICINE

## 2023-11-20 PROCEDURE — 99285 EMERGENCY DEPT VISIT HI MDM: CPT

## 2023-11-20 PROCEDURE — 84484 ASSAY OF TROPONIN QUANT: CPT | Performed by: EMERGENCY MEDICINE

## 2023-11-20 PROCEDURE — 96374 THER/PROPH/DIAG INJ IV PUSH: CPT

## 2023-11-20 PROCEDURE — 80053 COMPREHEN METABOLIC PANEL: CPT | Performed by: EMERGENCY MEDICINE

## 2023-11-20 PROCEDURE — 99285 EMERGENCY DEPT VISIT HI MDM: CPT | Performed by: EMERGENCY MEDICINE

## 2023-11-20 PROCEDURE — 71045 X-RAY EXAM CHEST 1 VIEW: CPT

## 2023-11-20 RX ORDER — SODIUM CHLORIDE 9 MG/ML
3 INJECTION INTRAVENOUS
Status: DISCONTINUED | OUTPATIENT
Start: 2023-11-20 | End: 2023-11-20 | Stop reason: HOSPADM

## 2023-11-20 RX ORDER — MORPHINE SULFATE 4 MG/ML
4 INJECTION, SOLUTION INTRAMUSCULAR; INTRAVENOUS ONCE
Status: COMPLETED | OUTPATIENT
Start: 2023-11-20 | End: 2023-11-20

## 2023-11-20 RX ADMIN — MORPHINE SULFATE 4 MG: 4 INJECTION, SOLUTION INTRAMUSCULAR; INTRAVENOUS at 01:45

## 2023-11-20 NOTE — ED PROVIDER NOTES
History  Chief Complaint   Patient presents with    Chest Pain     Started 30 min ago; pt unable to describe chest pain; had EKG done on Thursday that was unremarkable; pt has hx of this chest pain       61-YEAR-OLD FEMALE    PMH:  CAD w/ stents  Type 2 DM - IDDM  Obesity  VINICIO  Restlessleg syndrome  HTN  [place]ornic LBP  Depression  NON SMOKER. No drugs. Occasionally social drinking    Chief complaint:   SS CP, radiating to Left arm    HPI:  About 1.5 hours ago ss cp woke her from sleep     PAIN IS RATED AS SEVERE  DESCRIBED AS PRESSURE    PATIENT HAS NO SHORTNESS OF BREATH. NO  COMPLAINTS OF DIAPHORESIS    SHE DENIES ANY RADIATION OF PAIN TO THE JAW NECK OR THE BACK. INTERVENTIONS: NONE      PATIENT DENIES ANY COUGH, NO FEVERS OR CHILLS. NO URI SYMPTOMS    VTE  RISK FACTORS:  NONE  NO HISTORY OF PE OR DVT  NO LONG CAR RIDES OR PLANE RIDES. NO IMMOBILIZATION. NO RECENT SURGERY OR TRAUMA. NO HEMOPTYSIS. OTHER ASSOCIATED SYMPTOMS:  NO ABDOMINAL PAIN. NO NAUSEA OR VOMITING. NO STOOL CHANGES. NO URINARY COMPLAINTS: NO DYSURIA, NO HEMATURIA, NO FREQUENCY    NO OTHER COMPLAINTS       History provided by:  Patient  Chest Pain  Pain location:  Substernal area  Pain quality: pressure    Pain radiates to:  L arm  Pain radiates to the back: no    Pain severity:  Severe  Onset quality:  Sudden  Duration:  2 hours  Timing:  Constant  Chronicity:  New  Relieved by:  Nothing  Worsened by:  Nothing tried  Associated symptoms: no abdominal pain, no altered mental status, no anorexia, no anxiety, no back pain, no claudication, no cough, no diaphoresis, no dizziness, no dysphagia, no fatigue, no fever, no headache, no lower extremity edema, no nausea, no near-syncope, no numbness, no palpitations, no shortness of breath, no syncope, not vomiting and no weakness        Prior to Admission Medications   Prescriptions Last Dose Informant Patient Reported? Taking?    Accu-Chek FastClix Lancets MISC  Self No No   Sig: TEST THREE TIMES A DAY   Blood Glucose Monitoring Suppl (OneTouch Verio Flex System) w/Device KIT   No No   Sig: Test four times daily   Insulin Pen Needle (BD Pen Needle Sadia 2nd Gen) 32G X 4 MM MISC   No No   Sig: Use 3 (three) times a day   OneTouch Delica Lancets 17A MISC   No No   Sig: Use 3 (three) times a day Test 3 times daily   amLODIPine (NORVASC) 5 mg tablet   No No   Sig: swallow 1 tablet once daily   anastrozole (ARIMIDEX) 1 mg tablet  Self Yes No   Sig: Take 1 mg by mouth daily   aspirin (ECOTRIN LOW STRENGTH) 81 mg EC tablet  Self Yes No   Sig: Take 81 mg by mouth daily   atorvastatin (LIPITOR) 40 mg tablet   No No   Sig: take 1 tablet by mouth once daily with dinner   benazepril (LOTENSIN) 20 mg tablet   No No   Sig: take 1 tablet by mouth once daily   betamethasone valerate (VALISONE) 0.1 % cream  Self Yes No   Sig: Apply topically 2 (two) times a day   cyclobenzaprine (FLEXERIL) 5 mg tablet   No No   Sig: Take 1 tablet (5 mg total) by mouth 3 (three) times a day as needed for muscle spasms for up to 10 days   dulaglutide (Trulicity) 8.83 YX/1.3ZB injection   No No   Sig: Inject 0.5 mL (0.75 mg total) under the skin every 7 days   famotidine (PEPCID) 20 mg tablet   No No   Sig: take 1 tablet by mouth twice a day before meals   Patient taking differently: Take 20 mg by mouth as needed   glucose blood (OneTouch Verio) test strip   No No   Sig: as directed use 1 TEST STRIP to TEST BLOOD SUGAR four times a day   insulin glargine (LANTUS) 100 units/mL subcutaneous injection   No No   Sig: Inject 100 Units under the skin daily at bedtime   insulin lispro (HumaLOG) 100 units/mL injection pen   No No   Sig: inject 10 units subcutaneously three times a day with food   lactulose (CHRONULAC) 10 g/15 mL solution   Yes No   Sig: as needed   metoprolol succinate (TOPROL-XL) 100 mg 24 hr tablet   No No   Sig: take 1 tablet by mouth once daily   nitroglycerin (NITROSTAT) 0.4 mg SL tablet   No No   Sig: Place 1 tablet (0.4 mg total) under the tongue every 5 (five) minutes as needed for chest pain If no relief after first dose call prescriber or 911   ondansetron (ZOFRAN) 8 mg tablet   No No   Sig: Take 1 tablet (8 mg total) by mouth every 8 (eight) hours as needed for nausea or vomiting for up to 14 days   pantoprazole (PROTONIX) 40 mg tablet  Self No No   Sig: Take 1 tablet (40 mg total) by mouth daily   Patient taking differently: Take 40 mg by mouth as needed   rOPINIRole (REQUIP) 1 mg tablet  Self No No   Sig: Take 1 tablet (1 mg total) by mouth daily   traZODone (DESYREL) 50 mg tablet   No No   Sig: Take 1 tablet (50 mg total) by mouth daily at bedtime      Facility-Administered Medications: None       Past Medical History:   Diagnosis Date    CAD (coronary artery disease)     Cancer (HCC)     right breast    Chronic back pain     Coronary artery disease     Diabetes mellitus ([address])     Family history of early CAD     Hyperlipidemia     Hypertension     Myocardial infarction ([address])     NSTEMI (non-ST elevated myocardial infarction) ([address]) 11/28/2018    Status post coronary angioplasty 1st obtuse marginal 11/28/2018 at [facility] stent could not be placed    Restless leg syndrome     Tuberculosis     patient was less than 11years old    Type 2 diabetes mellitus ([address])        Past Surgical History:   Procedure Laterality Date    ABDOMINAL SURGERY      phill    ANKLE SURGERY      tubal    BREAST SURGERY      partial mastectomy R breast    CARDIAC CATHETERIZATION  11/2018    Successful balloon angioplassty to OM1    CHOLECYSTECTOMY      FRACTURE SURGERY      right ankle    GALLBLADDER SURGERY      MASTECTOMY      TUBAL LIGATION         Family History   Problem Relation Age of Onset    Breast cancer Mother     Diabetes Mother     Heart failure Mother     Heart disease Father     Stroke Father      I have reviewed and agree with the history as documented.     E-Cigarette/Vaping    E-Cigarette Use Never User E-Cigarette/Vaping Substances    Nicotine No     THC No     CBD No     Flavoring No     Other No     Unknown No      Social History     Tobacco Use    Smoking status: Never    Smokeless tobacco: Never   Vaping Use    Vaping Use: Never used   Substance Use Topics    Alcohol use: Yes     Comment: occasionally    Drug use: No       Review of Systems   Constitutional:  Negative for chills, diaphoresis, fatigue and fever. HENT:  Negative for congestion, ear discharge, ear pain, facial swelling, mouth sores, postnasal drip, rhinorrhea, sinus pressure, sinus pain, sneezing, sore throat, trouble swallowing and voice change. Respiratory:  Negative for cough, shortness of breath, wheezing and stridor. Cardiovascular:  Positive for chest pain. Negative for palpitations, claudication, leg swelling, syncope and near-syncope. Gastrointestinal:  Negative for abdominal pain, anorexia, blood in stool, nausea and vomiting. Genitourinary:  Negative for difficulty urinating, dysuria, flank pain and frequency. Musculoskeletal:  Negative for arthralgias, back pain, gait problem, joint swelling, myalgias, neck pain and neck stiffness. Skin:  Negative for rash and wound. Neurological:  Negative for dizziness, weakness, light-headedness, numbness and headaches. All other systems reviewed and are negative. Physical Exam  Physical Exam  Constitutional:       General: She is not in acute distress. Appearance: She is well-developed. She is not ill-appearing, toxic-appearing or diaphoretic. HENT:      Head: Normocephalic and atraumatic. Nose: Nose normal.      Mouth/Throat:      Pharynx: No oropharyngeal exudate. Eyes:      General: No scleral icterus. Right eye: No discharge. Left eye: No discharge. Extraocular Movements: Extraocular movements intact. Conjunctiva/sclera: Conjunctivae normal.      Pupils: Pupils are equal, round, and reactive to light.    Neck:      Vascular: No JVD.      Trachea: No tracheal deviation. Cardiovascular:      Rate and Rhythm: Normal rate and regular rhythm. Pulses:           Radial pulses are 2+ on the right side and 2+ on the left side. Heart sounds: Normal heart sounds. No murmur heard. No friction rub. No gallop. Pulmonary:      Effort: Pulmonary effort is normal. No respiratory distress. Breath sounds: Normal breath sounds. No stridor. No wheezing, rhonchi or rales. Chest:      Chest wall: No tenderness. Abdominal:      General: Bowel sounds are normal. There is no distension. Palpations: Abdomen is soft. There is no mass. Tenderness: There is no abdominal tenderness. There is no guarding or rebound. Hernia: No hernia is present. Musculoskeletal:         General: No tenderness or deformity. Normal range of motion. Cervical back: Normal range of motion and neck supple. Right lower leg: No tenderness. No edema. Left lower leg: No tenderness. No edema. Lymphadenopathy:      Cervical: No cervical adenopathy. Skin:     General: Skin is warm. Capillary Refill: Capillary refill takes less than 2 seconds. Coloration: Skin is not cyanotic or pale. Findings: No ecchymosis, erythema or rash. Neurological:      General: No focal deficit present. Mental Status: She is alert and oriented to person, place, and time. Cranial Nerves: No cranial nerve deficit. Sensory: No sensory deficit. Motor: No abnormal muscle tone. Coordination: Coordination normal.   Psychiatric:         Mood and Affect: Mood is anxious. Behavior: Behavior normal.         Thought Content:  Thought content normal.         Judgment: Judgment normal.         Vital Signs  ED Triage Vitals   Temp Pulse Resp BP SpO2   -- -- -- -- --      Temp src Heart Rate Source Patient Position - Orthostatic VS BP Location FiO2 (%)   -- -- -- -- --      Pain Score       --           There were no vitals filed for this visit.       Visual Acuity      ED Medications  Medications   sodium chloride (PF) 0.9 % injection 3 mL (has no administration in time range)       Diagnostic Studies  Results Reviewed       Procedure Component Value Units Date/Time    CBC and differential [[id number]]     Lab Status: No result Specimen: Blood     HS Troponin 0hr (reflex protocol) [[id number]]     Lab Status: No result Specimen: Blood     Comprehensive metabolic panel [[id number]]     Lab Status: No result Specimen: Blood                    X-ray chest 1 view portable    (Results Pending)              Procedures  ECG 12 Lead Documentation Only    Date/Time: 11/20/2023 1:35 AM    Performed by: Emery Pendleton MD  Authorized by: Emery Pendleton MD    Indications / Diagnosis:  Cp  Patient location:  ED  Interpretation:     Interpretation: normal    Rate:     ECG rate:  87    ECG rate assessment: normal    Rhythm:     Rhythm: sinus rhythm    Ectopy:     Ectopy: none    QRS:     QRS axis:  Normal  Conduction:     Conduction: normal    ST segments:     ST segments:  Non-specific  T waves:     T waves: non-specific    ECG 12 Lead Documentation Only    Date/Time: 11/20/2023 3:31 AM    Performed by: Emery Pendleton MD  Authorized by: Emery Pendleton MD    Indications / Diagnosis:  CP  ECG reviewed by me, the ED Provider: yes    Patient location:  ED  Interpretation:     Interpretation: normal    Rate:     ECG rate:  67    ECG rate assessment: normal    Rhythm:     Rhythm: sinus rhythm    Ectopy:     Ectopy: none    QRS:     QRS axis:  Normal  Conduction:     Conduction: normal    ST segments:     ST segments:  Non-specific  T waves:     T waves: non-specific             ED Course  ED Course as of 11/22/23 0523   Mon Nov 20, 2023   0207 CBC and differential   0207 hs TnI 0hr: 7   0207 Comprehensive metabolic panel(!)   2802 Pt sleeping soundly    0409 Delta 2hr hsTnI: 2  Pt understands work up results  No concerning findings  Troponins normal and flat    Pt feels much much better and back to baseline      She has no signs of life or limb threatening process    I offered further tx, I offered admission, if she felt ill, or her pain was too great, but she declined  She is ready to manage from home  She is very appreciative of her ED care and is ready to manage from home  She understands return precautions    She will f/u w/ PCP tomorrow, as well as her Cardiologist                                  SBIRT 22yo+      Flowsheet Row Most Recent Value   Initial Alcohol Screen: US AUDIT-C     1. How often do you have a drink containing alcohol? 1 Filed at: 11/20/2023 0130   2. How many drinks containing alcohol do you have on a typical day you are drinking? 0 Filed at: 11/20/2023 0130   3a. Male UNDER 65: How often do you have five or more drinks on one occasion? 0 Filed at: 11/20/2023 0130   3b. FEMALE Any Age, or MALE 65+: How often do you have 4 or more drinks on one occassion? 0 Filed at: 11/20/2023 0130   Audit-C Score 1 Filed at: 11/20/2023 0130   CUCA: How many times in the past year have you. .. Used an illegal drug or used a prescription medication for non-medical reasons? Never Filed at: 11/20/2023 0130                      Medical Decision Making  Patient with history as above presented with Patient presents with:  Chest Pain: Started 30 min ago; pt unable to describe chest pain; had EKG done on Thursday that was unremarkable; pt has hx of this chest pain    History obtained from patient    Patient was nontoxic, stable. Ambulatory. Exam as above. EKG reviewed. Labs reviewed. Independently reviewed imaging. Differential diagnosis considered. Overall presentation is consistent with atypical CP  Low suspicion for ACS, PE, TAD, surgical process, sepsis     Patient was treated with Morphine with improvement in symptoms.     No Consideration was given for admission, as the patient's ED course, work up results and pt's improvement and desire to manage from home all supported the decision that the pt was stable for outpatient management. Disposition:   Discussed need to follow up with PCP and Cardiology  Discharged with instructions to obtain outpatient follow up of patient's symptoms and findings, with strict return precautions if patient develops new or worsening symptoms. This medical documentation was created using an electronic medical record system with [employer] voice recognition. Although this document has been carefully reviewed, there may still be some phonetic and typographical errors. These errors are purely typographical and due to imperfections of the software program, do not reflect any compromise in the patient's medical care. Amount and/or Complexity of Data Reviewed  Labs: ordered. Decision-making details documented in ED Course. Radiology: ordered and independent interpretation performed. Decision-making details documented in ED Course. ECG/medicine tests: ordered and independent interpretation performed. Decision-making details documented in ED Course. Risk  OTC drugs. Prescription drug management. Disposition  Final diagnoses:   None     ED Disposition       None          Follow-up Information    None         Patient's Medications   Discharge Prescriptions    No medications on file       No discharge procedures on file.     PDMP Review         Value Time User    PDMP Reviewed  Yes 12/3/2021  4:16 PM Jose Manuel Hughes MD            ED Provider  Electronically Signed by             Liza Mccloud MD  11/22/23 4049

## 2023-11-20 NOTE — ED NOTES
Pt currently reports no nausea or headache, but reports that she will eventually develop it and that it tends to coincide with her chest pain     Lamar Ramachandran RN  11/20/23 8323

## 2023-11-23 DIAGNOSIS — E11.9 TYPE 2 DIABETES MELLITUS WITHOUT COMPLICATION, WITH LONG-TERM CURRENT USE OF INSULIN (HCC): Chronic | ICD-10-CM

## 2023-11-23 DIAGNOSIS — Z79.4 TYPE 2 DIABETES MELLITUS WITHOUT COMPLICATION, WITH LONG-TERM CURRENT USE OF INSULIN (HCC): Chronic | ICD-10-CM

## 2023-11-23 RX ORDER — FAMOTIDINE 20 MG/1
20 TABLET, FILM COATED ORAL
Qty: 60 TABLET | Refills: 3 | Status: ON HOLD | OUTPATIENT
Start: 2023-11-23

## 2023-11-29 ENCOUNTER — TELEPHONE (OUTPATIENT)
Dept: CARDIOLOGY CLINIC | Facility: CLINIC | Age: 63
End: 2023-11-29

## 2023-11-29 NOTE — TELEPHONE ENCOUNTER
Pt  called in stating she was having cp and vomiting. He stated she has been to the ER recently for this. Advised him to take her back to the ER and I scheduled her a f/u appt in Jan. With Dr. Ana Maria Jacobson.

## 2023-12-02 ENCOUNTER — APPOINTMENT (EMERGENCY)
Dept: RADIOLOGY | Facility: HOSPITAL | Age: 63
End: 2023-12-02
Payer: COMMERCIAL

## 2023-12-02 ENCOUNTER — HOSPITAL ENCOUNTER (EMERGENCY)
Facility: HOSPITAL | Age: 63
End: 2023-12-03
Attending: INTERNAL MEDICINE
Payer: COMMERCIAL

## 2023-12-02 VITALS
SYSTOLIC BLOOD PRESSURE: 123 MMHG | RESPIRATION RATE: 19 BRPM | TEMPERATURE: 98.5 F | DIASTOLIC BLOOD PRESSURE: 82 MMHG | HEART RATE: 78 BPM | OXYGEN SATURATION: 94 %

## 2023-12-02 DIAGNOSIS — R07.9 CHEST PAIN: Primary | ICD-10-CM

## 2023-12-02 LAB
2HR DELTA HS TROPONIN: 58 NG/L
4HR DELTA HS TROPONIN: 239 NG/L
ALBUMIN SERPL BCP-MCNC: 4.3 G/DL (ref 3.5–5)
ALP SERPL-CCNC: 115 U/L (ref 34–104)
ALT SERPL W P-5'-P-CCNC: 14 U/L (ref 7–52)
ANION GAP SERPL CALCULATED.3IONS-SCNC: 13 MMOL/L
AST SERPL W P-5'-P-CCNC: 17 U/L (ref 13–39)
BASOPHILS # BLD AUTO: 0.03 THOUSANDS/ÂΜL (ref 0–0.1)
BASOPHILS NFR BLD AUTO: 0 % (ref 0–1)
BILIRUB SERPL-MCNC: 0.33 MG/DL (ref 0.2–1)
BUN SERPL-MCNC: 16 MG/DL (ref 5–25)
CALCIUM SERPL-MCNC: 9.3 MG/DL (ref 8.4–10.2)
CARDIAC TROPONIN I PNL SERPL HS: 248 NG/L
CARDIAC TROPONIN I PNL SERPL HS: 67 NG/L
CARDIAC TROPONIN I PNL SERPL HS: 9 NG/L
CHLORIDE SERPL-SCNC: 101 MMOL/L (ref 96–108)
CO2 SERPL-SCNC: 22 MMOL/L (ref 21–32)
CREAT SERPL-MCNC: 1.02 MG/DL (ref 0.6–1.3)
EOSINOPHIL # BLD AUTO: 0.13 THOUSAND/ÂΜL (ref 0–0.61)
EOSINOPHIL NFR BLD AUTO: 1 % (ref 0–6)
ERYTHROCYTE [DISTWIDTH] IN BLOOD BY AUTOMATED COUNT: 13.1 % (ref 11.6–15.1)
GFR SERPL CREATININE-BSD FRML MDRD: 58 ML/MIN/1.73SQ M
GLUCOSE SERPL-MCNC: 275 MG/DL (ref 65–140)
HCT VFR BLD AUTO: 42.9 % (ref 34.8–46.1)
HGB BLD-MCNC: 14.4 G/DL (ref 11.5–15.4)
IMM GRANULOCYTES # BLD AUTO: 0.03 THOUSAND/UL (ref 0–0.2)
IMM GRANULOCYTES NFR BLD AUTO: 0 % (ref 0–2)
LYMPHOCYTES # BLD AUTO: 4.73 THOUSANDS/ÂΜL (ref 0.6–4.47)
LYMPHOCYTES NFR BLD AUTO: 43 % (ref 14–44)
MCH RBC QN AUTO: 31.1 PG (ref 26.8–34.3)
MCHC RBC AUTO-ENTMCNC: 33.6 G/DL (ref 31.4–37.4)
MCV RBC AUTO: 93 FL (ref 82–98)
MONOCYTES # BLD AUTO: 0.53 THOUSAND/ÂΜL (ref 0.17–1.22)
MONOCYTES NFR BLD AUTO: 5 % (ref 4–12)
NEUTROPHILS # BLD AUTO: 5.52 THOUSANDS/ÂΜL (ref 1.85–7.62)
NEUTS SEG NFR BLD AUTO: 51 % (ref 43–75)
NRBC BLD AUTO-RTO: 0 /100 WBCS
PLATELET # BLD AUTO: 418 THOUSANDS/UL (ref 149–390)
PMV BLD AUTO: 9.3 FL (ref 8.9–12.7)
POTASSIUM SERPL-SCNC: 3.7 MMOL/L (ref 3.5–5.3)
PROT SERPL-MCNC: 8.1 G/DL (ref 6.4–8.4)
RBC # BLD AUTO: 4.63 MILLION/UL (ref 3.81–5.12)
SODIUM SERPL-SCNC: 136 MMOL/L (ref 135–147)
WBC # BLD AUTO: 10.97 THOUSAND/UL (ref 4.31–10.16)

## 2023-12-02 PROCEDURE — 80053 COMPREHEN METABOLIC PANEL: CPT | Performed by: INTERNAL MEDICINE

## 2023-12-02 PROCEDURE — 85025 COMPLETE CBC W/AUTO DIFF WBC: CPT | Performed by: INTERNAL MEDICINE

## 2023-12-02 PROCEDURE — 93005 ELECTROCARDIOGRAM TRACING: CPT

## 2023-12-02 PROCEDURE — 84484 ASSAY OF TROPONIN QUANT: CPT | Performed by: INTERNAL MEDICINE

## 2023-12-02 PROCEDURE — 99285 EMERGENCY DEPT VISIT HI MDM: CPT

## 2023-12-02 PROCEDURE — 71045 X-RAY EXAM CHEST 1 VIEW: CPT

## 2023-12-02 PROCEDURE — 36415 COLL VENOUS BLD VENIPUNCTURE: CPT | Performed by: INTERNAL MEDICINE

## 2023-12-02 RX ORDER — LABETALOL HYDROCHLORIDE 5 MG/ML
10 INJECTION, SOLUTION INTRAVENOUS ONCE
Status: DISCONTINUED | OUTPATIENT
Start: 2023-12-02 | End: 2023-12-02

## 2023-12-02 RX ORDER — NITROGLYCERIN 0.4 MG/1
0.4 TABLET SUBLINGUAL ONCE
Status: COMPLETED | OUTPATIENT
Start: 2023-12-02 | End: 2023-12-02

## 2023-12-02 RX ORDER — HEPARIN SODIUM 1000 [USP'U]/ML
4000 INJECTION, SOLUTION INTRAVENOUS; SUBCUTANEOUS EVERY 6 HOURS PRN
Status: DISCONTINUED | OUTPATIENT
Start: 2023-12-02 | End: 2023-12-03 | Stop reason: HOSPADM

## 2023-12-02 RX ORDER — HEPARIN SODIUM 1000 [USP'U]/ML
2000 INJECTION, SOLUTION INTRAVENOUS; SUBCUTANEOUS EVERY 6 HOURS PRN
Status: DISCONTINUED | OUTPATIENT
Start: 2023-12-02 | End: 2023-12-03 | Stop reason: HOSPADM

## 2023-12-02 RX ORDER — ASPIRIN 325 MG
325 TABLET ORAL ONCE
Status: COMPLETED | OUTPATIENT
Start: 2023-12-02 | End: 2023-12-02

## 2023-12-02 RX ORDER — HEPARIN SODIUM 10000 [USP'U]/100ML
3-20 INJECTION, SOLUTION INTRAVENOUS
Status: DISCONTINUED | OUTPATIENT
Start: 2023-12-03 | End: 2023-12-03 | Stop reason: HOSPADM

## 2023-12-02 RX ORDER — SODIUM CHLORIDE 9 MG/ML
3 INJECTION INTRAVENOUS
Status: DISCONTINUED | OUTPATIENT
Start: 2023-12-02 | End: 2023-12-03 | Stop reason: HOSPADM

## 2023-12-02 RX ORDER — HEPARIN SODIUM 1000 [USP'U]/ML
4000 INJECTION, SOLUTION INTRAVENOUS; SUBCUTANEOUS ONCE
Status: COMPLETED | OUTPATIENT
Start: 2023-12-03 | End: 2023-12-03

## 2023-12-02 RX ADMIN — ASPIRIN 325 MG: 325 TABLET ORAL at 22:32

## 2023-12-02 RX ADMIN — NITROGLYCERIN 0.4 MG: 0.4 TABLET SUBLINGUAL at 22:04

## 2023-12-03 ENCOUNTER — HOSPITAL ENCOUNTER (INPATIENT)
Facility: HOSPITAL | Age: 63
LOS: 2 days | Discharge: HOME | DRG: 174 | End: 2023-12-05
Attending: STUDENT IN AN ORGANIZED HEALTH CARE EDUCATION/TRAINING PROGRAM | Admitting: INTERNAL MEDICINE
Payer: COMMERCIAL

## 2023-12-03 DIAGNOSIS — I10 ESSENTIAL HYPERTENSION: ICD-10-CM

## 2023-12-03 DIAGNOSIS — I24.9 ACS (ACUTE CORONARY SYNDROME) (HCC): ICD-10-CM

## 2023-12-03 DIAGNOSIS — R79.89 ELEVATED TROPONIN I LEVEL: Primary | ICD-10-CM

## 2023-12-03 LAB
ANION GAP SERPL CALCULATED.3IONS-SCNC: 8 MMOL/L
APTT PPP: 29 SECONDS (ref 23–37)
APTT PPP: 48 SECONDS (ref 23–37)
APTT PPP: 49 SECONDS (ref 23–37)
APTT PPP: 67 SECONDS (ref 23–37)
ATRIAL RATE: 118 BPM
ATRIAL RATE: 76 BPM
ATRIAL RATE: 89 BPM
BASOPHILS # BLD AUTO: 0.05 THOUSANDS/ÂΜL (ref 0–0.1)
BASOPHILS NFR BLD AUTO: 1 % (ref 0–1)
BUN SERPL-MCNC: 17 MG/DL (ref 5–25)
CALCIUM SERPL-MCNC: 9.1 MG/DL (ref 8.4–10.2)
CARDIAC TROPONIN I PNL SERPL HS: 969 NG/L (ref 8–18)
CHLORIDE SERPL-SCNC: 106 MMOL/L (ref 96–108)
CHOLEST SERPL-MCNC: 287 MG/DL
CO2 SERPL-SCNC: 23 MMOL/L (ref 21–32)
CREAT SERPL-MCNC: 0.74 MG/DL (ref 0.6–1.3)
EOSINOPHIL # BLD AUTO: 0.16 THOUSAND/ÂΜL (ref 0–0.61)
EOSINOPHIL NFR BLD AUTO: 2 % (ref 0–6)
ERYTHROCYTE [DISTWIDTH] IN BLOOD BY AUTOMATED COUNT: 13.1 % (ref 11.6–15.1)
ERYTHROCYTE [DISTWIDTH] IN BLOOD BY AUTOMATED COUNT: 13.1 % (ref 11.6–15.1)
GFR SERPL CREATININE-BSD FRML MDRD: 86 ML/MIN/1.73SQ M
GLUCOSE SERPL-MCNC: 163 MG/DL (ref 65–140)
GLUCOSE SERPL-MCNC: 163 MG/DL (ref 65–140)
GLUCOSE SERPL-MCNC: 183 MG/DL (ref 65–140)
GLUCOSE SERPL-MCNC: 221 MG/DL (ref 65–140)
GLUCOSE SERPL-MCNC: 84 MG/DL (ref 65–140)
HCT VFR BLD AUTO: 39.1 % (ref 34.8–46.1)
HCT VFR BLD AUTO: 41.1 % (ref 34.8–46.1)
HDLC SERPL-MCNC: 37 MG/DL
HGB BLD-MCNC: 13.2 G/DL (ref 11.5–15.4)
HGB BLD-MCNC: 13.6 G/DL (ref 11.5–15.4)
IMM GRANULOCYTES # BLD AUTO: 0.03 THOUSAND/UL (ref 0–0.2)
IMM GRANULOCYTES NFR BLD AUTO: 0 % (ref 0–2)
INR PPP: 0.92 (ref 0.84–1.19)
LDLC SERPL CALC-MCNC: 176 MG/DL (ref 0–100)
LYMPHOCYTES # BLD AUTO: 2.92 THOUSANDS/ÂΜL (ref 0.6–4.47)
LYMPHOCYTES NFR BLD AUTO: 34 % (ref 14–44)
MCH RBC QN AUTO: 30.5 PG (ref 26.8–34.3)
MCH RBC QN AUTO: 30.6 PG (ref 26.8–34.3)
MCHC RBC AUTO-ENTMCNC: 33.1 G/DL (ref 31.4–37.4)
MCHC RBC AUTO-ENTMCNC: 33.8 G/DL (ref 31.4–37.4)
MCV RBC AUTO: 90 FL (ref 82–98)
MCV RBC AUTO: 93 FL (ref 82–98)
MONOCYTES # BLD AUTO: 0.5 THOUSAND/ÂΜL (ref 0.17–1.22)
MONOCYTES NFR BLD AUTO: 6 % (ref 4–12)
NEUTROPHILS # BLD AUTO: 4.93 THOUSANDS/ÂΜL (ref 1.85–7.62)
NEUTS SEG NFR BLD AUTO: 57 % (ref 43–75)
NRBC BLD AUTO-RTO: 0 /100 WBCS
P AXIS: 48 DEGREES
P AXIS: 53 DEGREES
P AXIS: 53 DEGREES
PLATELET # BLD AUTO: 411 THOUSANDS/UL (ref 149–390)
PLATELET # BLD AUTO: 417 THOUSANDS/UL (ref 149–390)
PMV BLD AUTO: 9.2 FL (ref 8.9–12.7)
PMV BLD AUTO: 9.3 FL (ref 8.9–12.7)
POTASSIUM SERPL-SCNC: 3.8 MMOL/L (ref 3.5–5.3)
PR INTERVAL: 152 MS
PR INTERVAL: 152 MS
PR INTERVAL: 158 MS
PROTHROMBIN TIME: 12.5 SECONDS (ref 11.6–14.5)
QRS AXIS: 42 DEGREES
QRS AXIS: 54 DEGREES
QRS AXIS: 66 DEGREES
QRSD INTERVAL: 76 MS
QRSD INTERVAL: 78 MS
QRSD INTERVAL: 80 MS
QT INTERVAL: 332 MS
QT INTERVAL: 402 MS
QT INTERVAL: 440 MS
QTC INTERVAL: 465 MS
QTC INTERVAL: 489 MS
QTC INTERVAL: 495 MS
RBC # BLD AUTO: 4.33 MILLION/UL (ref 3.81–5.12)
RBC # BLD AUTO: 4.44 MILLION/UL (ref 3.81–5.12)
SODIUM SERPL-SCNC: 137 MMOL/L (ref 135–147)
T WAVE AXIS: 33 DEGREES
T WAVE AXIS: 41 DEGREES
T WAVE AXIS: 61 DEGREES
TRIGL SERPL-MCNC: 369 MG/DL
VENTRICULAR RATE: 118 BPM
VENTRICULAR RATE: 76 BPM
VENTRICULAR RATE: 89 BPM
WBC # BLD AUTO: 8.4 THOUSAND/UL (ref 4.31–10.16)
WBC # BLD AUTO: 8.59 THOUSAND/UL (ref 4.31–10.16)

## 2023-12-03 PROCEDURE — 85027 COMPLETE CBC AUTOMATED: CPT | Performed by: EMERGENCY MEDICINE

## 2023-12-03 PROCEDURE — 80061 LIPID PANEL: CPT | Performed by: PHYSICIAN ASSISTANT

## 2023-12-03 PROCEDURE — 96365 THER/PROPH/DIAG IV INF INIT: CPT

## 2023-12-03 PROCEDURE — 85730 THROMBOPLASTIN TIME PARTIAL: CPT | Performed by: EMERGENCY MEDICINE

## 2023-12-03 PROCEDURE — 85730 THROMBOPLASTIN TIME PARTIAL: CPT | Performed by: INTERNAL MEDICINE

## 2023-12-03 PROCEDURE — 85610 PROTHROMBIN TIME: CPT | Performed by: EMERGENCY MEDICINE

## 2023-12-03 PROCEDURE — 80048 BASIC METABOLIC PNL TOTAL CA: CPT | Performed by: PHYSICIAN ASSISTANT

## 2023-12-03 PROCEDURE — 85730 THROMBOPLASTIN TIME PARTIAL: CPT | Performed by: PHYSICIAN ASSISTANT

## 2023-12-03 PROCEDURE — 85025 COMPLETE CBC W/AUTO DIFF WBC: CPT | Performed by: PHYSICIAN ASSISTANT

## 2023-12-03 PROCEDURE — 99223 1ST HOSP IP/OBS HIGH 75: CPT | Performed by: STUDENT IN AN ORGANIZED HEALTH CARE EDUCATION/TRAINING PROGRAM

## 2023-12-03 PROCEDURE — 96375 TX/PRO/DX INJ NEW DRUG ADDON: CPT

## 2023-12-03 PROCEDURE — 99223 1ST HOSP IP/OBS HIGH 75: CPT | Performed by: INTERNAL MEDICINE

## 2023-12-03 PROCEDURE — 82948 REAGENT STRIP/BLOOD GLUCOSE: CPT

## 2023-12-03 PROCEDURE — 85730 THROMBOPLASTIN TIME PARTIAL: CPT | Performed by: STUDENT IN AN ORGANIZED HEALTH CARE EDUCATION/TRAINING PROGRAM

## 2023-12-03 PROCEDURE — 84484 ASSAY OF TROPONIN QUANT: CPT | Performed by: PHYSICIAN ASSISTANT

## 2023-12-03 PROCEDURE — 36415 COLL VENOUS BLD VENIPUNCTURE: CPT | Performed by: EMERGENCY MEDICINE

## 2023-12-03 RX ORDER — HEPARIN SODIUM 1000 [USP'U]/ML
2000 INJECTION, SOLUTION INTRAVENOUS; SUBCUTANEOUS EVERY 6 HOURS PRN
Status: DISCONTINUED | OUTPATIENT
Start: 2023-12-03 | End: 2023-12-04

## 2023-12-03 RX ORDER — LISINOPRIL 20 MG/1
20 TABLET ORAL DAILY
Status: DISCONTINUED | OUTPATIENT
Start: 2023-12-03 | End: 2023-12-03

## 2023-12-03 RX ORDER — ONDANSETRON 2 MG/ML
4 INJECTION INTRAMUSCULAR; INTRAVENOUS EVERY 6 HOURS PRN
Status: DISCONTINUED | OUTPATIENT
Start: 2023-12-03 | End: 2023-12-05 | Stop reason: HOSPADM

## 2023-12-03 RX ORDER — INSULIN LISPRO 100 [IU]/ML
1-6 INJECTION, SOLUTION INTRAVENOUS; SUBCUTANEOUS
Status: DISCONTINUED | OUTPATIENT
Start: 2023-12-03 | End: 2023-12-05 | Stop reason: HOSPADM

## 2023-12-03 RX ORDER — ACETAMINOPHEN 325 MG/1
650 TABLET ORAL EVERY 6 HOURS PRN
Status: DISCONTINUED | OUTPATIENT
Start: 2023-12-03 | End: 2023-12-05 | Stop reason: HOSPADM

## 2023-12-03 RX ORDER — SODIUM CHLORIDE 9 MG/ML
125 INJECTION, SOLUTION INTRAVENOUS CONTINUOUS
Status: DISCONTINUED | OUTPATIENT
Start: 2023-12-04 | End: 2023-12-04

## 2023-12-03 RX ORDER — AMLODIPINE BESYLATE 5 MG/1
5 TABLET ORAL DAILY
Status: DISCONTINUED | OUTPATIENT
Start: 2023-12-03 | End: 2023-12-05 | Stop reason: HOSPADM

## 2023-12-03 RX ORDER — HEPARIN SODIUM 10000 [USP'U]/100ML
3-20 INJECTION, SOLUTION INTRAVENOUS
Status: DISCONTINUED | OUTPATIENT
Start: 2023-12-03 | End: 2023-12-04

## 2023-12-03 RX ORDER — INSULIN GLARGINE 100 [IU]/ML
50 INJECTION, SOLUTION SUBCUTANEOUS EVERY 12 HOURS SCHEDULED
Status: DISCONTINUED | OUTPATIENT
Start: 2023-12-03 | End: 2023-12-05 | Stop reason: HOSPADM

## 2023-12-03 RX ORDER — METOPROLOL SUCCINATE 100 MG/1
100 TABLET, EXTENDED RELEASE ORAL DAILY
Status: DISCONTINUED | OUTPATIENT
Start: 2023-12-03 | End: 2023-12-05 | Stop reason: HOSPADM

## 2023-12-03 RX ORDER — ATORVASTATIN CALCIUM 80 MG/1
80 TABLET, FILM COATED ORAL
Status: DISCONTINUED | OUTPATIENT
Start: 2023-12-03 | End: 2023-12-05 | Stop reason: HOSPADM

## 2023-12-03 RX ORDER — ROPINIROLE 1 MG/1
1 TABLET, FILM COATED ORAL
Status: DISCONTINUED | OUTPATIENT
Start: 2023-12-03 | End: 2023-12-05 | Stop reason: HOSPADM

## 2023-12-03 RX ORDER — ASPIRIN 81 MG/1
324 TABLET, CHEWABLE ORAL ONCE
Status: COMPLETED | OUTPATIENT
Start: 2023-12-04 | End: 2023-12-04

## 2023-12-03 RX ORDER — HEPARIN SODIUM 1000 [USP'U]/ML
4000 INJECTION, SOLUTION INTRAVENOUS; SUBCUTANEOUS EVERY 6 HOURS PRN
Status: DISCONTINUED | OUTPATIENT
Start: 2023-12-03 | End: 2023-12-04

## 2023-12-03 RX ORDER — INSULIN LISPRO 100 [IU]/ML
10 INJECTION, SOLUTION INTRAVENOUS; SUBCUTANEOUS
Status: DISCONTINUED | OUTPATIENT
Start: 2023-12-03 | End: 2023-12-05 | Stop reason: HOSPADM

## 2023-12-03 RX ORDER — INSULIN GLARGINE 100 [IU]/ML
20 INJECTION, SOLUTION SUBCUTANEOUS ONCE
Status: COMPLETED | OUTPATIENT
Start: 2023-12-03 | End: 2023-12-03

## 2023-12-03 RX ADMIN — HEPARIN SODIUM 12 UNITS/KG/HR: 10000 INJECTION, SOLUTION INTRAVENOUS at 00:16

## 2023-12-03 RX ADMIN — HEPARIN SODIUM 2000 UNITS: 1000 INJECTION INTRAVENOUS; SUBCUTANEOUS at 15:39

## 2023-12-03 RX ADMIN — INSULIN LISPRO 1 UNITS: 100 INJECTION, SOLUTION INTRAVENOUS; SUBCUTANEOUS at 09:33

## 2023-12-03 RX ADMIN — HEPARIN SODIUM 16 UNITS/KG/HR: 10000 INJECTION, SOLUTION INTRAVENOUS at 21:51

## 2023-12-03 RX ADMIN — TICAGRELOR 90 MG: 90 TABLET ORAL at 09:29

## 2023-12-03 RX ADMIN — ONDANSETRON 4 MG: 2 INJECTION INTRAMUSCULAR; INTRAVENOUS at 13:17

## 2023-12-03 RX ADMIN — METOPROLOL SUCCINATE 100 MG: 100 TABLET, EXTENDED RELEASE ORAL at 21:51

## 2023-12-03 RX ADMIN — ASPIRIN 81 MG: 81 TABLET, COATED ORAL at 09:29

## 2023-12-03 RX ADMIN — HEPARIN SODIUM 2000 UNITS: 1000 INJECTION INTRAVENOUS; SUBCUTANEOUS at 07:15

## 2023-12-03 RX ADMIN — ACETAMINOPHEN 325MG 650 MG: 325 TABLET ORAL at 10:59

## 2023-12-03 RX ADMIN — INSULIN LISPRO 2 UNITS: 100 INJECTION, SOLUTION INTRAVENOUS; SUBCUTANEOUS at 12:17

## 2023-12-03 RX ADMIN — ATORVASTATIN CALCIUM 80 MG: 80 TABLET, FILM COATED ORAL at 15:38

## 2023-12-03 RX ADMIN — INSULIN LISPRO 10 UNITS: 100 INJECTION, SOLUTION INTRAVENOUS; SUBCUTANEOUS at 12:18

## 2023-12-03 RX ADMIN — INSULIN GLARGINE 20 UNITS: 100 INJECTION, SOLUTION SUBCUTANEOUS at 21:51

## 2023-12-03 RX ADMIN — INSULIN LISPRO 1 UNITS: 100 INJECTION, SOLUTION INTRAVENOUS; SUBCUTANEOUS at 21:51

## 2023-12-03 RX ADMIN — AMLODIPINE BESYLATE 5 MG: 5 TABLET ORAL at 21:51

## 2023-12-03 RX ADMIN — INSULIN LISPRO 10 UNITS: 100 INJECTION, SOLUTION INTRAVENOUS; SUBCUTANEOUS at 07:47

## 2023-12-03 RX ADMIN — TICAGRELOR 180 MG: 90 TABLET ORAL at 00:16

## 2023-12-03 RX ADMIN — TICAGRELOR 90 MG: 90 TABLET ORAL at 21:50

## 2023-12-03 RX ADMIN — ROPINIROLE HYDROCHLORIDE 1 MG: 1 TABLET, FILM COATED ORAL at 21:51

## 2023-12-03 RX ADMIN — HEPARIN SODIUM 4000 UNITS: 1000 INJECTION INTRAVENOUS; SUBCUTANEOUS at 00:16

## 2023-12-03 NOTE — PLAN OF CARE
Problem: PAIN - ADULT  Goal: Verbalizes/displays adequate comfort level or baseline comfort level  Description: Interventions:  - Encourage patient to monitor pain and request assistance  - Assess pain using appropriate pain scale  - Administer analgesics based on type and severity of pain and evaluate response  - Implement non-pharmacological measures as appropriate and evaluate response  - Consider cultural and social influences on pain and pain management  - Notify physician/advanced practitioner if interventions unsuccessful or patient reports new pain  Outcome: Progressing     Problem: SAFETY ADULT  Goal: Patient will remain free of falls  Description: INTERVENTIONS:  - Educate patient/family on patient safety including physical limitations  - Instruct patient to call for assistance with activity   - Consult OT/PT to assist with strengthening/mobility   - Keep Call bell within reach  - Keep bed low and locked with side rails adjusted as appropriate  - Keep care items and personal belongings within reach  - Initiate and maintain comfort rounds  Outcome: Progressing     Problem: DISCHARGE PLANNING  Goal: Discharge to home or other facility with appropriate resources  Description: INTERVENTIONS:  - Identify barriers to discharge w/patient and caregiver  - Arrange for needed discharge resources and transportation as appropriate  - Identify discharge learning needs (meds, wound care, etc.)  - Refer to Case Management Department for coordinating discharge planning if the patient needs post-hospital services based on physician/advanced practitioner order or complex needs related to functional status, cognitive ability, or social support system  Outcome: Progressing     Problem: Knowledge Deficit  Goal: Patient/family/caregiver demonstrates understanding of disease process, treatment plan, medications, and discharge instructions  Description: Complete learning assessment and assess knowledge base.   Interventions:  - Provide teaching at level of understanding  - Provide teaching via preferred learning methods  Outcome: Progressing     Problem: CARDIOVASCULAR - ADULT  Goal: Maintains optimal cardiac output and hemodynamic stability  Description: INTERVENTIONS:  - Monitor I/O, vital signs and rhythm  - Monitor for S/S and trends of decreased cardiac output  - Administer and titrate ordered vasoactive medications to optimize hemodynamic stability  - Assess quality of pulses, skin color and temperature  - Assess for signs of decreased coronary artery perfusion  - Instruct patient to report change in severity of symptoms  Outcome: Progressing  Goal: Absence of cardiac dysrhythmias or at baseline rhythm  Description: INTERVENTIONS:  - Continuous cardiac monitoring, vital signs, obtain 12 lead EKG if ordered  - Administer antiarrhythmic and heart rate control medications as ordered  - Monitor electrolytes and administer replacement therapy as ordered  Outcome: Progressing

## 2023-12-03 NOTE — ASSESSMENT & PLAN NOTE
Lab Results   Component Value Date    HGBA1C 14.1 (A) 10/27/2023       No results for input(s): "POCGLU" in the last 72 hours.     Blood Sugar Average: Last 72 hrs:  very poorly controlled on lantus 100 iu qhs and humalog 10 iu tid ac  Transition to lantus 50 iu bid while ip humalog 10 iu tid ac and add ssi/poc bs

## 2023-12-03 NOTE — CONSULTS
Consult - Cardiology   Jordy Larson 61 y.o. female MRN: [id number]  Unit/Bed#: E4 -01 Encounter: [id number]        Reason For Consult: Abnormal troponin                 Assessment:  Abnormal troponin: Suspected non-STEMI - see discussion below   --HS Troponin 9, 67, 240, 969  CAD   --[facility] (non-STEMI) 11/2018: 90% OM--> plain balloon angioplasty. Occluded posterolateral branch. Unremarkable LAD &  RCA  Hypertension   --O/p Rx: amlodipine 5 mg daily, benazepril 20 mg daily, Toprol 100 mg daily  Hyperlipidemia   --Prehospital Rx: Atorvastatin 40 mg daily   --Lipids 4/24/2023: Cholesterol 361, triglycerides 347, HDL 37, calculated   Other     Diabetes     VINICIO: Untreated     Hx right breast cancer     Hx childhood tuberculosis      Discussion / Plan:  # Patient with history of CAD presented to [address] [place] [address] ED with chest pain at rest with symptom complex concerning for angina with escalating high-sensitivity troponin levels and vague ST segment shift suggestive of acute coronary syndrome: Presently pain-free    Repeat troponin to assess peak/trend  Cardiac catheterization advised and patient agreeable ~~> proceed tomorrow  Continue heparin till angiography completed  Ticagrelor load given earlier this morning ~~> continue twice daily dosing at least until cath completed Check echocardiogram  In light of ACS we will empirically escalate statin dose but check lipids to assess current level/efficacy of prehospital therapy  Continue Toprol and amlodipine as taken prior to admission ~~> will withhold benazepril until angiography completed  Recommendation for posthospital cardiac rehab discussed with the patient      History Of Present Illness:  Jordy Larson is a 60-year-old patient of [facility] cardiologist Dr. Shilpi Schroeder with history of CAD with prior balloon angioplasty, hypertension, dyslipidemia.     She was last seen by Dr. Shilpi Schroeder 8/18/2023  C/L chest pain with mostly atypical features (occurring both at rest and with effort, lasting minutes or extended periods. Somewhat sedentary lifestyle. 130/80, heart rate 80, weight 73 kg / 161 pounds  Tolerating high-dose statin with controlled BP  Nuclear stress test ordered ---> 9/7/2023: No perfusion defects, calculated EF 83%    Last evening, 12/2/2023, the patient presented to the [address] [place] [address] emergency department reporting ongoing midsternal chest discomfort present for about 45-60 minutes. She described this as a pressure or squeezing with some radiation to the left upper extremity without associated dyspnea, nausea, or emesis. She has had some modest episodes of chest discomfort in the past though she states this was stronger and different than those  High-sensitivity troponin level was 9 with ECG showing some nonspecific ST-T wave changes. Repeat at 2 hours was 67 with 4-hour results of 248 for which the patient was then given a loading dose of ticagrelor and initiated on heparin. She was later during the early morning hours today transferred to the [address] [place] [address]. Subsequent troponin was further elevated at 946.           Past Medical History:        Past Medical History:   Diagnosis Date    CAD (coronary artery disease)     Cancer ([address])     right breast    Chronic back pain     Coronary artery disease     Diabetes mellitus ([address])     Family history of early CAD     Hyperlipidemia     Hypertension     Myocardial infarction [facility])     NSTEMI (non-ST elevated myocardial infarction) ([address]) 11/28/2018    Status post coronary angioplasty 1st obtuse marginal 11/28/2018 at [facility] SANGEETHA stent could not be placed    Restless leg syndrome     Tuberculosis     patient was less than 11years old    Type 2 diabetes mellitus ([address])       Past Surgical History:   Procedure Laterality Date    ABDOMINAL SURGERY      phill    ANKLE SURGERY      tubal    BREAST SURGERY      partial mastectomy R breast    CARDIAC CATHETERIZATION  11/2018    Successful balloon angioplassty to OM1    CHOLECYSTECTOMY      FRACTURE SURGERY      right ankle    GALLBLADDER SURGERY      MASTECTOMY      TUBAL LIGATION          Allergy:        No Known Allergies    Medications:       Prior to Admission medications    Medication Sig Start Date End Date Taking?  Authorizing Provider   Accu-Chek FastClix Lancets MISC TEST THREE TIMES A DAY 12/2/21   Adi Bauer,    amLODIPine (NORVASC) 5 mg tablet swallow 1 tablet once daily 10/4/23   Adi Bauer DO   anastrozole (ARIMIDEX) 1 mg tablet Take 1 mg by mouth daily  Patient not taking: Reported on 12/2/2023 8/18/20   Historical Provider, MD   aspirin (ECOTRIN LOW STRENGTH) 81 mg EC tablet Take 81 mg by mouth daily    Historical Provider, MD   atorvastatin (LIPITOR) 40 mg tablet take 1 tablet by mouth once daily with dinner  Patient not taking: Reported on 12/2/2023 8/23/22   Adi Bauer DO   benazepril (LOTENSIN) 20 mg tablet take 1 tablet by mouth once daily 8/27/23   Adi Bauer DO   betamethasone valerate (VALISONE) 0.1 % cream Apply 1 Application topically 2 (two) times a day 7/6/20 11/17/23  Historical Provider, MD   Blood Glucose Monitoring Suppl (OneTouch Verio Flex System) w/Device KIT Test four times daily 4/18/22   Adi Bauer DO   dulaglutide (Trulicity) 2.89 MP/1.3ML injection Inject 0.5 mL (0.75 mg total) under the skin every 7 days 4/12/23 11/17/23  Xiomara Salgado MD   famotidine (PEPCID) 20 mg tablet take 1 tablet by mouth twice a day before meals  Patient taking differently: Take 20 mg by mouth 2 (two) times a day as needed for heartburn or indigestion 11/23/23   Adi Bauer DO   glucose blood (OneTouch Verio) test strip as directed use 1 TEST STRIP to TEST BLOOD SUGAR four times a day 5/11/23   Adi Bauer, DO   insulin glargine (LANTUS) 100 units/mL subcutaneous injection Inject 100 Units under the skin daily at bedtime 7/11/23   Adi Bauer DO   insulin lispro (HumaLOG) 100 units/mL injection pen inject 10 units subcutaneously three times a day with food 11/2/23   Jose Armando Hull DO   Insulin Pen Needle (BD Pen Needle Sadia 2nd Gen) 32G X 4 MM MISC Use 3 (three) times a day 12/14/22   Jose Armando Hull DO   lactulose (CHRONULAC) 10 g/15 mL solution Take 10 g by mouth as needed (constipation) 8/27/22   Kely Cevallos MD   metoprolol succinate (TOPROL-XL) 100 mg 24 hr tablet take 1 tablet by mouth once daily 1/3/23   Augustus Reeves MD   nitroglycerin (NITROSTAT) 0.4 mg SL tablet Place 1 tablet (0.4 mg total) under the tongue every 5 (five) minutes as needed for chest pain If no relief after first dose call prescriber or 911 6/8/23   Augustus Reeves MD   OneTouch Delica Lancets 77S MISC Use 3 (three) times a day Test 3 times daily 4/26/23   Vangie Haq MD   rOPINIRole (REQUIP) 1 mg tablet Take 1 tablet (1 mg total) by mouth daily 10/16/19   Jose Armando Hull DO   traZODone (DESYREL) 50 mg tablet Take 1 tablet (50 mg total) by mouth daily at bedtime  Patient not taking: Reported on 12/3/2023 11/17/23   RUBEN Oakes   cyclobenzaprine (FLEXERIL) 5 mg tablet Take 1 tablet (5 mg total) by mouth 3 (three) times a day as needed for muscle spasms for up to 10 days 4/12/23 12/3/23  Vangie Haq MD   ondansetron [facility]) 8 mg tablet Take 1 tablet (8 mg total) by mouth every 8 (eight) hours as needed for nausea or vomiting for up to 14 days 12/14/22 12/3/23  Jose Armando Hull DO   pantoprazole (PROTONIX) 40 mg tablet Take 1 tablet (40 mg total) by mouth daily  Patient not taking: Reported on 12/2/2023 4/20/20 12/3/23  Jose Armando Hull DO       Family History:     Family History   Problem Relation Age of Onset    Breast cancer Mother     Diabetes Mother     Heart failure Mother     Heart disease Father     Stroke Father         Social History:       Social History     Socioeconomic History    Marital status: /Civil Union     Spouse name: None    Number of children: None    Years of education: None    Highest education level: None   Occupational History    None   Tobacco Use    Smoking status: Never    Smokeless tobacco: Never   Vaping Use    Vaping Use: Never used   Substance and Sexual Activity    Alcohol use: Yes     Alcohol/week: 2.0 standard drinks of alcohol     Types: 1 Glasses of wine, 1 Cans of beer per week     Comment: occasionally    Drug use: Never    Sexual activity: None   Other Topics Concern    None   Social History Narrative    None     Social Determinants of Health     Financial Resource Strain: Not on file   Food Insecurity: Not on file   Transportation Needs: Not on file   Physical Activity: Not on file   Stress: Not on file   Social Connections: Not on file   Intimate Partner Violence: Not on file   Housing Stability: Not on file       ROS:  Symptoms per HPI  Insomnia  Sedentary lifestyle  Snoring and other symptoms of VINICIO  The remainder of the review of systems is negative    Exam:  General:  Alert, normally conversant, comfortable appearing  Head: Normocephalic, atraumatic. Eyes:  EOMI. Pupils - equal, round, reactive to accomodation. No icterus. Normal Conjunctiva. Oropharynx: Moist without lesion  Neck:  No gross bruit, JVD, thyromegaly, or lymphadenopathy  Heart:  Regular with controlled rate. No rub nor pathologic murmur  Lungs:  Clear without rales/rhonchi/wheeze  Abdomen:  Soft and nontender with normal bowel sounds. No organomegaly or mass  Lower Limbs:  No edema  Pulses:  RLE - DP:  2+                LLE - DP:  2+  Musculoskeletal: Independent movement of limbs observed, Formal ROM and strength eval not performed  Neurologic:    Oriented to: person, place, situation. Cranial Nerves: grossly intact - vision, smell, taste, and hearing were not tested.      Motor function: grossly normal, symmetric   Sensation: Was not tested      Vitals:    12/03/23 0212 12/03/23 0710   BP: 129/80 125/82   BP Location: Left arm Left arm   Pulse: 67 64   Resp: 18 18   Temp: (!) 97.1 °F (36.2 °C) (!) 97.2 °F (36.2 °C)   TempSrc: Temporal Temporal   SpO2: 95% 96%   Weight: 71.1 kg (156 lb 12 oz)    Height: 5' 1.5" (1.562 m)            DATA:      -----------  ECG:                      -----------------------------------------------------------------------------------------------------------------------------------------------  Telemetry:   Sinus rhythm ventricular rate trend 60-90           -----------------------------------------------------------------------------------------------------------------------------------------------  Weights: Wt Readings from Last 20 Encounters:   12/03/23 71.1 kg (156 lb 12 oz)   11/17/23 73 kg (161 lb)   11/03/23 73.5 kg (162 lb 2 oz)   10/27/23 69.9 kg (154 lb)   09/07/23 73 kg (161 lb)   08/18/23 73 kg (161 lb)   04/12/23 74.5 kg (164 lb 3.2 oz)   09/23/22 71.7 kg (158 lb)   08/29/22 71.7 kg (158 lb)   01/24/22 74.6 kg (164 lb 6 oz)   10/18/21 78.5 kg (173 lb)   10/18/21 78.5 kg (173 lb)   05/26/21 77.6 kg (171 lb 2 oz)   04/08/21 76.7 kg (169 lb)   03/12/21 75.3 kg (166 lb)   03/06/21 76 kg (167 lb 7 oz)   02/26/21 75.8 kg (167 lb)   11/22/20 69.3 kg (152 lb 12.5 oz)   11/13/20 71.7 kg (158 lb)   08/14/20 72.1 kg (159 lb)   , Body mass index is 29.14 kg/m².          Lab Studies:               Results from last 7 days   Lab Units 12/03/23  0623 12/03/23  0013 12/02/23  1905   WBC Thousand/uL 8.59 8.40 10.97*   HEMOGLOBIN g/dL 13.2 13.6 14.4   HEMATOCRIT % 39.1 41.1 42.9   PLATELETS Thousands/uL 411* 417* 418*   ,   Results from last 7 days   Lab Units 12/03/23  0623 12/02/23  1905   POTASSIUM mmol/L 3.8 3.7   CHLORIDE mmol/L 106 101   CO2 mmol/L 23 22   BUN mg/dL 17 16   CREATININE mg/dL 0.74 1.02   CALCIUM mg/dL 9.1 9.3   ALK PHOS U/L  --  115*   ALT U/L  --  14   AST U/L  --  17

## 2023-12-03 NOTE — ED NOTES
Pt reports that her chest pain was unresolved by first dose of nitro SL but is declining additional doses at this time     Edelmiro Frankel, RN  12/02/23 7181

## 2023-12-03 NOTE — ASSESSMENT & PLAN NOTE
W/cp in pt w/poorly controlled hld dm and cad. Somewhat atypical cp sometimes waking pt up at night occurring at rest and not necessarily after climbing stairs but has hx of MI. May be type 2 MI from htn.   Also consider VTE given hx of breast ca and some wang as well as poor ambulatory status  -Troponins were 9 --> 67 --> 248  -EKG was nonischemic  -Case was d/w cardiology on call at [place] and transfer was requested for cardiac cath evaluation w/asa and brilinta load and heparinization

## 2023-12-03 NOTE — PLAN OF CARE
Problem: PAIN - ADULT  Goal: Verbalizes/displays adequate comfort level or baseline comfort level  Description: Interventions:  - Encourage patient to monitor pain and request assistance  - Assess pain using appropriate pain scale  - Administer analgesics based on type and severity of pain and evaluate response  - Implement non-pharmacological measures as appropriate and evaluate response  - Consider cultural and social influences on pain and pain management  - Notify physician/advanced practitioner if interventions unsuccessful or patient reports new pain  Outcome: Progressing     Problem: SAFETY ADULT  Goal: Patient will remain free of falls  Description: INTERVENTIONS:  - Educate patient/family on patient safety including physical limitations  - Instruct patient to call for assistance with activity   - Consult OT/PT to assist with strengthening/mobility   - Keep Call bell within reach  - Keep bed low and locked with side rails adjusted as appropriate  - Keep care items and personal belongings within reach  - Initiate and maintain comfort rounds  Outcome: Progressing     Problem: DISCHARGE PLANNING  Goal: Discharge to home or other facility with appropriate resources  Description: INTERVENTIONS:  - Identify barriers to discharge w/patient and caregiver  - Arrange for needed discharge resources and transportation as appropriate  - Identify discharge learning needs (meds, wound care, etc.)  - Refer to Case Management Department for coordinating discharge planning if the patient needs post-hospital services based on physician/advanced practitioner order or complex needs related to functional status, cognitive ability, or social support system  Outcome: Progressing     Problem: Knowledge Deficit  Goal: Patient/family/caregiver demonstrates understanding of disease process, treatment plan, medications, and discharge instructions  Description: Complete learning assessment and assess knowledge base.   Interventions:  - Provide teaching at level of understanding  - Provide teaching via preferred learning methods  Outcome: Progressing     Problem: CARDIOVASCULAR - ADULT  Goal: Maintains optimal cardiac output and hemodynamic stability  Description: INTERVENTIONS:  - Monitor I/O, vital signs and rhythm  - Monitor for S/S and trends of decreased cardiac output  - Administer and titrate ordered vasoactive medications to optimize hemodynamic stability  - Assess quality of pulses, skin color and temperature  - Assess for signs of decreased coronary artery perfusion  - Instruct patient to report change in severity of symptoms  Outcome: Progressing  Goal: Absence of cardiac dysrhythmias or at baseline rhythm  Description: INTERVENTIONS:  - Continuous cardiac monitoring, vital signs, obtain 12 lead EKG if ordered  - Administer antiarrhythmic and heart rate control medications as ordered  - Monitor electrolytes and administer replacement therapy as ordered  Outcome: Progressing     Problem: METABOLIC, FLUID AND ELECTROLYTES - ADULT  Goal: Glucose maintained within target range  Description: INTERVENTIONS:  - Monitor Blood Glucose as ordered  - Assess for signs and symptoms of hyperglycemia and hypoglycemia  - Administer ordered medications to maintain glucose within target range  - Assess nutritional intake and initiate nutrition service referral as needed  Outcome: Progressing

## 2023-12-03 NOTE — H&P
[facility]  H&P  Name: Brien Beckham 61 y.o. female I MRN: [id number]  Unit/Bed#: E4 -01 I Date of Admission: 12/3/2023   Date of Service: 12/3/2023 I Hospital Day: 0      Assessment/Plan   * Elevated troponin I level  Assessment & Plan  W/cp in pt w/poorly controlled hld dm and cad. Somewhat atypical cp sometimes waking pt up at night occurring at rest and not necessarily after climbing stairs but has hx of MI. May be type 2 MI from htn. Also consider VTE given hx of breast ca and some wang as well as poor ambulatory status  -Troponins were 9 --> 67 --> 248  -EKG was nonischemic  -Case was d/w cardiology on call at [place] and transfer was requested for cardiac cath evaluation w/asa and brilinta load and heparinization    Coronary artery disease involving native coronary artery of native heart  Assessment & Plan  Continue bb and now asa/brilinta    Type 2 diabetes mellitus without complication, with long-term current use of insulin ([address])  Assessment & Plan  Lab Results   Component Value Date    HGBA1C 14.1 (A) 10/27/2023       No results for input(s): "POCGLU" in the last 72 hours. Blood Sugar Average: Last 72 hrs:  very poorly controlled on lantus 100 iu qhs and humalog 10 iu tid ac  Transition to lantus 50 iu bid while ip humalog 10 iu tid ac and add ssi/poc bs      Essential hypertension  Assessment & Plan  Bp was in htn emergency range vs accelerated improved w/ntg  Continue toprol and lisinopril for benazepril and norvasc           VTE Pharmacologic Prophylaxis:   High Risk (Score >/= 5) - Pharmacological DVT Prophylaxis Ordered: heparin drip. Sequential Compression Devices Ordered. Code Status: Level 1 - Full Code   Discussion with family:     Anticipated Length of Stay: Patient will be admitted on an inpatient basis with an anticipated length of stay of greater than 2 midnights secondary to elevated troponin r/o nstemi.     Total Time Spent on Date of Encounter in care of patient:  mins. This time was spent on one or more of the following: performing physical exam; counseling and coordination of care; obtaining or reviewing history; documenting in the medical record; reviewing/ordering tests, medications or procedures; communicating with other healthcare professionals and discussing with patient's family/caregivers. Chief Complaint: cp    History of Present Illness:  Gladys Campo is a 61 y.o. female with a PMH of cad, breast ca, htn hld iddm poor ambulatory status 2* back pain who presents with cp. Pt notes for a 'while' she has had intermittent poorly described cp. It's been going on almost daily for 'a while' and at least back since before Thanksgiving possibly back to [facility] pta, but she isn't certain. Sometimes it wakes her up from sleep. It does not happen necessarily after climbing the stairs from her first floor to her second floor although she does note some sob w/this. She is almost essentially housebound and spends a large portion of her day up in her room occasionally coming downstairs. Denies any LE edema or leg pain calf swelling however or hx of VTE. She reports usually her pain is related to her blood pressure being high. She came to ed on day of admission because pain was more severe in nature and she could not check her BP with this. It also radiated to her left arm pit which was new for her and due to severity of pain d/w her  and came to ED for evaluation. In [facility] initial troponin was wnl but delta was at 60 and third in 200s. Case was d/w cardiology on call who recommended transfer to facility w/cath capabilities for evaluation for possible cardiac cath.     Review of Systems:  Review of Systems    Past Medical and Surgical History:   Past Medical History:   Diagnosis Date    CAD (coronary artery disease)     Cancer ([address])     right breast    Chronic back pain     Coronary artery disease     Diabetes mellitus ([address])     Family history of early CAD     Hyperlipidemia     Hypertension     Myocardial infarction [facility])     NSTEMI (non-ST elevated myocardial infarction) ([address]) 11/28/2018    Status post coronary angioplasty 1st obtuse marginal 11/28/2018 at [facility] stent could not be placed    Restless leg syndrome     Tuberculosis     patient was less than 11years old    Type 2 diabetes mellitus ([address])        Past Surgical History:   Procedure Laterality Date    ABDOMINAL SURGERY      phill    ANKLE SURGERY      tubal    BREAST SURGERY      partial mastectomy R breast    CARDIAC CATHETERIZATION  11/2018    Successful balloon angioplassty to OM1    CHOLECYSTECTOMY      FRACTURE SURGERY      right ankle    GALLBLADDER SURGERY      MASTECTOMY      TUBAL LIGATION         Meds/Allergies:  Prior to Admission medications    Medication Sig Start Date End Date Taking?  Authorizing Provider   Accu-Chek FastClix Lancets MISC TEST THREE TIMES A DAY 12/2/21   Luis Dowd, DO   amLODIPine (NORVASC) 5 mg tablet swallow 1 tablet once daily 10/4/23   Luis Dowd DO   anastrozole (ARIMIDEX) 1 mg tablet Take 1 mg by mouth daily  Patient not taking: Reported on 12/2/2023 8/18/20   Historical Provider, MD   aspirin (ECOTRIN LOW STRENGTH) 81 mg EC tablet Take 81 mg by mouth daily    Historical Provider, MD   atorvastatin (LIPITOR) 40 mg tablet take 1 tablet by mouth once daily with dinner  Patient not taking: Reported on 12/2/2023 8/23/22   Lius Dowd DO   benazepril (LOTENSIN) 20 mg tablet take 1 tablet by mouth once daily 8/27/23   Luis Dowd DO   betamethasone valerate (VALISONE) 0.1 % cream Apply 1 Application topically 2 (two) times a day 7/6/20 11/17/23  Historical Provider, MD   Blood Glucose Monitoring Suppl (OneTouch Verio Flex System) w/Device KIT Test four times daily 4/18/22   Luis Dowd DO   dulaglutide (Trulicity) 0.68 SH/3.7IC injection Inject 0.5 mL (0.75 mg total) under the skin every 7 days 4/12/23 11/17/23  Yesy Mclean Tia Beauliue MD   famotidine (PEPCID) 20 mg tablet take 1 tablet by mouth twice a day before meals  Patient taking differently: Take 20 mg by mouth 2 (two) times a day as needed for heartburn or indigestion 23   Morgan Ballard, DO   glucose blood (OneTouch Verio) test strip as directed use 1 TEST STRIP to TEST BLOOD SUGAR four times a day 23   Morgan Alstonking, DO   insulin glargine (LANTUS) 100 units/mL subcutaneous injection Inject 100 Units under the skin daily at bedtime 23   Morgan , DO   insulin lispro (HumaLOG) 100 units/mL injection pen inject 10 units subcutaneously three times a day with food 23   Morgan , DO   Insulin Pen Needle (BD Pen Needle Sadia 2nd Gen) 32G X 4 MM MISC Use 3 (three) times a day 22   Morgan , DO   lactulose (CHRONULAC) 10 g/15 mL solution Take 10 g by mouth as needed (constipation) 22   Historical Provider, MD   metoprolol succinate (TOPROL-XL) 100 mg 24 hr tablet take 1 tablet by mouth once daily 1/3/23   Geneva Dior MD   nitroglycerin (NITROSTAT) 0.4 mg SL tablet Place 1 tablet (0.4 mg total) under the tongue every 5 (five) minutes as needed for chest pain If no relief after first dose call prescriber or 911 23   Geneva Dior MD   Oneuch Delica Lancets 57S MISC Use 3 (three) times a day Test 3 times daily 23   Gasper Garcia MD   rOPINIRole (REQUIP) 1 mg tablet Take 1 tablet (1 mg total) by mouth daily 10/16/19   Morgan Ballard, DO   traZODone (DESYREL) 50 mg tablet Take 1 tablet (50 mg total) by mouth daily at bedtime  Patient not taking: Reported on 12/3/2023 11/17/23   RUEBN Valle   cyclobenzaprine (FLEXERIL) 5 mg tablet Take 1 tablet (5 mg total) by mouth 3 (three) times a day as needed for muscle spasms for up to 10 days 4/12/23 12/3/23  Gasper Garcia MD   ondansetron [facility]) 8 mg tablet Take 1 tablet (8 mg total) by mouth every 8 (eight) hours as needed for nausea or vomiting for up to 14 days 12/14/22 12/3/23  Joel Montiel DO   pantoprazole (PROTONIX) 40 mg tablet Take 1 tablet (40 mg total) by mouth daily  Patient not taking: Reported on 12/2/2023 4/20/20 12/3/23  Joel Montiel DO     I have reviewed home medications with patient personally. Allergies: No Known Allergies    Social History:  Marital Status: /Civil Union   Occupation:   Patient Pre-hospital Living Situation:   Patient Pre-hospital Level of Mobility:   Patient Pre-hospital Diet Restrictions:   Substance Use History:   Social History     Substance and Sexual Activity   Alcohol Use Yes    Alcohol/week: 2.0 standard drinks of alcohol    Types: 1 Glasses of wine, 1 Cans of beer per week    Comment: occasionally     Social History     Tobacco Use   Smoking Status Never   Smokeless Tobacco Never     Social History     Substance and Sexual Activity   Drug Use Never       Family History:  Family History   Problem Relation Age of Onset    Breast cancer Mother     Diabetes Mother     Heart failure Mother     Heart disease Father     Stroke Father        Physical Exam:     Vitals:   Blood Pressure: 129/80 (12/03/23 0212)  Pulse: 67 (12/03/23 0212)  Temperature: (!) 97.1 °F (36.2 °C) (12/03/23 0212)  Temp Source: Temporal (12/03/23 0212)  Respirations: 18 (12/03/23 0212)  Height: 5' 1.5" (156.2 cm) (12/03/23 5407)  Weight - Scale: 71.1 kg (156 lb 12 oz) (12/03/23 0212)  SpO2: 95 % (12/03/23 1173)    Physical Exam  Vitals reviewed. Constitutional:       General: She is not in acute distress. Appearance: She is obese. She is not ill-appearing, toxic-appearing or diaphoretic. HENT:      Head: Normocephalic and atraumatic. Right Ear: External ear normal.      Left Ear: External ear normal.      Nose: Nose normal.   Cardiovascular:      Rate and Rhythm: Normal rate and regular rhythm. Heart sounds: No murmur heard. No friction rub. Pulmonary:      Effort: No respiratory distress.       Breath sounds: No stridor. No wheezing, rhonchi or rales. Abdominal:      General: There is no distension. Palpations: There is no mass. Tenderness: There is no abdominal tenderness. There is no guarding or rebound. Hernia: No hernia is present. Musculoskeletal:      Right lower leg: No edema. Left lower leg: No edema. Skin:     General: Skin is warm. Neurological:      Mental Status: She is alert. Mental status is at baseline. Psychiatric:         Mood and Affect: Mood normal.          Additional Data:     Lab Results:  Results from last 7 days   Lab Units 12/03/23  0623   WBC Thousand/uL 8.59   HEMOGLOBIN g/dL 13.2   HEMATOCRIT % 39.1   PLATELETS Thousands/uL 411*   NEUTROS PCT % 57   LYMPHS PCT % 34   MONOS PCT % 6   EOS PCT % 2     Results from last 7 days   Lab Units 12/03/23  0623 12/02/23  1905   SODIUM mmol/L 137 136   POTASSIUM mmol/L 3.8 3.7   CHLORIDE mmol/L 106 101   CO2 mmol/L 23 22   BUN mg/dL 17 16   CREATININE mg/dL 0.74 1.02   ANION GAP mmol/L 8 13   CALCIUM mg/dL 9.1 9.3   ALBUMIN g/dL  --  4.3   TOTAL BILIRUBIN mg/dL  --  0.33   ALK PHOS U/L  --  115*   ALT U/L  --  14   AST U/L  --  17   GLUCOSE RANDOM mg/dL 183* 275*     Results from last 7 days   Lab Units 12/03/23  0013   INR  0.92                   Lines/Drains:  Invasive Devices       Peripheral Intravenous Line  Duration             Peripheral IV 12/02/23 Right Antecubital <1 day    Peripheral IV 12/03/23 Left;Proximal;Ventral (anterior) Forearm <1 day                        Imaging: Personally reviewed the following imaging: cxr on my personal read w?mild  congestion in lower lungs. Pt asymptomatic howev er. No orders to display       EKG and Other Studies Reviewed on Admission:   EKG:  on my personal interpretation EKG is nsr w/o ischemic changes in 2+ contiguous leads. .    ** Please Note: This note has been constructed using a voice recognition system.  **

## 2023-12-03 NOTE — ED PROVIDER NOTES
History  Chief Complaint   Patient presents with    Chest Pain     Pain in mid of chest going into left arm/ armpit started between 25321     59-year-old female accompanied by her  presents with midsternal chest pain which started roughly 45 minutes to an hour ago. Patient states the pain radiates across her chest down her left arm and to her jaw . Is not associated with shortness of breath, no nausea no vomiting. Does not seem to have a respiratory or positional component. However the pain is reproducible over the anterior chest wall. Patient has a history of previous MI x 3, patient had 2 stents placed roughly 6 years ago. Patient has multiple risk factors including hypertension diabetes hyperlipidemia, patient has never used tobacco.        Prior to Admission Medications   Prescriptions Last Dose Informant Patient Reported? Taking?    Accu-Chek FastClix Lancets MISC  Self No No   Sig: TEST THREE TIMES A DAY   Blood Glucose Monitoring Suppl (OneTouch Verio Flex System) w/Device KIT   No No   Sig: Test four times daily   Insulin Pen Needle (BD Pen Needle Sadia 2nd Gen) 32G X 4 MM MISC   No No   Sig: Use 3 (three) times a day   OneTouch Delica Lancets 92E MISC   No No   Sig: Use 3 (three) times a day Test 3 times daily   amLODIPine (NORVASC) 5 mg tablet   No No   Sig: swallow 1 tablet once daily   anastrozole (ARIMIDEX) 1 mg tablet  Self Yes No   Sig: Take 1 mg by mouth daily   aspirin (ECOTRIN LOW STRENGTH) 81 mg EC tablet  Self Yes No   Sig: Take 81 mg by mouth daily   atorvastatin (LIPITOR) 40 mg tablet   No No   Sig: take 1 tablet by mouth once daily with dinner   benazepril (LOTENSIN) 20 mg tablet   No No   Sig: take 1 tablet by mouth once daily   betamethasone valerate (VALISONE) 0.1 % cream  Self Yes No   Sig: Apply topically 2 (two) times a day   cyclobenzaprine (FLEXERIL) 5 mg tablet   No No   Sig: Take 1 tablet (5 mg total) by mouth 3 (three) times a day as needed for muscle spasms for up to 10 days   dulaglutide (Trulicity) 1.64 RE/8.3RO injection   No No   Sig: Inject 0.5 mL (0.75 mg total) under the skin every 7 days   famotidine (PEPCID) 20 mg tablet   No No   Sig: take 1 tablet by mouth twice a day before meals   glucose blood (OneTouch Verio) test strip   No No   Sig: as directed use 1 TEST STRIP to TEST BLOOD SUGAR four times a day   insulin glargine (LANTUS) 100 units/mL subcutaneous injection   No No   Sig: Inject 100 Units under the skin daily at bedtime   insulin lispro (HumaLOG) 100 units/mL injection pen   No No   Sig: inject 10 units subcutaneously three times a day with food   lactulose (CHRONULAC) 10 g/15 mL solution   Yes No   Sig: as needed   metoprolol succinate (TOPROL-XL) 100 mg 24 hr tablet   No No   Sig: take 1 tablet by mouth once daily   nitroglycerin (NITROSTAT) 0.4 mg SL tablet   No No   Sig: Place 1 tablet (0.4 mg total) under the tongue every 5 (five) minutes as needed for chest pain If no relief after first dose call prescriber or 911   ondansetron (ZOFRAN) 8 mg tablet   No No   Sig: Take 1 tablet (8 mg total) by mouth every 8 (eight) hours as needed for nausea or vomiting for up to 14 days   pantoprazole (PROTONIX) 40 mg tablet  Self No No   Sig: Take 1 tablet (40 mg total) by mouth daily   Patient taking differently: Take 40 mg by mouth as needed   rOPINIRole (REQUIP) 1 mg tablet  Self No No   Sig: Take 1 tablet (1 mg total) by mouth daily   traZODone (DESYREL) 50 mg tablet   No No   Sig: Take 1 tablet (50 mg total) by mouth daily at bedtime      Facility-Administered Medications: None       Past Medical History:   Diagnosis Date    CAD (coronary artery disease)     Cancer (HCC)     right breast    Chronic back pain     Coronary artery disease     Diabetes mellitus (HCC)     Family history of early CAD     Hyperlipidemia     Hypertension     Myocardial infarction [facility])     NSTEMI (non-ST elevated myocardial infarction) ([address]) 11/28/2018    Status post coronary angioplasty 1st obtuse marginal 11/28/2018 at [facility] [place] stent could not be placed    Restless leg syndrome     Tuberculosis     patient was less than 11years old    Type 2 diabetes mellitus ([address])        Past Surgical History:   Procedure Laterality Date    ABDOMINAL SURGERY      phill    ANKLE SURGERY      tubal    BREAST SURGERY      partial mastectomy R breast    CARDIAC CATHETERIZATION  11/2018    Successful balloon angioplassty to [address]      right ankle    GALLBLADDER SURGERY      MASTECTOMY      TUBAL LIGATION         Family History   Problem Relation Age of Onset    Breast cancer Mother     Diabetes Mother     Heart failure Mother     Heart disease Father     Stroke Father      I have reviewed and agree with the history as documented. E-Cigarette/Vaping    E-Cigarette Use Never User      E-Cigarette/Vaping Substances    Nicotine No     THC No     CBD No     Flavoring No     Other No     Unknown No      Social History     Tobacco Use    Smoking status: Never    Smokeless tobacco: Never   Vaping Use    Vaping Use: Never used   Substance Use Topics    Alcohol use: Yes     Comment: occasionally    Drug use: No       Review of Systems   Constitutional: Negative. Respiratory: Negative. Cardiovascular: Negative. Gastrointestinal: Negative. Genitourinary: Negative. Musculoskeletal: Negative. Skin: Negative. Neurological: Negative. Hematological: Negative. Psychiatric/Behavioral:  The patient is nervous/anxious. Physical Exam  Physical Exam  Vitals and nursing note reviewed. Constitutional:       General: She is not in acute distress. Appearance: She is well-developed. She is not ill-appearing, toxic-appearing or diaphoretic. HENT:      Head: Normocephalic and atraumatic. Cardiovascular:      Rate and Rhythm: Regular rhythm. Tachycardia present. Heart sounds: Normal heart sounds.    Pulmonary:      Effort: Pulmonary effort is normal. Breath sounds: Normal breath sounds. Chest:      Chest wall: Tenderness present. No mass, deformity or edema. Abdominal:      General: Bowel sounds are normal.      Palpations: Abdomen is soft. Musculoskeletal:      Cervical back: Normal range of motion and neck supple. Skin:     General: Skin is warm and dry. Capillary Refill: Capillary refill takes less than 2 seconds. Neurological:      General: No focal deficit present. Mental Status: She is alert and oriented to person, place, and time.    Psychiatric:         Mood and Affect: Mood normal.         Behavior: Behavior normal.         Vital Signs  ED Triage Vitals   Temperature Pulse Respirations Blood Pressure SpO2   12/02/23 1854 12/02/23 1854 12/02/23 1854 12/02/23 1856 12/02/23 1854   98.5 °F (36.9 °C) (!) 114 19 (!) 230/113 96 %      Temp Source Heart Rate Source Patient Position - Orthostatic VS BP Location FiO2 (%)   12/02/23 1854 12/02/23 1854 12/02/23 1854 12/02/23 1854 --   Tympanic Monitor Sitting Left arm       Pain Score       12/02/23 1854       10 - Worst Possible Pain           Vitals:    12/02/23 1854 12/02/23 1856   BP:  (!) 230/113   Pulse: (!) 114    Patient Position - Orthostatic VS: Sitting          Visual Acuity      ED Medications  Medications - No data to display    Diagnostic Studies  Results Reviewed       Procedure Component Value Units Date/Time    CBC and differential [[id number]]  (Abnormal) Collected: 12/02/23 1905    Lab Status: Final result Specimen: Blood from Arm, Right Updated: 12/02/23 1911     WBC 10.97 Thousand/uL      RBC 4.63 Million/uL      Hemoglobin 14.4 g/dL      Hematocrit 42.9 %      MCV 93 fL      MCH 31.1 pg      MCHC 33.6 g/dL      RDW 13.1 %      MPV 9.3 fL      Platelets 804 Thousands/uL      nRBC 0 /100 WBCs      Neutrophils Relative 51 %      Immat GRANS % 0 %      Lymphocytes Relative 43 %      Monocytes Relative 5 %      Eosinophils Relative 1 %      Basophils Relative 0 % Neutrophils Absolute 5.52 Thousands/µL      Immature Grans Absolute 0.03 Thousand/uL      Lymphocytes Absolute 4.73 Thousands/µL      Monocytes Absolute 0.53 Thousand/µL      Eosinophils Absolute 0.13 Thousand/µL      Basophils Absolute 0.03 Thousands/µL     HS Troponin 0hr (reflex protocol) [[id number]] Collected: 12/02/23 1905    Lab Status: In process Specimen: Blood from Arm, Right Updated: 12/02/23 1908    Comprehensive metabolic panel [[id number]] Collected: 12/02/23 1905    Lab Status: In process Specimen: Blood from Arm, Right Updated: 12/02/23 1908                   No orders to display              Procedures  ECG 12 Lead Documentation Only    Date/Time: 12/2/2023 8:32 PM    Performed by: Charmaine Hodges MD  Authorized by: Charmaine Hodges MD    Indications / Diagnosis:  CP  ECG reviewed by me, the ED Provider: yes    Patient location:  ED  Previous ECG:     Previous ECG:  Compared to current  Interpretation:     Interpretation: non-specific    Rate:     ECG rate:  120    ECG rate assessment: tachycardic    Rhythm:     Rhythm: sinus tachycardia    Ectopy:     Ectopy: none    QRS:     QRS axis:  Normal  Conduction:     Conduction: normal    ST segments:     ST segments:  Normal  T waves:     T waves: normal             ED Course                                             Medical Decision Making  Amount and/or Complexity of Data Reviewed  Labs: ordered. Radiology: ordered and independent interpretation performed. Risk  Prescription drug management. Disposition  Final diagnoses:   None     ED Disposition       None          Follow-up Information    None         Patient's Medications   Discharge Prescriptions    No medications on file       No discharge procedures on file.     PDMP Review         Value Time User    PDMP Reviewed  Yes 12/3/2021  4:16 PM Benigno Spencer MD            ED Provider  Electronically Signed by             Charmaine Hodges MD  12/03/23 3528

## 2023-12-03 NOTE — ED CARE HANDOFF
Emergency Department Sign Out Note        Sign out and transfer of care from Dr Nikko Haq. See Separate Emergency Department note. The patient, Tani Vieira, was evaluated by the previous provider for NSTEMI. Workup Completed:  Cardiac work up    ED Course / Workup Pending (followup):                                      ED Course as of 12/03/23 0433   Sat Dec 02, 2023   2302 Sign out:     Pt here for CP  2-hour troponin elevated, concern for NSTEMI    Dr Delaney Trent wanted Inerventional cardiology consulted  Dr Lea Trimble w/ IC at [facility] AND [facility] agrees w/ transfer, wants pt on Heparin drip, Verma Roof to call back to accept the pt  Awaiting for transport    2321 Pt seen and evaluated    Agree with signout    Patient remains stable  Pain completely resolved  VSS     Pt understand plan of transfer for further evaluation by cardiology and possible cath   Sun Dec 03, 2023   0009 D/w Magdalena at [address] the case   Pt stable  CP free  EKG stable    Dr Sims Lunch is accepting      CriticalCare Time    Date/Time: 12/3/2023 12:10 AM    Performed by: Neel Morales MD  Authorized by: Neel Morales MD    Critical care provider statement:     Critical care time (minutes):  35    Critical care start time:  12/2/2023 11:30 PM    Critical care end time:  12/3/2023 12:10 AM    Critical care time was exclusive of:  Separately billable procedures and treating other patients    Critical care was necessary to treat or prevent imminent or life-threatening deterioration of the following conditions: NSTEMI.     Critical care was time spent personally by me on the following activities:  Examination of patient, review of old charts, ordering and performing treatments and interventions and evaluation of patient's response to treatment    Medical Decision Making  72-year-old female    Here for chest pain  elevated troponins  Concern for NSTEMI  Cardiology consulted here, who wanted interventional cardiology consulted  Interventional cardiology, Dr. Jazmin Hall wanted Brilinta and heparin drip  Will see the patient in consult and patient will be admitted to [facility]    Amount and/or Complexity of Data Reviewed  Labs: ordered. Radiology: ordered and independent interpretation performed. Risk  Decision regarding hospitalization. Disposition  Final diagnoses:   Chest pain     Time reflects when diagnosis was documented in both MDM as applicable and the Disposition within this note       Time User Action Codes Description Comment    12/3/2023 12:12 AM Anya Paniagua Add [R07.9] Chest pain           ED Disposition       ED Disposition   Transfer to Another Facility-In Network    Condition   --    Date/Time   Sat Dec 2, 2023 11:04 PM    Comment   Vaishali Willis should be transferred out to [facility].                MD Documentation      Keila Davidson Most Recent Value   Patient Condition The patient has been stabilized such that within reasonable medical probability, no material deterioration of the patient condition or the condition of the unborn child(marie) is likely to result from the transfer   Reason for Transfer Level of Care needed not available at this facility   Benefits of Transfer Specialized equipment and/or services available at the receiving facility (Include comment)________________________   Risks of Transfer Potential for delay in receiving treatment, Potential deterioration of medical condition   Accepting Physician Roberto Tong   Sending MD Dr. Sathish Kong   Provider Certification General risk, such as traffic hazards, adverse weather conditions, rough terrain or turbulence, possible failure of equipment (including vehicle or aircraft), or consequences of actions of persons outside the control of the transport personnel          RN Documentation      [address] Roberto Dent Report Given to [facility] RN          Follow-up Information    None       Discharge Medication List as of 12/3/2023  1:31 AM        CONTINUE these medications which have NOT CHANGED    Details   ! !  Accu-Chek FastClix Lancets MISC TEST THREE TIMES A DAY, Normal      amLODIPine (NORVASC) 5 mg tablet swallow 1 tablet once daily, Starting Wed 10/4/2023, Normal      anastrozole (ARIMIDEX) 1 mg tablet Take 1 mg by mouth daily, Starting Tue 8/18/2020, Historical Med      aspirin (ECOTRIN LOW STRENGTH) 81 mg EC tablet Take 81 mg by mouth daily, Historical Med      atorvastatin (LIPITOR) 40 mg tablet take 1 tablet by mouth once daily with dinner, Normal      benazepril (LOTENSIN) 20 mg tablet take 1 tablet by mouth once daily, Normal      betamethasone valerate (VALISONE) 0.1 % cream Apply 1 Application topically 2 (two) times a day, Starting Mon 7/6/2020, Until Fri 11/17/2023, Historical Med      Blood Glucose Monitoring Suppl (OneTouch Verio Flex System) w/Device KIT Test four times daily, Normal      dulaglutide (Trulicity) 0.41 LH/9.8HX injection Inject 0.5 mL (0.75 mg total) under the skin every 7 days, Starting Wed 4/12/2023, Until Fri 11/17/2023, Normal      famotidine (PEPCID) 20 mg tablet take 1 tablet by mouth twice a day before meals, Starting Thu 11/23/2023, Normal      glucose blood (OneTouch Verio) test strip as directed use 1 TEST STRIP to TEST BLOOD SUGAR four times a day, Normal      insulin glargine (LANTUS) 100 units/mL subcutaneous injection Inject 100 Units under the skin daily at bedtime, Starting Tue 7/11/2023, Normal      insulin lispro (HumaLOG) 100 units/mL injection pen inject 10 units subcutaneously three times a day with food, Normal      Insulin Pen Needle (BD Pen Needle Sadia 2nd Gen) 32G X 4 MM MISC Use 3 (three) times a day, Starting Wed 12/14/2022, Normal      lactulose (CHRONULAC) 10 g/15 mL solution as needed, Starting Sat 8/27/2022, Historical Med      metoprolol succinate (TOPROL-XL) 100 mg 24 hr tablet take 1 tablet by mouth once daily, Normal      nitroglycerin (NITROSTAT) 0.4 mg SL tablet Place 1 tablet (0.4 mg total) under the tongue every 5 (five) minutes as needed for chest pain If no relief after first dose call prescriber or 911, Starting u 6/8/2023, Normal      !! OneTouch Delica Lancets 99A MISC Use 3 (three) times a day Test 3 times daily, Starting Wed 4/26/2023, Normal      rOPINIRole (REQUIP) 1 mg tablet Take 1 tablet (1 mg total) by mouth daily, Starting Wed 10/16/2019, Normal      traZODone (DESYREL) 50 mg tablet Take 1 tablet (50 mg total) by mouth daily at bedtime, Starting Fri 11/17/2023, Normal      cyclobenzaprine (FLEXERIL) 5 mg tablet Take 1 tablet (5 mg total) by mouth 3 (three) times a day as needed for muscle spasms for up to 10 days, Starting Wed 4/12/2023, Until Fri 11/17/2023 at 2359, Normal      ondansetron (ZOFRAN) 8 mg tablet Take 1 tablet (8 mg total) by mouth every 8 (eight) hours as needed for nausea or vomiting for up to 14 days, Starting Wed 12/14/2022, Until Fri 11/17/2023 at 2359, Normal      pantoprazole (PROTONIX) 40 mg tablet Take 1 tablet (40 mg total) by mouth daily, Starting Mon 4/20/2020, Normal       !! - Potential duplicate medications found. Please discuss with provider. No discharge procedures on file.        ED Provider  Electronically Signed by     Umesh Gusman MD  12/02/23 [address] [facility] Alexandre Dutta MD  12/03/23 4016

## 2023-12-03 NOTE — ASSESSMENT & PLAN NOTE
Bp was in htn emergency range vs accelerated improved w/ntg  Continue toprol and lisinopril for benazepril and norvasc

## 2023-12-03 NOTE — EMTALA/ACUTE CARE TRANSFER
[address]  [address] [facility] [address]  Dept: [phone]      EMTALA TRANSFER CONSENT    NAME Lanie Sender                                        CASSANDRA 1960                              MRN [id number]    I have been informed of my rights regarding examination, treatment, and transfer   by Dr. Shandra Hunt MD    Benefits: Specialized equipment and/or services available at the receiving facility (Include comment)________________________    Risks: Potential for delay in receiving treatment, Potential deterioration of medical condition      Consent for Transfer:  I acknowledge that my medical condition has been evaluated and explained to me by the emergency department physician or other qualified medical person and/or my attending physician, who has recommended that I be transferred to the service of  Accepting Physician: Dr Jarrett Villegas at [address] [place] [address] [address] Name, [address] [place] [address] Street : [facility]. The above potential benefits of such transfer, the potential risks associated with such transfer, and the probable risks of not being transferred have been explained to me, and I fully understand them. The doctor has explained that, in my case, the benefits of transfer outweigh the risks. I agree to be transferred. I authorize the performance of emergency medical procedures and treatments upon me in both transit and upon arrival at the receiving facility. Additionally, I authorize the release of any and all medical records to the receiving facility and request they be transported with me, if possible. I understand that the safest mode of transportation during a medical emergency is an ambulance and that the Hospital advocates the use of this mode of transport. Risks of traveling to the receiving facility by car, including absence of medical control, life sustaining equipment, such as oxygen, and medical personnel has been explained to me and I fully understand them.     ([address] CORRECT [address] BELOW)  [  ]  I consent to the stated transfer and to be transported by ambulance/helicopter. [  ]  I consent to the stated transfer, but refuse transportation by ambulance and accept full responsibility for my transportation by car. I understand the risks of non-ambulance transfers and I exonerate the Hospital and its staff from any deterioration in my condition that results from this refusal.    X___________________________________________    DATE  23  TIME________  Signature of patient or legally responsible individual signing on patient behalf           RELATIONSHIP TO PATIENT_________________________          Provider Certification    NAME Negro Chavez                                         1960                              MRN [id number]    A medical screening exam was performed on the above named patient. Based on the examination:    Condition Necessitating Transfer The encounter diagnosis was Chest pain. Patient Condition: The patient has been stabilized such that within reasonable medical probability, no material deterioration of the patient condition or the condition of the unborn child(marie) is likely to result from the transfer    Reason for Transfer: Level of Care needed not available at this facility    Transfer Requirements: [address] available and qualified personnel available for treatment as acknowledged by    Agreed to accept transfer and to provide appropriate medical treatment as acknowledged by       Dr Stanislaw Fernández  Appropriate medical records of the examination and treatment of the patient are provided at the time of transfer   [address] _______  Transfer will be performed by qualified personnel from    and appropriate transfer equipment as required, including the use of necessary and appropriate life support measures.     Provider Certification: I have examined the patient and explained the following risks and benefits of being transferred/refusing transfer to the patient/family:  General risk, such as traffic hazards, adverse weather conditions, rough terrain or turbulence, possible failure of equipment (including vehicle or aircraft), or consequences of actions of persons outside the control of the transport personnel      Based on these reasonable risks and benefits to the patient and/or the unborn child(marie), and based upon the information available at the time of the patient’s examination, I certify that the medical benefits reasonably to be expected from the provision of appropriate medical treatments at another medical facility outweigh the increasing risks, if any, to the individual’s medical condition, and in the case of labor to the unborn child, from effecting the transfer.     X____________________________________________ DATE 12/03/23        TIME_______      ORIGINAL - SEND TO MEDICAL RECORDS   COPY - SEND WITH PATIENT DURING TRANSFER

## 2023-12-03 NOTE — Clinical Note
"DAILY PROGRESS NOTE  [facility]    10/5/2019    Patient Identification:  Name: Melia Almeida  Age: 55 y.o.  Sex: female  :  1963  MRN: [id number]           Primary Care Physician: Taiwo Shankar MD    Subjective:    Interval History:    Heart rate controlled on amiodarone.  Blood pressure has improved after bolus  Not eating much  Blood sugar okay  Potassium 5.1, hemoglobin 11.8  Has completed radiation therapy.    ROS:     No nausea, vomiting, diarrhea, constipation, chest pain    Objective:    Scheduled Meds:    acetylcysteine 4 mL Nebulization Q8H - RT   aspirin 81 mg Oral Daily   castor oil-balsam peru  Topical Q12H   dexamethasone 4 mg Oral Q12H   divalproex 125 mg Oral Nightly   escitalopram 10 mg Oral Daily   HYDROcodone-acetaminophen 1 tablet Oral BID   insulin glargine 10 Units Subcutaneous Nightly   insulin lispro 0-7 Units Subcutaneous 4x Daily With Meals & Nightly   ipratropium-albuterol 3 mL Nebulization Q8H - RT   levothyroxine 75 mcg Oral Q AM   midodrine 10 mg Oral TID AC   pantoprazole 40 mg Oral Q AM   polyethylene glycol 17 g Oral Daily       Continuous Infusions:    amiodarone 0.5 mg/min Last Rate: 0.5 mg/min (10/05/19 1430)       PRN Meds:  •  acetaminophen  •  carboxymethylcellulose sod (PF)  •  dextrose  •  dextrose  •  glucagon (human recombinant)    Intake/Output:    Intake/Output Summary (Last 24 hours) at 10/5/2019 1433  Last data filed at 10/5/2019 0930  Gross per 24 hour   Intake 20 ml   Output --   Net 20 ml         Exam:    tMax 24 hrs: Temp (24hrs), Av.7 °F (36.5 °C), Min:97.4 °F (36.3 °C), Max:97.9 °F (36.6 °C)    Vitals Ranges:   Temp:  [97.4 °F (36.3 °C)-97.9 °F (36.6 °C)] 97.9 °F (36.6 °C)  Heart Rate:  [] 106  Resp:  [16-20] 18  BP: ()/(42-77) 132/50    /50 (BP Location: Left arm, Patient Position: Lying)   Pulse 106   Temp 97.9 °F (36.6 °C) (Oral)   Resp 18   Ht 165.1 cm (65\")   Wt 85.7 kg " Medication given was fentanyl. Versed sedation The indication was Pain. (188 lb 15 oz)   LMP  (LMP Unknown)   SpO2 95%   BMI 31.44 kg/m²     General: Having dialysis, sleepy, arousable.  No respiratory distress while on oxygen.    Neck: Supple, symmetrical, trachea midline, no adenopathy;              thyroid:  no enlargement/tenderness/nodules;              no carotid bruit or JVD  Cardiovascular: Normal rate, regular rhythm and intact distal pulses.              Exam reveals no gallop and no friction rub. No murmur heard  Chest wall: No tenderness or deformity  Pulmonary:  Diminished breath sounds bilaterally, rhonchi at bases bilaterally.    Abdominal: Soft. Soft, non-tender, bowel sounds active all four quadrants,     no masses, no hepatomegaly, no splenomegaly.   Extremities: Normal, atraumatic, no cyanosis.  No edema in RLE. S/p L BKA  Pulses: 2 + symmetric all extremities  Neurological: Patient is alert and oriented to person, place                 CNII-XII intact, normal strength, sensation intact throughout  Skin: Skin color, texture, normal. Turgor is decreased. No rashes or lesions            Data Review:    Results from last 7 days   Lab Units 10/05/19  0517 10/04/19  0601 10/03/19  0456   WBC 10*3/mm3 6.59 8.67 6.45   HEMOGLOBIN g/dL 11.8* 11.7* 12.1   HEMATOCRIT % 38.7 38.2 38.9   PLATELETS 10*3/mm3 213 228 221       Results from last 7 days   Lab Units 10/05/19  0517 10/04/19  0601 10/03/19  0456   SODIUM mmol/L 128* 132* 132*   POTASSIUM mmol/L 5.1 4.8 5.7*   CHLORIDE mmol/L 84* 86* 88*   CO2 mmol/L 18.7* 25.3 21.4*   BUN mg/dL 76* 51* 85*   CREATININE mg/dL 4.92* 3.85* 4.84*   CALCIUM mg/dL 9.7 9.7 10.0   BILIRUBIN mg/dL  --   --  0.4   ALK PHOS U/L  --   --  331*   ALT (SGPT) U/L  --   --  40*   AST (SGOT) U/L  --   --  24   GLUCOSE mg/dL 151* 150* 183*                 No results found for: TROPONINT    Microbiology Results (last 10 days)     ** No results found for the last 240 hours. **           Imaging Results (last 7 days)     Procedure Component Value Units  Date/Time    XR Chest 1 View [[id number]] Collected:  10/01/19 0758     Updated:  10/01/19 1055    Narrative:       CLINICAL HISTORY: 55-year-old female for follow-up of pulmonary  infiltrates and atelectasis.     EXAM: Portable AP erect chest dated 10/01/2019 at 0602 hours.     FINDINGS: When compared to CT chest exam of 09/15/2019 and most recent  chest radiograph, portable AP erect projection of 09/26/2019 at 1138  hours, there is still opacification of the basilar right lung compatible  with previously demonstrated atelectasis and possible superimposed  infiltrates. Coarse markings in the perihilar and lower left lung and  some strandy opacity in the perihilar left mid lung periphery remain.  Metallic mesh vascular stents in the left upper extremity and right  subclavian distribution remain. Cardiac silhouette remains enlarged.  Sternal wire sutures and CABG markers are again demonstrated. No  pneumothorax or acute change in bony thorax is seen.     CONCLUSION: Allowing for minor technical differences, there is little if  any change from the exam of 09/26/2019 at 1138 hours, with cardiomegaly  and right basilar atelectasis or consolidative changes remaining.     This report was finalized on 10/1/2019 10:52 AM by Dr. Sam Magallanes M.D.               Assessment:        Squamous cell lung cancer (CMS/HCC)    End stage renal disease (CMS/HCC)    Atelectasis of right lung    CAD (coronary artery disease)  Acute respiratory failure with hypoxia  DM  PVD  CAD  HTN  Anemia CKD  Hypothyroidism       Plan:    Complete palliative radiation therapy  Poor prognosis considering multiple comorbidities as well as metastatic lung cancer.  Will ask hospice to see patient and follow patient at the nursing home  We will talk with patient again regarding continuation of hemodialysis  Continue Accu-Cheks and sliding scale insulin as well as Lantus.  Monitor and correct electrolytes  Monitor mental status  Continue home  medications  DVT/stress ulcer prophylaxis  Labs in am        Taiwo Shankar MD  10/5/2019  2:33 PM

## 2023-12-04 ENCOUNTER — APPOINTMENT (INPATIENT)
Dept: NON INVASIVE DIAGNOSTICS | Facility: HOSPITAL | Age: 63
DRG: 174 | End: 2023-12-04
Payer: COMMERCIAL

## 2023-12-04 LAB
ANION GAP SERPL CALCULATED.3IONS-SCNC: 9 MMOL/L
AORTIC ROOT: 2.8 CM
AORTIC VALVE MEAN VELOCITY: 10.4 M/S
APTT PPP: 67 SECONDS (ref 23–37)
AV LVOT MEAN GRADIENT: 2 MMHG
AV LVOT PEAK GRADIENT: 3 MMHG
AV MEAN GRADIENT: 5 MMHG
AV PEAK GRADIENT: 8 MMHG
AV VELOCITY RATIO: 0.56
BASOPHILS # BLD AUTO: 0.04 THOUSANDS/ÂΜL (ref 0–0.1)
BASOPHILS NFR BLD AUTO: 0 % (ref 0–1)
BUN SERPL-MCNC: 13 MG/DL (ref 5–25)
CALCIUM SERPL-MCNC: 9 MG/DL (ref 8.4–10.2)
CHLORIDE SERPL-SCNC: 107 MMOL/L (ref 96–108)
CHOLEST SERPL-MCNC: 291 MG/DL
CO2 SERPL-SCNC: 22 MMOL/L (ref 21–32)
CREAT SERPL-MCNC: 0.79 MG/DL (ref 0.6–1.3)
DOP CALC AO PEAK VEL: 1.42 M/S
DOP CALC AO VTI: 32.75 CM
DOP CALC LVOT PEAK VEL VTI: 19.98 CM
DOP CALC LVOT PEAK VEL: 0.8 M/S
E WAVE DECELERATION TIME: 314 MS
E/A RATIO: 0.72
EOSINOPHIL # BLD AUTO: 0.17 THOUSAND/ÂΜL (ref 0–0.61)
EOSINOPHIL NFR BLD AUTO: 2 % (ref 0–6)
ERYTHROCYTE [DISTWIDTH] IN BLOOD BY AUTOMATED COUNT: 13.2 % (ref 11.6–15.1)
FRACTIONAL SHORTENING: 42 (ref 28–44)
GFR SERPL CREATININE-BSD FRML MDRD: 79 ML/MIN/1.73SQ M
GLOBAL LONGITUIDAL STRAIN: -15 %
GLUCOSE SERPL-MCNC: 133 MG/DL (ref 65–140)
GLUCOSE SERPL-MCNC: 138 MG/DL (ref 65–140)
GLUCOSE SERPL-MCNC: 150 MG/DL (ref 65–140)
GLUCOSE SERPL-MCNC: 157 MG/DL (ref 65–140)
GLUCOSE SERPL-MCNC: 200 MG/DL (ref 65–140)
HCT VFR BLD AUTO: 38.7 % (ref 34.8–46.1)
HDLC SERPL-MCNC: 36 MG/DL
HGB BLD-MCNC: 12.9 G/DL (ref 11.5–15.4)
IMM GRANULOCYTES # BLD AUTO: 0.03 THOUSAND/UL (ref 0–0.2)
IMM GRANULOCYTES NFR BLD AUTO: 0 % (ref 0–2)
INTERVENTRICULAR SEPTUM IN DIASTOLE (PARASTERNAL SHORT AXIS VIEW): 1.3 CM
INTERVENTRICULAR SEPTUM: 1.3 CM (ref 0.6–1.1)
KCT BLD-ACNC: 308 SEC (ref 89–137)
LAAS-AP2: 17.3 CM2
LAAS-AP4: 13.9 CM2
LDLC SERPL DIRECT ASSAY-MCNC: 186 MG/DL (ref 0–100)
LEFT ATRIUM SIZE: 3.2 CM
LEFT ATRIUM VOLUME (MOD BIPLANE): 46 ML
LEFT ATRIUM VOLUME INDEX (MOD BIPLANE): 26.9 ML/M2
LEFT INTERNAL DIMENSION IN SYSTOLE: 2.1 CM (ref 2.1–4)
LEFT VENTRICULAR INTERNAL DIMENSION IN DIASTOLE: 3.6 CM (ref 3.5–6)
LEFT VENTRICULAR POSTERIOR WALL IN END DIASTOLE: 1.1 CM
LEFT VENTRICULAR STROKE VOLUME: 41 ML
LVSV (TEICH): 41 ML
LYMPHOCYTES # BLD AUTO: 2.5 THOUSANDS/ÂΜL (ref 0.6–4.47)
LYMPHOCYTES NFR BLD AUTO: 28 % (ref 14–44)
MCH RBC QN AUTO: 30.1 PG (ref 26.8–34.3)
MCHC RBC AUTO-ENTMCNC: 33.3 G/DL (ref 31.4–37.4)
MCV RBC AUTO: 90 FL (ref 82–98)
MONOCYTES # BLD AUTO: 0.51 THOUSAND/ÂΜL (ref 0.17–1.22)
MONOCYTES NFR BLD AUTO: 6 % (ref 4–12)
MV E'TISSUE VEL-LAT: 6 CM/S
MV E'TISSUE VEL-SEP: 4 CM/S
MV PEAK A VEL: 1.33 M/S
MV PEAK E VEL: 96 CM/S
MV STENOSIS PRESSURE HALF TIME: 91 MS
MV VALVE AREA P 1/2 METHOD: 2.42
NEUTROPHILS # BLD AUTO: 5.78 THOUSANDS/ÂΜL (ref 1.85–7.62)
NEUTS SEG NFR BLD AUTO: 64 % (ref 43–75)
NRBC BLD AUTO-RTO: 0 /100 WBCS
PLATELET # BLD AUTO: 381 THOUSANDS/UL (ref 149–390)
PMV BLD AUTO: 9.1 FL (ref 8.9–12.7)
POTASSIUM SERPL-SCNC: 3.7 MMOL/L (ref 3.5–5.3)
RBC # BLD AUTO: 4.29 MILLION/UL (ref 3.81–5.12)
RIGHT ATRIAL 2D VOLUME: 23 ML
RIGHT ATRIUM AREA SYSTOLE A4C: 10.4 CM2
SL CV LEFT ATRIUM LENGTH A2C: 5 CM
SL CV LV EF: 72
SL CV PED ECHO LEFT VENTRICLE DIASTOLIC VOLUME (MOD BIPLANE) 2D: 55 ML
SL CV PED ECHO LEFT VENTRICLE SYSTOLIC VOLUME (MOD BIPLANE) 2D: 14 ML
SODIUM SERPL-SCNC: 138 MMOL/L (ref 135–147)
SPECIMEN SOURCE: ABNORMAL
TRICUSPID ANNULAR PLANE SYSTOLIC EXCURSION: 1.7 CM
TRIGL SERPL-MCNC: 417 MG/DL
WBC # BLD AUTO: 9.03 THOUSAND/UL (ref 4.31–10.16)

## 2023-12-04 PROCEDURE — 99153 MOD SED SAME PHYS/QHP EA: CPT | Performed by: INTERNAL MEDICINE

## 2023-12-04 PROCEDURE — 80048 BASIC METABOLIC PNL TOTAL CA: CPT | Performed by: INTERNAL MEDICINE

## 2023-12-04 PROCEDURE — 027136Z DILATION OF CORONARY ARTERY, TWO ARTERIES WITH THREE DRUG-ELUTING INTRALUMINAL DEVICES, PERCUTANEOUS APPROACH: ICD-10-PCS | Performed by: INTERNAL MEDICINE

## 2023-12-04 PROCEDURE — 93306 TTE W/DOPPLER COMPLETE: CPT

## 2023-12-04 PROCEDURE — 99152 MOD SED SAME PHYS/QHP 5/>YRS: CPT | Performed by: INTERNAL MEDICINE

## 2023-12-04 PROCEDURE — 93356 MYOCRD STRAIN IMG SPCKL TRCK: CPT

## 2023-12-04 PROCEDURE — C1769 GUIDE WIRE: HCPCS | Performed by: INTERNAL MEDICINE

## 2023-12-04 PROCEDURE — 92978 ENDOLUMINL IVUS OCT C 1ST: CPT | Performed by: INTERNAL MEDICINE

## 2023-12-04 PROCEDURE — 85025 COMPLETE CBC W/AUTO DIFF WBC: CPT | Performed by: INTERNAL MEDICINE

## 2023-12-04 PROCEDURE — 4A023N7 MEASUREMENT OF CARDIAC SAMPLING AND PRESSURE, LEFT HEART, PERCUTANEOUS APPROACH: ICD-10-PCS | Performed by: INTERNAL MEDICINE

## 2023-12-04 PROCEDURE — 93306 TTE W/DOPPLER COMPLETE: CPT | Performed by: INTERNAL MEDICINE

## 2023-12-04 PROCEDURE — C1874 STENT, COATED/COV W/DEL SYS: HCPCS | Performed by: INTERNAL MEDICINE

## 2023-12-04 PROCEDURE — C9601 PERC DRUG-EL COR STENT BRAN: HCPCS | Performed by: INTERNAL MEDICINE

## 2023-12-04 PROCEDURE — C9600 PERC DRUG-EL COR STENT SING: HCPCS | Performed by: INTERNAL MEDICINE

## 2023-12-04 PROCEDURE — 83721 ASSAY OF BLOOD LIPOPROTEIN: CPT | Performed by: INTERNAL MEDICINE

## 2023-12-04 PROCEDURE — 93458 L HRT ARTERY/VENTRICLE ANGIO: CPT | Performed by: INTERNAL MEDICINE

## 2023-12-04 PROCEDURE — C1725 CATH, TRANSLUMIN NON-LASER: HCPCS | Performed by: INTERNAL MEDICINE

## 2023-12-04 PROCEDURE — 82948 REAGENT STRIP/BLOOD GLUCOSE: CPT

## 2023-12-04 PROCEDURE — C1887 CATHETER, GUIDING: HCPCS | Performed by: INTERNAL MEDICINE

## 2023-12-04 PROCEDURE — 85347 COAGULATION TIME ACTIVATED: CPT

## 2023-12-04 PROCEDURE — 93356 MYOCRD STRAIN IMG SPCKL TRCK: CPT | Performed by: INTERNAL MEDICINE

## 2023-12-04 PROCEDURE — 80061 LIPID PANEL: CPT | Performed by: INTERNAL MEDICINE

## 2023-12-04 PROCEDURE — C1894 INTRO/SHEATH, NON-LASER: HCPCS | Performed by: INTERNAL MEDICINE

## 2023-12-04 PROCEDURE — 99232 SBSQ HOSP IP/OBS MODERATE 35: CPT | Performed by: PHYSICIAN ASSISTANT

## 2023-12-04 PROCEDURE — 85730 THROMBOPLASTIN TIME PARTIAL: CPT | Performed by: INTERNAL MEDICINE

## 2023-12-04 PROCEDURE — C1753 CATH, INTRAVAS ULTRASOUND: HCPCS | Performed by: INTERNAL MEDICINE

## 2023-12-04 PROCEDURE — 92928 PRQ TCAT PLMT NTRAC ST 1 LES: CPT | Performed by: INTERNAL MEDICINE

## 2023-12-04 DEVICE — IMPLANTABLE DEVICE: Type: IMPLANTABLE DEVICE | Status: FUNCTIONAL

## 2023-12-04 DEVICE — XIENCE SKYPOINT™ EVEROLIMUS ELUTING CORONARY STENT SYSTEM 3.00 MM X 15 MM / RAPID-EXCHANGE
Type: IMPLANTABLE DEVICE | Status: FUNCTIONAL
Brand: XIENCE SKYPOINT™

## 2023-12-04 DEVICE — STENT ONYXNG22508UX ONYX 2.25X08RX
Type: IMPLANTABLE DEVICE | Status: FUNCTIONAL
Brand: ONYX FRONTIER™

## 2023-12-04 RX ORDER — HEPARIN SODIUM 1000 [USP'U]/ML
INJECTION, SOLUTION INTRAVENOUS; SUBCUTANEOUS CODE/TRAUMA/SEDATION MEDICATION
Status: DISCONTINUED | OUTPATIENT
Start: 2023-12-04 | End: 2023-12-04 | Stop reason: HOSPADM

## 2023-12-04 RX ORDER — SODIUM CHLORIDE 9 MG/ML
125 INJECTION, SOLUTION INTRAVENOUS CONTINUOUS
Status: DISPENSED | OUTPATIENT
Start: 2023-12-04 | End: 2023-12-04

## 2023-12-04 RX ORDER — NITROGLYCERIN 20 MG/100ML
INJECTION INTRAVENOUS CODE/TRAUMA/SEDATION MEDICATION
Status: DISCONTINUED | OUTPATIENT
Start: 2023-12-04 | End: 2023-12-04 | Stop reason: HOSPADM

## 2023-12-04 RX ORDER — VERAPAMIL HYDROCHLORIDE 2.5 MG/ML
INJECTION, SOLUTION INTRAVENOUS CODE/TRAUMA/SEDATION MEDICATION
Status: DISCONTINUED | OUTPATIENT
Start: 2023-12-04 | End: 2023-12-04 | Stop reason: HOSPADM

## 2023-12-04 RX ORDER — EZETIMIBE 10 MG/1
10 TABLET ORAL
Status: DISCONTINUED | OUTPATIENT
Start: 2023-12-04 | End: 2023-12-05 | Stop reason: HOSPADM

## 2023-12-04 RX ORDER — FENTANYL CITRATE 50 UG/ML
INJECTION, SOLUTION INTRAMUSCULAR; INTRAVENOUS CODE/TRAUMA/SEDATION MEDICATION
Status: DISCONTINUED | OUTPATIENT
Start: 2023-12-04 | End: 2023-12-04 | Stop reason: HOSPADM

## 2023-12-04 RX ORDER — MIDAZOLAM HYDROCHLORIDE 2 MG/2ML
INJECTION, SOLUTION INTRAMUSCULAR; INTRAVENOUS CODE/TRAUMA/SEDATION MEDICATION
Status: DISCONTINUED | OUTPATIENT
Start: 2023-12-04 | End: 2023-12-04 | Stop reason: HOSPADM

## 2023-12-04 RX ORDER — LIDOCAINE HYDROCHLORIDE 10 MG/ML
INJECTION, SOLUTION EPIDURAL; INFILTRATION; INTRACAUDAL; PERINEURAL CODE/TRAUMA/SEDATION MEDICATION
Status: DISCONTINUED | OUTPATIENT
Start: 2023-12-04 | End: 2023-12-04 | Stop reason: HOSPADM

## 2023-12-04 RX ADMIN — INSULIN GLARGINE 50 UNITS: 100 INJECTION, SOLUTION SUBCUTANEOUS at 21:49

## 2023-12-04 RX ADMIN — ACETAMINOPHEN 325MG 650 MG: 325 TABLET ORAL at 15:42

## 2023-12-04 RX ADMIN — NITROGLYCERIN 1 INCH: 20 OINTMENT TOPICAL at 14:00

## 2023-12-04 RX ADMIN — EZETIMIBE 10 MG: 10 TABLET ORAL at 21:49

## 2023-12-04 RX ADMIN — ATORVASTATIN CALCIUM 80 MG: 80 TABLET, FILM COATED ORAL at 15:42

## 2023-12-04 RX ADMIN — INSULIN LISPRO 10 UNITS: 100 INJECTION, SOLUTION INTRAVENOUS; SUBCUTANEOUS at 17:03

## 2023-12-04 RX ADMIN — ONDANSETRON 4 MG: 2 INJECTION INTRAMUSCULAR; INTRAVENOUS at 08:15

## 2023-12-04 RX ADMIN — SODIUM CHLORIDE 125 ML/HR: 0.9 INJECTION, SOLUTION INTRAVENOUS at 14:47

## 2023-12-04 RX ADMIN — TICAGRELOR 90 MG: 90 TABLET ORAL at 08:15

## 2023-12-04 RX ADMIN — METOPROLOL SUCCINATE 100 MG: 100 TABLET, EXTENDED RELEASE ORAL at 08:15

## 2023-12-04 RX ADMIN — AMLODIPINE BESYLATE 5 MG: 5 TABLET ORAL at 08:15

## 2023-12-04 RX ADMIN — SODIUM CHLORIDE 125 ML/HR: 0.9 INJECTION, SOLUTION INTRAVENOUS at 06:35

## 2023-12-04 RX ADMIN — ASPIRIN 81 MG CHEWABLE TABLET 324 MG: 81 TABLET CHEWABLE at 08:19

## 2023-12-04 RX ADMIN — ROPINIROLE HYDROCHLORIDE 1 MG: 1 TABLET, FILM COATED ORAL at 21:49

## 2023-12-04 RX ADMIN — INSULIN LISPRO 2 UNITS: 100 INJECTION, SOLUTION INTRAVENOUS; SUBCUTANEOUS at 17:00

## 2023-12-04 RX ADMIN — INSULIN LISPRO 1 UNITS: 100 INJECTION, SOLUTION INTRAVENOUS; SUBCUTANEOUS at 21:49

## 2023-12-04 RX ADMIN — NITROGLYCERIN 1 INCH: 20 OINTMENT TOPICAL at 19:29

## 2023-12-04 RX ADMIN — TICAGRELOR 90 MG: 90 TABLET ORAL at 21:49

## 2023-12-04 NOTE — ASSESSMENT & PLAN NOTE
Presented to [facility] with the complaint of chest pain   Transferred to [facility] for cardiology eval and cardic cath  With hx of poorly controlled HLD, DM, CAD, hx of MI, prior cath in 2018  Troponins  9 --> 67 --> 248 --> 969  EKG with ST changes in inferior leads  Continued on ASA 81 daily  Statin was restarted as she was not taking it at home  Brilinta loaded and continued on brillinta 90 mg BID  Currently on heparin drip  Seen by cardiology here - going for cardiac cath today  Echo pending

## 2023-12-04 NOTE — PROGRESS NOTES
[address] [facility]  Progress Note  Name: Jadon Lee  MRN: [id number]  Unit/Bed#: E4 -01 I Date of Admission: 12/3/2023   Date of Service: 12/4/2023 I Hospital Day: 1    Assessment/Plan   * Elevated troponin I level  Assessment & Plan  Presented to [facility] with the complaint of chest pain   Transferred to [facility] for cardiology eval and cardic cath  With hx of poorly controlled HLD, DM, CAD, hx of MI, prior cath in 2018  Troponins  9 --> 67 --> 248 --> 969  EKG with ST changes in inferior leads  Continued on ASA 81 daily  Statin was restarted as she was not taking it at home  Brilinta loaded and continued on brillinta 90 mg BID  Currently on heparin drip  Seen by cardiology here - going for cardiac cath today  Echo pending    Type 2 diabetes mellitus without complication, with long-term current use of insulin [facility])  Assessment & Plan  Lab Results   Component Value Date    HGBA1C 14.1 (A) 10/27/2023     Recent Labs     12/03/23  0744 12/03/23  1125 12/03/23  1536 12/03/23  2043   POCGLU 163* 221* 84 163*   Very poorly controlled on lantus 100 units qhs and humalog 10 iu tid ac  Lowered to lantus 20 units bid while inpatient and undergoing cath today  Continued on humalog 10 units  Sliding scale    Coronary artery disease involving native coronary artery of native heart  Assessment & Plan  Continue beta blocker and ASA  Awaiting cath for adjustments of meds    Mixed hyperlipidemia  Assessment & Plan  Was suppose to be taking a statin- reports she has not been  Cholesterol 291, triglycerides 417, HDL 36,   Restarted on statin 40 mg daily    Essential hypertension  Assessment & Plan  Presented with elevated pressures  Home regimen metoprolol succinate 100 mg daily, benazepril 20 mg daily, norvasc 5 mg daily  ACE held in anticipation for dye load  BPs stable 131/76        VTE Pharmacologic Prophylaxis:   Pharmacologic: Heparin Drip  Mechanical VTE Prophylaxis in Place: Yes    Discharge Plan: with need to continue inpatient stay for cardiac cath and medication adjustment    Discussions with Specialists or Other Care Team Provider: nursing    Education and Discussions with Family / Patient: patient    Time Spent for Care: This time was spent on one or more of the following: performing physical exam; counseling and coordination of care; obtaining or reviewing history; documenting in the medical record; reviewing/ordering tests, medications, or procedures; communicating with other healthcare professionals and discussing with patient's family/caregivers. Current Length of Stay: 1 day(s)  Current Patient Status: Inpatient   Code Status: Level 1 - Full Code    Subjective:   Resting in bed. Biggest complaint right now is nausea. She reports her chest pain has now resolved. Denies any vomiting or shortness of breath. Nervous for catherization. Objective:     Vitals:   Temp (24hrs), Av.3 °F (36.3 °C), Min:97 °F (36.1 °C), Max:97.6 °F (36.4 °C)    Temp:  [97 °F (36.1 °C)-97.6 °F (36.4 °C)] 97.6 °F (36.4 °C)  HR:  [69-83] 69  Resp:  [16-18] 18  BP: (114-131)/(73-87) 131/76  SpO2:  [93 %-97 %] 93 %  Body mass index is 29.14 kg/m². Input and Output Summary (last 24 hours): Intake/Output Summary (Last 24 hours) at 2023 0817  Last data filed at 2023 2703  Gross per 24 hour   Intake 489.43 ml   Output --   Net 489.43 ml       Physical Exam:     Physical Exam  Vitals and nursing note reviewed. Constitutional:       General: She is not in acute distress. Appearance: Normal appearance. She is normal weight. She is not ill-appearing, toxic-appearing or diaphoretic. HENT:      Head: Normocephalic and atraumatic. Eyes:      General: No scleral icterus. Cardiovascular:      Rate and Rhythm: Normal rate and regular rhythm. Pulmonary:      Effort: Pulmonary effort is normal. No respiratory distress. Breath sounds: Normal breath sounds. No stridor. No wheezing or rhonchi. Abdominal:      General: Bowel sounds are normal. There is no distension. Palpations: Abdomen is soft. There is no mass. Tenderness: There is no abdominal tenderness. Hernia: No hernia is present. Musculoskeletal:         General: No swelling. Cervical back: Neck supple. Skin:     General: Skin is warm and dry. Neurological:      Mental Status: She is alert and oriented to person, place, and time. Mental status is at baseline. Psychiatric:         Mood and Affect: Mood normal.         Behavior: Behavior normal.         Additional Data:     Labs:    Results from last 7 days   Lab Units 12/04/23  0335   WBC Thousand/uL 9.03   HEMOGLOBIN g/dL 12.9   HEMATOCRIT % 38.7   PLATELETS Thousands/uL 381   NEUTROS PCT % 64   LYMPHS PCT % 28   MONOS PCT % 6   EOS PCT % 2     Results from last 7 days   Lab Units 12/04/23  0335 12/03/23  0623 12/02/23  1905   POTASSIUM mmol/L 3.7   < > 3.7   CHLORIDE mmol/L 107   < > 101   CO2 mmol/L 22   < > 22   BUN mg/dL 13   < > 16   CREATININE mg/dL 0.79   < > 1.02   CALCIUM mg/dL 9.0   < > 9.3   ALK PHOS U/L  --   --  115*   ALT U/L  --   --  14   AST U/L  --   --  17    < > = values in this interval not displayed. Results from last 7 days   Lab Units 12/03/23  0013   INR  0.92       * I Have Reviewed All Lab Data Listed Above. * Additional Pertinent Lab Tests Reviewed:  [facility] Admission Reviewed    Imaging:    Imaging Reports Reviewed Today Include:   Imaging Personally Reviewed by Myself Includes:      Recent Cultures (last 7 days):           Lines/Drains:  Invasive Devices       Peripheral Intravenous Line  Duration             Peripheral IV 12/03/23 Left;Proximal;Ventral (anterior) Forearm 1 day                    Last 24 Hours Medication List:   Current Facility-Administered Medications   Medication Dose Route Frequency Provider Last Rate    acetaminophen  650 mg Oral Q6H PRN [address] Sor[address] MD      amLODIPine  5 mg Oral Daily Dang Reyes PA-C      aspirin  324 mg Oral Once Mary Bee PA-C      atorvastatin  80 mg Oral Daily With [employer] GUILLERMO Reyes PA-C      heparin (porcine)  3-20 Units/kg/hr (Order-Specific) Intravenous Titrated Florian Ramírez PA-C 16 Units/kg/hr (12/03/23 2151)    heparin (porcine)  2,000 Units Intravenous Q6H PRN Dang Reyes PA-C      heparin (porcine)  4,000 Units Intravenous Q6H PRN Dang eRyes PA-C      insulin glargine  50 Units Subcutaneous Q12H [address] Dang Reyes PA-C      insulin lispro  1-6 Units Subcutaneous 4x Daily (AC & HS) Dang Reyes PA-C      insulin lispro  10 Units Subcutaneous TID With Meals Dang Reyes PA-C      metoprolol succinate  100 mg Oral Daily Dang Reyes PA-C      ondansetron  4 mg Intravenous Q6H PRN Maty Blair MD      rOPINIRole  1 mg Oral HS Dang Reyes PA-C      sodium chloride  125 mL/hr Intravenous Continuous Mary Bee PA-C 125 mL/hr (12/04/23 0216)    ticagrelor  90 mg Oral Q12H Logan Pompa PA-C          Today, Patient Was Seen By: Mauro Hightower PA-C    ** Please Note: This note has been constructed using a voice recognition system.  **

## 2023-12-04 NOTE — UTILIZATION REVIEW
Initial Clinical Review    TRANSFER FROM [facility]    Admission: Date/Time/Statement:   Admission Orders (From admission, onward)       Ordered        12/03/23 0323  Inpatient Admission  Once                          Orders Placed This Encounter   Procedures    Inpatient Admission     Standing Status:   Standing     Number of Occurrences:   1     Order Specific Question:   Level of Care     Answer:   Med Surg [16]     Order Specific Question:   Estimated length of stay     Answer:   More than 2 Midnights     Order Specific Question:   Certification     Answer:   I certify that inpatient services are medically necessary for this patient for a duration of greater than two midnights. See H&P and MD Progress Notes for additional information about the patient's course of treatment. Initial Presentation: 61 y.o. female initially presented to ED at  [facility]  with chest pain for  " awhile."  States pain  has been daily, at least since Thanksgiving, maybe  Halloween,  not sure. Has some  SOB with stairs. Mostly  home bound,  spends most of day in her room, occasionally  goes downstairs. States  usually has pain  with elevated  BP. Pain more severe the day of admission, unable to take  BP. Pain radiated to left arm,  new  symptom. Initial troponin neg,  delta  60,  3rd in the  200's. Additional PMH  is  CAD, breast cancer, HTN, IDDM, poor  ambulatory status  D/T  back pain. Transferred to [facility] for further care. Admit  Ip with  Elevated troponin level and plan is  cardiology consult, trend troponin, aspirin, IV heparin, brilinta and  likely  cardiac cath. Cardiology  consult  Likely  NSTEMI. Continue  IV heparin, ASA and brilinta. Plan cardiac cath  12/4. Date:   12/4       Day 2:   Remains  on  IV  heparin. Troponin  9>91>317>968. EKG  shows  ST  changes  inferior leads.   Cardiac  Cath    3v CAD  Diffuse atherosclerosis       Wt Readings from Last 1 Encounters:   12/03/23 71.1 kg (156 lb 12 oz)     Additional Vital Signs:   96.7 °F (35.9 °C) Abnormal  58 18 112/71 -- 93 % -- -- None (Room air) Lying    12/04/23 1035 96.9 °F (36.1 °C) Abnormal  59 18 119/75 -- 96 % -- -- None (Room air) Lying   12/04/23 1020 96.9 °F (36.1 °C) Abnormal  62 18 108/71 -- 95 % -- -- None (Room air) Lying   12/04/23 08:51:37 -- -- -- -- -- -- 28 2 L/min Nasal cannula --   12/04/23 0801 97.6 °F (36.4 °C) 69 18 131/76 -- 93 % -- -- None (Room air) Lying   12/04/23 0242 97.4 °F (36.3 °C) Abnormal  70 18 121/73 89 93 % -- -- None (Room air) Lying   12/03/23 2321 97.3 °F (36.3 °C) Abnormal  79 18 128/81 100 94 % -- -- None (Room air) Lying   12/03/23 1935 97.2 °F (36.2 °C) Abnormal  83 18 114/75 89 95 % -- -- None (Room air) Lying   12/03/23 1533 97.2 °F (36.2 °C) Abnormal  77 18 118/74 93 97 % -- -- None (Room air) Sitting   12/03/23 1123 97 °F (36.1 °C) Abnormal  69 16 123/87 101 95 % -- -- None (Room air) Lying   12/03/23 0710 97.2 °F (36.2 °C) Abnormal  64 18 125/82 99 96 % -- -- None (Room air) Lying   12/03/23 0212 97.1 °F (36.2 °C) Abnormal  67 18 129/80 98 95 % -- -- None (Room air) Lying   12/03/23 0211 -- -- -- -- -- -- -- -- None (Room air) --     Pertinent Labs/Diagnostic Test Results:   EKG  nonischemic      Results from last 7 days   Lab Units 12/04/23  0335 12/03/23  0623 12/03/23  0013 12/02/23  1905   WBC Thousand/uL 9.03 8.59 8.40 10.97*   HEMOGLOBIN g/dL 12.9 13.2 13.6 14.4   HEMATOCRIT % 38.7 39.1 41.1 42.9   PLATELETS Thousands/uL 381 411* 417* 418*   NEUTROS ABS Thousands/µL 5.78 4.93  --  5.52         Results from last 7 days   Lab Units 12/04/23  0335 12/03/23  0623 12/02/23  1905   SODIUM mmol/L 138 137 136   POTASSIUM mmol/L 3.7 3.8 3.7   CHLORIDE mmol/L 107 106 101   CO2 mmol/L 22 23 22   ANION GAP mmol/L 9 8 13   BUN mg/dL 13 17 16   CREATININE mg/dL 0.79 0.74 1.02   EGFR ml/min/1.73sq m 79 86 58   CALCIUM mg/dL 9.0 9.1 9.3     Results from last 7 days   Lab Units 12/02/23  1905   AST U/L 17 ALT U/L 14   ALK PHOS U/L 115*   TOTAL PROTEIN g/dL 8.1   ALBUMIN g/dL 4.3   TOTAL BILIRUBIN mg/dL 0.33     Results from last 7 days   Lab Units 12/04/23  0801 12/03/23  2043 12/03/23  1536 12/03/23  1125 12/03/23  0744   POC GLUCOSE mg/dl 138 163* 84 221* 163*     Results from last 7 days   Lab Units 12/04/23  0335 12/03/23  0623 12/02/23  1905   GLUCOSE RANDOM mg/dL 157* 183* 275*           Results from last 7 days   Lab Units 12/02/23  2310 12/02/23  2102 12/02/23  1905   HS TNI 0HR ng/L  --   --  9   HS TNI 2HR ng/L  --  67*  --    HSTNI D2 ng/L  --  58*  --    HS TNI 4HR ng/L 248*  --   --    HSTNI D4 ng/L 239*  --   --          Results from last 7 days   Lab Units 12/04/23  0335 12/03/23  2132 12/03/23  1314 12/03/23  0622 12/03/23  0013   PROTIME seconds  --   --   --   --  12.5   INR   --   --   --   --  0.92   PTT seconds 67* 67* 49*   < > 29    < > = values in this interval not displayed.              Present on Admission:   Essential hypertension   Coronary artery disease involving native coronary artery of native heart   Elevated troponin I level   Mixed hyperlipidemia      Admitting Diagnosis: nstemi  Age/Sex: 61 y.o. female  Admission Orders:  Scheduled Medications:  [MAR Hold] amLODIPine, 5 mg, Oral, Daily  [MAR Hold] atorvastatin, 80 mg, Oral, Daily With Dinner  [facility] Hold] insulin glargine, 50 Units, Subcutaneous, Q12H [facility]  [MAR Hold] insulin lispro, 1-6 Units, Subcutaneous, 4x Daily (AC & HS)  [MAR Hold] insulin lispro, 10 Units, Subcutaneous, TID With Meals  [MAR Hold] metoprolol succinate, 100 mg, Oral, Daily  [MAR Hold] rOPINIRole, 1 mg, Oral, HS  [MAR Hold] ticagrelor, 90 mg, Oral, Q12H [facility]      Continuous IV Infusions:  heparin (porcine), 3-20 Units/kg/hr (Order-Specific), Intravenous, Titrated      PRN Meds:  [MAR Hold] acetaminophen, 650 mg, Oral, Q6H PRN  [MAR Hold] heparin (porcine), 2,000 Units, Intravenous, Q6H PRN  [MAR Hold] heparin (porcine), 4,000 Units, Intravenous, Q6H PRN  [facility] Hold] ondansetron, 4 mg, Intravenous, Q6H PRN        IP CONSULT TO CARDIOLOGY    Network Utilization Review Department  ATTENTION: Please call with any questions or concerns to [phone] and carefully listen to the prompts so that you are directed to the right person. All voicemails are confidential.   For Discharge needs, contact Care Management DC Support Team at [phone] opt. [phone]  Send all requests for admission clinical reviews, approved or denied determinations and any other requests to dedicated fax number below belonging to the campus where the patient is receiving treatment.  List of dedicated fax numbers for the Facilities:  [place] DENIALS (Administrative/Medical Necessity) [phone]   DISCHARGE SUPPORT TEAM (NETWORK) [id number] [facility] (Maternity/NICU/Pediatrics) [phone]   [address] [place] [address] 1000 [facility] [phone]   [address] [facility] 207 [facility] 5220 [facility] [address] [address] [facility] [address] 1300 [facility] [address] [place] [address] Nn [phone]

## 2023-12-04 NOTE — ASSESSMENT & PLAN NOTE
Was suppose to be taking a statin- reports she has not been  Cholesterol 291, triglycerides 417, HDL 36,   Restarted on statin 40 mg daily

## 2023-12-04 NOTE — ASSESSMENT & PLAN NOTE
Lab Results   Component Value Date    HGBA1C 14.1 (A) 10/27/2023     Recent Labs     12/03/23  0744 12/03/23  1125 12/03/23  1536 12/03/23 2043   POCGLU 163* 221* 84 163*   Very poorly controlled on lantus 100 units qhs and humalog 10 iu tid ac  Lowered to lantus 20 units bid while inpatient and undergoing cath today  Continued on humalog 10 units  Sliding scale

## 2023-12-04 NOTE — ASSESSMENT & PLAN NOTE
Presented with elevated pressures  Home regimen metoprolol succinate 100 mg daily, benazepril 20 mg daily, norvasc 5 mg daily  ACE held in anticipation for dye load  BPs stable 131/76

## 2023-12-04 NOTE — CASE MANAGEMENT
Case Management Assessment & Discharge Planning Note    Patient name Mikey Jacob  Location [place] [address] /E4 [address] [facility] MRN [id number]  : 1960 Date 2023       Current Admission Date: 12/3/2023  Current Admission Diagnosis:Elevated troponin I level   Patient Active Problem List    Diagnosis Date Noted    Elevated troponin I level 2023    Near syncope 2021    Hypotension 2021    Encounter for diabetic foot exam ([address]) 2021    Hyperglycemia due to diabetes mellitus ([address]) 2020    Hyponatremia 2020    Bilateral temporomandibular joint pain 2020    Costochondritis 2020    History of radiation therapy 2020    S/P lumpectomy, right breast 2020    Malignant neoplasm of upper-outer quadrant of right breast in female, estrogen receptor positive  2020    Use of anastrozole (Arimidex) 2020    Obstructive sleep apnea (adult) (pediatric)     Overweight (BMI 25.0-29.9) 2020    Depression 2019    SOB (shortness of breath) 2019    Coronary artery disease involving native coronary artery of native heart 2019    Mixed hyperlipidemia 2018    Other chest pain 2018    Restless leg syndrome 2018    Chronic bilateral low back pain without sciatica 2018    Essential hypertension 2018    Type 2 diabetes mellitus without complication, with long-term current use of insulin ([address]) 2014    Disease of pancreas 2014      LOS (days): 1  Geometric Mean LOS (GMLOS) (days): 1.80  Days to GMLOS:0.3     OBJECTIVE:    Risk of Unplanned Readmission Score: 17.37      Current admission status: Inpatient     Preferred Pharmacy:   [address] [place] [address] #[id number] Anabel Camilo, [address] [place] [address] [address] [address]  Phone: [phone] Fax: [address] [address]  Phone: [phone] Fax: [phone]    Primary Care Provider: Inderjit Coronel DO    Primary Insurance: Chairish  Secondary Insurance:     ASSESSMENT:  Active Health Care Proxies    There are no active Health Care Proxies on file. Readmission Root Cause  30 Day Readmission: No    Patient Information  Admitted from[de-identified] Home  Mental Status: Alert  During Assessment patient was accompanied by: Brother  Assessment information provided by[de-identified] Patient  Primary Caregiver: Self  Support Systems: Spouse/significant other, Self  County of Residence: [facility] do you live in?: [address] [facility] entry access options.  Select all that apply.: No steps to enter home  Type of Current Residence: 2 story home  Upon entering residence, is there a bedroom on the main floor (no further steps)?: Yes  Upon entering residence, is there a bathroom on the main floor (no further steps)?: Yes  Living Arrangements: Lives w/ Spouse/significant other  Is patient a ?: No    Activities of Daily Living Prior to Admission  Functional Status: Independent  Completes ADLs independently?: Yes  Ambulates independently?: Yes  Does patient use assisted devices?: No  Does patient currently own DME?: No  Does patient have a history of Outpatient Therapy (PT/OT)?: No  Does the patient have a history of Short-Term Rehab?: No  Does patient have a history of HHC?: No  Does patient currently have [address]?: No    Patient Information Continued  Income Source: Pension/shelter  Does patient have prescription coverage?: Yes  Does patient receive dialysis treatments?: No  Does patient have a history of substance abuse?: No  Does patient have a history of Mental Health Diagnosis?: No    PHQ 2/9 Screening   Reviewed PHQ 2/9 Depression Screening Score?: No    Means of Transportation  Means of Transport to [facility][de-identified] Family transport      Housing Stability: Low Risk  (12/4/2023)    Housing Stability Vital Sign     Unable to Pay for Housing in the Last Year: No     Number of Places Lived in the Last Year: 1     Unstable Housing in the Last Year: No   Food Insecurity: No Food Insecurity (12/4/2023)    Hunger Vital Sign     Worried About Running Out of Food in the Last Year: Never true     Ran Out of Food in the Last Year: Never true   Transportation Needs: No Transportation Needs (12/4/2023)    PRAPARE - Transportation     Lack of Transportation (Medical): No     Lack of Transportation (Non-Medical): No   Utilities: Not At Risk (12/4/2023)    [facility] Utilities     Threatened with loss of utilities: No     DISCHARGE DETAILS:    Discharge planning discussed with[de-identified] The patient  Freedom of Choice: Yes  Comments - Freedom of Choice: The patient was agreeable to speak with CM and is also agreeable with admission  CM contacted family/caregiver?: No- see comments (The patient is independent)  Were Treatment Team discharge recommendations reviewed with patient/caregiver?: No  Did patient/caregiver verbalize understanding of patient care needs?: Yes  Were patient/caregiver advised of the risks associated with not following Treatment Team discharge recommendations?: Yes    Contacts  Patient Contacts: James [place]  Relationship to Patient[de-identified] Family  Contact Method: Phone  Phone Number: [phone]  Reason/Outcome: Emergency Contact, Discharge [facility]         Is the patient interested in [address] [facility] at discharge?: No    DME Referral Provided  Referral made for DME?: No    Would you like to participate in our [facility] service program?  : No - Declined    Treatment Team Recommendation: Home  Discharge Destination Plan[de-identified] Home     Additional Comments: CM met with the patient at the bedside to complete the initial assessment. The patient was admitted to the hospital for NSTEMI. The patient is oriented x 4, able to make needs known to staff. The patients demographic sheet was verified.  The patient lives in a 2 story house with her spouse and able to complete her ADL's and IADL's. The patient anticipated discharge plan will be home with selfcare and family support. CM will continue to follow for discharge planning.

## 2023-12-05 ENCOUNTER — VBI (OUTPATIENT)
Dept: ADMINISTRATIVE | Facility: OTHER | Age: 63
End: 2023-12-05

## 2023-12-05 VITALS
RESPIRATION RATE: 18 BRPM | OXYGEN SATURATION: 92 % | TEMPERATURE: 97.8 F | WEIGHT: 156 LBS | HEART RATE: 71 BPM | BODY MASS INDEX: 28.71 KG/M2 | SYSTOLIC BLOOD PRESSURE: 152 MMHG | DIASTOLIC BLOOD PRESSURE: 93 MMHG | HEIGHT: 62 IN

## 2023-12-05 PROBLEM — I21.4 NON-ST ELEVATION MI (NSTEMI) (HCC): Status: ACTIVE | Noted: 2023-12-03

## 2023-12-05 LAB
GLUCOSE SERPL-MCNC: 111 MG/DL (ref 65–140)
GLUCOSE SERPL-MCNC: 144 MG/DL (ref 65–140)

## 2023-12-05 PROCEDURE — 82948 REAGENT STRIP/BLOOD GLUCOSE: CPT

## 2023-12-05 PROCEDURE — 99239 HOSP IP/OBS DSCHRG MGMT >30: CPT | Performed by: PHYSICIAN ASSISTANT

## 2023-12-05 PROCEDURE — 99232 SBSQ HOSP IP/OBS MODERATE 35: CPT | Performed by: INTERNAL MEDICINE

## 2023-12-05 RX ORDER — ATORVASTATIN CALCIUM 80 MG/1
80 TABLET, FILM COATED ORAL
Qty: 30 TABLET | Refills: 0 | Status: SHIPPED | OUTPATIENT
Start: 2023-12-05

## 2023-12-05 RX ORDER — ATORVASTATIN CALCIUM 80 MG/1
80 TABLET, FILM COATED ORAL
Qty: 30 TABLET | Refills: 0 | Status: SHIPPED | OUTPATIENT
Start: 2023-12-05 | End: 2023-12-05 | Stop reason: SDUPTHER

## 2023-12-05 RX ORDER — METOPROLOL SUCCINATE 100 MG/1
100 TABLET, EXTENDED RELEASE ORAL DAILY
Qty: 90 TABLET | Refills: 0 | Status: SHIPPED | OUTPATIENT
Start: 2023-12-05 | End: 2023-12-05 | Stop reason: SDUPTHER

## 2023-12-05 RX ORDER — METOPROLOL SUCCINATE 100 MG/1
100 TABLET, EXTENDED RELEASE ORAL DAILY
Qty: 90 TABLET | Refills: 0 | Status: SHIPPED | OUTPATIENT
Start: 2023-12-05

## 2023-12-05 RX ORDER — EZETIMIBE 10 MG/1
10 TABLET ORAL
Qty: 30 TABLET | Refills: 0 | Status: SHIPPED | OUTPATIENT
Start: 2023-12-05 | End: 2023-12-05 | Stop reason: SDUPTHER

## 2023-12-05 RX ORDER — EZETIMIBE 10 MG/1
10 TABLET ORAL
Qty: 30 TABLET | Refills: 0 | Status: SHIPPED | OUTPATIENT
Start: 2023-12-05

## 2023-12-05 RX ADMIN — AMLODIPINE BESYLATE 5 MG: 5 TABLET ORAL at 08:02

## 2023-12-05 RX ADMIN — TICAGRELOR 90 MG: 90 TABLET ORAL at 08:02

## 2023-12-05 RX ADMIN — ACETAMINOPHEN 325MG 650 MG: 325 TABLET ORAL at 08:08

## 2023-12-05 RX ADMIN — ACETAMINOPHEN 325MG 650 MG: 325 TABLET ORAL at 02:54

## 2023-12-05 RX ADMIN — NITROGLYCERIN 1 INCH: 20 OINTMENT TOPICAL at 00:09

## 2023-12-05 RX ADMIN — INSULIN GLARGINE 50 UNITS: 100 INJECTION, SOLUTION SUBCUTANEOUS at 08:02

## 2023-12-05 RX ADMIN — METOPROLOL SUCCINATE 100 MG: 100 TABLET, EXTENDED RELEASE ORAL at 08:02

## 2023-12-05 RX ADMIN — ONDANSETRON 4 MG: 2 INJECTION INTRAMUSCULAR; INTRAVENOUS at 02:54

## 2023-12-05 RX ADMIN — NITROGLYCERIN 1 INCH: 20 OINTMENT TOPICAL at 05:28

## 2023-12-05 NOTE — ASSESSMENT & PLAN NOTE
Lab Results   Component Value Date    HGBA1C 14.1 (A) 10/27/2023     Recent Labs     12/04/23  1647 12/04/23 2050 12/05/23  0712 12/05/23  1147   POCGLU 200* 150* 111 144*     Very poorly controlled DM- on lantus 100 units qhs and humalog 10 iu tid ac  Lowered to lantus 20 units bid while inpatient and undergoing cath   Continued on humalog 10 units  Sliding scale

## 2023-12-05 NOTE — DISCHARGE SUMMARY
[facility]  Discharge- Ольга Campbell 1960, 61 y.o. female MRN: [id number]  Unit/Bed#: E4 -01 Encounter: [id number]  Primary Care Provider: Kristel Berrios DO   Date and time admitted to hospital: 12/3/2023  1:59 AM    * Non-ST elevation MI (NSTEMI) [facility])  Assessment & Plan  Presented to  [facility] 12/2/23 with the complaint of chest pain and rising troponins  Transferred to [facility] for cardiology eval and cardic cath 12/3/23  With hx of poorly controlled HLD, DM, CAD, hx of MI, prior cath in 2018  Troponins  9 --> 67 --> 248 --> 969, EKG with ST changes in inferior leads  Received Brilinta load and heparin drip    Cardiac cath performed 12/4/23:     1st Mrg lesion is 100% stenosed. 1st Diag lesion is 70% stenosed. 3rd Diag lesion is 90% stenosed. Prox LAD lesion is 40% stenosed. RPDA-1 lesion is 70% stenosed. RPDA-2 lesion is 90% stenosed. 3rd RPL lesion is 90% stenosed. Mid LAD lesion is 90% stenosed. 2nd Diag lesion is 90% stenosed.   3 vessel CAD - Diffuse atherosclerosis  Subsequent INNA placed in mid LAD 80 and 2 INNA placed in second diagonal    Recommendation: Aggressive lipid Rx, DAPT permanent    Continued on ASA 81 daily  Statin was restarted and increased to atorvastatin 80 mg daily  Started on Zetia 10 mg daily  Started on Brillinta 90 mg BID   Needs outpt cardiology follow-up with Dr. Angie Anand in [address] [place] [address] 2050     Type 2 diabetes mellitus without complication, with long-term current use of insulin [facility])  Assessment & Plan  Lab Results   Component Value Date    HGBA1C 14.1 (A) 10/27/2023     Recent Labs     12/04/23  1647 12/04/23 2050 12/05/23  0712 12/05/23  1147   POCGLU 200* 150* 111 144*     Very poorly controlled DM- on lantus 100 units qhs and humalog 10 iu tid ac  Lowered to lantus 20 units bid while inpatient and undergoing cath   Continued on humalog 10 units  Sliding scale    Coronary artery disease involving native coronary artery of native heart  Assessment & Plan  Continue metoprolol succinate 100 mg daily  Needs dual antiplatelet therapy permanently  Brilinta 90 twice daily plus aspirin 81 daily  Started on high-dose statin    Mixed hyperlipidemia  Assessment & Plan  Was suppose to be taking a statin- reports she has not been  Cholesterol 291, triglycerides 417, HDL 36,   Restarted on atorvastatin but increase to 80 mg daily    Essential hypertension  Assessment & Plan  Presented with elevated pressures  Home regimen metoprolol succinate 100 mg daily, benazepril 20 mg daily, norvasc 5 mg daily        Consultations During Hospital Stay:  Cardiology    Procedures Performed:   12/4/2023: Echocardiogram    Left Ventricle: Left ventricular cavity size is normal. There is mild concentric hypertrophy. The left ventricular ejection fraction is 72% by visual estimation. Systolic function is hyperdynamic. Global longitudinal strain is borderline at -15%. Wall motion is normal. Diastolic function is mildly abnormal, consistent with grade I (abnormal) relaxation. Right Ventricle: Right ventricular cavity size is mildly dilated. Systolic function is low normal.    Aortic Valve: There is aortic valve sclerosis. Mitral Valve: There is mild annular calcification. There is trace regurgitation. Tricuspid Valve: There is trace regurgitation. Pulmonary artery systolic pressures cannot be estimated due to suboptimal tricuspid regurgitation envelope. Compared to report from May 10, 2019, there is now mild right ventricular dilatation with AV sclerosis. 12/4/2023: Cardiac catheterization  3v CAD- Diffuse atherosclerosis  Status post INNA x3  Recommendation: Aggressive lipid Rx, dual antiplatelet therapy with permanent    Significant Findings / Test Results:   NSTEMI  Poorly controlled diabetes  Hyperlipidemia    Incidental Findings:   None    Test Results Pending at Discharge (will require follow up):    None     Outpatient follow-up requested:  Cardiology-1 week-Dr. Coleen Eaton in [facility]  PCP-1 weeks    Complications: None    Hospital Course:     Devan Roberts is a 61 y.o. female patient with a past medical history of MI, type 2 diabetes, coronary artery disease, hypertension, hyperlipidemia, chronic back pain, history of breast cancer, restless leg syndrome. She presented to [address] [place] [address] with a complaint of chest pain. She was found to have rising troponins and further transferred to [address] [place] [address] for cardiac evaluation and higher level of care on 12/3/2023. She was started on a heparin drip as well as statin. She was continued on aspirin 81 and Brilinta loaded. Ultimately underwent cardiac cath 12/4/2023. She is found to have diffuse atherosclerosis with three-vessel coronary artery disease -underwent INNA x3. For further details regarding catheterization-please see above. She was seen to have NSTEMI and permanent dual antiplatelet therapy was recommended along with maximization of the patient's cholesterol medications. She was started on high-dose atorvastatin 80 mg daily and Zetia. She will need close ongoing outpatient follow-up with cardiology-Dr. Coleen Eaton in [facility]. She was counseled on importance of antiplatelet therapy as well as daily compliance. She is continued on her other blood pressure medications including beta-blocker with metoprolol succinate, benazepril, and amlodipine. In conclusion, patient stable for discharge at this time. Condition at Discharge: stable     Discharge Day Visit / Exam:     Subjective: Seen earlier during rounds. She denies any further chest pain, shortness of breath. Extensively discussed new cardiac medications as well as importance of dual antiplatelet therapy permanently. Declined call to update .      Vitals: Blood Pressure: 152/93 (12/05/23 1113)  Pulse: 71 (12/05/23 1113)  Temperature: 97.8 °F (36.6 °C) (12/05/23 1113)  Temp Source: Temporal (12/05/23 1113)  Respirations: 18 (12/05/23 1113)  Height: 5' 1.5" (156.2 cm) (12/04/23 1205)  Weight - Scale: 70.8 kg (156 lb) (12/04/23 1205)  SpO2: 92 % (12/05/23 1113)    Exam:   Physical Exam  Vitals and nursing note reviewed. Constitutional:       General: She is not in acute distress. Appearance: Normal appearance. She is normal weight. She is not ill-appearing, toxic-appearing or diaphoretic. HENT:      Head: Normocephalic and atraumatic. Eyes:      General: No scleral icterus. Cardiovascular:      Rate and Rhythm: Normal rate and regular rhythm. Pulmonary:      Effort: Pulmonary effort is normal. No respiratory distress. Breath sounds: Normal breath sounds. No stridor. No wheezing or rhonchi. Abdominal:      General: Bowel sounds are normal. There is no distension. Palpations: Abdomen is soft. There is no mass. Tenderness: There is no abdominal tenderness. Hernia: No hernia is present. Musculoskeletal:         General: No swelling. Cervical back: Neck supple. Skin:     General: Skin is warm and dry. Neurological:      Mental Status: She is oriented to person, place, and time. Mental status is at baseline. Psychiatric:         Mood and Affect: Mood normal.         Behavior: Behavior normal.         Discharge instructions/Information to patient and family:   See after visit summary for information provided to patient and family. Provisions for Follow-Up Care:  See after visit summary for information related to follow-up care and any pertinent home health orders. Planned Readmission: no     Discharge Statement: This time was spent on the day of discharge. I had direct contact with the patient on the day of discharge. Greater than 50% of the total time was spent examining patient, answering all patient questions, arranging and discussing plan of care with patient as well as directly providing post-discharge instructions.   Additional time then spent on discharge activities. Discharge Medications:  See after visit summary for reconciled discharge medications provided to patient and family.       ** Please Note: This note has been constructed using a voice recognition system **

## 2023-12-05 NOTE — TELEPHONE ENCOUNTER
12/05/23 10:28 AM     [employer] SmartForm used to document caregap status.     Chriss Syed Assumed care of  for 7p to 7a shift. First contact with . Discussed plan of care for the shift as well as safe sleep practices with 's mother. Mother verbalizes understanding without questions at this time.

## 2023-12-05 NOTE — ASSESSMENT & PLAN NOTE
Was suppose to be taking a statin- reports she has not been  Cholesterol 291, triglycerides 417, HDL 36,   Restarted on atorvastatin but increase to 80 mg daily

## 2023-12-05 NOTE — PLAN OF CARE
Problem: DISCHARGE PLANNING  Goal: Discharge to home or other facility with appropriate resources  Description: INTERVENTIONS:  - Identify barriers to discharge w/patient and caregiver  - Arrange for needed discharge resources and transportation as appropriate  - Identify discharge learning needs (meds, wound care, etc.)  - Refer to Case Management Department for coordinating discharge planning if the patient needs post-hospital services based on physician/advanced practitioner order or complex needs related to functional status, cognitive ability, or social support system  12/5/2023 1305 by Nette Hodge, RN  Outcome: Completed  12/5/2023 1016 by Nette Hodge, RN  Outcome: Progressing

## 2023-12-05 NOTE — PROGRESS NOTES
Progress Note - Cardiology   Keren Curiel 61 y.o. female MRN: [id number]  Unit/Bed#: E4 -01 Encounter: [id number]    Assessment:    Non ST elevation MI with chest pain and classic troponin rise, 9, 67, 240, 969  Subsequent INNA placed in mid LAD 80 and 2 INNA placed in second diagonal  Essential hypertension, controlled  Hyperlipidemia  Type 2 diabetes mellitus without complication and without long-term insulin use    Plan:    Discharged today  Check prior to discharge whether patient can afford Brilinta. If Brilinta is too expensive, would load with Plavix 300 mg before discharge and begin Plavix 75 mg daily tomorrow  Aspirin 81 mg daily  Continue amlodipine 5 mg daily, atorvastatin 80 mg daily, Zetia 10 mg daily, metoprolol succinate 100 mg daily, repeat Requip 1 mg daily at bedtime  Follow-up with Zeno Kayser in [facility]      Interval history:  Patient post operative stent placement, INNA, 1 stent in the mid LAD and 2 stents in the second diagonal.  No chest discomfort overnight. Only waiting for discharge. Primary service checking on the price of Brilinta to make sure patient can afford it. If Brilinta is too expensive we will give her a loading dose of Plavix and begin Plavix 75 mg daily tomorrow.   Follow-up with Dr. Zeno Kayser in [facility]    PROBLEM LIST:    Patient Active Problem List    Diagnosis Date Noted    Elevated troponin I level 12/03/2023    Near syncope 03/06/2021    Hypotension 03/06/2021    Encounter for diabetic foot exam ([address]) 02/26/2021    Hyperglycemia due to diabetes mellitus ([address]) 11/22/2020    Hyponatremia 11/22/2020    Bilateral temporomandibular joint pain 08/18/2020    Costochondritis 08/18/2020    History of radiation therapy 08/18/2020    S/P lumpectomy, right breast 08/18/2020    Malignant neoplasm of upper-outer quadrant of right breast in female, estrogen receptor positive  04/07/2020    Use of anastrozole (Arimidex) 04/07/2020 Obstructive sleep apnea (adult) (pediatric)     Overweight (BMI 25.0-29.9) 01/20/2020    Depression 11/08/2019    SOB (shortness of breath) 02/27/2019    Coronary artery disease involving native coronary artery of native heart 02/27/2019    Mixed hyperlipidemia 11/28/2018    Other chest pain 11/26/2018    Restless leg syndrome 11/26/2018    Chronic bilateral low back pain without sciatica 09/05/2018    Essential hypertension 08/02/2018    Type 2 diabetes mellitus without complication, with long-term current use of insulin ([address]) 01/31/2014    Disease of pancreas 01/27/2014       Vitals: BP (P) 121/70   Pulse (P) 72   Temp 97.6 °F (36.4 °C) (Temporal)   Resp 18   Ht 5' 1.5" (1.562 m)   Wt 70.8 kg (156 lb)   SpO2 92%   BMI 29.00 kg/m²   PREVIOUS WEIGHTS:   Weight (last 2 days)       Date/Time Weight    12/04/23 1205 70.8 (156)    12/03/23 0212 71.1 (156.75)            Wt Readings from Last 2 Encounters:   12/04/23 70.8 kg (156 lb)   11/17/23 73 kg (161 lb)       Labs/Data:        Results from last 7 days   Lab Units 12/04/23  0335 12/03/23 0623 12/03/23  0013   WBC Thousand/uL 9.03 8.59 8.40   HEMOGLOBIN g/dL 12.9 13.2 13.6   HEMATOCRIT % 38.7 39.1 41.1   MCV fL 90 90 93   MCH pg 30.1 30.5 30.6   MCHC g/dL 33.3 33.8 33.1   PLATELETS Thousands/uL 381 411* 417*     Results from last 7 days   Lab Units 12/04/23 0335 12/03/23 0623 12/02/23  1905   EGFR ml/min/1.73sq m 79 86 58   SODIUM mmol/L 138 137 136   POTASSIUM mmol/L 3.7 3.8 3.7   CHLORIDE mmol/L 107 106 101   CO2 mmol/L 22 23 22   BUN mg/dL 13 17 16   CREATININE mg/dL 0.79 0.74 1.02     Results from last 7 days   Lab Units 12/04/23  0335 12/03/23 0623 12/02/23  1905   CALCIUM mg/dL 9.0 9.1 9.3   AST U/L  --   --  17   ALT U/L  --   --  14   ALK PHOS U/L  --   --  115*     Lab Results   Component Value Date    NTBNP 46 03/11/2019     Lab Results   Component Value Date    CHOLESTEROL 291 (H) 12/04/2023    TRIG 417 (H) 12/04/2023    HDL 36 (L) 12/04/2023 [facility]  12/04/2023      Comment:      Calculated LDL invalid, triglycerides >400 mg/dl    LDLDIRECT 186 (H) 12/04/2023    HGBA1C 14.1 (A) 10/27/2023     Lab Results   Component Value Date    FREET4 0.84 04/24/2023    FREET4 0.82 03/11/2019       Results from last 7 days   Lab Units 12/03/23  0013   INR  0.92   PROTIME seconds 12.5          Current Facility-Administered Medications:     acetaminophen (TYLENOL) tablet 650 mg, 650 mg, Oral, Q6H PRN, Bonny Altman PA-C, 650 mg at 12/05/23 5722    amLODIPine (NORVASC) tablet 5 mg, 5 mg, Oral, Daily, Kaylyn Neal PA-C, 5 mg at 12/05/23 0802    atorvastatin (LIPITOR) tablet 80 mg, 80 mg, Oral, Daily With Nadeem Maria PA-C, 80 mg at 12/04/23 1542    ezetimibe (ZETIA) tablet 10 mg, 10 mg, Oral, HS, Micha Lopes MD, 10 mg at 12/04/23 2149    insulin glargine (LANTUS) subcutaneous injection 50 Units 0.5 mL, 50 Units, Subcutaneous, Q12H [address], Kaylyn Neal PA-C, 50 Units at 12/05/23 0802    insulin lispro (HumaLOG) 100 units/mL subcutaneous injection 1-6 Units, 1-6 Units, Subcutaneous, 4x Daily (AC & HS), 1 Units at 12/04/23 2149 **AND** Fingerstick Glucose (POCT), , , 4x Daily AC and at bedtime, Kaylyn Neal PA-C    insulin lispro (HumaLOG) 100 units/mL subcutaneous injection 10 Units, 10 Units, Subcutaneous, TID With Meals, Bonny Altman PA-C, 10 Units at 12/04/23 1703    metoprolol succinate (TOPROL-XL) 24 hr tablet 100 mg, 100 mg, Oral, Daily, Kaylyn Neal PA-C, 100 mg at 12/05/23 0802    ondansetron (ZOFRAN) injection 4 mg, 4 mg, Intravenous, Q6H PRN, Kaylyn Neal PA-C, 4 mg at 12/05/23 0254    rOPINIRole (REQUIP) tablet 1 mg, 1 mg, Oral, HS, Kaylyn Neal PA-C, 1 mg at 12/04/23 2149    ticagrelor (BRILINTA) tablet 90 mg, 90 mg, Oral, Q12H [address], Kaylyn Neal PA-C, 90 mg at 12/05/23 0802  No Known Allergies      Intake/Output Summary (Last 24 hours) at 12/5/2023 1030  Last data filed at 12/5/2023 0501  Gross per 24 hour   Intake 2600 ml   Output --   Net 2600 ml       Invasive Devices       Peripheral Intravenous Line  Duration             Peripheral IV 12/03/23 Left;Proximal;Ventral (anterior) Forearm 2 days                    Review of Systems   Respiratory:  Negative for cough, choking, chest tightness, shortness of breath, wheezing and stridor. Cardiovascular:  Negative for chest pain, palpitations and leg swelling. Musculoskeletal:  Negative for gait problem. Skin:  Negative for rash. Neurological:  Negative for dizziness, tremors, syncope, weakness, light-headedness, numbness and headaches. Psychiatric/Behavioral:  Negative for agitation and behavioral problems. The patient is not hyperactive. Physical Exam  Constitutional:       General: She is not in acute distress. Appearance: She is well-developed. HENT:      Head: Normocephalic and atraumatic. Neck:      Thyroid: No thyromegaly. Vascular: No carotid bruit or JVD. Trachea: No tracheal deviation. Cardiovascular:      Rate and Rhythm: Normal rate and regular rhythm. Pulses: Normal pulses. Heart sounds: Normal heart sounds. No murmur heard. No friction rub. No gallop. Pulmonary:      Effort: Pulmonary effort is normal. No respiratory distress. Breath sounds: Normal breath sounds. No wheezing, rhonchi or rales. Chest:      Chest wall: No tenderness. Musculoskeletal:         General: Normal range of motion. Cervical back: Normal range of motion and neck supple. Right lower leg: No edema. Left lower leg: No edema. Skin:     General: Skin is warm and dry. Neurological:      General: No focal deficit present. Mental Status: She is alert and oriented to person, place, and time. Psychiatric:         Mood and Affect: Mood normal.         Behavior: Behavior normal.         Thought Content:  Thought content normal.         Judgment: Judgment normal.         ====================================================== Imaging:   I have personally reviewed pertinent reports. Portions of the record may have been created with voice recognition software. Occasional wrong word or "sound a like" substitutions may have occurred due to the inherent limitations of voice recognition software. Read the chart carefully and recognize, using context, where substitutions have occurred.

## 2023-12-05 NOTE — ASSESSMENT & PLAN NOTE
Presented to  [facility] 12/2/23 with the complaint of chest pain and rising troponins  Transferred to [facility] for cardiology eval and cardic cath 12/3/23  With hx of poorly controlled HLD, DM, CAD, hx of MI, prior cath in 2018  Troponins  9 --> 67 --> 248 --> 969, EKG with ST changes in inferior leads  Received Brilinta load and heparin drip    Cardiac cath performed 12/4/23:     1st Mrg lesion is 100% stenosed. 1st Diag lesion is 70% stenosed. 3rd Diag lesion is 90% stenosed. Prox LAD lesion is 40% stenosed. RPDA-1 lesion is 70% stenosed. RPDA-2 lesion is 90% stenosed. 3rd RPL lesion is 90% stenosed. Mid LAD lesion is 90% stenosed. 2nd Diag lesion is 90% stenosed.   3 vessel CAD - Diffuse atherosclerosis  Subsequent INNA placed in mid LAD 80 and 2 INNA placed in second diagonal    Recommendation: Aggressive lipid Rx, DAPT permanent    Continued on ASA 81 daily  Statin was restarted and increased to atorvastatin 80 mg daily  Started on Zetia 10 mg daily  Started on Brillinta 90 mg BID   Needs outpt cardiology follow-up with Dr. Johan Moulton in [facility]

## 2023-12-05 NOTE — ASSESSMENT & PLAN NOTE
Continue metoprolol succinate 100 mg daily  Needs dual antiplatelet therapy permanently  Brilinta 90 twice daily plus aspirin 81 daily  Started on high-dose statin

## 2023-12-05 NOTE — DISCHARGE INSTRUCTIONS
Dear Devan Roberts,     It was our pleasure to care for you here at [facility]. It is our hope that we were always able to exceed the expected standards for your care during your stay. You were hospitalized due to heart attack. You were cared for on the 4th floor by Lucía Hameed PA-C under the service of Flor Malone DO with the [facility] who covers for your primary care physician (PCP), Roni Tyson DO, while you were hospitalized. If you have any questions or concerns related to this hospitalization, you may contact us at [phone]. For follow up as well as any medication refills, we recommend that you follow up with your primary care physician. A registered nurse will reach out to you by phone within a few days after your discharge to answer any additional questions that you may have after going home. However, at this time we provide for you here, the most important instructions / recommendations at discharge:       Notable Medication Adjustments -   You were restarted on your cholesterol medicine called atorvastatin and started on Zetia  You were found to have blockages in your heart   You will need to take antiplatelet therapy for the rest of your life which includes Brilinta 90 mg twice daily as well as aspirin 81 mg daily. You will need to continue taking these medications every day and not miss days as you did have 3 stents placed in your heart. These medications are important to keep your stents open. Important follow up information -   You will need to see Dr. Coleen Eaton of cardiology in follow-up in [facility]    Other Instructions -   Patient PCP in 1 week for further management of your diabetes.      Please review this entire after visit summary as additional general instructions including medication list, appointments, activity, diet, any pertinent wound care, and other additional recommendations from your care team that may be provided for you.       Sincerely,     Destinee Viera PA-C

## 2023-12-05 NOTE — ASSESSMENT & PLAN NOTE
Presented with elevated pressures  Home regimen metoprolol succinate 100 mg daily, benazepril 20 mg daily, norvasc 5 mg daily

## 2023-12-06 ENCOUNTER — CONSULT (OUTPATIENT)
Dept: ENDOCRINOLOGY | Facility: CLINIC | Age: 63
End: 2023-12-06
Payer: COMMERCIAL

## 2023-12-06 ENCOUNTER — TRANSITIONAL CARE MANAGEMENT (OUTPATIENT)
Dept: FAMILY MEDICINE CLINIC | Facility: CLINIC | Age: 63
End: 2023-12-06

## 2023-12-06 VITALS
BODY MASS INDEX: 29.63 KG/M2 | OXYGEN SATURATION: 99 % | SYSTOLIC BLOOD PRESSURE: 120 MMHG | HEART RATE: 92 BPM | WEIGHT: 161 LBS | HEIGHT: 62 IN | DIASTOLIC BLOOD PRESSURE: 70 MMHG | TEMPERATURE: 97.8 F | RESPIRATION RATE: 18 BRPM

## 2023-12-06 DIAGNOSIS — E78.2 MIXED HYPERLIPIDEMIA: ICD-10-CM

## 2023-12-06 DIAGNOSIS — E11.9 TYPE 2 DIABETES MELLITUS WITHOUT COMPLICATION, WITH LONG-TERM CURRENT USE OF INSULIN (HCC): Primary | Chronic | ICD-10-CM

## 2023-12-06 DIAGNOSIS — Z79.4 TYPE 2 DIABETES MELLITUS WITHOUT COMPLICATION, WITH LONG-TERM CURRENT USE OF INSULIN (HCC): Primary | Chronic | ICD-10-CM

## 2023-12-06 DIAGNOSIS — I10 ESSENTIAL HYPERTENSION: ICD-10-CM

## 2023-12-06 DIAGNOSIS — E11.65 UNCONTROLLED TYPE 2 DIABETES MELLITUS WITH HYPERGLYCEMIA (HCC): ICD-10-CM

## 2023-12-06 LAB — SL AMB POCT HEMOGLOBIN AIC: 10.6 (ref ?–6.5)

## 2023-12-06 PROCEDURE — 83036 HEMOGLOBIN GLYCOSYLATED A1C: CPT | Performed by: STUDENT IN AN ORGANIZED HEALTH CARE EDUCATION/TRAINING PROGRAM

## 2023-12-06 PROCEDURE — 99244 OFF/OP CNSLTJ NEW/EST MOD 40: CPT | Performed by: STUDENT IN AN ORGANIZED HEALTH CARE EDUCATION/TRAINING PROGRAM

## 2023-12-06 RX ORDER — BLOOD-GLUCOSE SENSOR
EACH MISCELLANEOUS
Qty: 6 EACH | Refills: 4 | Status: SHIPPED | OUTPATIENT
Start: 2023-12-06

## 2023-12-06 NOTE — UTILIZATION REVIEW
NOTIFICATION OF ADMISSION DISCHARGE   This is a Notification of Discharge from [address] [facility]. Please be advised that this patient has been discharge from our facility. Below you will find the admission and discharge date and time including the patient’s disposition. UTILIZATION REVIEW CONTACT:  Shaina Turner MA  Utilization   Network Utilization Review Department  Phone: [phone] x carefully listen to the prompts. All voicemails are confidential.  Email: [email or web address]     ADMISSION INFORMATION  PRESENTATION DATE: 12/3/2023  1:59 AM  OBERVATION ADMISSION DATE:   INPATIENT ADMISSION DATE: 12/3/23  1:59 AM   DISCHARGE DATE: 12/5/2023  3:20 PM   DISPOSITION:Home/Self Care    Network Utilization Review Department  ATTENTION: Please call with any questions or concerns to [phone] and carefully listen to the prompts so that you are directed to the right person. All voicemails are confidential.   For Discharge needs, contact Care Management DC Support Team at [phone] opt. 2  Send all requests for admission clinical reviews, approved or denied determinations and any other requests to dedicated fax number below belonging to the campus where the patient is receiving treatment.  List of dedicated fax numbers for the Facilities:  [place] DENIALS (Administrative/Medical Necessity) [phone]   DISCHARGE SUPPORT TEAM (Network) [phone] [facility] [phone]   [address] [facility] [address] [place] [address] [address]  [facility] [phone]

## 2023-12-11 DIAGNOSIS — Z79.4 TYPE 2 DIABETES MELLITUS WITH COMPLICATION, WITH LONG-TERM CURRENT USE OF INSULIN (HCC): ICD-10-CM

## 2023-12-11 DIAGNOSIS — E11.8 TYPE 2 DIABETES MELLITUS WITH COMPLICATION, WITH LONG-TERM CURRENT USE OF INSULIN (HCC): ICD-10-CM

## 2023-12-12 RX ORDER — PEN NEEDLE, DIABETIC 32GX 5/32"
NEEDLE, DISPOSABLE MISCELLANEOUS 3 TIMES DAILY
Qty: 100 EACH | Refills: 3 | Status: SHIPPED | OUTPATIENT
Start: 2023-12-12

## 2023-12-14 ENCOUNTER — OFFICE VISIT (OUTPATIENT)
Dept: FAMILY MEDICINE CLINIC | Facility: CLINIC | Age: 63
End: 2023-12-14
Payer: COMMERCIAL

## 2023-12-14 VITALS
HEART RATE: 74 BPM | OXYGEN SATURATION: 96 % | BODY MASS INDEX: 30.4 KG/M2 | SYSTOLIC BLOOD PRESSURE: 130 MMHG | WEIGHT: 161 LBS | DIASTOLIC BLOOD PRESSURE: 66 MMHG | HEIGHT: 61 IN

## 2023-12-14 DIAGNOSIS — E11.9 TYPE 2 DIABETES MELLITUS WITHOUT COMPLICATION, WITH LONG-TERM CURRENT USE OF INSULIN (HCC): Chronic | ICD-10-CM

## 2023-12-14 DIAGNOSIS — Z79.4 TYPE 2 DIABETES MELLITUS WITHOUT COMPLICATION, WITH LONG-TERM CURRENT USE OF INSULIN (HCC): Chronic | ICD-10-CM

## 2023-12-14 DIAGNOSIS — I10 ESSENTIAL HYPERTENSION: ICD-10-CM

## 2023-12-14 DIAGNOSIS — I21.4 NSTEMI (NON-ST ELEVATED MYOCARDIAL INFARCTION) (HCC): Primary | ICD-10-CM

## 2023-12-14 DIAGNOSIS — Z76.89 ENCOUNTER FOR SUPPORT AND COORDINATION OF TRANSITION OF CARE: ICD-10-CM

## 2023-12-14 DIAGNOSIS — I25.10 CORONARY ARTERY DISEASE INVOLVING NATIVE CORONARY ARTERY OF NATIVE HEART WITHOUT ANGINA PECTORIS: ICD-10-CM

## 2023-12-14 PROCEDURE — 99495 TRANSJ CARE MGMT MOD F2F 14D: CPT

## 2023-12-14 NOTE — PROGRESS NOTES
Assessment & Plan     1. NSTEMI (non-ST elevated myocardial infarction) (HCC)  -     ticagrelor (BRILINTA) 90 MG; Take 1 tablet (90 mg total) by mouth every 12 (twelve) hours  -     Lipid panel; Future; Expected date: 03/15/2024  -     LDL cholesterol, direct; Future; Expected date: 03/15/2024  -     Hemoglobin A1C; Future; Expected date: 03/15/2024  -     Comprehensive metabolic panel; Future; Expected date: 03/15/2024    2. Encounter for support and coordination of transition of care    3. Type 2 diabetes mellitus without complication, with long-term current use of insulin ([address])    4. Essential hypertension    5. Coronary artery disease involving native coronary artery of native heart without angina pectoris    Resent Brilinta script - the importance of taking this medication as prescribed was reiterated to the patient and her . Please  from pharmacy immediately. F/u with Cardiology as directed on 1/15/24. Continue DM2 treatment & plan of care per Dr. Nivia Villanueva. Appt with diabetic educator on 12/20/23. Discussed red flag symptoms and ER precautions with patient & . Recheck labs in 3 months - orders placed. Subjective     Transitional Care Management Review:     Jason Prabhakar is a 61 y.o. female here for TCM follow up. This is my 4th encounter with the patient - originally seen 10/27/23 for the first time for diabetes follow-up. Seen again for DM management on 11/3/23 and 11/17/23.     During the TCM phone call patient stated:  TCM Call       Date and time call was made  12/6/2023  6:39 AM    Hospital care reviewed  Records reviewed    Patient was hospitialized at  [facility]    Date of Admission  12/03/23    Date of discharge  12/05/23    Diagnosis  Non-ST elevation MI (NSTEMI) [facility])    Disposition  Home    Were the patients medications reviewed and updated  Yes    Current Symptoms  None          TCM Call       Post hospital issues  None    Should patient be enrolled in antico monitoring? No    Scheduled for follow up? Yes    Patients specialists  Cardiologist    Did you obtain your prescribed medications  Yes    Do you need help managing your prescriptions or medications  No    Is transportation to your appointment needed  No    I have advised the patient to call PCP with any new or worsening symptoms  [place]os    Living Arrangements  Spouse or Significiant other    Support System  Spouse; Family    The type of support provided  None    Do you have social support  Yes, as much as I need    Are you recieving any outpatient services  No    Are you recieving home care services  No    Are you using any community resources  No    Current waiver services  No    Have you fallen in the last 12 months  No    Interperter language line needed  No    Counseling  Family          Brian Montesinos presents in office today for TCM visit. This is my 4th encounter with the patient - originally seen 10/27/23 for the first time for diabetes follow-up. Seen again for DM management on 11/3/23 and 11/17/23. Accompanied by her , Cherelle Alvarado. Brian Montesinos presented to the ER with chest pain on 11/20/23 then again on 12/2/23. She was found to have diffuse atherosclerosis with 3 vessel CAD - underwent INNA x3. She was diagnosed with a NSTEMI and permanent dual antiplatelet therapy was recommended along with maximization of the patient's cholesterol medications. She was started on high-dose atorvastatin 80 mg daily and Zetia. Brian Montesinos states she had issues with her [address] prior to NSTEMI - she thinks stress precipitated this cardiac event. She is feeling "ok" overall. Denies CP or SOB. Denies palpitations, lightheadedness or dizziness. States she has not picked up her Brilinta yet because she said the pharmacy told them they didn't have the script for it. Review of Systems   Constitutional:  Negative for chills, fatigue and fever.    HENT:  Negative for congestion, ear pain, facial swelling, hearing loss, rhinorrhea, sinus pressure, sneezing, sore throat and trouble swallowing. Eyes:  Negative for pain, redness and visual disturbance. Respiratory:  Negative for cough, chest tightness, shortness of breath and wheezing. Cardiovascular:  Negative for chest pain and palpitations. Gastrointestinal:  Negative for abdominal pain, diarrhea, nausea and vomiting. Genitourinary:  Negative for dysuria, flank pain, hematuria and pelvic pain. Musculoskeletal:  Negative for arthralgias, back pain and myalgias. Skin:  Negative for color change and rash. Neurological:  Negative for dizziness, seizures, syncope, weakness, light-headedness and headaches. Psychiatric/Behavioral:  Negative for confusion, hallucinations and sleep disturbance. The patient is not nervous/anxious. All other systems reviewed and are negative. Objective     /66   Pulse 74   Ht 5' 1" (1.549 m)   Wt 73 kg (161 lb)   SpO2 96%   BMI 30.42 kg/m²      Physical Exam  Vitals and nursing note reviewed. Constitutional:       General: She is not in acute distress. Appearance: She is well-developed. HENT:      Head: Normocephalic and atraumatic. Right Ear: Hearing and tympanic membrane normal.      Left Ear: Hearing and tympanic membrane normal.      Nose: Nose normal.      Mouth/Throat:      Mouth: Mucous membranes are moist.      Dentition: Normal dentition. Tongue: No lesions. Pharynx: Oropharynx is clear. Uvula midline. No oropharyngeal exudate. Tonsils: No tonsillar exudate. Eyes:      Extraocular Movements: Extraocular movements intact. Conjunctiva/sclera: Conjunctivae normal.   Neck:      Vascular: No carotid bruit or JVD. Cardiovascular:      Rate and Rhythm: Normal rate and regular rhythm. Heart sounds: S1 normal and S2 normal. No murmur heard. Pulmonary:      Effort: Pulmonary effort is normal. No tachypnea or respiratory distress.       Breath sounds: Normal breath sounds and air entry. No decreased breath sounds. Chest:      Chest wall: No deformity or tenderness. Abdominal:      General: Abdomen is flat. Bowel sounds are normal. There is no distension. Palpations: Abdomen is soft. Tenderness: There is no abdominal tenderness. There is no right CVA tenderness, left CVA tenderness or guarding. Musculoskeletal:         General: No swelling. Cervical back: Full passive range of motion without pain and neck supple. Right lower leg: No edema. Left lower leg: No edema. Lymphadenopathy:      Cervical: No cervical adenopathy. Skin:     General: Skin is warm and dry. Capillary Refill: Capillary refill takes less than 2 seconds. Findings: No rash. Neurological:      Mental Status: She is alert and oriented to person, place, and time. Cranial Nerves: Cranial nerves 2-12 are intact. Sensory: Sensation is intact. Motor: Motor function is intact. Coordination: Coordination is intact. Gait: Gait is intact. Psychiatric:         Mood and Affect: Mood normal.         Behavior: Behavior is cooperative.        Medications have been reviewed by provider in current encounter    Catia Cruz, [facility]

## 2023-12-20 ENCOUNTER — OFFICE VISIT (OUTPATIENT)
Dept: DIABETES SERVICES | Facility: CLINIC | Age: 63
End: 2023-12-20
Payer: COMMERCIAL

## 2023-12-20 VITALS — WEIGHT: 160 LBS | BODY MASS INDEX: 30.23 KG/M2

## 2023-12-20 DIAGNOSIS — E11.65 UNCONTROLLED TYPE 2 DIABETES MELLITUS WITH HYPERGLYCEMIA (HCC): Primary | ICD-10-CM

## 2023-12-20 PROCEDURE — 97802 MEDICAL NUTRITION INDIV IN: CPT

## 2023-12-20 NOTE — PATIENT INSTRUCTIONS
Eat 3 meals per day, 4-5 hours apart. No meal skipping.     Eat 30 grams of carbohydrate per meal, and no more than 15 grams per snack     Initiate regular exercise to build to at least 30 minutes per day or 150 minutes of moderate exercise per week, and 2 days per week of resistance/strength training, pending physician approval.    Complete 3-day food log and return completed log at the follow up appointment

## 2023-12-20 NOTE — PROGRESS NOTES
Medical Nutrition Therapy        Assessment    Visit Type: Initial visit  Chief complaint/Medical Diagnosis/reason for visit E11.65    HPI Navin was seen in person for the initial MNT appointment, accompanied by her spouse, Roosevelt. BG levels are checked 4x/day before meals. FBG in the morning ranges from 115 to 127 mg/dL. Lala has a CGM that is currently not worn because she needs training on usage. Patient does take diabetes medication as prescribed.     Patient reported no current exercise regimen. Explained how exercise lowers BG levels by creating more demand for glucose in the muscle cells. Lala reports feeling exhausted due to poor sleep.    A challenge to eating appropriately identified is food insecurity.  Patient also shared that she does not like certain foods or beverages that are sugar-free.    Problems identified in food recall include meal skipping, inconsistent carbohydrate intake, high-fat and high-sodium convenience foods, high-fat dairy, limited non-starchy vegetables and whole grains, sugary beverages. Consumption of carbohydrates ranges from 30 to 60+ grams per meal. Explained basic pathophysiology of diabetes and impact of diet on blood glucose levels and disease complications. Explained how sodium influences volume retention, blood pressure, and complications for heart disease, stroke, and CKD.     Provided patient with a 1227 calorie meal plan to assist with consistency, balance and portion control.  Encouraged the consumption of regular meals at regular times.  Advised patient to keep carbohydrate intake to 30 grams per meal and 15 per snack to assist with glycemic control.  Suggested keeping protein intake to 5 ounces a day and fat to 3 servings daily to assist with lipid management and calorie control. Portion booklet and food labels were used to teach basic carbohydrate counting. Patient agreed to keep daily food logs and return them in 2 months for assessment. RD will remain available  "for further dietary questions/concerns.     Ht Readings from Last 1 Encounters:   12/14/23 5' 1\" (1.549 m)     Wt Readings from Last 3 Encounters:   12/14/23 73 kg (161 lb)   12/06/23 73 kg (161 lb)   12/04/23 70.8 kg (156 lb)     Weight Change: No    Barriers to Learning: no barriers    Do you follow any special diet presently?: No  Who shops: patient and spouse  Who cooks: patient and spouse    Food Log: Completed via the method of food recall    Wakes up 8-9 am    Lately not sleeping well.    Breakfast: 8-9 am; skips; 1 flavored pack cream of wheat (21 g) with 2-4 TBSP milk (whole) with coffee (w/ 1/2 TBSP sugar or none and milk) or tea and sometimes orange juice (6 oz)  Morning Snack: none  Lunch: skips; 12-2 pm; sandwich (white or whole wheat, turkey & american or provolone cheese) with coffee or tea or juice  Afternoon Snack: none  Dinner:5-7 pm; 1 c white rice with 1/2 c beans with chicken wings (skin on; fried; adobo & oil & vinegar) with ginger ale (regular) with ice  Evening Snack: 7-8 pm; sometimes; crackers or yogurt or chips  Beverages: juice (started watering down), soda, coffee, tea, water  Eating out/Take out: 1x/month; 1- 1 1/2 slices pizza, chinese- chicken & broccoli with rice, egg roll (splits to eat for 2 meals)  Exercise none; constantly exhausted due to lack of energy from poor sleep    Calorie needs 1227 kcals/day Carbs: 30 g/meal, 15 g/snack     Protein:5 ounces/day    Fat: 3 servings/day        Nutrition Diagnosis:  Food and nutrition related knowledge deficit  related to Lack of prior exposure to accurate nutrition related information as evidenced by Verbalizes inaccurate or incomplete information    Intervention: plate method, reduced fat intake, increased fiber intake, label reading, carbohydrate counting, increased plant based foods, meal timing, meal planning, exercise guidelines, and food diary     Treatment Goals: Patient understands education and recommendations, Patient will " monitor food intake daily with tracker, Patient will consume 3 meals a day, Patient will increase their intake of plant based foods, Patient will count carbohydrates, Patient will exercise, and Patient will monitor blood glucose    Monitoring and evaluation:    Term code indicator  FH 1.3.2 Food Intake Criteria: Eat 3 meals per day, 4-5 hours apart. No meal skipping.   Term code indicator  FH 1.6.3 Carbohydrate Intake Criteria: Eat 30 grams of carbohydrate per meal, and no more than 15 grams per snack   Term code indicator  CH 2.2 Treatments/Therapy/Alternative Medicine Criteria: Initiate regular exercise to build to at least 30 minutes per day or 150 minutes of moderate exercise per week, and 2 days per week of resistance/strength training, pending physician approval.    Materials Provided: portion book, 3-day food log    Patient’s Response to Instruction:  Comprehensiongood  Motivationgood  Expected Compliancegood    Begin Time: 3:45 pm  End Time: 5:05 pm  Referring Provider: Andrés Archer MD    Thank you for coming to the [facility] for education today.  Please feel free to call with any questions or concerns.    Saundra Butler, RD  [address] [address]  [phone]

## 2023-12-21 ENCOUNTER — TELEPHONE (OUTPATIENT)
Dept: DIABETES SERVICES | Facility: CLINIC | Age: 63
End: 2023-12-21

## 2023-12-27 DIAGNOSIS — Z79.4 TYPE 2 DIABETES MELLITUS WITHOUT COMPLICATION, WITH LONG-TERM CURRENT USE OF INSULIN (HCC): ICD-10-CM

## 2023-12-27 DIAGNOSIS — E11.9 TYPE 2 DIABETES MELLITUS WITHOUT COMPLICATION, WITH LONG-TERM CURRENT USE OF INSULIN (HCC): ICD-10-CM

## 2023-12-27 DIAGNOSIS — Z79.4 TYPE 2 DIABETES MELLITUS WITHOUT COMPLICATION, WITH LONG-TERM CURRENT USE OF INSULIN (HCC): Chronic | ICD-10-CM

## 2023-12-27 DIAGNOSIS — E11.9 TYPE 2 DIABETES MELLITUS WITHOUT COMPLICATION, WITH LONG-TERM CURRENT USE OF INSULIN (HCC): Chronic | ICD-10-CM

## 2023-12-27 RX ORDER — DULAGLUTIDE 0.75 MG/.5ML
0.75 INJECTION, SOLUTION SUBCUTANEOUS
Qty: 6 ML | Refills: 1 | Status: SHIPPED | OUTPATIENT
Start: 2023-12-27 | End: 2024-06-24

## 2023-12-27 RX ORDER — BLOOD SUGAR DIAGNOSTIC
STRIP MISCELLANEOUS
Qty: 200 STRIP | Refills: 1 | Status: SHIPPED | OUTPATIENT
Start: 2023-12-27

## 2023-12-28 DIAGNOSIS — Z79.4 TYPE 2 DIABETES MELLITUS WITHOUT COMPLICATION, WITH LONG-TERM CURRENT USE OF INSULIN (HCC): Chronic | ICD-10-CM

## 2023-12-28 DIAGNOSIS — E11.9 TYPE 2 DIABETES MELLITUS WITHOUT COMPLICATION, WITH LONG-TERM CURRENT USE OF INSULIN (HCC): Chronic | ICD-10-CM

## 2023-12-29 RX ORDER — INSULIN GLARGINE 100 [IU]/ML
50 INJECTION, SOLUTION SUBCUTANEOUS
Qty: 90 ML | Refills: 0 | Status: SHIPPED | OUTPATIENT
Start: 2023-12-29

## 2024-01-15 ENCOUNTER — OFFICE VISIT (OUTPATIENT)
Dept: CARDIOLOGY CLINIC | Facility: CLINIC | Age: 64
End: 2024-01-15
Payer: COMMERCIAL

## 2024-01-15 VITALS
BODY MASS INDEX: 30.21 KG/M2 | WEIGHT: 160 LBS | DIASTOLIC BLOOD PRESSURE: 80 MMHG | HEART RATE: 92 BPM | SYSTOLIC BLOOD PRESSURE: 140 MMHG | HEIGHT: 61 IN

## 2024-01-15 DIAGNOSIS — I10 ESSENTIAL HYPERTENSION: ICD-10-CM

## 2024-01-15 DIAGNOSIS — I24.9 ACS (ACUTE CORONARY SYNDROME) (HCC): ICD-10-CM

## 2024-01-15 DIAGNOSIS — E78.2 MIXED HYPERLIPIDEMIA: Primary | ICD-10-CM

## 2024-01-15 DIAGNOSIS — I25.10 CORONARY ARTERY DISEASE INVOLVING NATIVE CORONARY ARTERY OF NATIVE HEART WITHOUT ANGINA PECTORIS: ICD-10-CM

## 2024-01-15 PROCEDURE — 99214 OFFICE O/P EST MOD 30 MIN: CPT | Performed by: INTERNAL MEDICINE

## 2024-01-15 RX ORDER — INSULIN GLARGINE 100 [IU]/ML
50 INJECTION, SOLUTION SUBCUTANEOUS
COMMUNITY
Start: 2023-12-30

## 2024-01-15 RX ORDER — ATORVASTATIN CALCIUM 80 MG/1
80 TABLET, FILM COATED ORAL
Qty: 90 TABLET | Refills: 5 | Status: SHIPPED | OUTPATIENT
Start: 2024-01-15

## 2024-01-15 NOTE — PROGRESS NOTES
Patient ID: Lala Allen is a 63 y.o. female.        Plan:      Coronary artery disease involving native coronary artery of native heart  Recent multivessel stenting.  Continue DAPT.    Essential hypertension  Taking beta blocker erratically and will now take once daily.     Follow up Plan/Other summary comments:  Return in about 15 weeks (around 4/29/2024).Lipid profile just prior.    HPI: Patient seen in f/u regarding the above issues.  Since the last visit she underwent coronary stenting as outlined below.  The acute chest pain that she experienced has not recurred although she has continued episodes of what I would call atypical pain.  She remains physically active.  Somehow she stopped taking Lipitor.  We reviewed her meds at some length.    To reiterate, in November of 2018 heart catheterization revealed normal LAD and right coronary artery.  There was a 90% marginal stenosis treated with plain old balloon angioplasty.  A posterolateral branch was occluded.To reiterate, in November of 2018 heart catheterization revealed normal LAD and right coronary artery.  There was a 90% marginal stenosis treated with plain old balloon angioplasty.  A posterolateral branch was occluded.  On 12/4/2023 heart catheterization revealed 90% mid LAD stenosis, significant first and second diagonal stenosis, occluded obtuse marginal 1, and 70 to 90% serial posterior descending artery stenosis.  Stenting of the mid LAD x 1 and of the second diagonal x 2 was performed.        Most recent or relevant cardiac/vascular testing:    TTE 12/4/2023: Normal LV systolic function.  Mild LVH.      Past Surgical History:   Procedure Laterality Date    ABDOMINAL SURGERY      phill    ANKLE SURGERY      tubal    BREAST SURGERY      partial mastectomy R breast    CARDIAC CATHETERIZATION  11/2018    Successful balloon angioplassty to OM1    CARDIAC CATHETERIZATION  12/4/2023    Procedure: Cardiac catheterization;  Surgeon: Flex Darby MD;   "Location: [facility];  Service: Cardiology    CHOLECYSTECTOMY      FRACTURE SURGERY      right ankle    GALLBLADDER SURGERY      MASTECTOMY      TUBAL LIGATION         Lipid Profile:   Lab Results   Component Value Date    CHOL 235 06/26/2014    TRIG 417 (H) 12/04/2023    TRIG 358 06/26/2014    HDL 36 (L) 12/04/2023    HDL 31 06/26/2014         Review of Systems   10  point ROS  was otherwise non pertinent or negative except as per HPI or as below.   Gait: Normal.        Objective:     /80   Pulse 92   Ht 5' 1\" (1.549 m)   Wt 72.6 kg (160 lb)   BMI 30.23 kg/m²     PHYSICAL EXAM:    General:  Normal appearance in no distress.  Eyes:  Anicteric.  Oral mucosa:  Moist.  Neck:  No JVD. Carotid upstrokes are brisk without bruits.  No masses.  Chest:  Clear to auscultation.  Cardiac:  No palpable PMI.  Normal S1 and S2.  No murmur gallop or rub.  Abdomen:  Soft and nontender. No palpable organomegaly or aortic enlargement.  Extremities:  No peripheral edema.  Musculoskeletal:  Symmetric.   Vascular:  Femoral pulses are brisk without bruits.  Popliteal pulses are intact bilaterally.   Pedal pulses are intact.  Neuro:  Grossly symmetric.  Psych:  Alert and oriented x3.        Current Outpatient Medications:     amLODIPine (NORVASC) 5 mg tablet, swallow 1 tablet once daily, Disp: 90 tablet, Rfl: 1    aspirin (ECOTRIN LOW STRENGTH) 81 mg EC tablet, Take 1 tablet (81 mg total) by mouth daily, Disp: 30 tablet, Rfl: 0    atorvastatin (LIPITOR) 80 mg tablet, Take 1 tablet (80 mg total) by mouth daily with dinner, Disp: 90 tablet, Rfl: 5    benazepril (LOTENSIN) 20 mg tablet, take 1 tablet by mouth once daily, Disp: 90 tablet, Rfl: 1    Blood Glucose Monitoring Suppl (OneTouch Verio Flex System) w/Device KIT, Test four times daily, Disp: 1 kit, Rfl: 0    dulaglutide (Trulicity) 0.75 MG/0.5ML injection, Inject 0.5 mL (0.75 mg total) under the skin every 7 days, Disp: 6 mL, Rfl: 1    ezetimibe (ZETIA) 10 mg tablet, " Take 1 tablet (10 mg total) by mouth daily at bedtime, Disp: 30 tablet, Rfl: 0    famotidine (PEPCID) 20 mg tablet, take 1 tablet by mouth twice a day before meals, Disp: 60 tablet, Rfl: 3    glucose blood (OneTouch Verio) test strip, as directed use 1 TEST STRIP to TEST BLOOD SUGAR four times a day, Disp: 200 strip, Rfl: 1    insulin lispro (HumaLOG) 100 units/mL injection pen, inject 10 units subcutaneously three times a day with food (Patient taking differently: Pt takes 10 units plus sliding scale), Disp: 6 mL, Rfl: 1    Insulin Pen Needle (BD Pen Needle Sadia 2nd Gen) 32G X 4 MM MISC, Use 3 (three) times a day, Disp: 100 each, Rfl: 3    lactulose (CHRONULAC) 10 g/15 mL solution, Take 10 g by mouth as needed (constipation), Disp: , Rfl:     Lantus SoloStar 100 units/mL SOPN, Inject 50 Units as directed daily at bedtime, Disp: , Rfl:     metoprolol succinate (TOPROL-XL) 100 mg 24 hr tablet, Take 1 tablet (100 mg total) by mouth daily, Disp: 90 tablet, Rfl: 0    nitroglycerin (NITROSTAT) 0.4 mg SL tablet, Place 1 tablet (0.4 mg total) under the tongue every 5 (five) minutes as needed for chest pain If no relief after first dose call prescriber or 911, Disp: 25 tablet, Rfl: 0    OneTouch Delica Lancets 33G MISC, Use 3 (three) times a day Test 3 times daily, Disp: 300 each, Rfl: 2    rOPINIRole (REQUIP) 1 mg tablet, Take 1 tablet (1 mg total) by mouth daily, Disp: 90 tablet, Rfl: 1    ticagrelor (BRILINTA) 90 MG, Take 1 tablet (90 mg total) by mouth every 12 (twelve) hours, Disp: 90 tablet, Rfl: 3    traZODone (DESYREL) 50 mg tablet, Take 1 tablet (50 mg total) by mouth daily at bedtime, Disp: 30 tablet, Rfl: 5    Accu-Chek FastClix Lancets MISC, TEST THREE TIMES A DAY (Patient not taking: Reported on 12/20/2023), Disp: 100 each, Rfl: 0    anastrozole (ARIMIDEX) 1 mg tablet, Take 1 mg by mouth daily (Patient not taking: Reported on 12/2/2023), Disp: , Rfl:     betamethasone valerate (VALISONE) 0.1 % cream, Apply 1  Application topically 2 (two) times a day, Disp: , Rfl:     Continuous Blood Gluc Sensor (FreeStyle Zoya 3 Sensor) MISC, To check blood sugar continuously and to change every 2 weeks (Patient not taking: Reported on 12/20/2023), Disp: 6 each, Rfl: 4    Empagliflozin 25 MG TABS, Take 1 tablet (25 mg total) by mouth every morning (Patient not taking: Reported on 1/15/2024), Disp: 90 tablet, Rfl: 0    insulin glargine (LANTUS) 100 units/mL subcutaneous injection, Inject 50 Units under the skin daily at bedtime (Patient not taking: Reported on 1/15/2024), Disp: 90 mL, Rfl: 0  No Known Allergies  Past Medical History:   Diagnosis Date    CAD (coronary artery disease)     Cancer ([facility])     right breast    Chronic back pain     Coronary artery disease     Diabetes mellitus ([facility])     Family history of early CAD     Hyperlipidemia     Hypertension     Non-ST elevation MI (NSTEMI) ([facility]) 12/02/2023    Subsequent INNA placed in mid LAD 80 and 2 INNA placed in second diagonal 12/4/23    NSTEMI (non-ST elevated myocardial infarction) ([facility]) 11/28/2018    Status post coronary angioplasty 1st obtuse marginal 11/28/2018 at [facility] stent could not be placed    Restless leg syndrome     Tuberculosis     patient was less than 5 years old    Type 2 diabetes mellitus ([facility])            Social History     Tobacco Use   Smoking Status Never   Smokeless Tobacco Never

## 2024-01-15 NOTE — PATIENT INSTRUCTIONS
Take the metoprolol in the morning.  Resume atorvastatin 80 mg take in the evening.    Blood test for cholesterol in 3 months.

## 2024-01-17 ENCOUNTER — OFFICE VISIT (OUTPATIENT)
Dept: ENDOCRINOLOGY | Facility: CLINIC | Age: 64
End: 2024-01-17
Payer: COMMERCIAL

## 2024-01-17 VITALS
HEIGHT: 61 IN | WEIGHT: 160 LBS | BODY MASS INDEX: 30.21 KG/M2 | HEART RATE: 91 BPM | OXYGEN SATURATION: 98 % | SYSTOLIC BLOOD PRESSURE: 140 MMHG | DIASTOLIC BLOOD PRESSURE: 90 MMHG | TEMPERATURE: 98 F

## 2024-01-17 DIAGNOSIS — I10 ESSENTIAL HYPERTENSION: ICD-10-CM

## 2024-01-17 DIAGNOSIS — E78.2 MIXED HYPERLIPIDEMIA: ICD-10-CM

## 2024-01-17 DIAGNOSIS — Z79.4 TYPE 2 DIABETES MELLITUS WITHOUT COMPLICATION, WITH LONG-TERM CURRENT USE OF INSULIN (HCC): Primary | Chronic | ICD-10-CM

## 2024-01-17 DIAGNOSIS — E11.9 TYPE 2 DIABETES MELLITUS WITHOUT COMPLICATION, WITH LONG-TERM CURRENT USE OF INSULIN (HCC): Primary | Chronic | ICD-10-CM

## 2024-01-17 PROCEDURE — 99214 OFFICE O/P EST MOD 30 MIN: CPT | Performed by: STUDENT IN AN ORGANIZED HEALTH CARE EDUCATION/TRAINING PROGRAM

## 2024-01-17 RX ORDER — BLOOD-GLUCOSE,RECEIVER,CONT
EACH MISCELLANEOUS
Qty: 1 EACH | Refills: 0 | Status: SHIPPED | OUTPATIENT
Start: 2024-01-17

## 2024-01-17 NOTE — PROGRESS NOTES
Established patient Progress Note      Cc: diabetes    Referring Provider  No referring provider defined for this encounter.     History of Present Illness:   Lala Allen is a 63 y.o. female with a history of type 2 diabetes with long term use of insulin who presented for follow up.  Last seen in the office in December 2023.       Reports complications of CAD s/p Stent. Denies recent illness or hospitalizations. Denies recent severe hypoglycemic or severe hyperglycemic episodes. Denies any issues with her current regimen. home glucose monitoring: are performed regularly      Current regimen:   Lantus 50 units once daily  Humalog 10 units plus sliding scale   Jardiance : not taking   Trulucity 0.75 mg weekly    Home blood glucose readings:   Before breakfast: 200 - 300   Before lunch: 185, 255, 166, 134, 161, 176,   Before dinner: 229. 194, 183, 172, 192, 119  Bedtime: 282, 191, 308, 354, 131, 115, 234, 224, , 147     Hypoglycemic episodes:   H/o of hypoglycemia causing hospitalization or Intervention such as glucagon injection  or ambulance call : no  Has awareness: yes       Opthamology: UTD  Podiatry: NO        Has hypertension: followed by PCP; on amlodipine 5 mg daily and benazepril 20 mg daily hyperlipidemia: followed by PCP; on Lipitor 80 mg and Zetia 10 mg daily   tolerating well, no myalgias.     Thyroid disorders: no  History of pancreatitis: yes, likely biliary pancreatitis,s/p cholecystectomy     Patient Active Problem List   Diagnosis    Essential hypertension    Chronic bilateral low back pain without sciatica    Type 2 diabetes mellitus without complication, with long-term current use of insulin (HCC)    Other chest pain    Restless leg syndrome    Disease of pancreas    Mixed hyperlipidemia    SOB (shortness of breath)    Coronary artery disease involving native coronary artery of  native heart    Depression    Overweight (BMI 25.0-29.9)    Obstructive sleep apnea (adult) (pediatric)    Bilateral temporomandibular joint pain    Costochondritis    History of radiation therapy    Malignant neoplasm of upper-outer quadrant of right breast in female, estrogen receptor positive     S/P lumpectomy, right breast    Use of anastrozole (Arimidex)    Hyperglycemia due to diabetes mellitus ([facility])    Hyponatremia    Encounter for diabetic foot exam ([facility])    Near syncope    Hypotension    Non-ST elevation MI (NSTEMI) ([facility])      Past Medical History:   Diagnosis Date    CAD (coronary artery disease)     Cancer ([facility])     right breast    Chronic back pain     Coronary artery disease     Diabetes mellitus ([facility])     Family history of early CAD     Hyperlipidemia     Hypertension     Non-ST elevation MI (NSTEMI) ([facility]) 12/02/2023    Subsequent INNA placed in mid LAD 80 and 2 INNA placed in second diagonal 12/4/23    NSTEMI (non-ST elevated myocardial infarction) ([facility]) 11/28/2018    Status post coronary angioplasty 1st obtuse marginal 11/28/2018 at [facility] stent could not be placed    Restless leg syndrome     Tuberculosis     patient was less than 5 years old    Type 2 diabetes mellitus ([facility])       Past Surgical History:   Procedure Laterality Date    ABDOMINAL SURGERY      phill    ANKLE SURGERY      tubal    BREAST SURGERY      partial mastectomy R breast    CARDIAC CATHETERIZATION  11/2018    Successful balloon angioplassty to OM1    CARDIAC CATHETERIZATION  12/4/2023    Procedure: Cardiac catheterization;  Surgeon: Flex Darby MD;  Location: [facility];  Service: Cardiology    CHOLECYSTECTOMY      FRACTURE SURGERY      right ankle    GALLBLADDER SURGERY      MASTECTOMY      TUBAL LIGATION        Family History   Problem Relation Age of Onset    Breast cancer Mother     Diabetes Mother     Heart failure Mother     Heart disease Father     Stroke Father      Social History     Tobacco Use     Smoking status: Never    Smokeless tobacco: Never   Substance Use Topics    Alcohol use: Yes     Alcohol/week: 2.0 standard drinks of alcohol     Types: 1 Glasses of wine, 1 Cans of beer per week     Comment: occasionally     No Known Allergies      Current Outpatient Medications:     amLODIPine (NORVASC) 5 mg tablet, swallow 1 tablet once daily, Disp: 90 tablet, Rfl: 1    aspirin (ECOTRIN LOW STRENGTH) 81 mg EC tablet, Take 1 tablet (81 mg total) by mouth daily, Disp: 30 tablet, Rfl: 0    atorvastatin (LIPITOR) 80 mg tablet, Take 1 tablet (80 mg total) by mouth daily with dinner, Disp: 90 tablet, Rfl: 5    benazepril (LOTENSIN) 20 mg tablet, take 1 tablet by mouth once daily, Disp: 90 tablet, Rfl: 1    betamethasone valerate (VALISONE) 0.1 % cream, Apply 1 Application topically 2 (two) times a day, Disp: , Rfl:     Blood Glucose Monitoring Suppl (OneTouch Verio Flex System) w/Device KIT, Test four times daily, Disp: 1 kit, Rfl: 0    dulaglutide (Trulicity) 0.75 MG/0.5ML injection, Inject 0.5 mL (0.75 mg total) under the skin every 7 days, Disp: 6 mL, Rfl: 1    ezetimibe (ZETIA) 10 mg tablet, Take 1 tablet (10 mg total) by mouth daily at bedtime, Disp: 30 tablet, Rfl: 0    famotidine (PEPCID) 20 mg tablet, take 1 tablet by mouth twice a day before meals, Disp: 60 tablet, Rfl: 3    glucose blood (OneTouch Verio) test strip, as directed use 1 TEST STRIP to TEST BLOOD SUGAR four times a day, Disp: 200 strip, Rfl: 1    insulin lispro (HumaLOG) 100 units/mL injection pen, inject 10 units subcutaneously three times a day with food (Patient taking differently: Pt takes 10 units plus sliding scale), Disp: 6 mL, Rfl: 1    Insulin Pen Needle (BD Pen Needle Sadia 2nd Gen) 32G X 4 MM MISC, Use 3 (three) times a day, Disp: 100 each, Rfl: 3    lactulose (CHRONULAC) 10 g/15 mL solution, Take 10 g by mouth as needed (constipation), Disp: , Rfl:     Lantus SoloStar 100 units/mL SOPN, Inject 50 Units as directed daily at bedtime,  Disp: , Rfl:     metoprolol succinate (TOPROL-XL) 100 mg 24 hr tablet, Take 1 tablet (100 mg total) by mouth daily, Disp: 90 tablet, Rfl: 0    nitroglycerin (NITROSTAT) 0.4 mg SL tablet, Place 1 tablet (0.4 mg total) under the tongue every 5 (five) minutes as needed for chest pain If no relief after first dose call prescriber or 911, Disp: 25 tablet, Rfl: 0    OneTouch Delica Lancets 33G MISC, Use 3 (three) times a day Test 3 times daily, Disp: 300 each, Rfl: 2    rOPINIRole (REQUIP) 1 mg tablet, Take 1 tablet (1 mg total) by mouth daily, Disp: 90 tablet, Rfl: 1    ticagrelor (BRILINTA) 90 MG, Take 1 tablet (90 mg total) by mouth every 12 (twelve) hours, Disp: 90 tablet, Rfl: 3    traZODone (DESYREL) 50 mg tablet, Take 1 tablet (50 mg total) by mouth daily at bedtime, Disp: 30 tablet, Rfl: 5    Accu-Chek FastClix Lancets MISC, TEST THREE TIMES A DAY (Patient not taking: Reported on 12/20/2023), Disp: 100 each, Rfl: 0    anastrozole (ARIMIDEX) 1 mg tablet, Take 1 mg by mouth daily (Patient not taking: Reported on 12/2/2023), Disp: , Rfl:     Continuous Blood Gluc Sensor (FreeStyle Zoya 3 Sensor) MISC, To check blood sugar continuously and to change every 2 weeks (Patient not taking: Reported on 12/20/2023), Disp: 6 each, Rfl: 4    Empagliflozin 25 MG TABS, Take 1 tablet (25 mg total) by mouth every morning (Patient not taking: Reported on 1/15/2024), Disp: 90 tablet, Rfl: 0    insulin glargine (LANTUS) 100 units/mL subcutaneous injection, Inject 50 Units under the skin daily at bedtime (Patient not taking: Reported on 1/15/2024), Disp: 90 mL, Rfl: 0  Review of Systems   Constitutional:  Negative for appetite change, fatigue and unexpected weight change.   HENT:  Negative for trouble swallowing and voice change.    Eyes:  Negative for visual disturbance.   Respiratory:  Negative for cough, shortness of breath and wheezing.    Cardiovascular:  Negative for palpitations and leg swelling.   Gastrointestinal:  Negative  "for abdominal pain, constipation, diarrhea, nausea and vomiting.   Endocrine: Negative for cold intolerance, heat intolerance, polyphagia and polyuria.   Musculoskeletal:  Negative for arthralgias.   Skin:  Negative for color change, rash and wound.   Neurological:  Negative for dizziness, tremors, weakness, light-headedness, numbness and headaches.   Psychiatric/Behavioral:  Positive for sleep disturbance. Negative for agitation. The patient is not nervous/anxious.        Physical Exam:  Body mass index is 30.23 kg/m².  Pulse 91   Temp 98 °F (36.7 °C)   Ht 5' 1\" (1.549 m)   SpO2 98%   BMI 30.23 kg/m²    Wt Readings from Last 3 Encounters:   01/15/24 72.6 kg (160 lb)   12/20/23 72.6 kg (160 lb)   12/14/23 73 kg (161 lb)       GEN: NAD  E/n/m nl facies,   Eyes: no stare or proptosis,   Neck: trachea midline, thyroid NT to palpation, nl in size, no nodules or neck masses noted,   CV; heart reg rate s1s2 nl, no Murmur   Resp: CTAB, good effort  Ab+BS  Neuro: no tremor,   Skin: warm and dry, no palmar erythema  Ext:  no edema bilaterally,   Psych: nl mood and affect, no gross lapses in memory      Labs:   No components found for: \"HA1C\"  No components found for: \"GLU\"    Lab Results   Component Value Date    CREATININE 0.79 12/04/2023    CREATININE 0.74 12/03/2023    CREATININE 1.02 12/02/2023    BUN 13 12/04/2023     06/26/2014    K 3.7 12/04/2023     12/04/2023    CO2 22 12/04/2023     eGFR   Date Value Ref Range Status   12/04/2023 79 ml/min/1.73sq m Final     No components found for: \"MALBCRER\"    Lab Results   Component Value Date    CHOL 235 06/26/2014    HDL 36 (L) 12/04/2023    TRIG 417 (H) 12/04/2023       Lab Results   Component Value Date    ALT 14 12/02/2023    AST 17 12/02/2023    ALKPHOS 115 (H) 12/02/2023       Lab Results   Component Value Date    FREET4 0.84 04/24/2023       Impression:  1. Type 2 diabetes mellitus without complication, with long-term current use of insulin (HCC)    2. " Essential hypertension    3. Mixed hyperlipidemia           Plan:    Lala was seen today for diabetes type 2 and follow-up.    Diagnoses and all orders for this visit:    Type 2 diabetes mellitus without complication, with long-term current use of insulin ([facility]):  I made following changes:  Resume Jardiance 25 mg daily.  We increased Trulicity to 1.5 mg weekly.  Continue Lantus and Humalog.  Bring your zoya reader to download report in few weeks.  Call the office your blood sugars are consistently below 80 or above 350.  Return back in 3 months   Lab prior to next visit.   We ordered Zoya 3 sensor and reader, call for training when you receive it.    -     Continuous Blood Gluc  (FreeStyle Zoya 3 [place]) SOFI; To check blood sugars continuously  -     Discontinue: dulaglutide (Trulicity) 1.5 MG/0.5ML injection; Inject 0.5 mL (1.5 mg total) under the skin every 7 days  -     Hemoglobin A1C; Future  -     Comprehensive metabolic panel; Future  -     Lipid Panel with Direct LDL reflex; Future  -     Vitamin D 25 hydroxy; Future  -     Albumin / creatinine urine ratio; Future    Essential hypertension:  Controlled.    -     Comprehensive metabolic panel; Future  -     Albumin / creatinine urine ratio; Future    Mixed hyperlipidemia:  Continue LipitorandZetia.    -     Lipid Panel with Direct LDL reflex; Future      Discussed with the patient and all questioned fully answered. She will call me if any problems arise.    Counseled patient on diagnostic results, prognosis, risk and benefit of treatment options, instruction for management, importance of treatment compliance, Risk  factor reduction and impressions      Andrés Archer MD

## 2024-01-17 NOTE — PATIENT INSTRUCTIONS
Resume Jardiance 25 mg daily.  We increased Trulicity to 1.5 mg weekly.  Continue Lantus and Humalog.  Bring your bushra reader to download report in few weeks.  Call the office your blood sugars are consistently below 80 or above 350.

## 2024-01-19 DIAGNOSIS — Z79.4 TYPE 2 DIABETES MELLITUS WITHOUT COMPLICATION, WITH LONG-TERM CURRENT USE OF INSULIN (HCC): Chronic | ICD-10-CM

## 2024-01-19 DIAGNOSIS — I21.4 NSTEMI (NON-ST ELEVATED MYOCARDIAL INFARCTION) (HCC): ICD-10-CM

## 2024-01-19 DIAGNOSIS — E11.9 TYPE 2 DIABETES MELLITUS WITHOUT COMPLICATION, WITH LONG-TERM CURRENT USE OF INSULIN (HCC): Chronic | ICD-10-CM

## 2024-02-16 ENCOUNTER — APPOINTMENT (OUTPATIENT)
Age: 64
End: 2024-02-16
Payer: COMMERCIAL

## 2024-02-16 DIAGNOSIS — I21.4 NSTEMI (NON-ST ELEVATED MYOCARDIAL INFARCTION) (HCC): ICD-10-CM

## 2024-02-16 LAB
ALBUMIN SERPL BCP-MCNC: 4.1 G/DL (ref 3.5–5)
ALP SERPL-CCNC: 106 U/L (ref 34–104)
ALT SERPL W P-5'-P-CCNC: 15 U/L (ref 7–52)
ANION GAP SERPL CALCULATED.3IONS-SCNC: 9 MMOL/L
AST SERPL W P-5'-P-CCNC: 15 U/L (ref 13–39)
BILIRUB SERPL-MCNC: 0.59 MG/DL (ref 0.2–1)
BUN SERPL-MCNC: 13 MG/DL (ref 5–25)
CALCIUM SERPL-MCNC: 9.1 MG/DL (ref 8.4–10.2)
CHLORIDE SERPL-SCNC: 106 MMOL/L (ref 96–108)
CO2 SERPL-SCNC: 26 MMOL/L (ref 21–32)
CREAT SERPL-MCNC: 0.86 MG/DL (ref 0.6–1.3)
EST. AVERAGE GLUCOSE BLD GHB EST-MCNC: 212 MG/DL
GFR SERPL CREATININE-BSD FRML MDRD: 72 ML/MIN/1.73SQ M
GLUCOSE P FAST SERPL-MCNC: 137 MG/DL (ref 65–99)
HBA1C MFR BLD: 9 %
LDLC SERPL DIRECT ASSAY-MCNC: 95 MG/DL (ref 0–100)
POTASSIUM SERPL-SCNC: 4 MMOL/L (ref 3.5–5.3)
PROT SERPL-MCNC: 7.6 G/DL (ref 6.4–8.4)
SODIUM SERPL-SCNC: 141 MMOL/L (ref 135–147)

## 2024-02-16 PROCEDURE — 83721 ASSAY OF BLOOD LIPOPROTEIN: CPT

## 2024-02-16 PROCEDURE — 36415 COLL VENOUS BLD VENIPUNCTURE: CPT

## 2024-02-16 PROCEDURE — 80053 COMPREHEN METABOLIC PANEL: CPT

## 2024-02-16 PROCEDURE — 83036 HEMOGLOBIN GLYCOSYLATED A1C: CPT

## 2024-02-18 DIAGNOSIS — I10 ESSENTIAL HYPERTENSION: ICD-10-CM

## 2024-02-19 ENCOUNTER — OFFICE VISIT (OUTPATIENT)
Dept: FAMILY MEDICINE CLINIC | Facility: CLINIC | Age: 64
End: 2024-02-19
Payer: COMMERCIAL

## 2024-02-19 VITALS
HEART RATE: 79 BPM | SYSTOLIC BLOOD PRESSURE: 146 MMHG | OXYGEN SATURATION: 98 % | DIASTOLIC BLOOD PRESSURE: 82 MMHG | BODY MASS INDEX: 29.53 KG/M2 | TEMPERATURE: 98.3 F | WEIGHT: 156.4 LBS | HEIGHT: 61 IN

## 2024-02-19 DIAGNOSIS — Z79.4 TYPE 2 DIABETES MELLITUS WITHOUT COMPLICATION, WITH LONG-TERM CURRENT USE OF INSULIN (HCC): Primary | ICD-10-CM

## 2024-02-19 DIAGNOSIS — E11.9 TYPE 2 DIABETES MELLITUS WITHOUT COMPLICATION, WITH LONG-TERM CURRENT USE OF INSULIN (HCC): Primary | ICD-10-CM

## 2024-02-19 DIAGNOSIS — R21 RASH: ICD-10-CM

## 2024-02-19 DIAGNOSIS — Z79.4 TYPE 2 DIABETES MELLITUS WITH COMPLICATION, WITH LONG-TERM CURRENT USE OF INSULIN (HCC): ICD-10-CM

## 2024-02-19 DIAGNOSIS — Z23 ENCOUNTER FOR IMMUNIZATION: ICD-10-CM

## 2024-02-19 DIAGNOSIS — E11.8 TYPE 2 DIABETES MELLITUS WITH COMPLICATION, WITH LONG-TERM CURRENT USE OF INSULIN (HCC): ICD-10-CM

## 2024-02-19 PROCEDURE — 99214 OFFICE O/P EST MOD 30 MIN: CPT

## 2024-02-19 RX ORDER — PEN NEEDLE, DIABETIC 32GX 5/32"
NEEDLE, DISPOSABLE MISCELLANEOUS 4 TIMES DAILY
Qty: 100 EACH | Refills: 3 | Status: SHIPPED | OUTPATIENT
Start: 2024-02-19

## 2024-02-19 RX ORDER — BETAMETHASONE DIPROPIONATE 0.5 MG/G
CREAM TOPICAL 2 TIMES DAILY
Qty: 30 G | Refills: 0 | Status: SHIPPED | OUTPATIENT
Start: 2024-02-19

## 2024-02-19 RX ORDER — BENAZEPRIL HYDROCHLORIDE 20 MG/1
TABLET ORAL
Qty: 90 TABLET | Refills: 1 | Status: SHIPPED | OUTPATIENT
Start: 2024-02-19

## 2024-02-19 RX ORDER — LANCETS 33 GAUGE
EACH MISCELLANEOUS 4 TIMES DAILY
Qty: 300 EACH | Refills: 2 | Status: SHIPPED | OUTPATIENT
Start: 2024-02-19

## 2024-02-19 NOTE — PATIENT INSTRUCTIONS

## 2024-02-19 NOTE — PROGRESS NOTES
"Name: Lala Allen      : 1960      MRN: [id number]  Encounter Provider: RUBEN Jackson  Encounter Date: 2024   Encounter department: [facility]    Assessment & Plan     1. Type 2 diabetes mellitus without complication, with long-term current use of insulin (HCC)  -     Diabetic foot exam; Future  -     OneTouch Delica Lancets 33G MISC; Use 4 (four) times a day Test 4 times daily    2. Encounter for immunization  -     Zoster Vaccine Recombinant IM  -     influenza vaccine, quadrivalent, 0.5 mL, preservative-free, for adult and pediatric patients 6 mos+ (AFLURIA, FLUARIX, FLULAVAL, FLUZONE)    3. Rash  -     betamethasone dipropionate (DIPROSONE) 0.05 % cream; Apply topically 2 (two) times a day    4. Type 2 diabetes mellitus with complication, with long-term current use of insulin (HCC)  -     Insulin Pen Needle (BD Pen Needle Sadia 2nd Gen) 32G X 4 MM MISC; Use 4 (four) times a day        Depression Screening and Follow-up Plan: Patient's depression screening was positive with a PHQ-9 score of 11. Clincally patient does not have depression. No treatment is required.       Subjective      Lala presents in office today for DM2 f/u. Following with Endocrinology - last seen in office 24. Recent changes include increasing Trulicty to 1.5 mg weekly, resume Jardiance 25 mg daily and continuing Lantus & Humalog. A1c 9.0 - down from 10.6. Pt reports her appetite feels \"under control.\"  this AM. Denies complications of retinopathy or neuropathy. Sees Dr. Alvarez yearly for eye exams. Denies recent episodes of hypoglycemia or hyperglycemia.      Review of Systems   Constitutional:  Negative for chills, fatigue and fever.   HENT:  Negative for congestion, ear pain, facial swelling, hearing loss, rhinorrhea, sinus pressure, sneezing, sore throat and trouble swallowing.    Eyes:  Negative for pain, redness and visual disturbance.   Respiratory:  Negative for cough, " chest tightness, shortness of breath and wheezing.    Cardiovascular:  Negative for chest pain and palpitations.   Gastrointestinal:  Negative for abdominal pain, diarrhea, nausea and vomiting.   Genitourinary:  Negative for dysuria, flank pain, hematuria and pelvic pain.   Musculoskeletal:  Negative for arthralgias, back pain and myalgias.   Skin:  Negative for color change and rash.   Neurological:  Negative for dizziness, seizures, syncope, weakness, light-headedness and headaches.   Psychiatric/Behavioral:  Negative for confusion, hallucinations and sleep disturbance. The patient is not nervous/anxious.    All other systems reviewed and are negative.      Current Outpatient Medications on File Prior to Visit   Medication Sig    amLODIPine (NORVASC) 5 mg tablet swallow 1 tablet once daily    aspirin (ECOTRIN LOW STRENGTH) 81 mg EC tablet Take 1 tablet (81 mg total) by mouth daily    atorvastatin (LIPITOR) 80 mg tablet Take 1 tablet (80 mg total) by mouth daily with dinner    benazepril (LOTENSIN) 20 mg tablet take 1 tablet by mouth once daily    betamethasone valerate (VALISONE) 0.1 % cream Apply 1 Application topically 2 (two) times a day    Blood Glucose Monitoring Suppl (OneTouch Verio Flex System) w/Device KIT Test four times daily    Empagliflozin 25 MG TABS Take 1 tablet (25 mg total) by mouth every morning    famotidine (PEPCID) 20 mg tablet take 1 tablet by mouth twice a day before meals    glucose blood (OneTouch Verio) test strip as directed use 1 TEST STRIP to TEST BLOOD SUGAR four times a day    insulin lispro (HumaLOG) 100 units/mL injection pen inject 10 units subcutaneously three times a day with food (Patient taking differently: Pt takes 10 units plus sliding scale)    lactulose (CHRONULAC) 10 g/15 mL solution Take 10 g by mouth as needed (constipation)    Lantus SoloStar 100 units/mL SOPN Inject 50 Units as directed daily at bedtime    metoprolol succinate (TOPROL-XL) 100 mg 24 hr tablet Take 1  tablet (100 mg total) by mouth daily    nitroglycerin (NITROSTAT) 0.4 mg SL tablet Place 1 tablet (0.4 mg total) under the tongue every 5 (five) minutes as needed for chest pain If no relief after first dose call prescriber or 911    rOPINIRole (REQUIP) 1 mg tablet Take 1 tablet (1 mg total) by mouth daily    ticagrelor (BRILINTA) 90 MG Take 1 tablet (90 mg total) by mouth every 12 (twelve) hours (Patient taking differently: Take 90 mg by mouth every 12 (twelve) hours Taking once daily)    [DISCONTINUED] Insulin Pen Needle (BD Pen Needle Sadia 2nd Gen) 32G X 4 MM MISC Use 3 (three) times a day    [DISCONTINUED] OneTouch Delica Lancets 33G MISC Use 3 (three) times a day Test 3 times daily    [DISCONTINUED] traZODone (DESYREL) 50 mg tablet Take 1 tablet (50 mg total) by mouth daily at bedtime    Accu-Chek FastClix Lancets MISC TEST THREE TIMES A DAY (Patient not taking: Reported on 12/20/2023)    anastrozole (ARIMIDEX) 1 mg tablet Take 1 mg by mouth daily (Patient not taking: Reported on 12/2/2023)    Continuous Blood Gluc  (FreeStyle Zoya 3 [place]) SOFI To check blood sugars continuously (Patient not taking: Reported on 2/19/2024)    Continuous Blood Gluc Sensor (FreeStyle Zoya 3 Sensor) MISC To check blood sugar continuously and to change every 2 weeks (Patient not taking: Reported on 12/20/2023)    dulaglutide (Trulicity) 1.5 MG/0.5ML injection Inject 0.5 mL (1.5 mg total) under the skin every 7 days (Patient not taking: Reported on 2/19/2024)    ezetimibe (ZETIA) 10 mg tablet Take 1 tablet (10 mg total) by mouth daily at bedtime    insulin glargine (LANTUS) 100 units/mL subcutaneous injection Inject 50 Units under the skin daily at bedtime (Patient not taking: Reported on 2/19/2024)    [DISCONTINUED] benazepril (LOTENSIN) 20 mg tablet take 1 tablet by mouth once daily       Objective     /82 (BP Location: Left arm, Patient Position: Sitting)   Pulse 79   Temp 98.3 °F (36.8 °C) (Tympanic)   Ht 5'  "1\" (1.549 m)   Wt 70.9 kg (156 lb 6.4 oz)   SpO2 98%   BMI 29.55 kg/m²     Physical Exam  Vitals reviewed.   Constitutional:       General: She is awake. She is not in acute distress.     Appearance: Normal appearance. She is well-developed. She is not toxic-appearing.   HENT:      Head: Normocephalic.      Jaw: There is normal jaw occlusion.      Right Ear: Tympanic membrane normal. Tympanic membrane is not erythematous or bulging.      Left Ear: Tympanic membrane normal. Tympanic membrane is not erythematous or bulging.      Nose: No congestion or rhinorrhea.      Right Sinus: No maxillary sinus tenderness or frontal sinus tenderness.      Left Sinus: No maxillary sinus tenderness or frontal sinus tenderness.      Mouth/Throat:      Pharynx: Uvula midline. No oropharyngeal exudate, posterior oropharyngeal erythema or uvula swelling.      Tonsils: No tonsillar exudate or tonsillar abscesses.   Eyes:      Extraocular Movements: Extraocular movements intact.      Conjunctiva/sclera: Conjunctivae normal.      Pupils: Pupils are equal, round, and reactive to light.   Neck:      Thyroid: No thyroid mass.      Vascular: No JVD.      Trachea: Trachea and phonation normal.   Cardiovascular:      Rate and Rhythm: Normal rate and regular rhythm.      Pulses: Pulses are weak.           Dorsalis pedis pulses are 1+ on the right side and 1+ on the left side.        Posterior tibial pulses are 1+ on the right side and 1+ on the left side.      Heart sounds: Normal heart sounds.   Pulmonary:      Effort: Pulmonary effort is normal. No tachypnea or respiratory distress.      Breath sounds: Normal breath sounds and air entry. No decreased breath sounds, wheezing, rhonchi or rales.   Chest:      Chest wall: No tenderness.   Abdominal:      General: Bowel sounds are normal. There is no distension.      Palpations: Abdomen is soft.      Tenderness: There is no abdominal tenderness. There is no right CVA tenderness, left CVA " tenderness, guarding or rebound.   Musculoskeletal:         General: Normal range of motion.      Cervical back: Normal range of motion.      Right lower leg: No edema.      Left lower leg: No edema.   Feet:      Right foot:      Skin integrity: No ulcer, skin breakdown, erythema, warmth, callus or dry skin.      Left foot:      Skin integrity: No ulcer, skin breakdown, erythema, warmth, callus or dry skin.   Lymphadenopathy:      Cervical: No cervical adenopathy.   Skin:     General: Skin is warm and dry.      Findings: No rash.   Neurological:      General: No focal deficit present.      Mental Status: She is alert and oriented to person, place, and time.      Sensory: Sensation is intact.      Motor: Motor function is intact.      Coordination: Coordination is intact.      Gait: Gait is intact.      Deep Tendon Reflexes: Reflexes are normal and symmetric.   Psychiatric:         Attention and Perception: Attention normal.         Mood and Affect: Mood normal.         Speech: Speech normal.         Behavior: Behavior normal. Behavior is cooperative.         Cognition and Memory: Cognition normal.     Diabetic Foot Exam    Patient's shoes and socks removed.    Right Foot/Ankle   Right Foot Inspection  Skin Exam: skin normal and skin intact. No dry skin, no warmth, no callus, no erythema, no maceration, no abnormal color, no pre-ulcer, no ulcer and no callus.     Toe Exam: ROM and strength within normal limits.     Sensory   Monofilament testing: intact    Vascular  Capillary refills: < 3 seconds  The right DP pulse is 1+. The right PT pulse is 1+.     Left Foot/Ankle  Left Foot Inspection  Skin Exam: skin normal and skin intact. No dry skin, no warmth, no erythema, no maceration, normal color, no pre-ulcer, no ulcer and no callus.     Toe Exam: ROM and strength within normal limits.     Sensory   Monofilament testing: intact    Vascular  Capillary refills: < 3 seconds  The left DP pulse is 1+. The left PT pulse is  1+.     Assign Risk Category  No deformity present  No loss of protective sensation  Weak pulses  Risk: 0    RUBEN Jackson

## 2024-02-29 DIAGNOSIS — E11.65 UNCONTROLLED TYPE 2 DIABETES MELLITUS WITH HYPERGLYCEMIA (HCC): ICD-10-CM

## 2024-02-29 RX ORDER — EMPAGLIFLOZIN 25 MG/1
25 TABLET, FILM COATED ORAL EVERY MORNING
Qty: 90 TABLET | Refills: 0 | Status: SHIPPED | OUTPATIENT
Start: 2024-02-29

## 2024-03-15 DIAGNOSIS — E11.9 TYPE 2 DIABETES MELLITUS WITHOUT COMPLICATION, WITH LONG-TERM CURRENT USE OF INSULIN (HCC): Chronic | ICD-10-CM

## 2024-03-15 DIAGNOSIS — Z79.4 TYPE 2 DIABETES MELLITUS WITHOUT COMPLICATION, WITH LONG-TERM CURRENT USE OF INSULIN (HCC): Chronic | ICD-10-CM

## 2024-03-15 RX ORDER — FAMOTIDINE 20 MG/1
20 TABLET, FILM COATED ORAL
Qty: 60 TABLET | Refills: 3 | Status: SHIPPED | OUTPATIENT
Start: 2024-03-15

## 2024-03-18 DIAGNOSIS — Z79.4 TYPE 2 DIABETES MELLITUS WITHOUT COMPLICATION, WITH LONG-TERM CURRENT USE OF INSULIN (HCC): Chronic | ICD-10-CM

## 2024-03-18 DIAGNOSIS — E11.9 TYPE 2 DIABETES MELLITUS WITHOUT COMPLICATION, WITH LONG-TERM CURRENT USE OF INSULIN (HCC): Chronic | ICD-10-CM

## 2024-03-18 RX ORDER — INSULIN LISPRO 100 [IU]/ML
INJECTION, SOLUTION INTRAVENOUS; SUBCUTANEOUS
Qty: 6 ML | Refills: 1 | Status: SHIPPED | OUTPATIENT
Start: 2024-03-18

## 2024-04-02 DIAGNOSIS — I10 ESSENTIAL HYPERTENSION: ICD-10-CM

## 2024-04-02 RX ORDER — AMLODIPINE BESYLATE 5 MG/1
5 TABLET ORAL DAILY
Qty: 90 TABLET | Refills: 1 | Status: SHIPPED | OUTPATIENT
Start: 2024-04-02

## 2024-04-18 ENCOUNTER — OFFICE VISIT (OUTPATIENT)
Dept: ENDOCRINOLOGY | Facility: CLINIC | Age: 64
End: 2024-04-18
Payer: COMMERCIAL

## 2024-04-18 VITALS
BODY MASS INDEX: 29.14 KG/M2 | OXYGEN SATURATION: 98 % | DIASTOLIC BLOOD PRESSURE: 70 MMHG | SYSTOLIC BLOOD PRESSURE: 160 MMHG | HEART RATE: 96 BPM | TEMPERATURE: 98.4 F | WEIGHT: 154.2 LBS

## 2024-04-18 DIAGNOSIS — E78.2 MIXED HYPERLIPIDEMIA: ICD-10-CM

## 2024-04-18 DIAGNOSIS — E11.9 TYPE 2 DIABETES MELLITUS WITHOUT COMPLICATION, WITH LONG-TERM CURRENT USE OF INSULIN (HCC): Primary | Chronic | ICD-10-CM

## 2024-04-18 DIAGNOSIS — Z79.4 TYPE 2 DIABETES MELLITUS WITHOUT COMPLICATION, WITH LONG-TERM CURRENT USE OF INSULIN (HCC): Primary | Chronic | ICD-10-CM

## 2024-04-18 DIAGNOSIS — I10 ESSENTIAL HYPERTENSION: ICD-10-CM

## 2024-04-18 PROCEDURE — 99214 OFFICE O/P EST MOD 30 MIN: CPT | Performed by: STUDENT IN AN ORGANIZED HEALTH CARE EDUCATION/TRAINING PROGRAM

## 2024-04-18 NOTE — PROGRESS NOTES
Established patient Progress Note      Cc: diabetes    Referring Provider  No referring provider defined for this encounter.     History of Present Illness:   Lala Allen is a 63 y.o. female with a history of type 2 diabetes with long term use of insulin who presented for follow up.        Reports complications of CAD s/p stent. Denies recent illness or hospitalizations. Denies recent severe hypoglycemic or severe hyperglycemic episodes. Denies any issues with her current regimen. home glucose monitoring: are performed regularly      Current regimen:     Jardiance 25 mg daily  Domenico has not taken  Lantus 50 units at bedtime  Humalog 10 units with sliding scale     SMBG x 3 -4 , blood sugars are ranging from 120 - 200 mg/dl     Hypoglycemic episodes:   H/o of hypoglycemia causing hospitalization or Intervention such as glucagon injection  or ambulance call : no  Has awareness: yes         Opthamology: UTD  Podiatry: NO        Has hypertension: followed by PCP; on amlodipine 5 mg daily and benazepril 20 mg daily hyperlipidemia: followed by PCP; on Lipitor 80 mg and Zetia 10 mg daily   tolerating well, no myalgias.     Thyroid disorders: no  History of pancreatitis: yes, likely biliary pancreatitis,s/p cholecystectomy     Patient Active Problem List   Diagnosis    Essential hypertension    Chronic bilateral low back pain without sciatica    Type 2 diabetes mellitus without complication, with long-term current use of insulin (HCC)    Other chest pain    Restless leg syndrome    Disease of pancreas    Mixed hyperlipidemia    SOB (shortness of breath)    Coronary artery disease involving native coronary artery of native heart    Depression    Overweight (BMI 25.0-29.9)    Obstructive sleep apnea (adult) (pediatric)    Bilateral temporomandibular joint pain    Costochondritis    History of radiation  therapy    Malignant neoplasm of upper-outer quadrant of right breast in female, estrogen receptor positive (HCC)    S/P lumpectomy, right breast    Use of anastrozole (Arimidex)    Hyperglycemia due to diabetes mellitus (HCC)    Hyponatremia    Encounter for diabetic foot exam ([facility])    Near syncope    Hypotension    Non-ST elevation MI (NSTEMI) ([facility])      Past Medical History:   Diagnosis Date    CAD (coronary artery disease)     Cancer (HCC)     right breast    Chronic back pain     Coronary artery disease     Diabetes mellitus (HCC)     Family history of early CAD     Hyperlipidemia     Hypertension     Non-ST elevation MI (NSTEMI) ([facility]) 12/02/2023    Subsequent INNA placed in mid LAD 80 and 2 INNA placed in second diagonal 12/4/23    NSTEMI (non-ST elevated myocardial infarction) ([facility]) 11/28/2018    Status post coronary angioplasty 1st obtuse marginal 11/28/2018 at [facility] stent could not be placed    Restless leg syndrome     Tuberculosis     patient was less than 5 years old    Type 2 diabetes mellitus (HCC)       Past Surgical History:   Procedure Laterality Date    ABDOMINAL SURGERY      phill    ANKLE SURGERY      tubal    BREAST SURGERY      partial mastectomy R breast    CARDIAC CATHETERIZATION  11/2018    Successful balloon angioplassty to OM1    CARDIAC CATHETERIZATION  12/4/2023    Procedure: Cardiac catheterization;  Surgeon: Flex Darby MD;  Location: [facility];  Service: Cardiology    CHOLECYSTECTOMY      FRACTURE SURGERY      right ankle    GALLBLADDER SURGERY      MASTECTOMY      TUBAL LIGATION        Family History   Problem Relation Age of Onset    Breast cancer Mother     Diabetes Mother     Heart failure Mother     Heart disease Father     Stroke Father      Social History     Tobacco Use    Smoking status: Never    Smokeless tobacco: Never   Substance Use Topics    Alcohol use: Yes     Alcohol/week: 2.0 standard drinks of alcohol     Types: 1 Glasses of wine, 1 Cans of  beer per week     Comment: occasionally     No Known Allergies      Current Outpatient Medications:     amLODIPine (NORVASC) 5 mg tablet, swallow 1 tablet once daily, Disp: 90 tablet, Rfl: 1    aspirin (ECOTRIN LOW STRENGTH) 81 mg EC tablet, Take 1 tablet (81 mg total) by mouth daily, Disp: 30 tablet, Rfl: 0    atorvastatin (LIPITOR) 80 mg tablet, Take 1 tablet (80 mg total) by mouth daily with dinner, Disp: 90 tablet, Rfl: 5    benazepril (LOTENSIN) 20 mg tablet, take 1 tablet by mouth once daily, Disp: 90 tablet, Rfl: 1    betamethasone dipropionate (DIPROSONE) 0.05 % cream, Apply topically 2 (two) times a day, Disp: 30 g, Rfl: 0    Blood Glucose Monitoring Suppl (OneTouch Verio Flex System) w/Device KIT, Test four times daily, Disp: 1 kit, Rfl: 0    ezetimibe (ZETIA) 10 mg tablet, Take 1 tablet (10 mg total) by mouth daily at bedtime, Disp: 30 tablet, Rfl: 0    famotidine (PEPCID) 20 mg tablet, take 1 tablet by mouth twice a day before meals, Disp: 60 tablet, Rfl: 3    glucose blood (OneTouch Verio) test strip, as directed use 1 TEST STRIP to TEST BLOOD SUGAR four times a day, Disp: 200 strip, Rfl: 1    insulin lispro (HumaLOG) 100 units/mL injection pen, Pt takes 10 units plus sliding scale, Disp: 6 mL, Rfl: 1    Insulin Pen Needle (BD Pen Needle Sadia 2nd Gen) 32G X 4 MM MISC, Use 4 (four) times a day, Disp: 100 each, Rfl: 3    Jardiance 25 MG TABS, take 1 tablet by mouth every morning, Disp: 90 tablet, Rfl: 0    lactulose (CHRONULAC) 10 g/15 mL solution, Take 10 g by mouth as needed (constipation), Disp: , Rfl:     Lantus SoloStar 100 units/mL SOPN, Inject 50 Units as directed daily at bedtime, Disp: , Rfl:     metoprolol succinate (TOPROL-XL) 100 mg 24 hr tablet, Take 1 tablet (100 mg total) by mouth daily, Disp: 90 tablet, Rfl: 0    nitroglycerin (NITROSTAT) 0.4 mg SL tablet, Place 1 tablet (0.4 mg total) under the tongue every 5 (five) minutes as needed for chest pain If no relief after first dose call  prescriber or 911, Disp: 25 tablet, Rfl: 0    OneTouch Delica Lancets 33G MISC, Use 4 (four) times a day Test 4 times daily, Disp: 300 each, Rfl: 2    rOPINIRole (REQUIP) 1 mg tablet, Take 1 tablet (1 mg total) by mouth daily, Disp: 90 tablet, Rfl: 1    Accu-Chek FastClix Lancets MISC, TEST THREE TIMES A DAY (Patient not taking: Reported on 12/20/2023), Disp: 100 each, Rfl: 0    anastrozole (ARIMIDEX) 1 mg tablet, Take 1 mg by mouth daily (Patient not taking: Reported on 12/2/2023), Disp: , Rfl:     betamethasone valerate (VALISONE) 0.1 % cream, Apply 1 Application topically 2 (two) times a day, Disp: , Rfl:     Continuous Blood Gluc  (FreeStyle Zoya 3 Lorida) SOFI, To check blood sugars continuously (Patient not taking: Reported on 2/19/2024), Disp: 1 each, Rfl: 0    Continuous Blood Gluc Sensor (FreeStyle Zoya 3 Sensor) MISC, To check blood sugar continuously and to change every 2 weeks (Patient not taking: Reported on 12/20/2023), Disp: 6 each, Rfl: 4    dulaglutide (Trulicity) 1.5 MG/0.5ML injection, Inject 0.5 mL (1.5 mg total) under the skin every 7 days (Patient not taking: Reported on 2/19/2024), Disp: 6 mL, Rfl: 0    insulin glargine (LANTUS) 100 units/mL subcutaneous injection, Inject 50 Units under the skin daily at bedtime (Patient not taking: Reported on 2/19/2024), Disp: 90 mL, Rfl: 0    ticagrelor (BRILINTA) 90 MG, Take 1 tablet (90 mg total) by mouth every 12 (twelve) hours (Patient taking differently: Take 90 mg by mouth every 12 (twelve) hours Taking once daily), Disp: 90 tablet, Rfl: 3  Review of Systems   Constitutional:  Positive for fatigue. Negative for appetite change and unexpected weight change.   HENT:  Negative for trouble swallowing and voice change.    Eyes:  Negative for visual disturbance.   Respiratory:  Negative for cough, shortness of breath and wheezing.    Cardiovascular:  Negative for palpitations and leg swelling.   Gastrointestinal:  Negative for abdominal pain,  "constipation, diarrhea, nausea and vomiting.   Endocrine: Negative for cold intolerance, heat intolerance, polyphagia and polyuria.   Musculoskeletal:  Negative for arthralgias.   Skin:  Negative for color change, rash and wound.   Neurological:  Negative for dizziness, tremors, weakness, light-headedness, numbness and headaches.   Psychiatric/Behavioral:  Positive for sleep disturbance. Negative for agitation. The patient is nervous/anxious.        Physical Exam:  Body mass index is 29.14 kg/m².  /70 (BP Location: Right arm, Patient Position: Sitting, Cuff Size: Adult)   Pulse 96   Temp 98.4 °F (36.9 °C)   Wt 69.9 kg (154 lb 3.2 oz)   SpO2 98%   BMI 29.14 kg/m²    Wt Readings from Last 3 Encounters:   04/18/24 69.9 kg (154 lb 3.2 oz)   02/19/24 70.9 kg (156 lb 6.4 oz)   01/17/24 72.6 kg (160 lb)       GEN: NAD  E/n/m nl facies,   Eyes: no stare or proptosis,   Ab+BS  Neuro: no tremor,   Skin: warm and dry,   Ext:  no edema bilaterally,   Psych: nl mood and affect, no gross lapses in memory      Labs:   No components found for: \"HA1C\"  No components found for: \"GLU\"    Lab Results   Component Value Date    CREATININE 0.86 02/16/2024    CREATININE 0.79 12/04/2023    CREATININE 0.74 12/03/2023    BUN 13 02/16/2024     06/26/2014    K 4.0 02/16/2024     02/16/2024    CO2 26 02/16/2024     GFR, Calculated   Date Value Ref Range Status   08/17/2020 74 >60 mL/min/1.73m2 Final     Comment:     mL/min per 1.73 square meters                                            Normal Function or Mild Renal    Disease (if clinically at risk):  >or=60  Moderately Decreased:                30-59  Severely Decreased:                  15-29  Renal Failure:                         <15                                            -American GFR: multiply reported GFR by 1.16    Please note that the eGFR is based on the CKD-EPI calculation, and is not intended to be used for drug dosing.                              " "              Note: Calculated GFR may not be an accurate indicator of renal function if the patient's renal function is not in a steady state.     eGFRcr   Date Value Ref Range Status   07/05/2023 65 >59 Final     eGFR   Date Value Ref Range Status   02/16/2024 72 ml/min/1.73sq m Final     No components found for: \"MALBCRER\"    Lab Results   Component Value Date    CHOL 235 06/26/2014    HDL 36 (L) 12/04/2023    TRIG 417 (H) 12/04/2023       Lab Results   Component Value Date    ALT 15 02/16/2024    AST 15 02/16/2024    ALKPHOS 106 (H) 02/16/2024       Lab Results   Component Value Date    FREET4 0.84 04/24/2023     Component      Latest Ref Rng 2/16/2024   Sodium      135 - 147 mmol/L 141    Potassium      3.5 - 5.3 mmol/L 4.0    Chloride      96 - 108 mmol/L 106    Carbon Dioxide      21 - 32 mmol/L 26    ANION GAP      mmol/L 9    BUN      5 - 25 mg/dL 13    Creatinine      0.60 - 1.30 mg/dL 0.86    GLUCOSE, FASTING      65 - 99 mg/dL 137 (H)    Calcium      8.4 - 10.2 mg/dL 9.1    AST      13 - 39 U/L 15    ALT      7 - 52 U/L 15    ALK PHOS      34 - 104 U/L 106 (H)    Total Protein      6.4 - 8.4 g/dL 7.6    Albumin      3.5 - 5.0 g/dL 4.1    Total Bilirubin      0.20 - 1.00 mg/dL 0.59    GFR, Calculated      ml/min/1.73sq m 72    Hemoglobin A1C      Normal 4.0-5.6%; PreDiabetic 5.7-6.4%; Diabetic >=6.5%; Glycemic control for adults with diabetes <7.0% % 9.0 (H)    eAG, EST AVG Glucose      mg/dl 212    LDL CHOLESTEROL DIRECT      0 - 100 mg/dl 95       Legend:  (H) High  Impression:  1. Type 2 diabetes mellitus without complication, with long-term current use of insulin (HCC)    2. Essential hypertension    3. Mixed hyperlipidemia           Plan:    Problem List Items Addressed This Visit          Cardiovascular and Mediastinum    Essential hypertension     Blood pressure is above goal 160/70,  Goal < 130/80  She was advised to be consistent with antihypertensive medications,  Monitor blood pressure at " home.            Endocrine    Type 2 diabetes mellitus without complication, with long-term current use of insulin ([facility]) - Primary (Chronic)       Lab Results   Component Value Date    HGBA1C 9.0 (H) 02/16/2024     Glycemic control has been improving, although still is above goal, goal < 7%,  She was advised to take Lantus and Humalog regularly and not skip doses,  We will continue Trulucity and Jardiance ,  I have asked the patient to check blood sugars 3 daily and bring her log to next visit review so that changes can be made to regimen, if applicable.   Emphasized importance of daily exercise, weight loss, caloric reduction, small meals, consumption of multiple servings of fruits and vegetables and avoidance of concentrated sweets such as juice and soda.   Return back in 3 months   Lab prior to next visit.              Relevant Medications    dulaglutide (Trulicity) 1.5 MG/0.5ML injection       Other    Mixed hyperlipidemia     Continue Lipitor 80 mg daily,                   Discussed with the patient and all questioned fully answered. She will call me if any problems arise.    Counseled patient on diagnostic results, prognosis, risk and benefit of treatment options, instruction for management, importance of treatment compliance, Risk  factor reduction and impressions      Andrés Archer MD

## 2024-04-25 NOTE — ASSESSMENT & PLAN NOTE
Lab Results   Component Value Date    HGBA1C 9.0 (H) 02/16/2024     Glycemic control has been improving, although still is above goal, goal < 7%,  She was advised to take Lantus and Humalog regularly and not skip doses,  We will continue Trulucity and Jardiance ,  I have asked the patient to check blood sugars 3 daily and bring her log to next visit review so that changes can be made to regimen, if applicable.   Emphasized importance of daily exercise, weight loss, caloric reduction, small meals, consumption of multiple servings of fruits and vegetables and avoidance of concentrated sweets such as juice and soda.   Return back in 3 months   Lab prior to next visit.

## 2024-04-25 NOTE — ASSESSMENT & PLAN NOTE
Blood pressure is above goal 160/70,  Goal < 130/80  She was advised to be consistent with antihypertensive medications,  Monitor blood pressure at home.

## 2024-05-31 DIAGNOSIS — R21 RASH: ICD-10-CM

## 2024-06-01 DIAGNOSIS — E11.65 UNCONTROLLED TYPE 2 DIABETES MELLITUS WITH HYPERGLYCEMIA (HCC): ICD-10-CM

## 2024-06-03 RX ORDER — EMPAGLIFLOZIN 25 MG/1
25 TABLET, FILM COATED ORAL EVERY MORNING
Qty: 90 TABLET | Refills: 1 | Status: SHIPPED | OUTPATIENT
Start: 2024-06-03

## 2024-06-03 RX ORDER — BETAMETHASONE DIPROPIONATE 0.5 MG/G
1 CREAM TOPICAL 2 TIMES DAILY
Qty: 30 G | Refills: 0 | Status: SHIPPED | OUTPATIENT
Start: 2024-06-03

## 2024-06-18 DIAGNOSIS — R21 RASH: Primary | ICD-10-CM

## 2024-06-18 RX ORDER — TRIAMCINOLONE ACETONIDE 1 MG/G
CREAM TOPICAL 2 TIMES DAILY
Qty: 80 G | Refills: 3 | Status: SHIPPED | OUTPATIENT
Start: 2024-06-18

## 2024-07-18 DIAGNOSIS — Z79.4 TYPE 2 DIABETES MELLITUS WITHOUT COMPLICATION, WITH LONG-TERM CURRENT USE OF INSULIN (HCC): Chronic | ICD-10-CM

## 2024-07-18 DIAGNOSIS — E11.9 TYPE 2 DIABETES MELLITUS WITHOUT COMPLICATION, WITH LONG-TERM CURRENT USE OF INSULIN (HCC): Chronic | ICD-10-CM

## 2024-07-18 RX ORDER — FAMOTIDINE 20 MG/1
20 TABLET, FILM COATED ORAL
Qty: 60 TABLET | Refills: 5 | Status: SHIPPED | OUTPATIENT
Start: 2024-07-18

## 2024-07-22 ENCOUNTER — TELEPHONE (OUTPATIENT)
Dept: ENDOCRINOLOGY | Facility: CLINIC | Age: 64
End: 2024-07-22

## 2024-07-22 ENCOUNTER — OFFICE VISIT (OUTPATIENT)
Dept: ENDOCRINOLOGY | Facility: CLINIC | Age: 64
End: 2024-07-22
Payer: COMMERCIAL

## 2024-07-22 VITALS
HEART RATE: 79 BPM | BODY MASS INDEX: 29.49 KG/M2 | OXYGEN SATURATION: 97 % | DIASTOLIC BLOOD PRESSURE: 78 MMHG | SYSTOLIC BLOOD PRESSURE: 164 MMHG | HEIGHT: 61 IN | WEIGHT: 156.2 LBS | TEMPERATURE: 97.9 F

## 2024-07-22 DIAGNOSIS — E11.9 TYPE 2 DIABETES MELLITUS WITHOUT COMPLICATION, WITH LONG-TERM CURRENT USE OF INSULIN (HCC): Chronic | ICD-10-CM

## 2024-07-22 DIAGNOSIS — E78.2 MIXED HYPERLIPIDEMIA: ICD-10-CM

## 2024-07-22 DIAGNOSIS — I10 ESSENTIAL HYPERTENSION: ICD-10-CM

## 2024-07-22 DIAGNOSIS — E11.65 UNCONTROLLED TYPE 2 DIABETES MELLITUS WITH HYPERGLYCEMIA (HCC): Primary | ICD-10-CM

## 2024-07-22 DIAGNOSIS — Z79.4 TYPE 2 DIABETES MELLITUS WITHOUT COMPLICATION, WITH LONG-TERM CURRENT USE OF INSULIN (HCC): Chronic | ICD-10-CM

## 2024-07-22 LAB — SL AMB POCT HEMOGLOBIN AIC: 10.8 (ref ?–6.5)

## 2024-07-22 PROCEDURE — 99214 OFFICE O/P EST MOD 30 MIN: CPT | Performed by: STUDENT IN AN ORGANIZED HEALTH CARE EDUCATION/TRAINING PROGRAM

## 2024-07-22 PROCEDURE — 83036 HEMOGLOBIN GLYCOSYLATED A1C: CPT | Performed by: STUDENT IN AN ORGANIZED HEALTH CARE EDUCATION/TRAINING PROGRAM

## 2024-07-22 PROCEDURE — 95251 CONT GLUC MNTR ANALYSIS I&R: CPT | Performed by: STUDENT IN AN ORGANIZED HEALTH CARE EDUCATION/TRAINING PROGRAM

## 2024-07-22 RX ORDER — INSULIN LISPRO 100 [IU]/ML
INJECTION, SOLUTION INTRAVENOUS; SUBCUTANEOUS
Qty: 36 ML | Refills: 1 | Status: SHIPPED | OUTPATIENT
Start: 2024-07-22

## 2024-07-22 NOTE — ASSESSMENT & PLAN NOTE
Lab Results   Component Value Date    HGBA1C 10.8 (A) 07/22/2024     Uncontrolled with A1C of 10.8%, We discussed pathophysiology of type 2 diabetes and importance of glycemic control to avoid complications,  She could not receive Trulucity due to shortage.  I am recommending initiation of ozempic 0.25 mg weekly x4 weeks, then 0.5 mg weekly thereafter if tolerated. Samples provided.  Then 1 mg weekly which was ordered.  I increased Humalog to 14 units before dinner plus sliding scale, and continue 10 units plus sliding scale before lunch.  Continue JARDIANCE.  Emphasized importance of daily exercise, weight loss, caloric reduction, small meals, consumption of multiple servings of fruits and vegetables and avoidance of concentrated sweets such as juice and soda.   Return back in 2 months,  Labs as ordered.

## 2024-07-22 NOTE — PROGRESS NOTES
Established patient Progress Note      Cc: diabetes    Referring Provider  No referring provider defined for this encounter.     History of Present Illness:   Lala Allen is a 64 y.o. female with a history of type 2 diabetes with long term use of insulin who presented for follow up.        Reports complications of  CAD s/p stent . Denies recent illness or hospitalizations.    Current regimen:   Jardiance 25 mg daily.  Trulicity 1.5 mg weekly. Not taking due to shortage  Lantus 50 units nightly  Humalog 10 units with meals plus sliding scale. On average 14 units before meals,     Lala Allen   Device used,bushra 3  Home use       Indication   Type 2 Diabetes      More than 72 hours of data was reviewed. Report to be scanned to chart.     Date Range: July 9 - 22nd    Analysis of data:   Average Glucose: 275 mg/dl  Coefficient of Variation: 27.5%   SD : x   Time in Target Range: 11%   Time Above Range: 89%   Time Below Range: 0        Hypoglycemic episodes:   H/o of hypoglycemia causing hospitalization or Intervention such as glucagon injection  or ambulance call : no  Has awareness: yes      Opthamology: Up to date;   Podiatry: UTD    on ACE inhibitor or ARB: Benazepril 20 mg daily  on statin: Lipitor 80 mg and Zetia 10 mg daily.    Thyroid disorders: No.  History of pancreatitis: History of biliary pancreatitis status post cholecystectomy.      Patient Active Problem List   Diagnosis    Essential hypertension    Chronic bilateral low back pain without sciatica    Type 2 diabetes mellitus without complication, with long-term current use of insulin (HCC)    Other chest pain    Restless leg syndrome    Disease of pancreas    Mixed hyperlipidemia    SOB (shortness of breath)    Coronary artery disease involving native coronary artery of native heart    Depression    Overweight (BMI 25.0-29.9)     Obstructive sleep apnea (adult) (pediatric)    Bilateral temporomandibular joint pain    Costochondritis    History of radiation therapy    Malignant neoplasm of upper-outer quadrant of right breast in female, estrogen receptor positive (HCC)    S/P lumpectomy, right breast    Use of anastrozole (Arimidex)    Hyperglycemia due to diabetes mellitus ([facility])    Hyponatremia    Encounter for diabetic foot exam ([facility])    Near syncope    Hypotension    Non-ST elevation MI (NSTEMI) ([facility])      Past Medical History:   Diagnosis Date    CAD (coronary artery disease)     Cancer ([facility])     right breast    Chronic back pain     Coronary artery disease     Diabetes mellitus ([facility])     Family history of early CAD     Hyperlipidemia     Hypertension     Non-ST elevation MI (NSTEMI) ([facility]) 12/02/2023    Subsequent INNA placed in mid LAD 80 and 2 INNA placed in second diagonal 12/4/23    NSTEMI (non-ST elevated myocardial infarction) ([facility]) 11/28/2018    Status post coronary angioplasty 1st obtuse marginal 11/28/2018 at [facility] stent could not be placed    Restless leg syndrome     Tuberculosis     patient was less than 5 years old    Type 2 diabetes mellitus ([facility])       Past Surgical History:   Procedure Laterality Date    ABDOMINAL SURGERY      phill    ANKLE SURGERY      tubal    BREAST SURGERY      partial mastectomy R breast    CARDIAC CATHETERIZATION  11/2018    Successful balloon angioplassty to OM1    CARDIAC CATHETERIZATION  12/4/2023    Procedure: Cardiac catheterization;  Surgeon: Flex Darby MD;  Location: [facility];  Service: Cardiology    CHOLECYSTECTOMY      FRACTURE SURGERY      right ankle    GALLBLADDER SURGERY      MASTECTOMY      TUBAL LIGATION        Family History   Problem Relation Age of Onset    Breast cancer Mother     Diabetes Mother     Heart failure Mother     Heart disease Father     Stroke Father      Social History     Tobacco Use    Smoking status: Never    Smokeless tobacco: Never    Substance Use Topics    Alcohol use: Yes     Alcohol/week: 2.0 standard drinks of alcohol     Types: 1 Glasses of wine, 1 Cans of beer per week     Comment: occasionally     No Known Allergies      Current Outpatient Medications:     amLODIPine (NORVASC) 5 mg tablet, swallow 1 tablet once daily, Disp: 90 tablet, Rfl: 1    aspirin (ECOTRIN LOW STRENGTH) 81 mg EC tablet, Take 1 tablet (81 mg total) by mouth daily, Disp: 30 tablet, Rfl: 0    atorvastatin (LIPITOR) 80 mg tablet, Take 1 tablet (80 mg total) by mouth daily with dinner, Disp: 90 tablet, Rfl: 5    benazepril (LOTENSIN) 20 mg tablet, take 1 tablet by mouth once daily, Disp: 90 tablet, Rfl: 1    betamethasone dipropionate (DIPROSONE) 0.05 % cream, apply to affected area twice a day, Disp: 30 g, Rfl: 0    betamethasone valerate (VALISONE) 0.1 % cream, Apply 1 Application topically 2 (two) times a day, Disp: , Rfl:     Continuous Blood Gluc  (FreeStyle Zoya 3 Clayton) SOFI, To check blood sugars continuously, Disp: 1 each, Rfl: 0    Continuous Blood Gluc Sensor (FreeStyle Zoya 3 Sensor) MISC, To check blood sugar continuously and to change every 2 weeks, Disp: 6 each, Rfl: 4    Empagliflozin (Jardiance) 25 MG TABS, take 1 tablet by mouth every morning, Disp: 90 tablet, Rfl: 1    ezetimibe (ZETIA) 10 mg tablet, Take 1 tablet (10 mg total) by mouth daily at bedtime, Disp: 30 tablet, Rfl: 0    famotidine (PEPCID) 20 mg tablet, take 1 tablet by mouth twice a day before meals (Patient taking differently: Take 20 mg by mouth 2 (two) times a day before meals As needed), Disp: 60 tablet, Rfl: 5    insulin lispro (HumaLOG) 100 units/mL injection pen, Pt takes 14 units plus sliding scale, up to 20 units before meals,, Disp: 36 mL, Rfl: 1    Insulin Pen Needle (BD Pen Needle Sadia 2nd Gen) 32G X 4 MM MISC, Use 4 (four) times a day, Disp: 100 each, Rfl: 3    lactulose (CHRONULAC) 10 g/15 mL solution, Take 10 g by mouth as needed (constipation), Disp: , Rfl:      Lantus SoloStar 100 units/mL SOPN, Inject 50 Units as directed daily at bedtime, Disp: , Rfl:     metoprolol succinate (TOPROL-XL) 100 mg 24 hr tablet, Take 1 tablet (100 mg total) by mouth daily, Disp: 90 tablet, Rfl: 0    nitroglycerin (NITROSTAT) 0.4 mg SL tablet, Place 1 tablet (0.4 mg total) under the tongue every 5 (five) minutes as needed for chest pain If no relief after first dose call prescriber or 911, Disp: 25 tablet, Rfl: 0    OneTouch Delica Lancets 33G MISC, Use 4 (four) times a day Test 4 times daily, Disp: 300 each, Rfl: 2    rOPINIRole (REQUIP) 1 mg tablet, Take 1 tablet (1 mg total) by mouth daily, Disp: 90 tablet, Rfl: 1    semaglutide, 0.25 or 0.5 mg/dose, (Ozempic, 0.25 or 0.5 MG/DOSE,) 2 mg/3 mL injection pen, Samples given 2 boxes [id number] 12/31/2025, Disp: 2 mL, Rfl: 0    [START ON 8/16/2024] semaglutide, 1 mg/dose, (Ozempic, 1 MG/DOSE,) 4 mg/3 mL injection pen, Inject 0.75 mL (1 mg total) under the skin every 7 days Do not start before August 16, 2024., Disp: 9 mL, Rfl: 0    ticagrelor (BRILINTA) 90 MG, Take 1 tablet (90 mg total) by mouth every 12 (twelve) hours (Patient taking differently: Take 90 mg by mouth every 12 (twelve) hours Taking once daily), Disp: 90 tablet, Rfl: 3    triamcinolone (KENALOG) 0.1 % cream, Apply topically 2 (two) times a day, Disp: 80 g, Rfl: 3    Accu-Chek FastClix Lancets MISC, TEST THREE TIMES A DAY (Patient not taking: Reported on 12/20/2023), Disp: 100 each, Rfl: 0    anastrozole (ARIMIDEX) 1 mg tablet, Take 1 mg by mouth daily (Patient not taking: Reported on 12/2/2023), Disp: , Rfl:     Blood Glucose Monitoring Suppl (OneTouch Verio Flex System) w/Device KIT, Test four times daily (Patient not taking: Reported on 7/22/2024), Disp: 1 kit, Rfl: 0    glucose blood (OneTouch Verio) test strip, as directed use 1 TEST STRIP to TEST BLOOD SUGAR four times a day, Disp: 200 strip, Rfl: 1  Review of Systems   Constitutional:  Negative for appetite change, fatigue and  "unexpected weight change.   HENT:  Negative for trouble swallowing and voice change.    Cardiovascular:  Negative for chest pain and palpitations.   Gastrointestinal:  Negative for abdominal pain, constipation, diarrhea, nausea and vomiting.   Endocrine: Negative for cold intolerance, heat intolerance, polydipsia, polyphagia and polyuria.   Psychiatric/Behavioral:  Positive for sleep disturbance.        Physical Exam:  Body mass index is 29.51 kg/m².  /78 (BP Location: Left arm, Patient Position: Sitting, Cuff Size: Adult)   Pulse 79   Temp 97.9 °F (36.6 °C)   Ht 5' 1\" (1.549 m)   Wt 70.9 kg (156 lb 3.2 oz)   SpO2 97%   BMI 29.51 kg/m²    Wt Readings from Last 3 Encounters:   07/22/24 70.9 kg (156 lb 3.2 oz)   04/18/24 69.9 kg (154 lb 3.2 oz)   02/19/24 70.9 kg (156 lb 6.4 oz)       GEN: NAD  E/n/m nl facies,   Eyes: no stare or proptosis,   CV; heart reg rate s1s2 nl, no Murmur   Resp: CTAB, good effort  Ab+BS  Neuro: no tremor,   Skin: warm and dry,   Ext:  no edema bilaterally,   Psych: nl mood and affect, no gross lapses in memory      Labs:   No components found for: \"HA1C\"  No components found for: \"GLU\"    Lab Results   Component Value Date    CREATININE 0.86 02/16/2024    CREATININE 0.79 12/04/2023    CREATININE 0.74 12/03/2023    BUN 13 02/16/2024     06/26/2014    K 4.0 02/16/2024     02/16/2024    CO2 26 02/16/2024     GFR, Calculated   Date Value Ref Range Status   08/17/2020 74 >60 mL/min/1.73m2 Final     Comment:     mL/min per 1.73 square meters                                            Normal Function or Mild Renal    Disease (if clinically at risk):  >or=60  Moderately Decreased:                30-59  Severely Decreased:                  15-29  Renal Failure:                         <15                                            -American GFR: multiply reported GFR by 1.16    Please note that the eGFR is based on the CKD-EPI calculation, and is not intended to be used " "for drug dosing.                                            Note: Calculated GFR may not be an accurate indicator of renal function if the patient's renal function is not in a steady state.     eGFRcr   Date Value Ref Range Status   07/05/2023 65 >59 Final     eGFR   Date Value Ref Range Status   02/16/2024 72 ml/min/1.73sq m Final     No components found for: \"MALBCRER\"    Lab Results   Component Value Date    CHOL 235 06/26/2014    HDL 36 (L) 12/04/2023    TRIG 417 (H) 12/04/2023       Lab Results   Component Value Date    ALT 15 02/16/2024    AST 15 02/16/2024    ALKPHOS 106 (H) 02/16/2024       Lab Results   Component Value Date    FREET4 0.84 04/24/2023       Impression:  1. Uncontrolled type 2 diabetes mellitus with hyperglycemia (HCC)    2. Type 2 diabetes mellitus without complication, with long-term current use of insulin (HCC)    3. Essential hypertension    4. Mixed hyperlipidemia           Plan:    Problem List Items Addressed This Visit          Cardiovascular and Mediastinum    Essential hypertension     Uncontrolled with blood pressure of 164/78,  Currently on benazopril 20 mg daily , metoprolol 100 mg daily.  She was advised to monitor blood pressure at home and if blood pressure is > 130/80 to notify Primary team or us.           Relevant Orders    Comprehensive metabolic panel    Albumin / creatinine urine ratio       Endocrine    Type 2 diabetes mellitus without complication, with long-term current use of insulin (HCC) (Chronic)       Lab Results   Component Value Date    HGBA1C 10.8 (A) 07/22/2024     Uncontrolled with A1C of 10.8%, We discussed pathophysiology of type 2 diabetes and importance of glycemic control to avoid complications,  She could not receive Trulucity due to shortage.  I am recommending initiation of ozempic 0.25 mg weekly x4 weeks, then 0.5 mg weekly thereafter if tolerated. Samples provided.  Then 1 mg weekly which was ordered.  I increased Humalog to 14 units before dinner " plus sliding scale, and continue 10 units plus sliding scale before lunch.  Continue JARDIANCE.  Emphasized importance of daily exercise, weight loss, caloric reduction, small meals, consumption of multiple servings of fruits and vegetables and avoidance of concentrated sweets such as juice and soda.   Return back in 2 months,  Labs as ordered.               Relevant Medications    semaglutide, 0.25 or 0.5 mg/dose, (Ozempic, 0.25 or 0.5 MG/DOSE,) 2 mg/3 mL injection pen    semaglutide, 1 mg/dose, (Ozempic, 1 MG/DOSE,) 4 mg/3 mL injection pen (Start on 8/16/2024)    insulin lispro (HumaLOG) 100 units/mL injection pen       Other    Mixed hyperlipidemia    Relevant Orders    Lipid Panel with Direct LDL reflex     Other Visit Diagnoses       Uncontrolled type 2 diabetes mellitus with hyperglycemia (HCC)    -  Primary    Relevant Medications    semaglutide, 0.25 or 0.5 mg/dose, (Ozempic, 0.25 or 0.5 MG/DOSE,) 2 mg/3 mL injection pen    semaglutide, 1 mg/dose, (Ozempic, 1 MG/DOSE,) 4 mg/3 mL injection pen (Start on 8/16/2024)    insulin lispro (HumaLOG) 100 units/mL injection pen    Other Relevant Orders    POCT hemoglobin A1c (Completed)    Ambulatory Referral to Sleep Medicine    Hemoglobin A1C    Comprehensive metabolic panel    Lipid Panel with Direct LDL reflex    Albumin / creatinine urine ratio                  Discussed with the patient and all questioned fully answered. She will call me if any problems arise.    Counseled patient on diagnostic results, prognosis, risk and benefit of treatment options, instruction for management, importance of treatment compliance, Risk  factor reduction and impressions      Andrés Archer MD

## 2024-07-22 NOTE — PATIENT INSTRUCTIONS
I am recommending initiation of ozempic 0.25 mg weekly x4 weeks, then 0.5 mg weekly thereafter if tolerated.   After you finish your sample, take ozempic 1 mg weekly,

## 2024-07-22 NOTE — ASSESSMENT & PLAN NOTE
Uncontrolled with blood pressure of 164/78,  Currently on benazopril 20 mg daily , metoprolol 100 mg daily.  She was advised to monitor blood pressure at home and if blood pressure is > 130/80 to notify Primary team or us.

## 2024-07-23 ENCOUNTER — TELEPHONE (OUTPATIENT)
Dept: ADMINISTRATIVE | Facility: OTHER | Age: 64
End: 2024-07-23

## 2024-07-23 NOTE — TELEPHONE ENCOUNTER
Upon review of the In Basket request we were able to locate, review, and update the patient chart as requested for Diabetic Eye Exam.    Any additional questions or concerns should be emailed to the Practice Liaisons via the appropriate education email address, please do not reply via In Basket.    Thank you  Lisa Keith   [facility]

## 2024-07-23 NOTE — LETTER
Diabetic Eye Exam Form    Date Requested: 24  Patient: Lala Allen  Patient : [id number]   Referring Provider: RUBEN Jackson      DIABETIC Eye Exam Date _______________________________      Type of Exam MUST be documented for Diabetic Eye Exams. Please CHECK ONE.     Retinal Exam       Dilated Retinal Exam       OCT       Optomap-Iris Exam      Fundus Photography       Left Eye - Please check Retinopathy or No Retinopathy        Exam did show retinopathy    Exam did not show retinopathy       Right Eye - Please check Retinopathy or No Retinopathy       Exam did show retinopathy    Exam did not show retinopathy       Comments __________________________________________________________    Practice Providing Exam ______________________________________________    Exam Performed By (print name) _______________________________________      Provider Signature ___________________________________________________      These reports are needed for  compliance.  Please fax this completed form and a copy of the Diabetic Eye Exam report to our office located at [address] [place] [address] as soon as possible via Fax [phone] attention Lisa: Phone [phone]  We thank you for your assistance in treating our mutual patient.

## 2024-07-23 NOTE — TELEPHONE ENCOUNTER
----- Message from Orin OCASIO sent at 7/22/2024  2:19 PM EDT -----  07/22/24 2:19 PM    Hello, our patient Lala Allen has had Diabetic Eye Exam completed/performed. Please assist in updating the patient chart by making an External outreach to [facility] facility located in [facility] [place]. The date of service is 2023.    Thank you,  Orin Camacho   [facility] [place]

## 2024-07-23 NOTE — TELEPHONE ENCOUNTER
Upon review of the In Basket request and the patient's chart, initial outreach has been made via fax to facility. Please see Contacts section for details.     Thank you  Lisa Keith

## 2024-07-31 ENCOUNTER — APPOINTMENT (EMERGENCY)
Dept: CT IMAGING | Facility: HOSPITAL | Age: 64
End: 2024-07-31
Payer: COMMERCIAL

## 2024-07-31 ENCOUNTER — HOSPITAL ENCOUNTER (EMERGENCY)
Facility: HOSPITAL | Age: 64
Discharge: HOME | End: 2024-07-31
Attending: FAMILY MEDICINE
Payer: COMMERCIAL

## 2024-07-31 ENCOUNTER — APPOINTMENT (EMERGENCY)
Dept: RADIOLOGY | Facility: HOSPITAL | Age: 64
End: 2024-07-31
Payer: COMMERCIAL

## 2024-07-31 VITALS
RESPIRATION RATE: 16 BRPM | DIASTOLIC BLOOD PRESSURE: 77 MMHG | HEART RATE: 70 BPM | TEMPERATURE: 98.6 F | OXYGEN SATURATION: 93 % | SYSTOLIC BLOOD PRESSURE: 143 MMHG

## 2024-07-31 DIAGNOSIS — I74.10 AORTIC THROMBUS (HCC): ICD-10-CM

## 2024-07-31 DIAGNOSIS — M25.512 SHOULDER PAIN, LEFT: Primary | ICD-10-CM

## 2024-07-31 LAB
2HR DELTA HS TROPONIN: 0 NG/L
ALBUMIN SERPL BCG-MCNC: 4.3 G/DL (ref 3.5–5)
ALP SERPL-CCNC: 94 U/L (ref 34–104)
ALT SERPL W P-5'-P-CCNC: 16 U/L (ref 7–52)
ANION GAP SERPL CALCULATED.3IONS-SCNC: 9 MMOL/L (ref 4–13)
AST SERPL W P-5'-P-CCNC: 17 U/L (ref 13–39)
BASOPHILS # BLD AUTO: 0.04 THOUSANDS/ÂΜL (ref 0–0.1)
BASOPHILS NFR BLD AUTO: 1 % (ref 0–1)
BILIRUB SERPL-MCNC: 0.5 MG/DL (ref 0.2–1)
BUN SERPL-MCNC: 12 MG/DL (ref 5–25)
CALCIUM SERPL-MCNC: 9.5 MG/DL (ref 8.4–10.2)
CARDIAC TROPONIN I PNL SERPL HS: 4 NG/L
CARDIAC TROPONIN I PNL SERPL HS: 4 NG/L
CHLORIDE SERPL-SCNC: 107 MMOL/L (ref 96–108)
CO2 SERPL-SCNC: 24 MMOL/L (ref 21–32)
CREAT SERPL-MCNC: 0.73 MG/DL (ref 0.6–1.3)
EOSINOPHIL # BLD AUTO: 0.16 THOUSAND/ÂΜL (ref 0–0.61)
EOSINOPHIL NFR BLD AUTO: 2 % (ref 0–6)
ERYTHROCYTE [DISTWIDTH] IN BLOOD BY AUTOMATED COUNT: 14 % (ref 11.6–15.1)
GFR SERPL CREATININE-BSD FRML MDRD: 87 ML/MIN/1.73SQ M
GLUCOSE SERPL-MCNC: 195 MG/DL (ref 65–140)
HCT VFR BLD AUTO: 43.6 % (ref 34.8–46.1)
HGB BLD-MCNC: 14 G/DL (ref 11.5–15.4)
IMM GRANULOCYTES # BLD AUTO: 0.02 THOUSAND/UL (ref 0–0.2)
IMM GRANULOCYTES NFR BLD AUTO: 0 % (ref 0–2)
LYMPHOCYTES # BLD AUTO: 1.33 THOUSANDS/ÂΜL (ref 0.6–4.47)
LYMPHOCYTES NFR BLD AUTO: 16 % (ref 14–44)
MCH RBC QN AUTO: 29.7 PG (ref 26.8–34.3)
MCHC RBC AUTO-ENTMCNC: 32.1 G/DL (ref 31.4–37.4)
MCV RBC AUTO: 92 FL (ref 82–98)
MONOCYTES # BLD AUTO: 0.51 THOUSAND/ÂΜL (ref 0.17–1.22)
MONOCYTES NFR BLD AUTO: 6 % (ref 4–12)
NEUTROPHILS # BLD AUTO: 6.29 THOUSANDS/ÂΜL (ref 1.85–7.62)
NEUTS SEG NFR BLD AUTO: 75 % (ref 43–75)
NRBC BLD AUTO-RTO: 0 /100 WBCS
PLATELET # BLD AUTO: 295 THOUSANDS/UL (ref 149–390)
PMV BLD AUTO: 9.6 FL (ref 8.9–12.7)
POTASSIUM SERPL-SCNC: 3.6 MMOL/L (ref 3.5–5.3)
PROT SERPL-MCNC: 7.7 G/DL (ref 6.4–8.4)
RBC # BLD AUTO: 4.72 MILLION/UL (ref 3.81–5.12)
SODIUM SERPL-SCNC: 140 MMOL/L (ref 135–147)
WBC # BLD AUTO: 8.35 THOUSAND/UL (ref 4.31–10.16)

## 2024-07-31 PROCEDURE — 99285 EMERGENCY DEPT VISIT HI MDM: CPT

## 2024-07-31 PROCEDURE — 85025 COMPLETE CBC W/AUTO DIFF WBC: CPT | Performed by: PHYSICIAN ASSISTANT

## 2024-07-31 PROCEDURE — 84484 ASSAY OF TROPONIN QUANT: CPT | Performed by: PHYSICIAN ASSISTANT

## 2024-07-31 PROCEDURE — 93005 ELECTROCARDIOGRAM TRACING: CPT

## 2024-07-31 PROCEDURE — 71275 CT ANGIOGRAPHY CHEST: CPT

## 2024-07-31 PROCEDURE — 74174 CTA ABD&PLVS W/CONTRAST: CPT

## 2024-07-31 PROCEDURE — 80053 COMPREHEN METABOLIC PANEL: CPT | Performed by: PHYSICIAN ASSISTANT

## 2024-07-31 PROCEDURE — 73030 X-RAY EXAM OF SHOULDER: CPT

## 2024-07-31 PROCEDURE — 96372 THER/PROPH/DIAG INJ SC/IM: CPT

## 2024-07-31 PROCEDURE — 99285 EMERGENCY DEPT VISIT HI MDM: CPT | Performed by: PHYSICIAN ASSISTANT

## 2024-07-31 PROCEDURE — 36415 COLL VENOUS BLD VENIPUNCTURE: CPT | Performed by: PHYSICIAN ASSISTANT

## 2024-07-31 RX ORDER — HYDROCODONE BITARTRATE AND ACETAMINOPHEN 5; 325 MG/1; MG/1
1 TABLET ORAL EVERY 6 HOURS PRN
Qty: 8 TABLET | Refills: 0 | Status: SHIPPED | OUTPATIENT
Start: 2024-07-31

## 2024-07-31 RX ORDER — DIAZEPAM 5 MG/ML
5 INJECTION, SOLUTION INTRAMUSCULAR; INTRAVENOUS ONCE
Status: COMPLETED | OUTPATIENT
Start: 2024-07-31 | End: 2024-07-31

## 2024-07-31 RX ORDER — KETOROLAC TROMETHAMINE 30 MG/ML
15 INJECTION, SOLUTION INTRAMUSCULAR; INTRAVENOUS ONCE
Status: COMPLETED | OUTPATIENT
Start: 2024-07-31 | End: 2024-07-31

## 2024-07-31 RX ADMIN — DIAZEPAM 5 MG: 10 INJECTION, SOLUTION INTRAMUSCULAR; INTRAVENOUS at 12:48

## 2024-07-31 RX ADMIN — IOHEXOL 100 ML: 350 INJECTION, SOLUTION INTRAVENOUS at 14:29

## 2024-07-31 RX ADMIN — KETOROLAC TROMETHAMINE 15 MG: 30 INJECTION, SOLUTION INTRAMUSCULAR at 12:48

## 2024-07-31 NOTE — ED PROVIDER NOTES
History  Chief Complaint   Patient presents with    Shoulder Pain     Patient presents with left shoulder pain for 5 days. Denies any injury to the shoulder.   Reports it is spasming. Patient took tylenol and used lidocaine patches with no relief.      This is a 64-year-old female patient who presents with a 5-day history of left shoulder and trap pain.  She woke up Sunday morning with the pain and that night went to [facility] had a full cardiac workup because of her past medical history of 5 stents.  She has no chest pain no shortness of breath.  She states they never addressed her shoulder pain which hurts when she moves it she has no radicular symptoms no numbness or paresthesia.  No fever no chills no headache blurred vision double vision no neck pain.  The pain starts in her left side of her shoulder and shoulder blade.  Hurts when she moves.  There is no rash.  She uses a heating blanket and Tylenol with minimal improvement.  The  and patient did not want more than EKG regarding cardiac workup because she had a full 1 at [facility] does not have any chest pain or shortness of breath.  Has an appoint with her PCP in the next few days.  No pleuritic pain.        Prior to Admission Medications   Prescriptions Last Dose Informant Patient Reported? Taking?   Accu-Chek FastClix Lancets MISC  Self No No   Sig: TEST THREE TIMES A DAY   Patient not taking: Reported on 12/20/2023   Blood Glucose Monitoring Suppl (OneTouch Verio Flex System) w/Device KIT   No No   Sig: Test four times daily   Patient not taking: Reported on 7/22/2024   Continuous Blood Gluc  (FreeStyle Zoya 3 [place]) SOFI   No No   Sig: To check blood sugars continuously   Continuous Blood Gluc Sensor (FreeStyle Zoya 3 Sensor) MISC   No No   Sig: To check blood sugar continuously and to change every 2 weeks   Empagliflozin (Jardiance) 25 MG TABS   No No   Sig: take 1 tablet by mouth every morning   Insulin Pen Needle (BD  Pen Needle Sadia 2nd Gen) 32G X 4 MM MISC   No No   Sig: Use 4 (four) times a day   Lantus SoloStar 100 units/mL SOPN   Yes No   Sig: Inject 50 Units as directed daily at bedtime   OneTouch Delica Lancets 33G MISC   No No   Sig: Use 4 (four) times a day Test 4 times daily   amLODIPine (NORVASC) 5 mg tablet   No No   Sig: swallow 1 tablet once daily   anastrozole (ARIMIDEX) 1 mg tablet  Self, Spouse/Significant Other Yes No   Sig: Take 1 mg by mouth daily   Patient not taking: Reported on 12/2/2023   aspirin (ECOTRIN LOW STRENGTH) 81 mg EC tablet   No No   Sig: Take 1 tablet (81 mg total) by mouth daily   atorvastatin (LIPITOR) 80 mg tablet   No No   Sig: Take 1 tablet (80 mg total) by mouth daily with dinner   benazepril (LOTENSIN) 20 mg tablet   No No   Sig: take 1 tablet by mouth once daily   betamethasone dipropionate (DIPROSONE) 0.05 % cream   No No   Sig: apply to affected area twice a day   betamethasone valerate (VALISONE) 0.1 % cream  Self Yes No   Sig: Apply 1 Application topically 2 (two) times a day   ezetimibe (ZETIA) 10 mg tablet   No No   Sig: Take 1 tablet (10 mg total) by mouth daily at bedtime   famotidine (PEPCID) 20 mg tablet   No No   Sig: take 1 tablet by mouth twice a day before meals   Patient taking differently: Take 20 mg by mouth 2 (two) times a day before meals As needed   glucose blood (OneTouch Verio) test strip   No No   Sig: as directed use 1 TEST STRIP to TEST BLOOD SUGAR four times a day   insulin lispro (HumaLOG) 100 units/mL injection pen   No No   Sig: Pt takes 14 units plus sliding scale, up to 20 units before meals,   lactulose (CHRONULAC) 10 g/15 mL solution  Self Yes No   Sig: Take 10 g by mouth as needed (constipation)   metoprolol succinate (TOPROL-XL) 100 mg 24 hr tablet   No No   Sig: Take 1 tablet (100 mg total) by mouth daily   nitroglycerin (NITROSTAT) 0.4 mg SL tablet   No No   Sig: Place 1 tablet (0.4 mg total) under the tongue every 5 (five) minutes as needed for  chest pain If no relief after first dose call prescriber or 911   rOPINIRole (REQUIP) 1 mg tablet  Self No No   Sig: Take 1 tablet (1 mg total) by mouth daily   semaglutide, 1 mg/dose, (Ozempic, 1 MG/DOSE,) 4 mg/3 mL injection pen   No No   Sig: Inject 0.75 mL (1 mg total) under the skin every 7 days Do not start before August 16, 2024.   ticagrelor (BRILINTA) 90 MG   No No   Sig: Take 1 tablet (90 mg total) by mouth every 12 (twelve) hours   Patient taking differently: Take 90 mg by mouth every 12 (twelve) hours Taking once daily   triamcinolone (KENALOG) 0.1 % cream   No No   Sig: Apply topically 2 (two) times a day      Facility-Administered Medications: None       Past Medical History:   Diagnosis Date    CAD (coronary artery disease)     Cancer ([facility])     right breast    Chronic back pain     Coronary artery disease     Diabetes mellitus ([facility])     Family history of early CAD     Hyperlipidemia     Hypertension     Non-ST elevation MI (NSTEMI) ([facility]) 12/02/2023    Subsequent INNA placed in mid LAD 80 and 2 INNA placed in second diagonal 12/4/23    NSTEMI (non-ST elevated myocardial infarction) ([facility]) 11/28/2018    Status post coronary angioplasty 1st obtuse marginal 11/28/2018 at [facility] stent could not be placed    Restless leg syndrome     Tuberculosis     patient was less than 5 years old    Type 2 diabetes mellitus ([facility])        Past Surgical History:   Procedure Laterality Date    ABDOMINAL SURGERY      phill    ANKLE SURGERY      tubal    BREAST SURGERY      partial mastectomy R breast    CARDIAC CATHETERIZATION  11/2018    Successful balloon angioplassty to OM1    CARDIAC CATHETERIZATION  12/4/2023    Procedure: Cardiac catheterization;  Surgeon: Flex Darby MD;  Location: [facility];  Service: Cardiology    CHOLECYSTECTOMY      FRACTURE SURGERY      right ankle    GALLBLADDER SURGERY      MASTECTOMY      TUBAL LIGATION         Family History   Problem Relation Age of Onset    Breast  cancer Mother     Diabetes Mother     Heart failure Mother     Heart disease Father     Stroke Father      I have reviewed and agree with the history as documented.    E-Cigarette/Vaping    E-Cigarette Use Never User      E-Cigarette/Vaping Substances    Nicotine No     THC No     CBD No     Flavoring No     Other No     Unknown No      Social History     Tobacco Use    Smoking status: Never    Smokeless tobacco: Never   Vaping Use    Vaping status: Never Used   Substance Use Topics    Alcohol use: Yes     Alcohol/week: 2.0 standard drinks of alcohol     Types: 1 Glasses of wine, 1 Cans of beer per week     Comment: occasionally    Drug use: Never       Review of Systems   Constitutional:  Negative for diaphoresis, fatigue and fever.   HENT:  Negative for congestion, ear pain, nosebleeds and sore throat.    Eyes:  Negative for photophobia, pain, discharge and visual disturbance.   Respiratory:  Negative for cough, choking, chest tightness, shortness of breath and wheezing.    Cardiovascular:  Negative for chest pain and palpitations.   Gastrointestinal:  Negative for abdominal distention, abdominal pain, diarrhea and vomiting.   Genitourinary:  Negative for dysuria, flank pain and frequency.   Musculoskeletal:  Negative for back pain, gait problem and joint swelling.        Left shoulder pain reproducible   Skin:  Negative for color change and rash.   Neurological:  Negative for dizziness, syncope and headaches.   Psychiatric/Behavioral:  Negative for behavioral problems and confusion. The patient is not nervous/anxious.    All other systems reviewed and are negative.      Physical Exam  Physical Exam  Vitals and nursing note reviewed.   Constitutional:       General: She is not in acute distress.     Appearance: Normal appearance. She is not ill-appearing, toxic-appearing or diaphoretic.   HENT:      Head: Normocephalic and atraumatic.      Right Ear: Tympanic membrane, ear canal and external ear normal.      Left  Ear: Tympanic membrane, ear canal and external ear normal.      Nose: Nose normal. No congestion or rhinorrhea.      Mouth/Throat:      Mouth: Mucous membranes are dry.      Pharynx: Oropharynx is clear. No oropharyngeal exudate or posterior oropharyngeal erythema.   Eyes:      Extraocular Movements: Extraocular movements intact.      Conjunctiva/sclera: Conjunctivae normal.      Pupils: Pupils are equal, round, and reactive to light.   Cardiovascular:      Rate and Rhythm: Normal rate and regular rhythm.   Pulmonary:      Effort: Pulmonary effort is normal. No respiratory distress.      Breath sounds: Normal breath sounds.   Abdominal:      General: Bowel sounds are normal.      Palpations: Abdomen is soft.      Tenderness: There is no abdominal tenderness.   Musculoskeletal:         General: Normal range of motion.        Arms:       Cervical back: Normal range of motion and neck supple. No rigidity or tenderness.      Right lower leg: No edema.      Left lower leg: No edema.   Lymphadenopathy:      Cervical: No cervical adenopathy.   Skin:     General: Skin is warm and dry.      Capillary Refill: Capillary refill takes less than 2 seconds.      Findings: No rash.   Neurological:      General: No focal deficit present.      Mental Status: She is alert and oriented to person, place, and time. Mental status is at baseline.   Psychiatric:         Mood and Affect: Mood normal.         Behavior: Behavior normal.         Vital Signs  ED Triage Vitals [07/31/24 1121]   Temperature Pulse Respirations Blood Pressure SpO2   98.6 °F (37 °C) 84 16 141/91 95 %      Temp Source Heart Rate Source Patient Position - Orthostatic VS BP Location FiO2 (%)   Temporal Monitor Sitting Left arm --      Pain Score       9           Vitals:    07/31/24 1121 07/31/24 1500   BP: 141/91 143/77   Pulse: 84 70   Patient Position - Orthostatic VS: Sitting Sitting         Visual Acuity      ED Medications  Medications   ketorolac (TORADOL)  injection 15 mg (15 mg Intramuscular Given 7/31/24 1248)   diazepam (VALIUM) injection 5 mg (5 mg Intramuscular Given 7/31/24 1248)   iohexol (OMNIPAQUE) 350 MG/ML injection (MULTI-DOSE) 100 mL (100 mL Intravenous Given 7/31/24 1429)       Diagnostic Studies  Results Reviewed       Procedure Component Value Units Date/Time    HS Troponin I 2hr [[id number]]  (Normal) Collected: 07/31/24 1457    Lab Status: Final result Specimen: Blood from Arm, Right Updated: 07/31/24 1527     hs TnI 2hr 4 ng/L      Delta 2hr hsTnI 0 ng/L     HS Troponin 0hr (reflex protocol) [[id number]]  (Normal) Collected: 07/31/24 1338    Lab Status: Final result Specimen: Blood from Arm, Left Updated: 07/31/24 1415     hs TnI 0hr 4 ng/L     Comprehensive metabolic panel [[id number]]  (Abnormal) Collected: 07/31/24 1338    Lab Status: Final result Specimen: Blood from Arm, Left Updated: 07/31/24 1408     Sodium 140 mmol/L      Potassium 3.6 mmol/L      Chloride 107 mmol/L      CO2 24 mmol/L      ANION GAP 9 mmol/L      BUN 12 mg/dL      Creatinine 0.73 mg/dL      Glucose 195 mg/dL      Calcium 9.5 mg/dL      AST 17 U/L      ALT 16 U/L      Alkaline Phosphatase 94 U/L      Total Protein 7.7 g/dL      Albumin 4.3 g/dL      Total Bilirubin 0.50 mg/dL      eGFR 87 ml/min/1.73sq m     Narrative:      National Kidney Disease Foundation guidelines for Chronic Kidney Disease (CKD):     Stage 1 with normal or high GFR (GFR > 90 mL/min/1.73 square meters)    Stage 2 Mild CKD (GFR = 60-89 mL/min/1.73 square meters)    Stage 3A Moderate CKD (GFR = 45-59 mL/min/1.73 square meters)    Stage 3B Moderate CKD (GFR = 30-44 mL/min/1.73 square meters)    Stage 4 Severe CKD (GFR = 15-29 mL/min/1.73 square meters)    Stage 5 End Stage CKD (GFR <15 mL/min/1.73 square meters)  Note: GFR calculation is accurate only with a steady state creatinine    CBC and differential [[id number]] Collected: 07/31/24 1338    Lab Status: Final result Specimen: Blood from Arm, Left Updated:  07/31/24 1349     WBC 8.35 Thousand/uL      RBC 4.72 Million/uL      Hemoglobin 14.0 g/dL      Hematocrit 43.6 %      MCV 92 fL      MCH 29.7 pg      MCHC 32.1 g/dL      RDW 14.0 %      MPV 9.6 fL      Platelets 295 Thousands/uL      nRBC 0 /100 WBCs      Segmented % 75 %      Immature Grans % 0 %      Lymphocytes % 16 %      Monocytes % 6 %      Eosinophils Relative 2 %      Basophils Relative 1 %      Absolute Neutrophils 6.29 Thousands/µL      Absolute Immature Grans 0.02 Thousand/uL      Absolute Lymphocytes 1.33 Thousands/µL      Absolute Monocytes 0.51 Thousand/µL      Eosinophils Absolute 0.16 Thousand/µL      Basophils Absolute 0.04 Thousands/µL                    CTA dissection protocol chest abdomen pelvis w wo contrast   Final Result by Betzy Cesar MD (07/31 1616)      1) No thoracic or abdominal aortic aneurysm, intramural hematoma or dissection.      2) Atherosclerotic disease, as detailed above, with progression from 2021 and 2020. Significant portions of infrarenal abdominal aortic lumen not opacified with contrast, likely related to severe noncalcified atherosclerotic plaque and/or partial    thrombosis. Several subcentimeter penetrating atherosclerotic ulcers in the infrarenal aorta, the largest measuring 5 mm. Areas of ulcerated plaque in the descending thoracic aorta.      3) No acute pulmonary pathology.      4) No acute abdominal or pelvic pathology.      5) Multiple additional incidental findings as above.                  Workstation performed: [id number]         XR shoulder 2+ views LEFT   ED Interpretation by Tuan Leahy PA-C (07/31 1300)   No fracture or dislocation      Final Result by Dale Longoria MD (07/31 1330)      No acute osseous abnormality.         Computerized Assisted Algorithm (CAA) may have been used to analyze all applicable images.         Workstation performed: [id number]                    Procedures  Procedures         ED Course  ED Course as of 07/31/24 1941    Wed Jul 31, 2024   1313 Plan:  Back pain since Friday, chest pressure since Saturday w radiation to the left arm. Hx of cad, last stent placed about 1 year ago. Plan labs, xr, cta. Will give nitro and fluids. [JS]   0030 EKG shows sinus rhythm with a rate of 84. Pr 154. QRS 78. No gross stemi. [JS]   0116 (GONZALEZ) HS Trop, HR 0: 5 [JS]   0119 No relief with nitro, will give morphine. [JS]   0448 CT reviewed, discussed with patient. Page out to vascular surgery to discuss findings. [JS]   0524 Vascular reviewing images. [JS]   0524 Patient reports feeling much better. [JS]   0537 Discussed with vascular surgery, no acute intervention, office follow up in 4-6 weeks, already on asa and statin. [JS]   0538 Patient's pain improved. Feels comfortable going home and following up with her cardiologist. All results were discussed with the patient. Strict return precautions as well as outpatient follow up instructions were given. All questions answered. The patient was stable at time of discharge from the Emergency Department.      1325 Called reading room to have images from [facility] pushed over so that our vascular surgeon and radiologist can review.   1446 Dr. Donald . he looked at images and said he agrees with [facility] vascular, follow up outpatient, asa/statin and quit smoking if she smokes               HEART Risk Score      Flowsheet Row Most Recent Value   Heart Score Risk Calculator    History 0 Filed at: 07/31/2024 1550   ECG 0 Filed at: 07/31/2024 1550   Age 1 Filed at: 07/31/2024 1550   Risk Factors 2 Filed at: 07/31/2024 1550   Troponin 0 Filed at: 07/31/2024 1550   HEART Score 3 Filed at: 07/31/2024 1550                          SBIRT 20yo+      Flowsheet Row Most Recent Value   Initial Alcohol Screen: US AUDIT-C     1. How often do you have a drink containing alcohol? 0 Filed at: 07/31/2024 1122   2. How many drinks containing alcohol do you have on a typical day you are drinking?  0 Filed at: 07/31/2024  1122   3a. Male UNDER 65: How often do you have five or more drinks on one occasion? 0 Filed at: 07/31/2024 1122   3b. FEMALE Any Age, or MALE 65+: How often do you have 4 or more drinks on one occassion? 0 Filed at: 07/31/2024 1122   Audit-C Score 0 Filed at: 07/31/2024 1122   CUCA: How many times in the past year have you...    Used an illegal drug or used a prescription medication for non-medical reasons? Never Filed at: 07/31/2024 1122                      Medical Decision Making  This is a 64-year-old female patient who when she presented complained of pain in her left shoulder and trapezius states that she woke up about 5 days ago and had this pain it does not radiate.  There is no rash or other symptoms.  I asked the patient has not been seen in any other hospital and she stated no I then reviewed her chart and on care everywhere patient was found to have a transmural thrombus in her aorta at [facility] 5 days ago was discharged home with follow-up but truly stated she knew nothing much about it.  And still had ongoing pain it was somewhat reproducible but was normal on information to obtain.  Differential diagnose includes not limited to musculoskeletal pain, transmural thrombus, dissection    Problems Addressed:  Aortic thrombus (HCC): acute illness or injury     Details: I did rescan the patient because of increased pain and discomfort I did have her pain comfort with Toradol and Valium and then realized that she had been seen 5 days ago after digging deeper into her chart which she did not divulge.  I obtained a repeat CAT scan to make sure there was no rupture or dissection and is basically the same I did make contact with Dr. Donald who stated she can follow-up in a few weeks she is on Brilinta Zetia and aspirin as recommended by vascular and was given an amatory referral told to return worsening symptoms  Shoulder pain, left: acute illness or injury     Details: Left shoulder pain did appear to  musculoskeletal with the fact that the transmural thrombus in the aorta nearby further testing was required.  I did relieve all of her pain and since the medication back to her pharmacy she to follow-up with her PCP and vascular as directed    Amount and/or Complexity of Data Reviewed  External Data Reviewed: labs, radiology and notes.     Details: I did review labs radiology and notes for comparison  Labs: ordered. Decision-making details documented in ED Course.     Details: All labs reviewed nothing acute that required immediate intervention  Radiology: ordered and independent interpretation performed.     Details: I did repeat the CTA a fear of rupture or worsening and its unchanged from previous I did review the results  Discussion of management or test interpretation with external provider(s): Using joint decision-making with the patient patient's  and vascular she will be discharged home on her medicines return worsening symptoms question comes concerns and follow-up with her PCP verbalized understanding and agreement    Risk  Prescription drug management.                 Disposition  Final diagnoses:   Shoulder pain, left   Aortic thrombus (HCC)     Time reflects when diagnosis was documented in both MDM as applicable and the Disposition within this note       Time User Action Codes Description Comment    7/31/2024  2:50 PM Tuan Hernadez Add [M25.512] Shoulder pain, left     7/31/2024  2:50 PM Tuan Hernadez Add [I74.10] Aortic thrombus (HCC)           ED Disposition       ED Disposition   Discharge    Condition   Stable    Date/Time   Wed Jul 31, 2024 [id number]    Comment   Lala Allen discharge to home/self care.                   Follow-up Information       Follow up With Specialties Details Why Contact Info    RUBEN Jackson Nurse Practitioner Schedule an appointment as soon as possible for a visit   [address] [place] [address]  [phone]              Discharge  Medication List as of 7/31/2024  2:53 PM        START taking these medications    Details   HYDROcodone-acetaminophen (Norco) 5-325 mg per tablet Take 1 tablet by mouth every 6 (six) hours as needed for pain (Initial therapy) Max Daily Amount: 4 tablets, Starting Wed 7/31/2024, Normal           CONTINUE these medications which have NOT CHANGED    Details   !! Accu-Chek FastClix Lancets MISC TEST THREE TIMES A DAY, Normal      amLODIPine (NORVASC) 5 mg tablet swallow 1 tablet once daily, Starting Tue 4/2/2024, Normal      anastrozole (ARIMIDEX) 1 mg tablet Take 1 mg by mouth daily, Starting Tue 8/18/2020, Historical Med      aspirin (ECOTRIN LOW STRENGTH) 81 mg EC tablet Take 1 tablet (81 mg total) by mouth daily, Starting Tue 12/5/2023, Normal      atorvastatin (LIPITOR) 80 mg tablet Take 1 tablet (80 mg total) by mouth daily with dinner, Starting Mon 1/15/2024, Normal      benazepril (LOTENSIN) 20 mg tablet take 1 tablet by mouth once daily, Normal      betamethasone dipropionate (DIPROSONE) 0.05 % cream apply to affected area twice a day, Starting Mon 6/3/2024, Normal      betamethasone valerate (VALISONE) 0.1 % cream Apply 1 Application topically 2 (two) times a day, Starting Mon 7/6/2020, Until Mon 7/22/2024, Historical Med      Blood Glucose Monitoring Suppl (OneTouch Verio Flex System) w/Device KIT Test four times daily, Normal      Continuous Blood Gluc  (FreeStyle Zoya 3 [place]) SOFI To check blood sugars continuously, Normal      Continuous Blood Gluc Sensor (FreeStyle Zoya 3 Sensor) MISC To check blood sugar continuously and to change every 2 weeks, Normal      Empagliflozin (Jardiance) 25 MG TABS take 1 tablet by mouth every morning, Starting Mon 6/3/2024, Normal      ezetimibe (ZETIA) 10 mg tablet Take 1 tablet (10 mg total) by mouth daily at bedtime, Starting Tue 12/5/2023, Normal      famotidine (PEPCID) 20 mg tablet take 1 tablet by mouth twice a day before meals, Starting Thu 7/18/2024,  Normal      glucose blood (OneTouch Verio) test strip as directed use 1 TEST STRIP to TEST BLOOD SUGAR four times a day, Normal      insulin lispro (HumaLOG) 100 units/mL injection pen Pt takes 14 units plus sliding scale, up to 20 units before meals,, Fill Later      Insulin Pen Needle (BD Pen Needle Sadia 2nd Gen) 32G X 4 MM MISC Use 4 (four) times a day, Starting Mon 2/19/2024, Normal      lactulose (CHRONULAC) 10 g/15 mL solution Take 10 g by mouth as needed (constipation), Starting Sat 8/27/2022, Historical Med      Lantus SoloStar 100 units/mL SOPN Inject 50 Units as directed daily at bedtime, Starting Sat 12/30/2023, Historical Med      metoprolol succinate (TOPROL-XL) 100 mg 24 hr tablet Take 1 tablet (100 mg total) by mouth daily, Starting Tue 12/5/2023, Normal      nitroglycerin (NITROSTAT) 0.4 mg SL tablet Place 1 tablet (0.4 mg total) under the tongue every 5 (five) minutes as needed for chest pain If no relief after first dose call prescriber or 911, Starting Thu 6/8/2023, Normal      !! OneTouch Delica Lancets 33G MISC Use 4 (four) times a day Test 4 times daily, Starting Mon 2/19/2024, Normal      rOPINIRole (REQUIP) 1 mg tablet Take 1 tablet (1 mg total) by mouth daily, Starting Wed 10/16/2019, Normal      semaglutide, 1 mg/dose, (Ozempic, 1 MG/DOSE,) 4 mg/3 mL injection pen Inject 0.75 mL (1 mg total) under the skin every 7 days Do not start before August 16, 2024., Starting Fri 8/16/2024, Until Thu 11/14/2024, Normal      ticagrelor (BRILINTA) 90 MG Take 1 tablet (90 mg total) by mouth every 12 (twelve) hours, Starting Fri 1/19/2024, Normal      triamcinolone (KENALOG) 0.1 % cream Apply topically 2 (two) times a day, Starting Tue 6/18/2024, Normal       !! - Potential duplicate medications found. Please discuss with provider.              PDMP Review         Value Time User    PDMP Reviewed  Yes 7/31/2024  2:52 PM Tuan Leahy PA-C            ED Provider  Electronically Signed by              Tuan Leahy PA-C  07/31/24 1941

## 2024-07-31 NOTE — DISCHARGE INSTRUCTIONS
As you are aware you do have a blood clot in your aorta.  It is recommended she continue your Brilinta, Zetia and aspirin.  Follow-up with the vascular surgeon listed there is an amatory referral placed.  Call tomorrow.    Return with any worsening symptoms questions comments or concerns.    Follow-up with your PCP for shoulder pain.

## 2024-07-31 NOTE — QUICK NOTE
Vascular Surgery Quick Note    Contacted by ED provider regarding large mural thrombus seen on CTA at [facility] on 7/29/24. Advised repeat CTA due to concern from provider for worsening symptoms and possible dissection. Reviewed all imaging with on call vascular surgeon Dr Crane. Recommend outpatient follow up with vascular surgery in 4-6 weeks for mural thrombus. Patient should continue brilinta, asa, and zetia. Communicated with Tuan Leahy PA-C with recommendations. Please reach out with any questions/concerns.     Vickie Harris PA-C

## 2024-08-01 ENCOUNTER — TELEPHONE (OUTPATIENT)
Age: 64
End: 2024-08-01

## 2024-08-01 LAB
ATRIAL RATE: 67 BPM
P AXIS: 39 DEGREES
PR INTERVAL: 158 MS
QRS AXIS: 41 DEGREES
QRSD INTERVAL: 78 MS
QT INTERVAL: 432 MS
QTC INTERVAL: 456 MS
T WAVE AXIS: 52 DEGREES
VENTRICULAR RATE: 67 BPM

## 2024-08-01 PROCEDURE — 93010 ELECTROCARDIOGRAM REPORT: CPT | Performed by: INTERNAL MEDICINE

## 2024-08-01 NOTE — TELEPHONE ENCOUNTER
Patient called the RX Refill Line. Message is being forwarded to the office.     Patient is requesting a refill of Nystatin Cream to be sent to [facility] [place], unable to locate in chart.     Please contact patient at [phone]

## 2024-08-01 NOTE — TELEPHONE ENCOUNTER
I spoke with pt in regards to her request for nystatin cream. Pt understands its not on her current med list. Pt stated she is very excoriated in her tino area and would like that medication to help. I did explain that she might need an appointment to be evaluated since this is a new concern. Pt verbally understood and stated she has an appointment with you on Wednesday and is fine with addressing it then.

## 2024-08-03 ENCOUNTER — HOSPITAL ENCOUNTER (EMERGENCY)
Facility: HOSPITAL | Age: 64
Discharge: HOME | End: 2024-08-03
Attending: EMERGENCY MEDICINE
Payer: COMMERCIAL

## 2024-08-03 VITALS
OXYGEN SATURATION: 97 % | WEIGHT: 156 LBS | BODY MASS INDEX: 29.45 KG/M2 | SYSTOLIC BLOOD PRESSURE: 164 MMHG | RESPIRATION RATE: 18 BRPM | HEIGHT: 61 IN | HEART RATE: 111 BPM | DIASTOLIC BLOOD PRESSURE: 95 MMHG | TEMPERATURE: 97.7 F

## 2024-08-03 DIAGNOSIS — B02.9 SHINGLES: Primary | ICD-10-CM

## 2024-08-03 PROCEDURE — 99283 EMERGENCY DEPT VISIT LOW MDM: CPT

## 2024-08-03 PROCEDURE — 99284 EMERGENCY DEPT VISIT MOD MDM: CPT | Performed by: EMERGENCY MEDICINE

## 2024-08-03 RX ORDER — LIDOCAINE 50 MG/G
2 PATCH TOPICAL DAILY
Qty: 20 PATCH | Refills: 0 | Status: SHIPPED | OUTPATIENT
Start: 2024-08-03 | End: 2024-08-13

## 2024-08-03 RX ORDER — ACYCLOVIR 400 MG/1
800 TABLET ORAL
Qty: 100 TABLET | Refills: 0 | Status: SHIPPED | OUTPATIENT
Start: 2024-08-03 | End: 2024-08-13

## 2024-08-03 NOTE — ED PROVIDER NOTES
History  Chief Complaint   Patient presents with    Rash     Rash under left arm, painful, blisters     64-year-old female presents emergency department complaining of rash.  Patient notes that she has had pain and a red rash across her left shoulder around to her left breast.  This is only left-sided.  The patient denies any fever chills or other rashes elsewhere.  Patient decided come to the ED for evaluation of this rash.        Prior to Admission Medications   Prescriptions Last Dose Informant Patient Reported? Taking?   Accu-Chek FastClix Lancets MISC  Self No No   Sig: TEST THREE TIMES A DAY   Patient not taking: Reported on 12/20/2023   Blood Glucose Monitoring Suppl (OneTouch Verio Flex System) w/Device KIT   No No   Sig: Test four times daily   Patient not taking: Reported on 7/22/2024   Continuous Blood Gluc  (FreeStyle Zoya 3 [place]) SOFI   No No   Sig: To check blood sugars continuously   Continuous Blood Gluc Sensor (FreeStyle Zoya 3 Sensor) MISC   No No   Sig: To check blood sugar continuously and to change every 2 weeks   Empagliflozin (Jardiance) 25 MG TABS   No No   Sig: take 1 tablet by mouth every morning   HYDROcodone-acetaminophen (Norco) 5-325 mg per tablet   No No   Sig: Take 1 tablet by mouth every 6 (six) hours as needed for pain (Initial therapy) Max Daily Amount: 4 tablets   Insulin Pen Needle (BD Pen Needle Sadia 2nd Gen) 32G X 4 MM MISC   No No   Sig: Use 4 (four) times a day   Lantus SoloStar 100 units/mL SOPN   Yes No   Sig: Inject 50 Units as directed daily at bedtime   OneTouch Delica Lancets 33G MISC   No No   Sig: Use 4 (four) times a day Test 4 times daily   amLODIPine (NORVASC) 5 mg tablet   No No   Sig: swallow 1 tablet once daily   anastrozole (ARIMIDEX) 1 mg tablet  Self, Spouse/Significant Other Yes No   Sig: Take 1 mg by mouth daily   Patient not taking: Reported on 12/2/2023   aspirin (ECOTRIN LOW STRENGTH) 81 mg EC tablet   No No   Sig: Take 1 tablet (81 mg total)  by mouth daily   atorvastatin (LIPITOR) 80 mg tablet   No No   Sig: Take 1 tablet (80 mg total) by mouth daily with dinner   benazepril (LOTENSIN) 20 mg tablet   No No   Sig: take 1 tablet by mouth once daily   betamethasone dipropionate (DIPROSONE) 0.05 % cream   No No   Sig: apply to affected area twice a day   betamethasone valerate (VALISONE) 0.1 % cream  Self Yes No   Sig: Apply 1 Application topically 2 (two) times a day   ezetimibe (ZETIA) 10 mg tablet   No No   Sig: Take 1 tablet (10 mg total) by mouth daily at bedtime   famotidine (PEPCID) 20 mg tablet   No No   Sig: take 1 tablet by mouth twice a day before meals   Patient taking differently: Take 20 mg by mouth 2 (two) times a day before meals As needed   glucose blood (OneTouch Verio) test strip   No No   Sig: as directed use 1 TEST STRIP to TEST BLOOD SUGAR four times a day   insulin lispro (HumaLOG) 100 units/mL injection pen   No No   Sig: Pt takes 14 units plus sliding scale, up to 20 units before meals,   lactulose (CHRONULAC) 10 g/15 mL solution  Self Yes No   Sig: Take 10 g by mouth as needed (constipation)   metoprolol succinate (TOPROL-XL) 100 mg 24 hr tablet   No No   Sig: Take 1 tablet (100 mg total) by mouth daily   nitroglycerin (NITROSTAT) 0.4 mg SL tablet   No No   Sig: Place 1 tablet (0.4 mg total) under the tongue every 5 (five) minutes as needed for chest pain If no relief after first dose call prescriber or 911   rOPINIRole (REQUIP) 1 mg tablet  Self No No   Sig: Take 1 tablet (1 mg total) by mouth daily   semaglutide, 1 mg/dose, (Ozempic, 1 MG/DOSE,) 4 mg/3 mL injection pen   No No   Sig: Inject 0.75 mL (1 mg total) under the skin every 7 days Do not start before August 16, 2024.   ticagrelor (BRILINTA) 90 MG   No No   Sig: Take 1 tablet (90 mg total) by mouth every 12 (twelve) hours   Patient taking differently: Take 90 mg by mouth every 12 (twelve) hours Taking once daily   triamcinolone (KENALOG) 0.1 % cream   No No   Sig: Apply  topically 2 (two) times a day      Facility-Administered Medications: None       Past Medical History:   Diagnosis Date    CAD (coronary artery disease)     Cancer ([facility])     right breast    Chronic back pain     Coronary artery disease     Diabetes mellitus (HCC)     Family history of early CAD     Hyperlipidemia     Hypertension     Non-ST elevation MI (NSTEMI) ([facility]) 12/02/2023    Subsequent INNA placed in mid LAD 80 and 2 INNA placed in second diagonal 12/4/23    NSTEMI (non-ST elevated myocardial infarction) ([facility]) 11/28/2018    Status post coronary angioplasty 1st obtuse marginal 11/28/2018 at [facility] stent could not be placed    Restless leg syndrome     Tuberculosis     patient was less than 5 years old    Type 2 diabetes mellitus ([facility])        Past Surgical History:   Procedure Laterality Date    ABDOMINAL SURGERY      phill    ANKLE SURGERY      tubal    BREAST SURGERY      partial mastectomy R breast    CARDIAC CATHETERIZATION  11/2018    Successful balloon angioplassty to OM1    CARDIAC CATHETERIZATION  12/4/2023    Procedure: Cardiac catheterization;  Surgeon: Flex Darby MD;  Location: [facility];  Service: Cardiology    CHOLECYSTECTOMY      FRACTURE SURGERY      right ankle    GALLBLADDER SURGERY      MASTECTOMY      TUBAL LIGATION         Family History   Problem Relation Age of Onset    Breast cancer Mother     Diabetes Mother     Heart failure Mother     Heart disease Father     Stroke Father      I have reviewed and agree with the history as documented.    E-Cigarette/Vaping    E-Cigarette Use Never User      E-Cigarette/Vaping Substances    Nicotine No     THC No     CBD No     Flavoring No     Other No     Unknown No      Social History     Tobacco Use    Smoking status: Never    Smokeless tobacco: Never   Vaping Use    Vaping status: Never Used   Substance Use Topics    Alcohol use: Yes     Alcohol/week: 2.0 standard drinks of alcohol     Types: 1 Glasses of wine, 1 Cans of  beer per week     Comment: occasionally    Drug use: Never       Review of Systems   Constitutional:  Positive for activity change. Negative for chills and fever.   HENT:  Negative for ear pain and sore throat.    Eyes:  Negative for pain and visual disturbance.   Respiratory:  Negative for cough and shortness of breath.    Cardiovascular:  Negative for chest pain and palpitations.   Gastrointestinal:  Negative for abdominal pain and vomiting.   Musculoskeletal:  Negative for arthralgias and back pain.   Skin:  Positive for rash. Negative for color change.   All other systems reviewed and are negative.      Physical Exam  Physical Exam  Vitals and nursing note reviewed.   Constitutional:       General: She is not in acute distress.     Appearance: She is well-developed.   HENT:      Head: Normocephalic and atraumatic.   Eyes:      Conjunctiva/sclera: Conjunctivae normal.   Cardiovascular:      Rate and Rhythm: Normal rate and regular rhythm.      Heart sounds: No murmur heard.  Pulmonary:      Effort: Pulmonary effort is normal. No respiratory distress.      Breath sounds: Normal breath sounds.   Abdominal:      Palpations: Abdomen is soft.      Tenderness: There is no abdominal tenderness.   Musculoskeletal:         General: No swelling.      Cervical back: Neck supple.   Skin:     General: Skin is warm and dry.      Capillary Refill: Capillary refill takes less than 2 seconds.      Findings: Rash present.      Comments: Patient with a vesicular erythematous rash along the T4 dermatome distribution on the left consistent with shingles.   Neurological:      Mental Status: She is alert.   Psychiatric:         Mood and Affect: Mood normal.         Vital Signs  ED Triage Vitals [08/03/24 1243]   Temperature Pulse Respirations Blood Pressure SpO2   97.7 °F (36.5 °C) (!) 111 18 164/95 97 %      Temp Source Heart Rate Source Patient Position - Orthostatic VS BP Location FiO2 (%)   Tympanic Monitor Lying Left arm --       Pain Score       2           Vitals:    08/03/24 1243   BP: 164/95   Pulse: (!) 111   Patient Position - Orthostatic VS: Lying         Visual Acuity      ED Medications  Medications - No data to display    Diagnostic Studies  Results Reviewed       None                   No orders to display              Procedures  Procedures         ED Course                                               Medical Decision Making  64-year-old female presents emergency department secondary to rash which she describes as burning and painful to the left shoulder and left back as well as left breast.  The patient notes that she has not had this before.  Differential diagnosis is cellulitis versus shingles.  Closer examination of the rash reveals a teardrop on yoko petal appearance to the rash with burning pain.  This is dermatomal and does not cross the midline.  This is most consistent with shingles.  The patient be placed on acyclovir 5 times a day for 10 days as well as Lidoderm patches for pain.  The patient was educated regarding staying away from anyone who is pregnant, or has not had the varicella vaccine or has not had chickenpox in the past.  Also, she might stay away from people who are immunocompromised.  The patient was told when the rash would no longer be infectious.  Patient told to follow-up with her family doctor in 2 to 4 days for repeat evaluation.  Patient discharged.    Risk  Prescription drug management.                 Disposition  Final diagnoses:   Shingles     Time reflects when diagnosis was documented in both MDM as applicable and the Disposition within this note       Time User Action Codes Description Comment    8/3/2024 12:52 PM Jt Bryant Add [B02.9] Shingles           ED Disposition       ED Disposition   Discharge    Condition   Stable    Date/Time   Sat Aug 3, 2024  1:00 PM    Comment   Lala Allen discharge to home/self care.                   Follow-up Information       Follow up With  Specialties Details Why Contact Info    RUBEN Jackson Nurse Practitioner On 8/6/2024  [address] [place] [address]  [phone]              Discharge Medication List as of 8/3/2024  1:00 PM        START taking these medications    Details   acyclovir (ZOVIRAX) 400 MG tablet Take 2 tablets (800 mg total) by mouth 5 (five) times a day for 10 days, Starting Sat 8/3/2024, Until Tue 8/13/2024, Normal      lidocaine (Lidoderm) 5 % Apply 2 patches topically over 12 hours daily for 10 days Remove & Discard patch within 12 hours or as directed by MD, Starting Sat 8/3/2024, Until Tue 8/13/2024, Normal           CONTINUE these medications which have NOT CHANGED    Details   !! Accu-Chek FastClix Lancets MISC TEST THREE TIMES A DAY, Normal      amLODIPine (NORVASC) 5 mg tablet swallow 1 tablet once daily, Starting Tue 4/2/2024, Normal      anastrozole (ARIMIDEX) 1 mg tablet Take 1 mg by mouth daily, Starting Tue 8/18/2020, Historical Med      aspirin (ECOTRIN LOW STRENGTH) 81 mg EC tablet Take 1 tablet (81 mg total) by mouth daily, Starting Tue 12/5/2023, Normal      atorvastatin (LIPITOR) 80 mg tablet Take 1 tablet (80 mg total) by mouth daily with dinner, Starting Mon 1/15/2024, Normal      benazepril (LOTENSIN) 20 mg tablet take 1 tablet by mouth once daily, Normal      betamethasone dipropionate (DIPROSONE) 0.05 % cream apply to affected area twice a day, Starting Mon 6/3/2024, Normal      betamethasone valerate (VALISONE) 0.1 % cream Apply 1 Application topically 2 (two) times a day, Starting Mon 7/6/2020, Until Mon 7/22/2024, Historical Med      Blood Glucose Monitoring Suppl (OneTouch Verio Flex System) w/Device KIT Test four times daily, Normal      Continuous Blood Gluc  (FreeStyle Zoya 3 [place]) SOFI To check blood sugars continuously, Normal      Continuous Blood Gluc Sensor (FreeStyle Zoya 3 Sensor) MISC To check blood sugar continuously and to change every 2 weeks, Normal       Empagliflozin (Jardiance) 25 MG TABS take 1 tablet by mouth every morning, Starting Mon 6/3/2024, Normal      ezetimibe (ZETIA) 10 mg tablet Take 1 tablet (10 mg total) by mouth daily at bedtime, Starting Tue 12/5/2023, Normal      famotidine (PEPCID) 20 mg tablet take 1 tablet by mouth twice a day before meals, Starting Thu 7/18/2024, Normal      glucose blood (OneTouch Verio) test strip as directed use 1 TEST STRIP to TEST BLOOD SUGAR four times a day, Normal      HYDROcodone-acetaminophen (Norco) 5-325 mg per tablet Take 1 tablet by mouth every 6 (six) hours as needed for pain (Initial therapy) Max Daily Amount: 4 tablets, Starting Wed 7/31/2024, Normal      insulin lispro (HumaLOG) 100 units/mL injection pen Pt takes 14 units plus sliding scale, up to 20 units before meals,, Fill Later      Insulin Pen Needle (BD Pen Needle Sadia 2nd Gen) 32G X 4 MM MISC Use 4 (four) times a day, Starting Mon 2/19/2024, Normal      lactulose (CHRONULAC) 10 g/15 mL solution Take 10 g by mouth as needed (constipation), Starting Sat 8/27/2022, Historical Med      Lantus SoloStar 100 units/mL SOPN Inject 50 Units as directed daily at bedtime, Starting Sat 12/30/2023, Historical Med      metoprolol succinate (TOPROL-XL) 100 mg 24 hr tablet Take 1 tablet (100 mg total) by mouth daily, Starting Tue 12/5/2023, Normal      nitroglycerin (NITROSTAT) 0.4 mg SL tablet Place 1 tablet (0.4 mg total) under the tongue every 5 (five) minutes as needed for chest pain If no relief after first dose call prescriber or 911, Starting Thu 6/8/2023, Normal      !! OneTouch Delica Lancets 33G MISC Use 4 (four) times a day Test 4 times daily, Starting Mon 2/19/2024, Normal      rOPINIRole (REQUIP) 1 mg tablet Take 1 tablet (1 mg total) by mouth daily, Starting Wed 10/16/2019, Normal      semaglutide, 1 mg/dose, (Ozempic, 1 MG/DOSE,) 4 mg/3 mL injection pen Inject 0.75 mL (1 mg total) under the skin every 7 days Do not start before August 16, 2024.,  Starting Fri 8/16/2024, Until Thu 11/14/2024, Normal      ticagrelor (BRILINTA) 90 MG Take 1 tablet (90 mg total) by mouth every 12 (twelve) hours, Starting Fri 1/19/2024, Normal      triamcinolone (KENALOG) 0.1 % cream Apply topically 2 (two) times a day, Starting Tue 6/18/2024, Normal       !! - Potential duplicate medications found. Please discuss with provider.          No discharge procedures on file.    PDMP Review         Value Time User    PDMP Reviewed  Yes 7/31/2024  2:52 PM Tuan Leahy PA-C            ED Provider  Electronically Signed by             Jt Bryant Jr.,   08/03/24 2018

## 2024-08-03 NOTE — DISCHARGE INSTRUCTIONS
Return to the ER for any new, concerning, or worsening issues.    Use Lidoderm patches to the area as discussed for pain control.    Use

## 2024-08-06 ENCOUNTER — TELEPHONE (OUTPATIENT)
Age: 64
End: 2024-08-06

## 2024-08-06 NOTE — TELEPHONE ENCOUNTER
Patient was scheduled for appointment on 8/7/24 with Barbara last week due to lt shoulder pain.  She did go to the ER on 8/3/24 for pain as well as burning rashes on her lt side and was subsequently diagnosed with shingles.  She was given medication as well as patches for pain which she states is not doing anything for the pain.  Patient wanted to make office aware of her new diagnosis before coming in to her appointment tomorrow.

## 2024-08-07 ENCOUNTER — OFFICE VISIT (OUTPATIENT)
Dept: FAMILY MEDICINE CLINIC | Facility: CLINIC | Age: 64
End: 2024-08-07
Payer: COMMERCIAL

## 2024-08-07 VITALS
TEMPERATURE: 97.3 F | OXYGEN SATURATION: 99 % | WEIGHT: 154.2 LBS | SYSTOLIC BLOOD PRESSURE: 146 MMHG | BODY MASS INDEX: 29.11 KG/M2 | HEIGHT: 61 IN | DIASTOLIC BLOOD PRESSURE: 70 MMHG | HEART RATE: 83 BPM

## 2024-08-07 DIAGNOSIS — E11.9 TYPE 2 DIABETES MELLITUS WITHOUT COMPLICATION, WITH LONG-TERM CURRENT USE OF INSULIN (HCC): ICD-10-CM

## 2024-08-07 DIAGNOSIS — B02.29 POST HERPETIC NEURALGIA: Primary | ICD-10-CM

## 2024-08-07 DIAGNOSIS — I74.10 AORTIC THROMBUS (HCC): ICD-10-CM

## 2024-08-07 DIAGNOSIS — Z23 ENCOUNTER FOR IMMUNIZATION: ICD-10-CM

## 2024-08-07 DIAGNOSIS — Z79.4 TYPE 2 DIABETES MELLITUS WITHOUT COMPLICATION, WITH LONG-TERM CURRENT USE OF INSULIN (HCC): ICD-10-CM

## 2024-08-07 DIAGNOSIS — R21 RASH: ICD-10-CM

## 2024-08-07 PROCEDURE — 99214 OFFICE O/P EST MOD 30 MIN: CPT

## 2024-08-07 RX ORDER — GABAPENTIN 300 MG/1
300 CAPSULE ORAL 2 TIMES DAILY
Qty: 60 CAPSULE | Refills: 5 | Status: SHIPPED | OUTPATIENT
Start: 2024-08-07 | End: 2024-08-09 | Stop reason: ALTCHOICE

## 2024-08-07 RX ORDER — NYSTATIN 100000 U/G
CREAM TOPICAL 2 TIMES DAILY
Qty: 30 G | Refills: 2 | Status: SHIPPED | OUTPATIENT
Start: 2024-08-07

## 2024-08-07 NOTE — PROGRESS NOTES
Ambulatory Visit  Name: Lala Allen      : 1960      MRN: [id number]  Encounter Provider: RUBEN Jackson  Encounter Date: 2024   Encounter department: [facility]    Assessment & Plan   1. Post herpetic neuralgia  -     gabapentin (NEURONTIN) 300 mg capsule; Take 1 capsule (300 mg total) by mouth 2 (two) times a day Take 1 capsule (300 mg total) by mouth on day 1. Take 1 capsule (300 mg total) by mouth 2 (two) times a day on day 2 and continue this dosing daily.  2. Aortic thrombus (HCC)  Comments:  Follow ups with Cardiology & Vascular in place.  3. Type 2 diabetes mellitus without complication, with long-term current use of insulin (HCC)  -     Glucometer  4. Rash  -     nystatin (MYCOSTATIN) cream; Apply topically 2 (two) times a day  5. Encounter for immunization  -     Pneumococcal Conjugate Vaccine 20-valent (Pcv20)  -     Zoster Vaccine Recombinant IM       History of Present Illness     Presents for f/u on shingles - complains of L shoulder pain  States Lidocaine patches aren't helping and don't stick to her skin  BP elevated in the setting of pain - reports compliance with medications  Denies CP or SOB; denies dizziness or lightheadedness          Review of Systems   Constitutional:  Negative for chills, fatigue and fever.   HENT:  Negative for congestion, ear pain, facial swelling, hearing loss, rhinorrhea, sinus pressure, sneezing, sore throat and trouble swallowing.    Eyes:  Negative for pain, redness and visual disturbance.   Respiratory:  Negative for cough, chest tightness, shortness of breath and wheezing.    Cardiovascular:  Negative for chest pain and palpitations.   Gastrointestinal:  Negative for abdominal pain, diarrhea, nausea and vomiting.   Genitourinary:  Negative for dysuria, flank pain, hematuria and pelvic pain.   Musculoskeletal:  Positive for arthralgias. Negative for back pain and myalgias.   Skin:  Positive for rash. Negative for  "color change.   Neurological:  Negative for dizziness, seizures, syncope, weakness, light-headedness and headaches.   Psychiatric/Behavioral:  Negative for confusion, hallucinations and sleep disturbance. The patient is not nervous/anxious.    All other systems reviewed and are negative.      Objective     /70   Pulse 83   Temp (!) 97.3 °F (36.3 °C) (Tympanic)   Ht 5' 1\" (1.549 m)   Wt 69.9 kg (154 lb 3.2 oz)   SpO2 99%   BMI 29.14 kg/m²     Physical Exam  Vitals and nursing note reviewed.   Constitutional:       General: She is not in acute distress.     Appearance: She is well-developed.   HENT:      Head: Normocephalic and atraumatic.      Right Ear: Hearing and tympanic membrane normal.      Left Ear: Hearing and tympanic membrane normal.      Nose: Nose normal.      Mouth/Throat:      Mouth: Mucous membranes are moist.      Dentition: Normal dentition.      Tongue: No lesions.      Pharynx: Oropharynx is clear. Uvula midline. No oropharyngeal exudate.      Tonsils: No tonsillar exudate.   Eyes:      Extraocular Movements: Extraocular movements intact.      Conjunctiva/sclera: Conjunctivae normal.   Neck:      Vascular: No carotid bruit or JVD.   Cardiovascular:      Rate and Rhythm: Normal rate and regular rhythm.      Heart sounds: S1 normal and S2 normal. No murmur heard.  Pulmonary:      Effort: Pulmonary effort is normal. No tachypnea or respiratory distress.      Breath sounds: Normal breath sounds and air entry. No decreased breath sounds.   Chest:      Chest wall: No deformity or tenderness.   Abdominal:      General: Abdomen is flat. Bowel sounds are normal. There is no distension.      Palpations: Abdomen is soft.      Tenderness: There is no abdominal tenderness. There is no right CVA tenderness, left CVA tenderness or guarding.   Musculoskeletal:         General: No swelling.      Left shoulder: Tenderness (generalized) present.      Cervical back: Full passive range of motion without " pain and neck supple.      Right lower leg: No edema.      Left lower leg: No edema.   Lymphadenopathy:      Cervical: No cervical adenopathy.   Skin:     General: Skin is warm and dry.      Capillary Refill: Capillary refill takes less than 2 seconds.      Findings: Rash present.             Comments:   Rash consistent with herpes zoster/T3/T4 dermatome pattern  +crusted   Neurological:      Mental Status: She is alert and oriented to person, place, and time.      Cranial Nerves: Cranial nerves 2-12 are intact.      Sensory: Sensation is intact.      Motor: Motor function is intact.      Coordination: Coordination is intact.      Gait: Gait is intact.   Psychiatric:         Mood and Affect: Mood normal.         Behavior: Behavior is cooperative.       Administrative Statements

## 2024-08-09 ENCOUNTER — TELEPHONE (OUTPATIENT)
Age: 64
End: 2024-08-09

## 2024-08-09 DIAGNOSIS — I74.10 AORTIC THROMBUS (HCC): Primary | ICD-10-CM

## 2024-08-09 DIAGNOSIS — B02.29 POST HERPETIC NEURALGIA: ICD-10-CM

## 2024-08-09 RX ORDER — PREGABALIN 75 MG/1
75 CAPSULE ORAL 2 TIMES DAILY
Qty: 14 CAPSULE | Refills: 0 | Status: SHIPPED | OUTPATIENT
Start: 2024-08-09 | End: 2024-08-16

## 2024-08-09 NOTE — TELEPHONE ENCOUNTER
"Attempted to call pt's phone 2 times, both times an automated system said \"This caller is currently not excepting any calls\". Will send a [employer] message.   "

## 2024-08-09 NOTE — TELEPHONE ENCOUNTER
Pt's spouse calls in on behalf of pt to let you know that gabapentin has not been effective in helping with pt's pain.

## 2024-08-14 DIAGNOSIS — E11.8 TYPE 2 DIABETES MELLITUS WITH COMPLICATION, WITH LONG-TERM CURRENT USE OF INSULIN (HCC): ICD-10-CM

## 2024-08-14 DIAGNOSIS — Z79.4 TYPE 2 DIABETES MELLITUS WITH COMPLICATION, WITH LONG-TERM CURRENT USE OF INSULIN (HCC): ICD-10-CM

## 2024-08-15 ENCOUNTER — TELEPHONE (OUTPATIENT)
Dept: CARDIOLOGY CLINIC | Facility: CLINIC | Age: 64
End: 2024-08-15

## 2024-08-15 DIAGNOSIS — I21.4 NSTEMI (NON-ST ELEVATED MYOCARDIAL INFARCTION) (HCC): ICD-10-CM

## 2024-08-15 DIAGNOSIS — I10 ESSENTIAL HYPERTENSION: ICD-10-CM

## 2024-08-15 RX ORDER — PEN NEEDLE, DIABETIC 32GX 5/32"
NEEDLE, DISPOSABLE MISCELLANEOUS
Qty: 100 EACH | Refills: 5 | Status: SHIPPED | OUTPATIENT
Start: 2024-08-15

## 2024-08-15 NOTE — TELEPHONE ENCOUNTER
Patient called to request a refill on ticagrelor (BRILINTA) 90 MG  Patient states she is out of the medication.   Please send to  [id number] - [place] - [address]  [phone]

## 2024-08-15 NOTE — TELEPHONE ENCOUNTER
Patients  calling to refill metoprolol 100mg for patient. States she gets this regularly however, most recent script in chart shows it was prescribed from hospital visit on 12/05/2023.  states patients script comes from Dr. Toribio. Unclear where script is coming from or if patient should be continuing on the medication. Please review and send script if appropriate.     Patient only has 3 pills left and uses [facility] on [address] [place] [address].

## 2024-08-16 RX ORDER — METOPROLOL SUCCINATE 100 MG/1
100 TABLET, EXTENDED RELEASE ORAL DAILY
Qty: 90 TABLET | Refills: 3 | Status: SHIPPED | OUTPATIENT
Start: 2024-08-16

## 2024-08-19 ENCOUNTER — OFFICE VISIT (OUTPATIENT)
Dept: CARDIOLOGY CLINIC | Facility: CLINIC | Age: 64
End: 2024-08-19
Payer: COMMERCIAL

## 2024-08-19 VITALS
DIASTOLIC BLOOD PRESSURE: 80 MMHG | BODY MASS INDEX: 29.27 KG/M2 | RESPIRATION RATE: 14 BRPM | WEIGHT: 155 LBS | HEART RATE: 80 BPM | HEIGHT: 61 IN | SYSTOLIC BLOOD PRESSURE: 140 MMHG

## 2024-08-19 DIAGNOSIS — E78.2 MIXED HYPERLIPIDEMIA: ICD-10-CM

## 2024-08-19 DIAGNOSIS — I25.10 CORONARY ARTERY DISEASE INVOLVING NATIVE CORONARY ARTERY OF NATIVE HEART WITHOUT ANGINA PECTORIS: Primary | ICD-10-CM

## 2024-08-19 DIAGNOSIS — I10 ESSENTIAL HYPERTENSION: ICD-10-CM

## 2024-08-19 PROCEDURE — 99214 OFFICE O/P EST MOD 30 MIN: CPT | Performed by: INTERNAL MEDICINE

## 2024-08-19 NOTE — PROGRESS NOTES
" Patient ID: Lala Allen is a 64 y.o. female.        Plan:      Coronary artery disease involving native coronary artery of native heart  No recent symptoms.    Mixed hyperlipidemia  Tolerating high-intensity statin therapy and will continue current regimen.      Essential hypertension  Labile but ok.       Follow up Plan/Other summary comments:  Patient has lots of nausea since starting Ozempic and she is going to speak further with her primary care provider about this if this persists.    HPI: Patient seen in follow-up today regarding the above.  She had recent bout of shingles and is still recovering.  No overt angina.    To reiterate, in November of 2018 heart catheterization revealed normal LAD and right coronary artery.  There was a 90% marginal stenosis treated with plain old balloon angioplasty.  A posterolateral branch was occluded.      Most recent or relevant cardiac/vascular testing:    Myoview 05/31/2019:  Normal.      Past Surgical History:   Procedure Laterality Date    ABDOMINAL SURGERY      phill    ANKLE SURGERY      tubal    BREAST SURGERY      partial mastectomy R breast    CARDIAC CATHETERIZATION  11/2018    Successful balloon angioplassty to OM1    CARDIAC CATHETERIZATION  12/4/2023    Procedure: Cardiac catheterization;  Surgeon: Flex Darby MD;  Location: [facility];  Service: Cardiology    CHOLECYSTECTOMY      FRACTURE SURGERY      right ankle    GALLBLADDER SURGERY      MASTECTOMY      TUBAL LIGATION         Lipid Profile: Reviewed      Review of Systems   10  point ROS  was otherwise non pertinent or negative except as per HPI or as below.   Gait:  Normal.        Objective:     /80   Pulse 80   Resp 14   Ht 5' 1\" (1.549 m)   Wt 70.3 kg (155 lb)   BMI 29.29 kg/m²     PHYSICAL EXAM:    General:  Normal appearance in no distress.  Eyes:  Anicteric.  Oral mucosa:  Moist.  Neck:  No JVD. Carotid upstrokes are brisk without bruits.  No masses.  Chest:  Clear to " auscultation.  Cardiac:  No palpable PMI.  Normal S1 and S2.  No murmur gallop or rub.  Abdomen:  Soft and nontender. No palpable organomegaly or aortic enlargement.  Extremities:  No peripheral edema.  Musculoskeletal:  Symmetric.   Vascular:  Femoral pulses are brisk without bruits.  Popliteal pulses are intact bilaterally.   Pedal pulses are intact.  Neuro:  Grossly symmetric.  Psych:  Alert and oriented x3.      Meds reviewed.    Past Medical History:   Diagnosis Date    CAD (coronary artery disease)     Cancer ([facility])     right breast    Chronic back pain     Coronary artery disease     Diabetes mellitus ([facility])     Family history of early CAD     Hyperlipidemia     Hypertension     Non-ST elevation MI (NSTEMI) ([facility]) 12/02/2023    Subsequent INNA placed in mid LAD 80 and 2 INNA placed in second diagonal 12/4/23    NSTEMI (non-ST elevated myocardial infarction) ([facility]) 11/28/2018    Status post coronary angioplasty 1st obtuse marginal 11/28/2018 at [facility] stent could not be placed    Restless leg syndrome     Tuberculosis     patient was less than 5 years old    Type 2 diabetes mellitus ([facility])            Social History     Tobacco Use   Smoking Status Never   Smokeless Tobacco Never

## 2024-08-20 DIAGNOSIS — I10 ESSENTIAL HYPERTENSION: ICD-10-CM

## 2024-08-20 RX ORDER — BENAZEPRIL HYDROCHLORIDE 20 MG/1
TABLET ORAL
Qty: 90 TABLET | Refills: 1 | Status: SHIPPED | OUTPATIENT
Start: 2024-08-20

## 2024-08-27 DIAGNOSIS — B02.29 POST HERPETIC NEURALGIA: ICD-10-CM

## 2024-08-27 NOTE — TELEPHONE ENCOUNTER
Pt is still experiencing nerve pain despite shingles almost fully resolved.  Pt states her last dose of Lyrica was 2 days ago 8/25/24.  States Lyrica was effective for her pain.  Inquiring if PCP would be agreeable to refill another short term prescription of medication or if an alternative medication is recommended.  Please give pt a call back with recommendations.    Medication: Lyrica 75mg    Dose/Frequency: 1 capsule by mouth 2 times a day for 7 days    Quantity: 14    Pharmacy: [facility]    Office:   [x] PCP/Provider -   [] Speciality/Provider -     Does the patient have enough for 3 days?   [] Yes   [x] No - Send as HP to POD

## 2024-08-28 ENCOUNTER — TELEPHONE (OUTPATIENT)
Dept: FAMILY MEDICINE CLINIC | Facility: CLINIC | Age: 64
End: 2024-08-28

## 2024-08-28 RX ORDER — PREGABALIN 75 MG/1
150 CAPSULE ORAL 2 TIMES DAILY
Qty: 120 CAPSULE | Refills: 0 | Status: SHIPPED | OUTPATIENT
Start: 2024-08-28 | End: 2024-08-30 | Stop reason: SDUPTHER

## 2024-08-28 NOTE — TELEPHONE ENCOUNTER
Pt called in for a medication refill and this is the providers message on the request.     RUBEN Jackson MA  Caller: Unspecified (Yesterday, 10:06 AM)  I will increase her prescription to 150 mg BID, totaling 300 mg a day for a month. Please notify our office in any changes in side effects.

## 2024-08-29 NOTE — TELEPHONE ENCOUNTER
called today stating pt is in pain and was not able to  the Lyrica sent to the pharmacy due to them needing clarification. Was told PCP needed to call pharmacy before they could fill it.     Please advise

## 2024-08-30 DIAGNOSIS — B02.29 POST HERPETIC NEURALGIA: ICD-10-CM

## 2024-08-30 RX ORDER — PREGABALIN 100 MG/1
100 CAPSULE ORAL 3 TIMES DAILY
Qty: 90 CAPSULE | Refills: 0 | Status: SHIPPED | OUTPATIENT
Start: 2024-08-30 | End: 2024-09-29

## 2024-08-30 NOTE — TELEPHONE ENCOUNTER
I spoke with the pharmacy and they said the 75mg for the pregablin would need a prior auth because insurance will only cover 3 capsules a day not 4. Pharmacy suggested sending over 100mg tablets instead. Please advise

## 2024-09-24 DIAGNOSIS — I10 ESSENTIAL HYPERTENSION: ICD-10-CM

## 2024-09-24 RX ORDER — AMLODIPINE BESYLATE 5 MG/1
5 TABLET ORAL DAILY
Qty: 90 TABLET | Refills: 1 | Status: SHIPPED | OUTPATIENT
Start: 2024-09-24

## 2024-10-07 DIAGNOSIS — E11.65 UNCONTROLLED TYPE 2 DIABETES MELLITUS WITH HYPERGLYCEMIA (HCC): ICD-10-CM

## 2024-10-08 ENCOUNTER — APPOINTMENT (OUTPATIENT)
Age: 64
End: 2024-10-08
Payer: COMMERCIAL

## 2024-10-08 DIAGNOSIS — I10 ESSENTIAL HYPERTENSION: ICD-10-CM

## 2024-10-08 DIAGNOSIS — Z79.4 TYPE 2 DIABETES MELLITUS WITHOUT COMPLICATION, WITH LONG-TERM CURRENT USE OF INSULIN (HCC): Chronic | ICD-10-CM

## 2024-10-08 DIAGNOSIS — E11.9 TYPE 2 DIABETES MELLITUS WITHOUT COMPLICATION, WITH LONG-TERM CURRENT USE OF INSULIN (HCC): Chronic | ICD-10-CM

## 2024-10-08 DIAGNOSIS — E78.2 MIXED HYPERLIPIDEMIA: ICD-10-CM

## 2024-10-08 DIAGNOSIS — E11.65 UNCONTROLLED TYPE 2 DIABETES MELLITUS WITH HYPERGLYCEMIA (HCC): ICD-10-CM

## 2024-10-08 LAB
ALBUMIN SERPL BCG-MCNC: 4.4 G/DL (ref 3.5–5)
ALP SERPL-CCNC: 122 U/L (ref 34–104)
ALT SERPL W P-5'-P-CCNC: 15 U/L (ref 7–52)
ANION GAP SERPL CALCULATED.3IONS-SCNC: 11 MMOL/L (ref 4–13)
AST SERPL W P-5'-P-CCNC: 12 U/L (ref 13–39)
BASOPHILS # BLD AUTO: 0.05 THOUSANDS/ΜL (ref 0–0.1)
BASOPHILS NFR BLD AUTO: 1 % (ref 0–1)
BILIRUB SERPL-MCNC: 0.69 MG/DL (ref 0.2–1)
BUN SERPL-MCNC: 17 MG/DL (ref 5–25)
CALCIUM SERPL-MCNC: 9.9 MG/DL (ref 8.4–10.2)
CHLORIDE SERPL-SCNC: 102 MMOL/L (ref 96–108)
CHOLEST SERPL-MCNC: 298 MG/DL
CO2 SERPL-SCNC: 25 MMOL/L (ref 21–32)
CREAT SERPL-MCNC: 0.86 MG/DL (ref 0.6–1.3)
CREAT UR-MCNC: 63.6 MG/DL
EOSINOPHIL # BLD AUTO: 0.15 THOUSAND/ΜL (ref 0–0.61)
EOSINOPHIL NFR BLD AUTO: 1 % (ref 0–6)
ERYTHROCYTE [DISTWIDTH] IN BLOOD BY AUTOMATED COUNT: 13.7 % (ref 11.6–15.1)
EST. AVERAGE GLUCOSE BLD GHB EST-MCNC: 249 MG/DL
GFR SERPL CREATININE-BSD FRML MDRD: 71 ML/MIN/1.73SQ M
GLUCOSE P FAST SERPL-MCNC: 245 MG/DL (ref 65–99)
HBA1C MFR BLD: 10.3 %
HCT VFR BLD AUTO: 45.8 % (ref 34.8–46.1)
HDLC SERPL-MCNC: 43 MG/DL
HGB BLD-MCNC: 15.4 G/DL (ref 11.5–15.4)
IMM GRANULOCYTES # BLD AUTO: 0.02 THOUSAND/UL (ref 0–0.2)
IMM GRANULOCYTES NFR BLD AUTO: 0 % (ref 0–2)
LDLC SERPL DIRECT ASSAY-MCNC: 179 MG/DL (ref 0–100)
LYMPHOCYTES # BLD AUTO: 2.44 THOUSANDS/ΜL (ref 0.6–4.47)
LYMPHOCYTES NFR BLD AUTO: 24 % (ref 14–44)
MCH RBC QN AUTO: 30.5 PG (ref 26.8–34.3)
MCHC RBC AUTO-ENTMCNC: 33.6 G/DL (ref 31.4–37.4)
MCV RBC AUTO: 91 FL (ref 82–98)
MICROALBUMIN UR-MCNC: 14.1 MG/L
MICROALBUMIN/CREAT 24H UR: 22 MG/G CREATININE (ref 0–30)
MONOCYTES # BLD AUTO: 0.61 THOUSAND/ΜL (ref 0.17–1.22)
MONOCYTES NFR BLD AUTO: 6 % (ref 4–12)
NEUTROPHILS # BLD AUTO: 7.11 THOUSANDS/ΜL (ref 1.85–7.62)
NEUTS SEG NFR BLD AUTO: 68 % (ref 43–75)
NRBC BLD AUTO-RTO: 0 /100 WBCS
PLATELET # BLD AUTO: 340 THOUSANDS/UL (ref 149–390)
PMV BLD AUTO: 10.7 FL (ref 8.9–12.7)
POTASSIUM SERPL-SCNC: 3.9 MMOL/L (ref 3.5–5.3)
PROT SERPL-MCNC: 8 G/DL (ref 6.4–8.4)
RBC # BLD AUTO: 5.05 MILLION/UL (ref 3.81–5.12)
SODIUM SERPL-SCNC: 138 MMOL/L (ref 135–147)
TRIGL SERPL-MCNC: 401 MG/DL
TSH SERPL DL<=0.05 MIU/L-ACNC: 2.54 UIU/ML (ref 0.45–4.5)
WBC # BLD AUTO: 10.38 THOUSAND/UL (ref 4.31–10.16)

## 2024-10-08 PROCEDURE — 36415 COLL VENOUS BLD VENIPUNCTURE: CPT

## 2024-10-08 PROCEDURE — 85025 COMPLETE CBC W/AUTO DIFF WBC: CPT

## 2024-10-08 PROCEDURE — 80061 LIPID PANEL: CPT

## 2024-10-08 PROCEDURE — 82043 UR ALBUMIN QUANTITATIVE: CPT

## 2024-10-08 PROCEDURE — 82306 VITAMIN D 25 HYDROXY: CPT

## 2024-10-08 PROCEDURE — 84443 ASSAY THYROID STIM HORMONE: CPT

## 2024-10-08 PROCEDURE — 80053 COMPREHEN METABOLIC PANEL: CPT

## 2024-10-08 PROCEDURE — 82570 ASSAY OF URINE CREATININE: CPT

## 2024-10-08 PROCEDURE — 83721 ASSAY OF BLOOD LIPOPROTEIN: CPT

## 2024-10-08 PROCEDURE — 83036 HEMOGLOBIN GLYCOSYLATED A1C: CPT

## 2024-10-09 ENCOUNTER — TELEPHONE (OUTPATIENT)
Dept: CARDIOLOGY CLINIC | Facility: CLINIC | Age: 64
End: 2024-10-09

## 2024-10-09 ENCOUNTER — TELEPHONE (OUTPATIENT)
Dept: ENDOCRINOLOGY | Facility: CLINIC | Age: 64
End: 2024-10-09

## 2024-10-09 ENCOUNTER — OFFICE VISIT (OUTPATIENT)
Dept: ENDOCRINOLOGY | Facility: CLINIC | Age: 64
End: 2024-10-09
Payer: COMMERCIAL

## 2024-10-09 VITALS
BODY MASS INDEX: 28.25 KG/M2 | WEIGHT: 149.6 LBS | TEMPERATURE: 97.8 F | SYSTOLIC BLOOD PRESSURE: 140 MMHG | HEART RATE: 108 BPM | DIASTOLIC BLOOD PRESSURE: 77 MMHG | OXYGEN SATURATION: 98 % | HEIGHT: 61 IN

## 2024-10-09 DIAGNOSIS — E78.2 MIXED HYPERLIPIDEMIA: Primary | ICD-10-CM

## 2024-10-09 DIAGNOSIS — E11.9 TYPE 2 DIABETES MELLITUS WITHOUT COMPLICATION, WITH LONG-TERM CURRENT USE OF INSULIN (HCC): Primary | Chronic | ICD-10-CM

## 2024-10-09 DIAGNOSIS — E78.2 MIXED HYPERLIPIDEMIA: ICD-10-CM

## 2024-10-09 DIAGNOSIS — E11.9 TYPE 2 DIABETES MELLITUS WITHOUT COMPLICATION, WITH LONG-TERM CURRENT USE OF INSULIN (HCC): Chronic | ICD-10-CM

## 2024-10-09 DIAGNOSIS — I10 ESSENTIAL HYPERTENSION: ICD-10-CM

## 2024-10-09 DIAGNOSIS — Z79.4 TYPE 2 DIABETES MELLITUS WITHOUT COMPLICATION, WITH LONG-TERM CURRENT USE OF INSULIN (HCC): Primary | Chronic | ICD-10-CM

## 2024-10-09 DIAGNOSIS — Z79.4 TYPE 2 DIABETES MELLITUS WITHOUT COMPLICATION, WITH LONG-TERM CURRENT USE OF INSULIN (HCC): Chronic | ICD-10-CM

## 2024-10-09 LAB — 25(OH)D3 SERPL-MCNC: 24 NG/ML (ref 30–100)

## 2024-10-09 PROCEDURE — 99214 OFFICE O/P EST MOD 30 MIN: CPT | Performed by: STUDENT IN AN ORGANIZED HEALTH CARE EDUCATION/TRAINING PROGRAM

## 2024-10-09 RX ORDER — ACYCLOVIR 400 MG/1
1 TABLET ORAL
Qty: 9 EACH | Refills: 2 | Status: SHIPPED | OUTPATIENT
Start: 2024-10-09

## 2024-10-09 RX ORDER — INSULIN GLARGINE 100 [IU]/ML
50 INJECTION, SOLUTION SUBCUTANEOUS
Qty: 45 ML | Refills: 0 | Status: SHIPPED | OUTPATIENT
Start: 2024-10-09 | End: 2025-01-07

## 2024-10-09 RX ORDER — INSULIN LISPRO 100 [IU]/ML
INJECTION, SOLUTION INTRAVENOUS; SUBCUTANEOUS
Qty: 36 ML | Refills: 1 | Status: SHIPPED | OUTPATIENT
Start: 2024-10-09

## 2024-10-09 RX ORDER — ACYCLOVIR 400 MG/1
1 TABLET ORAL CONTINUOUS
Qty: 1 EACH | Refills: 0 | Status: SHIPPED | OUTPATIENT
Start: 2024-10-09

## 2024-10-09 NOTE — ASSESSMENT & PLAN NOTE
Lab Results   Component Value Date    HGBA1C 10.3 (H) 10/08/2024       Diabetes remains uncontrolled, we honest conversation with Lala, that continuing current glycemic control, will end up with more diabetes related complications.  She believes that dietary indiscretion are playing a role, I recommended her to meet our educator, she already had 1 session, never followed up and she is reluctant to make an follow-up visit.  She could not get Ozempic due to shortage, Ozempic sample was provided from office, 0.25 mg weekly for 2 weeks and then 0.5 mg weekly, then will be increased to 1 mg weekly.  I increased Humalog to 14 units before meals and in addition to sliding scale.  Continue Lantus 50 units nightly.  Continue Jardiance 25 mg daily.  She could not get bushra 3 sensor due to shortage, I ordered Dexcom G7 sensor and reader, she will call us for training if she needs.  Ophthalmology and podiatry follow-up  Return back in 3 months with  Labs prior to next visit.  Orders:    Continuous Glucose  (Dexcom G7 ) SOFI; Use 1 each continuous    Continuous Glucose Sensor (Dexcom G7 Sensor); Use 1 Device every 10 days    insulin lispro (HumaLOG) 100 units/mL injection pen; Pt takes 14 units plus sliding scale, up to 20 units before meals,    Insulin Glargine Solostar (Lantus SoloStar) 100 UNIT/ML SOPN; Inject 0.5 mL (50 Units total) under the skin daily at bedtime    Hemoglobin A1C; Future    Comprehensive metabolic panel; Future    Lipid Panel with Direct LDL reflex; Future

## 2024-10-09 NOTE — PROGRESS NOTES
Ambulatory Visit  Name: Lala Allen      : 1960      MRN: [id number]  Encounter Provider: Andrés Archer MD  Encounter Date: 10/9/2024   Encounter department: [facility] [place]    Assessment & Plan  Type 2 diabetes mellitus without complication, with long-term current use of insulin ([facility])    Lab Results   Component Value Date    HGBA1C 10.3 (H) 10/08/2024       Diabetes remains uncontrolled, we honest conversation with Lala, that continuing current glycemic control, will end up with more diabetes related complications.  She believes that dietary indiscretion are playing a role, I recommended her to meet our educator, she already had 1 session, never followed up and she is reluctant to make an follow-up visit.  She could not get Ozempic due to shortage, Ozempic sample was provided from office, 0.25 mg weekly for 2 weeks and then 0.5 mg weekly, then will be increased to 1 mg weekly.  I increased Humalog to 14 units before meals and in addition to sliding scale.  Continue Lantus 50 units nightly.  Continue Jardiance 25 mg daily.  She could not get bushra 3 sensor due to shortage, I ordered Dexcom G7 sensor and reader, she will call us for training if she needs.  Ophthalmology and podiatry follow-up  Return back in 3 months with  Labs prior to next visit.  Orders:    Continuous Glucose  (Dexcom G7 ) SOFI; Use 1 each continuous    Continuous Glucose Sensor (Dexcom G7 Sensor); Use 1 Device every 10 days    insulin lispro (HumaLOG) 100 units/mL injection pen; Pt takes 14 units plus sliding scale, up to 20 units before meals,    Insulin Glargine Solostar (Lantus SoloStar) 100 UNIT/ML SOPN; Inject 0.5 mL (50 Units total) under the skin daily at bedtime    Hemoglobin A1C; Future    Comprehensive metabolic panel; Future    Lipid Panel with Direct LDL reflex; Future    Type 2 diabetes mellitus without complication, with long-term current use of insulin  ([facility])    Lab Results   Component Value Date    HGBA1C 10.3 (H) 10/08/2024       Orders:    Continuous Glucose  (Dexcom G7 ) SOFI; Use 1 each continuous    Continuous Glucose Sensor (Dexcom G7 Sensor); Use 1 Device every 10 days    insulin lispro (HumaLOG) 100 units/mL injection pen; Pt takes 14 units plus sliding scale, up to 20 units before meals,    Insulin Glargine Solostar (Lantus SoloStar) 100 UNIT/ML SOPN; Inject 0.5 mL (50 Units total) under the skin daily at bedtime    Hemoglobin A1C; Future    Comprehensive metabolic panel; Future    Lipid Panel with Direct LDL reflex; Future    Essential hypertension  Blood pressure is above goal 140/77, the goal less than 130/80.  She will need to monitor blood pressure at home, if remains consistent above goal, to upgrade her antihypertensive medications.    Orders:    Comprehensive metabolic panel; Future    Mixed hyperlipidemia  Hyperlipidemia has worsened, with , she is on maximum dose of statin as well as Zetia, dietary changes including cutting down on red meat, processed meat and full fat dairy discussed.  Check lipid profile before next visit.    Orders:    Lipid Panel with Direct LDL reflex; Future      History of Present Illness     Lala Allen is a 64 y.o. female who presents for follow up for type 2 diabetes with long term current use of insulin.        Jardiance 25 mg daily.  Ozempic 0.25 mg weekly which she did not take for the last few weeks.  Lantus 50 units nightly  Humalog 10 units with meals plus sliding scale. On average 14 units before meals,    Diabetes is complicated by NSTEMI s/p INNA placement in mid LAD.     on ACE inhibitor or ARB: Benazepril 20 mg daily  on statin: Lipitor 80 mg and Zetia 10 mg daily.     Thyroid disorders: No.  History of pancreatitis: History of biliary pancreatitis status post cholecystectomy.    History obtained from : patient  Review of Systems   Constitutional:  Positive for fatigue and  unexpected weight change. Negative for appetite change.   HENT:  Negative for trouble swallowing and voice change.    Cardiovascular:  Negative for chest pain and palpitations.   Gastrointestinal:  Negative for abdominal pain, constipation, diarrhea, nausea and vomiting.   Endocrine: Negative for cold intolerance, heat intolerance, polydipsia, polyphagia and polyuria.   Psychiatric/Behavioral:  Positive for sleep disturbance.      Medical History Reviewed by provider this encounter:       Current Outpatient Medications on File Prior to Visit   Medication Sig Dispense Refill    Accu-Chek FastClix Lancets MISC TEST THREE TIMES A  each 0    acyclovir (ZOVIRAX) 400 MG tablet Take 2 tablets (800 mg total) by mouth 5 (five) times a day for 10 days 100 tablet 0    amLODIPine (NORVASC) 5 mg tablet swallow 1 tablet once daily 90 tablet 1    aspirin (ECOTRIN LOW STRENGTH) 81 mg EC tablet Take 1 tablet (81 mg total) by mouth daily 30 tablet 0    atorvastatin (LIPITOR) 80 mg tablet Take 1 tablet (80 mg total) by mouth daily with dinner 90 tablet 5    benazepril (LOTENSIN) 20 mg tablet take 1 tablet by mouth once daily 90 tablet 1    betamethasone dipropionate (DIPROSONE) 0.05 % cream apply to affected area twice a day 30 g 0    betamethasone valerate (VALISONE) 0.1 % cream Apply 1 Application topically 2 (two) times a day      Empagliflozin (Jardiance) 25 MG TABS take 1 tablet by mouth every morning 90 tablet 1    ezetimibe (ZETIA) 10 mg tablet Take 1 tablet (10 mg total) by mouth daily at bedtime 30 tablet 0    famotidine (PEPCID) 20 mg tablet take 1 tablet by mouth twice a day before meals (Patient taking differently: Take 20 mg by mouth 2 (two) times a day before meals As needed) 60 tablet 5    glucose blood (OneTouch Verio) test strip as directed use 1 TEST STRIP to TEST BLOOD SUGAR four times a day 200 strip 1    HYDROcodone-acetaminophen (Norco) 5-325 mg per tablet Take 1 tablet by mouth every 6 (six) hours as  needed for pain (Initial therapy) Max Daily Amount: 4 tablets 8 tablet 0    Insulin Pen Needle (BD Pen Needle Sadia 2nd Gen) 32G X 4 MM MISC use 1 PEN NEEDLE to inject MEDICATION subcutaneously four times a day 100 each 5    lactulose (CHRONULAC) 10 g/15 mL solution Take 10 g by mouth as needed (constipation)      lidocaine (Lidoderm) 5 % Apply 2 patches topically over 12 hours daily for 10 days Remove & Discard patch within 12 hours or as directed by MD 20 patch 0    metoprolol succinate (TOPROL-XL) 100 mg 24 hr tablet Take 1 tablet (100 mg total) by mouth daily 90 tablet 3    nitroglycerin (NITROSTAT) 0.4 mg SL tablet Place 1 tablet (0.4 mg total) under the tongue every 5 (five) minutes as needed for chest pain If no relief after first dose call prescriber or 911 25 tablet 0    nystatin (MYCOSTATIN) cream Apply topically 2 (two) times a day 30 g 2    OneTouch Delica Lancets 33G MISC Use 4 (four) times a day Test 4 times daily 300 each 2    pregabalin (LYRICA) 100 mg capsule Take 1 capsule (100 mg total) by mouth 3 (three) times a day 90 capsule 0    rOPINIRole (REQUIP) 1 mg tablet Take 1 tablet (1 mg total) by mouth daily 90 tablet 1    triamcinolone (KENALOG) 0.1 % cream Apply topically 2 (two) times a day 80 g 3    [DISCONTINUED] insulin lispro (HumaLOG) 100 units/mL injection pen Pt takes 14 units plus sliding scale, up to 20 units before meals, 36 mL 1    [DISCONTINUED] Lantus SoloStar 100 units/mL SOPN Inject 50 Units as directed daily at bedtime      anastrozole (ARIMIDEX) 1 mg tablet Take 1 mg by mouth daily (Patient not taking: Reported on 12/2/2023)      Blood Glucose Monitoring Suppl (OneTouch Verio Flex System) w/Device KIT Test four times daily (Patient not taking: Reported on 8/7/2024) 1 kit 0    semaglutide, 1 mg/dose, (Ozempic, 1 MG/DOSE,) 4 mg/3 mL injection pen Inject 0.75 mL (1 mg total) under the skin every 7 days (Patient not taking: Reported on 10/9/2024) 9 mL 0    [DISCONTINUED] Continuous  "Blood Gluc  (FreeStyle Zoya 3 [place]) SOFI To check blood sugars continuously (Patient not taking: Reported on 10/9/2024) 1 each 0    [DISCONTINUED] Continuous Blood Gluc Sensor (FreeStyle Zoya 3 Sensor) MISC To check blood sugar continuously and to change every 2 weeks (Patient not taking: Reported on 10/9/2024) 6 each 4     No current facility-administered medications on file prior to visit.          Objective     /77 (BP Location: Left arm, Patient Position: Sitting, Cuff Size: Adult)   Pulse (!) 108   Temp 97.8 °F (36.6 °C) (Tympanic)   Ht 5' 1\" (1.549 m)   Wt 67.9 kg (149 lb 9.6 oz)   SpO2 98%   BMI 28.27 kg/m²     Physical Exam  Vitals and nursing note reviewed.   Constitutional:       General: She is not in acute distress.     Appearance: She is well-developed.   HENT:      Head: Normocephalic and atraumatic.   Cardiovascular:      Rate and Rhythm: Normal rate and regular rhythm.   Pulmonary:      Effort: Pulmonary effort is normal. No respiratory distress.   Abdominal:      Palpations: Abdomen is soft.   Musculoskeletal:         General: No swelling.      Cervical back: Neck supple.   Skin:     General: Skin is warm and dry.   Neurological:      Mental Status: She is alert.           Component      Latest Ref Rng 10/8/2024   Sodium      135 - 147 mmol/L 138    Potassium      3.5 - 5.3 mmol/L 3.9    Chloride      96 - 108 mmol/L 102    Carbon Dioxide      21 - 32 mmol/L 25    ANION GAP      4 - 13 mmol/L 11    BUN      5 - 25 mg/dL 17    Creatinine      0.60 - 1.30 mg/dL 0.86    GLUCOSE, FASTING      65 - 99 mg/dL 245 (H)    Calcium      8.4 - 10.2 mg/dL 9.9    AST      13 - 39 U/L 12 (L)    ALT      7 - 52 U/L 15    ALK PHOS      34 - 104 U/L 122 (H)    Total Protein      6.4 - 8.4 g/dL 8.0    Albumin      3.5 - 5.0 g/dL 4.4    Total Bilirubin      0.20 - 1.00 mg/dL 0.69    GFR, Calculated      ml/min/1.73sq m 71    Cholesterol      See Comment mg/dL 298 (H)    Triglycerides      See " Comment mg/dL 401 (H)    HDL      >=50 mg/dL 43 (L)    LDL Calculated --    EXT Creatinine Urine      Reference range not established. mg/dL 63.6    Albumin,U,Random      <20.0 mg/L 14.1    Albumin Creat Ratio      0 - 30 mg/g creatinine 22    Hemoglobin A1C      Normal 4.0-5.6%; PreDiabetic 5.7-6.4%; Diabetic >=6.5%; Glycemic control for adults with diabetes <7.0% % 10.3 (H)    eAG, EST AVG Glucose      mg/dl 249    LDL CHOLESTEROL DIRECT      0 - 100 mg/dl 179 (H)       Legend:  (H) High  (L) Low

## 2024-10-09 NOTE — ASSESSMENT & PLAN NOTE
Blood pressure is above goal 140/77, the goal less than 130/80.  She will need to monitor blood pressure at home, if remains consistent above goal, to upgrade her antihypertensive medications.    Orders:    Comprehensive metabolic panel; Future

## 2024-10-09 NOTE — PATIENT INSTRUCTIONS
Ozempic 0.25 mg weekly for  2 weeks and then 0.5 mg weekly, after you have finished your samples, call us to order Ozempic 1 mg weekly.  We increased Humalog to 14 units before meals and in addition to sliding scale  Continue Lantus 50 units daily.  Continue Jardiance 25 mg daily.

## 2024-10-09 NOTE — ASSESSMENT & PLAN NOTE
Lab Results   Component Value Date    HGBA1C 10.3 (H) 10/08/2024       Orders:    Continuous Glucose  (Dexcom G7 ) SOFI; Use 1 each continuous    Continuous Glucose Sensor (Dexcom G7 Sensor); Use 1 Device every 10 days    insulin lispro (HumaLOG) 100 units/mL injection pen; Pt takes 14 units plus sliding scale, up to 20 units before meals,    Insulin Glargine Solostar (Lantus SoloStar) 100 UNIT/ML SOPN; Inject 0.5 mL (50 Units total) under the skin daily at bedtime    Hemoglobin A1C; Future    Comprehensive metabolic panel; Future    Lipid Panel with Direct LDL reflex; Future

## 2024-10-09 NOTE — TELEPHONE ENCOUNTER
----- Message from Bartolo Toribio MD sent at 10/9/2024 12:23 PM EDT -----  Lets check an LDL in 4 months after she is on that.  ----- Message -----  From: Debra Paez MA  Sent: 10/9/2024  10:08 AM EDT  To: Bartolo Toribio MD    -spoke to adin, she is taking her atorvastatin 80mg every day. She is still recovering from the shingles. She admits she is not eating as well or properly as she should be. She did say her ozempic was on backorder but she got a sample today and going to inject today.     Bartolo Toribio MD  P  [facility]  Please check whether patient is actually taking statin therapy.

## 2024-10-09 NOTE — ASSESSMENT & PLAN NOTE
Hyperlipidemia has worsened, with , she is on maximum dose of statin as well as Zetia, dietary changes including cutting down on red meat, processed meat and full fat dairy discussed.  Check lipid profile before next visit.    Orders:    Lipid Panel with Direct LDL reflex; Future

## 2024-10-17 ENCOUNTER — VBI (OUTPATIENT)
Dept: ADMINISTRATIVE | Facility: OTHER | Age: 64
End: 2024-10-17

## 2024-10-17 NOTE — TELEPHONE ENCOUNTER
10/17/24 1:16 PM     Chart reviewed for BP ; nothing is submitted to the patient's insurance at this time.     Prince Delgadillo MA   [facility]

## 2024-11-03 DIAGNOSIS — E11.65 UNCONTROLLED TYPE 2 DIABETES MELLITUS WITH HYPERGLYCEMIA (HCC): ICD-10-CM

## 2024-11-04 RX ORDER — EMPAGLIFLOZIN 25 MG/1
25 TABLET, FILM COATED ORAL EVERY MORNING
Qty: 90 TABLET | Refills: 1 | Status: SHIPPED | OUTPATIENT
Start: 2024-11-04

## 2024-11-12 ENCOUNTER — CONSULT (OUTPATIENT)
Dept: VASCULAR SURGERY | Facility: CLINIC | Age: 64
End: 2024-11-12
Payer: COMMERCIAL

## 2024-11-12 VITALS
SYSTOLIC BLOOD PRESSURE: 162 MMHG | HEIGHT: 61 IN | WEIGHT: 155.2 LBS | HEART RATE: 91 BPM | OXYGEN SATURATION: 97 % | TEMPERATURE: 98 F | RESPIRATION RATE: 16 BRPM | DIASTOLIC BLOOD PRESSURE: 90 MMHG | BODY MASS INDEX: 29.3 KG/M2

## 2024-11-12 DIAGNOSIS — Z79.4 TYPE 2 DIABETES MELLITUS WITHOUT COMPLICATION, WITH LONG-TERM CURRENT USE OF INSULIN (HCC): Chronic | ICD-10-CM

## 2024-11-12 DIAGNOSIS — I70.0 ABDOMINAL AORTIC ATHEROSCLEROSIS (HCC): Primary | ICD-10-CM

## 2024-11-12 DIAGNOSIS — I74.10 AORTIC THROMBUS (HCC): ICD-10-CM

## 2024-11-12 DIAGNOSIS — E11.9 TYPE 2 DIABETES MELLITUS WITHOUT COMPLICATION, WITH LONG-TERM CURRENT USE OF INSULIN (HCC): Chronic | ICD-10-CM

## 2024-11-12 DIAGNOSIS — I25.10 CORONARY ARTERY DISEASE INVOLVING NATIVE CORONARY ARTERY OF NATIVE HEART WITHOUT ANGINA PECTORIS: ICD-10-CM

## 2024-11-12 DIAGNOSIS — E78.2 MIXED HYPERLIPIDEMIA: ICD-10-CM

## 2024-11-12 DIAGNOSIS — B02.29 POST HERPETIC NEURALGIA: ICD-10-CM

## 2024-11-12 DIAGNOSIS — I73.9 PAD (PERIPHERAL ARTERY DISEASE) (HCC): ICD-10-CM

## 2024-11-12 PROCEDURE — 99244 OFF/OP CNSLTJ NEW/EST MOD 40: CPT | Performed by: SURGERY

## 2024-11-12 NOTE — PROGRESS NOTES
Assessment/Plan:     Pt is a 65 yo F w/ HTN, CAD s/p stent x 5, VINICIO, DM, MDD, breast cancer s/p lumpectomy and radiation, back pain, HLD, PAD, aortic atherosclerosis    Abdominal aortic atherosclerosis (HCC)  -     Ambulatory Referral to Vascular Surgery  -reviewed CTA C/A/P which shows significant aortic atherosclerosis and plaque; there are two areas in the infrarenal aorta with enlargement; although the maximum diameter is 22mm, her native artery is only 13mm and thus I would treat this as an ectasia/aneurysm  -this is an asymptomatic incidental finding; disease is quite advanced for her age and considering she is a never smoker  -will continue surveillance of this area w/ AOIL and will also do ultrasound of the legs as she has BLE claudication and nonpalpable pulses; will do next testing in 12 months and f/u 1 year    Type 2 diabetes mellitus without complication, with long-term current use of insulin ([facility])  -A1C: 10.3  -follows with Dr. Archer    Medications  Mixed hyperlipidemia  -cont ASA/statin    Coronary artery disease involving native coronary artery of native heart without angina pectoris  -s/p multiple cardiac stents  -notes chest pain w/ exertion    Subjective:     Patient ID: Lala Allen is a 64 y.o. female.    HPI:    Patient referred to review findings on CT scan    Patient was in the ER in Aug with shingles of her torsa.  She underwent CTA C/A/P at that time w/ finding of irregular plaque in the aorta.    She gets CP and claudication with ambulation.  This is equal bilaterally.      Has chronic HTN.  Reports a fall down stairs which was the start of her major healthy problems ~'12 with prolonged hospitalization at that time.    Hx of breast cancer '19 s/p lumpectomy and radiation.    Has family hx of cardiac disease in both sides of the family.  Her mom was in her 50s at first heart attack.    Never smoker.    Takes ASA/statin,         Patient is here to establish care, referred by ED. Patient is  "asymptomatic at this time. Patient is on Atorvastatin, ASA 81mg, and Zetia. Patient is a non smoker.    Review of Systems   Constitutional: Negative.    HENT: Negative.     Eyes: Negative.    Respiratory: Negative.     Cardiovascular: Negative.    Gastrointestinal: Negative.    Endocrine: Negative.    Genitourinary: Negative.    Musculoskeletal: Negative.    Skin: Negative.    Allergic/Immunologic: Negative.    Neurological: Negative.    Hematological: Negative.    Psychiatric/Behavioral: Negative.           Objective:     Physical Exam  Cardiovascular:      Rate and Rhythm: Normal rate and regular rhythm.      Pulses:           Radial pulses are 2+ on the right side and 2+ on the left side.        Femoral pulses are 0 on the right side and 0 on the left side.       Popliteal pulses are 0 on the right side and 0 on the left side.        Dorsalis pedis pulses are 0 on the right side and 0 on the left side.        Posterior tibial pulses are 0 on the right side and 0 on the left side.      Heart sounds: No murmur heard.  Pulmonary:      Effort: No respiratory distress.      Breath sounds: No wheezing or rales.   Musculoskeletal:      Right lower leg: No edema.      Left lower leg: No edema.   Skin:     Comments: No BLE wounds or skin changes           I have reviewed and made appropriate changes to the review of systems input by the medical assistant.    Vitals:    11/12/24 1515   BP: 162/90   BP Location: Right arm   Patient Position: Sitting   Cuff Size: Adult   Pulse: 91   Resp: 16   Temp: 98 °F (36.7 °C)   TempSrc: Temporal   SpO2: 97%   Weight: 70.4 kg (155 lb 3.2 oz)   Height: 5' 1\" (1.549 m)       Patient Active Problem List   Diagnosis    Essential hypertension    Chronic bilateral low back pain without sciatica    Type 2 diabetes mellitus without complication, with long-term current use of insulin (HCC)    Other chest pain    Restless leg syndrome    Disease of pancreas    Mixed hyperlipidemia    SOB " (shortness of breath)    Coronary artery disease involving native coronary artery of native heart    Depression    Overweight (BMI 25.0-29.9)    Obstructive sleep apnea (adult) (pediatric)    Bilateral temporomandibular joint pain    Costochondritis    History of radiation therapy    Malignant neoplasm of upper-outer quadrant of right breast in female, estrogen receptor positive (HCC)    S/P lumpectomy, right breast    Use of anastrozole (Arimidex)    Hyperglycemia due to diabetes mellitus (HCC)    Hyponatremia    Encounter for diabetic foot exam ([facility])    Near syncope    Hypotension    Non-ST elevation MI (NSTEMI) ([facility])       Past Surgical History:   Procedure Laterality Date    ABDOMINAL SURGERY      phill    ANKLE SURGERY      tubal    BREAST SURGERY      partial mastectomy R breast    CARDIAC CATHETERIZATION  11/2018    Successful balloon angioplassty to OM1    CARDIAC CATHETERIZATION  12/4/2023    Procedure: Cardiac catheterization;  Surgeon: Flex Darby MD;  Location: [facility];  Service: Cardiology    CHOLECYSTECTOMY      FRACTURE SURGERY      right ankle    GALLBLADDER SURGERY      MASTECTOMY      TUBAL LIGATION         Family History   Problem Relation Age of Onset    Breast cancer Mother     Diabetes Mother     Heart failure Mother     Heart disease Father     Stroke Father        Social History     Socioeconomic History    Marital status: /Civil Union     Spouse name: Not on file    Number of children: Not on file    Years of education: Not on file    Highest education level: Not on file   Occupational History    Not on file   Tobacco Use    Smoking status: Never    Smokeless tobacco: Never   Vaping Use    Vaping status: Never Used   Substance and Sexual Activity    Alcohol use: Yes     Alcohol/week: 2.0 standard drinks of alcohol     Types: 1 Glasses of wine, 1 Cans of beer per week     Comment: occasionally    Drug use: Never    Sexual activity: Not on file   Other Topics Concern     Not on file   Social History Narrative    Not on file     Social Determinants of Health     Financial Resource Strain: Not on file   Food Insecurity: No Food Insecurity (12/4/2023)    Nursing - Inadequate Food Risk Classification     Worried About Running Out of Food in the Last Year: Never true     Ran Out of Food in the Last Year: Never true     Ran Out of Food in the Last Year: Not on file   Transportation Needs: No Transportation Needs (12/4/2023)    PRAPARE - Transportation     Lack of Transportation (Medical): No     Lack of Transportation (Non-Medical): No   Physical Activity: Not on file   Stress: Not on file   Social Connections: Not on file   Intimate Partner Violence: Not on file   Housing Stability: Low Risk  (12/4/2023)    Housing Stability Vital Sign     Unable to Pay for Housing in the Last Year: No     Number of Times Moved in the Last Year: 1     Homeless in the Last Year: No       No Known Allergies      Current Outpatient Medications:     Accu-Chek FastClix Lancets MISC, TEST THREE TIMES A DAY, Disp: 100 each, Rfl: 0    amLODIPine (NORVASC) 5 mg tablet, swallow 1 tablet once daily, Disp: 90 tablet, Rfl: 1    aspirin (ECOTRIN LOW STRENGTH) 81 mg EC tablet, Take 1 tablet (81 mg total) by mouth daily, Disp: 30 tablet, Rfl: 0    atorvastatin (LIPITOR) 80 mg tablet, Take 1 tablet (80 mg total) by mouth daily with dinner, Disp: 90 tablet, Rfl: 5    benazepril (LOTENSIN) 20 mg tablet, take 1 tablet by mouth once daily, Disp: 90 tablet, Rfl: 1    betamethasone dipropionate (DIPROSONE) 0.05 % cream, apply to affected area twice a day, Disp: 30 g, Rfl: 0    Continuous Glucose  (Dexcom G7 ) SOFI, Use 1 each continuous, Disp: 1 each, Rfl: 0    Continuous Glucose Sensor (Dexcom G7 Sensor), Use 1 Device every 10 days, Disp: 9 each, Rfl: 2    ezetimibe (ZETIA) 10 mg tablet, Take 1 tablet (10 mg total) by mouth daily at bedtime, Disp: 30 tablet, Rfl: 0    famotidine (PEPCID) 20 mg tablet, take 1  tablet by mouth twice a day before meals (Patient taking differently: Take 20 mg by mouth 2 (two) times a day before meals As needed), Disp: 60 tablet, Rfl: 5    glucose blood (OneTouch Verio) test strip, as directed use 1 TEST STRIP to TEST BLOOD SUGAR four times a day, Disp: 200 strip, Rfl: 1    HYDROcodone-acetaminophen (Norco) 5-325 mg per tablet, Take 1 tablet by mouth every 6 (six) hours as needed for pain (Initial therapy) Max Daily Amount: 4 tablets, Disp: 8 tablet, Rfl: 0    Insulin Glargine Solostar (Lantus SoloStar) 100 UNIT/ML SOPN, Inject 0.5 mL (50 Units total) under the skin daily at bedtime, Disp: 45 mL, Rfl: 0    insulin lispro (HumaLOG) 100 units/mL injection pen, Pt takes 14 units plus sliding scale, up to 20 units before meals,, Disp: 36 mL, Rfl: 1    Insulin Pen Needle (BD Pen Needle Sadia 2nd Gen) 32G X 4 MM MISC, use 1 PEN NEEDLE to inject MEDICATION subcutaneously four times a day, Disp: 100 each, Rfl: 5    Jardiance 25 MG TABS, take 1 tablet by mouth every morning, Disp: 90 tablet, Rfl: 1    lactulose (CHRONULAC) 10 g/15 mL solution, Take 10 g by mouth as needed (constipation), Disp: , Rfl:     metoprolol succinate (TOPROL-XL) 100 mg 24 hr tablet, Take 1 tablet (100 mg total) by mouth daily, Disp: 90 tablet, Rfl: 3    nitroglycerin (NITROSTAT) 0.4 mg SL tablet, Place 1 tablet (0.4 mg total) under the tongue every 5 (five) minutes as needed for chest pain If no relief after first dose call prescriber or 911, Disp: 25 tablet, Rfl: 0    nystatin (MYCOSTATIN) cream, Apply topically 2 (two) times a day, Disp: 30 g, Rfl: 2    OneTouch Delica Lancets 33G MISC, Use 4 (four) times a day Test 4 times daily, Disp: 300 each, Rfl: 2    rOPINIRole (REQUIP) 1 mg tablet, Take 1 tablet (1 mg total) by mouth daily, Disp: 90 tablet, Rfl: 1    triamcinolone (KENALOG) 0.1 % cream, Apply topically 2 (two) times a day, Disp: 80 g, Rfl: 3    acyclovir (ZOVIRAX) 400 MG tablet, Take 2 tablets (800 mg total) by mouth 5  (five) times a day for 10 days, Disp: 100 tablet, Rfl: 0    anastrozole (ARIMIDEX) 1 mg tablet, Take 1 mg by mouth daily (Patient not taking: Reported on 12/2/2023), Disp: , Rfl:     betamethasone valerate (VALISONE) 0.1 % cream, Apply 1 Application topically 2 (two) times a day, Disp: , Rfl:     Blood Glucose Monitoring Suppl (OneTouch Verio Flex System) w/Device KIT, Test four times daily (Patient not taking: Reported on 8/7/2024), Disp: 1 kit, Rfl: 0    lidocaine (Lidoderm) 5 %, Apply 2 patches topically over 12 hours daily for 10 days Remove & Discard patch within 12 hours or as directed by MD, Disp: 20 patch, Rfl: 0    pregabalin (LYRICA) 100 mg capsule, Take 1 capsule (100 mg total) by mouth 3 (three) times a day, Disp: 90 capsule, Rfl: 0    semaglutide, 1 mg/dose, (Ozempic, 1 MG/DOSE,) 4 mg/3 mL injection pen, Inject 0.75 mL (1 mg total) under the skin every 7 days (Patient not taking: Reported on 10/9/2024), Disp: 9 mL, Rfl: 0

## 2024-11-12 NOTE — TELEPHONE ENCOUNTER
Medication: Pregabalin     Dose/Frequency: 100 mg capsule     Quantity: 90    Pharmacy: [facility] #[id number]    Office:   [x] PCP/Provider - Barbara MIRANDA  [] Speciality/Provider -     Does the patient have enough for 3 days?   [] Yes   [x] No - Send as HP to POD

## 2024-11-13 NOTE — TELEPHONE ENCOUNTER
Spoke to patient's  she was only taking this at night and not 3 times daily and would like to continue taking this medication       Please advise....

## 2024-11-14 RX ORDER — PREGABALIN 100 MG/1
100 CAPSULE ORAL
Qty: 30 CAPSULE | Refills: 0 | Status: SHIPPED | OUTPATIENT
Start: 2024-11-14 | End: 2024-12-14

## 2024-11-22 ENCOUNTER — VBI (OUTPATIENT)
Dept: ADMINISTRATIVE | Facility: OTHER | Age: 64
End: 2024-11-22

## 2024-11-22 NOTE — TELEPHONE ENCOUNTER
11/22/24 12:33 PM     Chart reviewed for Hemoglobin A1c ; nothing is submitted to the patient's insurance at this time.     Prince Delgadillo MA   [facility]

## 2024-12-21 ENCOUNTER — HOSPITAL ENCOUNTER (EMERGENCY)
Facility: HOSPITAL | Age: 64
Discharge: HOME | End: 2024-12-21
Attending: FAMILY MEDICINE
Payer: COMMERCIAL

## 2024-12-21 VITALS
WEIGHT: 155 LBS | OXYGEN SATURATION: 97 % | RESPIRATION RATE: 18 BRPM | HEART RATE: 73 BPM | TEMPERATURE: 97.8 F | DIASTOLIC BLOOD PRESSURE: 92 MMHG | HEIGHT: 62 IN | SYSTOLIC BLOOD PRESSURE: 187 MMHG | BODY MASS INDEX: 28.52 KG/M2

## 2024-12-21 DIAGNOSIS — E16.2 HYPOGLYCEMIA: Primary | ICD-10-CM

## 2024-12-21 LAB
ALBUMIN SERPL BCG-MCNC: 4.3 G/DL (ref 3.5–5)
ALP SERPL-CCNC: 101 U/L (ref 34–104)
ALT SERPL W P-5'-P-CCNC: 14 U/L (ref 7–52)
ANION GAP SERPL CALCULATED.3IONS-SCNC: 10 MMOL/L (ref 4–13)
AST SERPL W P-5'-P-CCNC: 17 U/L (ref 13–39)
BASOPHILS # BLD AUTO: 0.04 THOUSANDS/ÂΜL (ref 0–0.1)
BASOPHILS NFR BLD AUTO: 1 % (ref 0–1)
BILIRUB SERPL-MCNC: 0.67 MG/DL (ref 0.2–1)
BUN SERPL-MCNC: 12 MG/DL (ref 5–25)
CALCIUM SERPL-MCNC: 9.5 MG/DL (ref 8.4–10.2)
CARDIAC TROPONIN I PNL SERPL HS: 4 NG/L (ref ?–50)
CHLORIDE SERPL-SCNC: 107 MMOL/L (ref 96–108)
CO2 SERPL-SCNC: 25 MMOL/L (ref 21–32)
CREAT SERPL-MCNC: 0.81 MG/DL (ref 0.6–1.3)
EOSINOPHIL # BLD AUTO: 0.25 THOUSAND/ÂΜL (ref 0–0.61)
EOSINOPHIL NFR BLD AUTO: 3 % (ref 0–6)
ERYTHROCYTE [DISTWIDTH] IN BLOOD BY AUTOMATED COUNT: 13.4 % (ref 11.6–15.1)
GFR SERPL CREATININE-BSD FRML MDRD: 76 ML/MIN/1.73SQ M
GLUCOSE SERPL-MCNC: 105 MG/DL (ref 65–140)
GLUCOSE SERPL-MCNC: 111 MG/DL (ref 65–140)
GLUCOSE SERPL-MCNC: 112 MG/DL (ref 65–140)
GLUCOSE SERPL-MCNC: 115 MG/DL (ref 65–140)
GLUCOSE SERPL-MCNC: 118 MG/DL (ref 65–140)
HCT VFR BLD AUTO: 42.7 % (ref 34.8–46.1)
HGB BLD-MCNC: 14 G/DL (ref 11.5–15.4)
IMM GRANULOCYTES # BLD AUTO: 0.01 THOUSAND/UL (ref 0–0.2)
IMM GRANULOCYTES NFR BLD AUTO: 0 % (ref 0–2)
LYMPHOCYTES # BLD AUTO: 2.98 THOUSANDS/ÂΜL (ref 0.6–4.47)
LYMPHOCYTES NFR BLD AUTO: 34 % (ref 14–44)
MCH RBC QN AUTO: 30.5 PG (ref 26.8–34.3)
MCHC RBC AUTO-ENTMCNC: 32.8 G/DL (ref 31.4–37.4)
MCV RBC AUTO: 93 FL (ref 82–98)
MONOCYTES # BLD AUTO: 0.59 THOUSAND/ÂΜL (ref 0.17–1.22)
MONOCYTES NFR BLD AUTO: 7 % (ref 4–12)
NEUTROPHILS # BLD AUTO: 4.97 THOUSANDS/ÂΜL (ref 1.85–7.62)
NEUTS SEG NFR BLD AUTO: 55 % (ref 43–75)
NRBC BLD AUTO-RTO: 0 /100 WBCS
PLATELET # BLD AUTO: 350 THOUSANDS/UL (ref 149–390)
PMV BLD AUTO: 9.8 FL (ref 8.9–12.7)
POTASSIUM SERPL-SCNC: 3.7 MMOL/L (ref 3.5–5.3)
PROT SERPL-MCNC: 8 G/DL (ref 6.4–8.4)
RBC # BLD AUTO: 4.59 MILLION/UL (ref 3.81–5.12)
SODIUM SERPL-SCNC: 142 MMOL/L (ref 135–147)
WBC # BLD AUTO: 8.84 THOUSAND/UL (ref 4.31–10.16)

## 2024-12-21 PROCEDURE — 84484 ASSAY OF TROPONIN QUANT: CPT | Performed by: PHYSICIAN ASSISTANT

## 2024-12-21 PROCEDURE — 36415 COLL VENOUS BLD VENIPUNCTURE: CPT | Performed by: PHYSICIAN ASSISTANT

## 2024-12-21 PROCEDURE — 99285 EMERGENCY DEPT VISIT HI MDM: CPT | Performed by: PHYSICIAN ASSISTANT

## 2024-12-21 PROCEDURE — 99285 EMERGENCY DEPT VISIT HI MDM: CPT

## 2024-12-21 PROCEDURE — 82948 REAGENT STRIP/BLOOD GLUCOSE: CPT

## 2024-12-21 PROCEDURE — 85025 COMPLETE CBC W/AUTO DIFF WBC: CPT | Performed by: PHYSICIAN ASSISTANT

## 2024-12-21 PROCEDURE — 93005 ELECTROCARDIOGRAM TRACING: CPT

## 2024-12-21 PROCEDURE — 80053 COMPREHEN METABOLIC PANEL: CPT | Performed by: PHYSICIAN ASSISTANT

## 2024-12-21 RX ORDER — AMLODIPINE BESYLATE 5 MG/1
5 TABLET ORAL ONCE
Status: COMPLETED | OUTPATIENT
Start: 2024-12-21 | End: 2024-12-21

## 2024-12-21 RX ADMIN — AMLODIPINE BESYLATE 5 MG: 5 TABLET ORAL at 16:12

## 2024-12-21 NOTE — ED PROVIDER NOTES
Time reflects when diagnosis was documented in both MDM as applicable and the Disposition within this note       Time User Action Codes Description Comment    12/21/2024  4:28 PM Bossman Pruitt Add [E16.2] Hypoglycemia           ED Disposition       ED Disposition   Discharge    Condition   Stable    Date/Time   Sat Dec 21, 2024  5:13 PM    Comment   Lala Allen discharge to home/self care.                   Assessment & Plan       Medical Decision Making  Lala Allen is a 64 y.o. female w/ a PMHx of insulin dependent T2DM presenting to the ED for evaluation of recurrent episodes of hypoglycemia today. Denies any recent change in medication. Patient's glucometer read 46 and when glucose was checked with our glucometer it was 110. Suspect possible malfunctioning of glucometer. Will monitor patient in ED and check labs to assess for leukocytosis, electrolyte abnormalities, or elevated troponin. Patient is understanding and agreeable with plan.     Labs are unremarkable.  EKG reveals normal sinus rhythm with no ischemic changes.  Patient is hypertensive however denies any chest pain, shortness of breath, headache, visual changes, or lightheadedness.  She reports that she did not take her blood pressure medication last night.  Patient's amlodipine administered in ER and blood pressure did decrease.  While monitoring patient in ER blood glucose level has been stable around 110.  I discussed lab results with patient.  I discussed with patient that her abnormal blood sugar may have been due to malfunctioning of her glucometer.  Advised that she use her manual fingerstick glucometer at home from now on until she is able to see her endocrinologist. Advised close follow up with PCP and endocrinologist for reassessment.  Advised strict return precautions to the ED.  Patient is understanding and agreeable with plan.    Problems Addressed:  Hypoglycemia: acute illness or injury    Amount and/or Complexity of Data  Reviewed  Labs: ordered.    Risk  Prescription drug management.             Medications   amLODIPine (NORVASC) tablet 5 mg (5 mg Oral Given 12/21/24 1612)       ED Risk Strat Scores                          SBIRT 22yo+      Flowsheet Row Most Recent Value   Initial Alcohol Screen: US AUDIT-C     1. How often do you have a drink containing alcohol? 0 Filed at: 12/21/2024 1402   2. How many drinks containing alcohol do you have on a typical day you are drinking?  0 Filed at: 12/21/2024 1402   3b. FEMALE Any Age, or MALE 65+: How often do you have 4 or more drinks on one occassion? 0 Filed at: 12/21/2024 1402   Audit-C Score 0 Filed at: 12/21/2024 1402   CUCA: How many times in the past year have you...    Used an illegal drug or used a prescription medication for non-medical reasons? Never Filed at: 12/21/2024 1402                            History of Present Illness       Chief Complaint   Patient presents with    Hypoglycemia - Symptomatic     According to the patient she has had hypoglycemia since changing her meter this morning.  Patient reports taking oral intake to bring blood glucose up.        Past Medical History:   Diagnosis Date    CAD (coronary artery disease)     Cancer (Formerly [facility])     right breast    Chronic back pain     Coronary artery disease     Diabetes mellitus (Formerly [facility])     Family history of early CAD     Hyperlipidemia     Hypertension     Non-ST elevation MI (NSTEMI) (Formerly [facility]) 12/02/2023    Subsequent INNA placed in mid LAD 80 and 2 INNA placed in second diagonal 12/4/23    NSTEMI (non-ST elevated myocardial infarction) (Formerly [facility]) 11/28/2018    Status post coronary angioplasty 1st obtuse marginal 11/28/2018 at [facility] stent could not be placed    Restless leg syndrome     Tuberculosis     patient was less than 5 years old    Type 2 diabetes mellitus (HCC)       Past Surgical History:   Procedure Laterality Date    ABDOMINAL SURGERY      phill    ANKLE SURGERY      tubal    BREAST SURGERY      partial  mastectomy R breast    CARDIAC CATHETERIZATION  11/2018    Successful balloon angioplassty to OM1    CARDIAC CATHETERIZATION  12/4/2023    Procedure: Cardiac catheterization;  Surgeon: Flex Darby MD;  Location: [facility];  Service: Cardiology    CHOLECYSTECTOMY      FRACTURE SURGERY      right ankle    GALLBLADDER SURGERY      MASTECTOMY      TUBAL LIGATION        Family History   Problem Relation Age of Onset    Breast cancer Mother     Diabetes Mother     Heart failure Mother     Heart disease Father     Stroke Father       Social History     Tobacco Use    Smoking status: Never    Smokeless tobacco: Never   Vaping Use    Vaping status: Never Used   Substance Use Topics    Alcohol use: Yes     Alcohol/week: 2.0 standard drinks of alcohol     Types: 1 Glasses of wine, 1 Cans of beer per week     Comment: occasionally    Drug use: Never      E-Cigarette/Vaping    E-Cigarette Use Never User       E-Cigarette/Vaping Substances    Nicotine No     THC No     CBD No     Flavoring No     Other No     Unknown No       I have reviewed and agree with the history as documented.     Lala Allen is a 64 y.o. female w/ a PMHx of insulin dependent T2DM presenting to the ED for evaluation of recurrent episodes of hypoglycemia today. Denies any recent change in medication. Reports last night took her 50 of Lantus. This morning she took her Jardiance around 6am. She then took 14 of Humalog before breakfast at 7:30am. She had eggs, sausage, and bread. Reports afterwards her glucose was 140. She then reports that she changed her Dexcom glucometer as it was 2 wks since her last glucometer change. She reports that when she checked her glucose on the new glucometer it was 80 and then quickly dropped to 46. She reports that she did not experience any symptoms at that time. She then had an energy drink and changed her glucometer again because she noticed it was bleeding from the site. She reports that after changing it  again and drinking the energy drink, glucose was 110. She then took a shower and when she got out and checked her glucometer again, it was back down to 46. She reports she then drove to the ED but did not eat or drink anything. When she arrived to the ED her POC glucose was 110. She reports that she has been using Dexcom glucometer for the past 2 months without any complications. She reports that she typically changes it every 2 wks. she denies experiencing any chest pain, shortness of breath, abdominal pain, vomiting, headaches, vision changes, palpitations, lightheadedness, diaphoresis, or weakness.  She does report that a little bit before breakfast she was feeling a little bit nauseous however that resolved after breakfast.        Review of Systems   Constitutional:  Negative for chills and fever.   HENT:  Negative for ear pain and sore throat.    Eyes:  Negative for pain and visual disturbance.   Respiratory:  Negative for cough and shortness of breath.    Cardiovascular:  Negative for chest pain and palpitations.   Gastrointestinal:  Positive for nausea (resolved). Negative for abdominal pain and vomiting.   Genitourinary:  Negative for dysuria and hematuria.   Musculoskeletal:  Negative for arthralgias and back pain.   Skin:  Negative for color change and rash.   Neurological:  Negative for seizures and syncope.   All other systems reviewed and are negative.          Objective       ED Triage Vitals   Temperature Pulse Blood Pressure Respirations SpO2 Patient Position - Orthostatic VS   12/21/24 1359 12/21/24 1359 12/21/24 1359 12/21/24 1359 12/21/24 1359 12/21/24 1359   97.8 °F (36.6 °C) 88 (!) 222/102 18 98 % Lying      Temp Source Heart Rate Source BP Location FiO2 (%) Pain Score    12/21/24 1359 12/21/24 1500 12/21/24 1359 -- 12/21/24 1359    Temporal Monitor Right arm  No Pain      Vitals      Date and Time Temp Pulse SpO2 Resp BP Pain Score FACES Pain Rating User   12/21/24 1715 -- 73 97 % 18 187/92  provider made aware, OK to discharge No Pain -- TAB   12/21/24 1710 -- -- 98 % -- 187/92 -- -- TAB   12/21/24 1709 -- -- -- -- -- No Pain -- TAB   12/21/24 1635 -- -- -- -- 189/90 -- -- TAB   12/21/24 1605 -- 73 97 % 16  207/108 MD made aware No Pain -- TAB   12/21/24 1530 -- 75 98 % 18 192/99 No Pain -- TAB   12/21/24 1500 -- 75 97 % 18 182/95 -- -- TAB   12/21/24 1412 -- -- -- -- 208/99 -- -- TAB   12/21/24 1359 97.8 °F (36.6 °C) 88 98 % 18 222/102 No Pain -- [facility]            Physical Exam  Vitals and nursing note reviewed.   Constitutional:       General: She is not in acute distress.     Appearance: She is well-developed. She is not ill-appearing, toxic-appearing or diaphoretic.   HENT:      Head: Normocephalic and atraumatic.   Eyes:      Extraocular Movements: Extraocular movements intact.      Conjunctiva/sclera: Conjunctivae normal.      Pupils: Pupils are equal, round, and reactive to light.   Cardiovascular:      Rate and Rhythm: Normal rate and regular rhythm.      Heart sounds: No murmur heard.  Pulmonary:      Effort: Pulmonary effort is normal. No respiratory distress.      Breath sounds: Normal breath sounds.   Abdominal:      General: There is no distension.      Palpations: Abdomen is soft. There is no mass.      Tenderness: There is no abdominal tenderness.   Musculoskeletal:         General: No swelling.      Cervical back: Neck supple.   Skin:     General: Skin is warm and dry.      Capillary Refill: Capillary refill takes less than 2 seconds.   Neurological:      Mental Status: She is alert.   Psychiatric:         Mood and Affect: Mood normal.         Results Reviewed       Procedure Component Value Units Date/Time    Fingerstick Glucose (POCT) [[id number]]  (Normal) Collected: 12/21/24 1633    Lab Status: Final result Specimen: Blood Updated: 12/21/24 1638     POC Glucose 115 mg/dl     Fingerstick Glucose (POCT) [[id number]]  (Normal) Collected: 12/21/24 1541    Lab Status: Final result Specimen:  Blood Updated: 12/21/24 1542     POC Glucose 105 mg/dl     HS Troponin 0hr (reflex protocol) [[id number]]  (Normal) Collected: 12/21/24 1513    Lab Status: Final result Specimen: Blood from Arm, Left Updated: 12/21/24 1540     hs TnI 0hr 4 ng/L     Comprehensive metabolic panel [[id number]] Collected: 12/21/24 1513    Lab Status: Final result Specimen: Blood from Arm, Left Updated: 12/21/24 1536     Sodium 142 mmol/L      Potassium 3.7 mmol/L      Chloride 107 mmol/L      CO2 25 mmol/L      ANION GAP 10 mmol/L      BUN 12 mg/dL      Creatinine 0.81 mg/dL      Glucose 111 mg/dL      Calcium 9.5 mg/dL      AST 17 U/L      ALT 14 U/L      Alkaline Phosphatase 101 U/L      Total Protein 8.0 g/dL      Albumin 4.3 g/dL      Total Bilirubin 0.67 mg/dL      eGFR 76 ml/min/1.73sq m     Narrative:      National [employer] guidelines for Chronic Kidney Disease (CKD):     Stage 1 with normal or high GFR (GFR > 90 mL/min/1.73 square meters)    Stage 2 Mild CKD (GFR = 60-89 mL/min/1.73 square meters)    Stage 3A Moderate CKD (GFR = 45-59 mL/min/1.73 square meters)    Stage 3B Moderate CKD (GFR = 30-44 mL/min/1.73 square meters)    Stage 4 Severe CKD (GFR = 15-29 mL/min/1.73 square meters)    Stage 5 End Stage CKD (GFR <15 mL/min/1.73 square meters)  Note: GFR calculation is accurate only with a steady state creatinine    CBC and differential [[id number]] Collected: 12/21/24 1513    Lab Status: Final result Specimen: Blood from Arm, Left Updated: 12/21/24 1518     WBC 8.84 Thousand/uL      RBC 4.59 Million/uL      Hemoglobin 14.0 g/dL      Hematocrit 42.7 %      MCV 93 fL      MCH 30.5 pg      MCHC 32.8 g/dL      RDW 13.4 %      MPV 9.8 fL      Platelets 350 Thousands/uL      nRBC 0 /100 WBCs      Segmented % 55 %      Immature Grans % 0 %      Lymphocytes % 34 %      Monocytes % 7 %      Eosinophils Relative 3 %      Basophils Relative 1 %      Absolute Neutrophils 4.97 Thousands/µL      Absolute Immature Grans 0.01  Thousand/uL      Absolute Lymphocytes 2.98 Thousands/µL      Absolute Monocytes 0.59 Thousand/µL      Eosinophils Absolute 0.25 Thousand/µL      Basophils Absolute 0.04 Thousands/µL     Fingerstick Glucose (POCT) [[id number]]  (Normal) Collected: 12/21/24 1434    Lab Status: Final result Specimen: Blood Updated: 12/21/24 1435     POC Glucose 118 mg/dl     Fingerstick Glucose (POCT) [[id number]]  (Normal) Collected: 12/21/24 1400    Lab Status: Final result Specimen: Blood Updated: 12/21/24 1401     POC Glucose 112 mg/dl             No orders to display       Procedures    ED Medication and Procedure Management   Prior to Admission Medications   Prescriptions Last Dose Informant Patient Reported? Taking?   Accu-Chek FastClix Lancets MISC  Self No No   Sig: TEST THREE TIMES A DAY   Blood Glucose Monitoring Suppl (OneTouch Verio Flex System) w/Device KIT  Self No No   Sig: Test four times daily   Patient not taking: Reported on 8/7/2024   Continuous Glucose  (Dexcom G7 ) SOFI  Self No No   Sig: Use 1 each continuous   Continuous Glucose Sensor (Dexcom G7 Sensor)  Self No No   Sig: Use 1 Device every 10 days   HYDROcodone-acetaminophen (Norco) 5-325 mg per tablet  Self No No   Sig: Take 1 tablet by mouth every 6 (six) hours as needed for pain (Initial therapy) Max Daily Amount: 4 tablets   Insulin Glargine Solostar (Lantus SoloStar) 100 UNIT/ML SOPN  Self No No   Sig: Inject 0.5 mL (50 Units total) under the skin daily at bedtime   Insulin Pen Needle (BD Pen Needle Sadia 2nd Gen) 32G X 4 MM MISC  Self No No   Sig: use 1 PEN NEEDLE to inject MEDICATION subcutaneously four times a day   Jardiance 25 MG TABS  Self No No   Sig: take 1 tablet by mouth every morning   OneTouch Delica Lancets 33G MISC  Self No No   Sig: Use 4 (four) times a day Test 4 times daily   acyclovir (ZOVIRAX) 400 MG tablet   No No   Sig: Take 2 tablets (800 mg total) by mouth 5 (five) times a day for 10 days   amLODIPine (NORVASC) 5 mg  tablet 12/20/2024 Self No Yes   Sig: swallow 1 tablet once daily   anastrozole (ARIMIDEX) 1 mg tablet  Self Yes No   Sig: Take 1 mg by mouth daily   Patient not taking: Reported on 12/2/2023   aspirin (ECOTRIN LOW STRENGTH) 81 mg EC tablet  Self No No   Sig: Take 1 tablet (81 mg total) by mouth daily   atorvastatin (LIPITOR) 80 mg tablet  Self No No   Sig: Take 1 tablet (80 mg total) by mouth daily with dinner   benazepril (LOTENSIN) 20 mg tablet  Self No No   Sig: take 1 tablet by mouth once daily   betamethasone dipropionate (DIPROSONE) 0.05 % cream  Self No No   Sig: apply to affected area twice a day   betamethasone valerate (VALISONE) 0.1 % cream  Self Yes No   Sig: Apply 1 Application topically 2 (two) times a day   ezetimibe (ZETIA) 10 mg tablet  Self No No   Sig: Take 1 tablet (10 mg total) by mouth daily at bedtime   famotidine (PEPCID) 20 mg tablet  Self No No   Sig: take 1 tablet by mouth twice a day before meals   Patient taking differently: Take 20 mg by mouth 2 (two) times a day before meals As needed   glucose blood (OneTouch Verio) test strip  Self No No   Sig: as directed use 1 TEST STRIP to TEST BLOOD SUGAR four times a day   insulin lispro (HumaLOG) 100 units/mL injection pen  Self No No   Sig: Pt takes 14 units plus sliding scale, up to 20 units before meals,   lactulose (CHRONULAC) 10 g/15 mL solution  Self Yes No   Sig: Take 10 g by mouth as needed (constipation)   lidocaine (Lidoderm) 5 %   No No   Sig: Apply 2 patches topically over 12 hours daily for 10 days Remove & Discard patch within 12 hours or as directed by MD   metoprolol succinate (TOPROL-XL) 100 mg 24 hr tablet 12/20/2024 Self No Yes   Sig: Take 1 tablet (100 mg total) by mouth daily   nitroglycerin (NITROSTAT) 0.4 mg SL tablet  Self No No   Sig: Place 1 tablet (0.4 mg total) under the tongue every 5 (five) minutes as needed for chest pain If no relief after first dose call prescriber or 911   nystatin (MYCOSTATIN) cream  Self No  No   Sig: Apply topically 2 (two) times a day   pregabalin (LYRICA) 100 mg capsule   No No   Sig: Take 1 capsule (100 mg total) by mouth daily at bedtime   rOPINIRole (REQUIP) 1 mg tablet  Self No No   Sig: Take 1 tablet (1 mg total) by mouth daily   semaglutide, 1 mg/dose, (Ozempic, 1 MG/DOSE,) 4 mg/3 mL injection pen  Self No No   Sig: Inject 0.75 mL (1 mg total) under the skin every 7 days   Patient not taking: Reported on 10/9/2024   triamcinolone (KENALOG) 0.1 % cream  Self No No   Sig: Apply topically 2 (two) times a day      Facility-Administered Medications: None     Discharge Medication List as of 12/21/2024  5:13 PM        CONTINUE these medications which have NOT CHANGED    Details   amLODIPine (NORVASC) 5 mg tablet swallow 1 tablet once daily, Starting Tue 9/24/2024, Normal      metoprolol succinate (TOPROL-XL) 100 mg 24 hr tablet Take 1 tablet (100 mg total) by mouth daily, Starting Fri 8/16/2024, Normal      !! Accu-Chek FastClix Lancets MISC TEST THREE TIMES A DAY, Normal      acyclovir (ZOVIRAX) 400 MG tablet Take 2 tablets (800 mg total) by mouth 5 (five) times a day for 10 days, Starting Sat 8/3/2024, Until Wed 10/9/2024, Normal      anastrozole (ARIMIDEX) 1 mg tablet Take 1 mg by mouth daily, Starting Tue 8/18/2020, Historical Med      aspirin (ECOTRIN LOW STRENGTH) 81 mg EC tablet Take 1 tablet (81 mg total) by mouth daily, Starting Tue 12/5/2023, Normal      atorvastatin (LIPITOR) 80 mg tablet Take 1 tablet (80 mg total) by mouth daily with dinner, Starting Mon 1/15/2024, Normal      benazepril (LOTENSIN) 20 mg tablet take 1 tablet by mouth once daily, Normal      betamethasone dipropionate (DIPROSONE) 0.05 % cream apply to affected area twice a day, Starting Mon 6/3/2024, Normal      betamethasone valerate (VALISONE) 0.1 % cream Apply 1 Application topically 2 (two) times a day, Starting Mon 7/6/2020, Until Wed 10/9/2024, Historical Med      Blood Glucose Monitoring Suppl (OneTouch Verio Flex  System) w/Device KIT Test four times daily, Normal      Continuous Glucose  (Dexcom G7 ) SOFI Use 1 each continuous, Starting Wed 10/9/2024, Normal      Continuous Glucose Sensor (Dexcom G7 Sensor) Use 1 Device every 10 days, Starting Wed 10/9/2024, Normal      ezetimibe (ZETIA) 10 mg tablet Take 1 tablet (10 mg total) by mouth daily at bedtime, Starting Tue 12/5/2023, Normal      famotidine (PEPCID) 20 mg tablet take 1 tablet by mouth twice a day before meals, Starting Thu 7/18/2024, Normal      glucose blood (OneTouch Verio) test strip as directed use 1 TEST STRIP to TEST BLOOD SUGAR four times a day, Normal      HYDROcodone-acetaminophen (Norco) 5-325 mg per tablet Take 1 tablet by mouth every 6 (six) hours as needed for pain (Initial therapy) Max Daily Amount: 4 tablets, Starting Wed 7/31/2024, Normal      Insulin Glargine Solostar (Lantus SoloStar) 100 UNIT/ML SOPN Inject 0.5 mL (50 Units total) under the skin daily at bedtime, Starting Wed 10/9/2024, Until Tue 1/7/2025, Normal      insulin lispro (HumaLOG) 100 units/mL injection pen Pt takes 14 units plus sliding scale, up to 20 units before meals,, Fill Later      Insulin Pen Needle (BD Pen Needle Sadia 2nd Gen) 32G X 4 MM MISC use 1 PEN NEEDLE to inject MEDICATION subcutaneously four times a day, Normal      Jardiance 25 MG TABS take 1 tablet by mouth every morning, Starting Mon 11/4/2024, Normal      lactulose (CHRONULAC) 10 g/15 mL solution Take 10 g by mouth as needed (constipation), Starting Sat 8/27/2022, Historical Med      lidocaine (Lidoderm) 5 % Apply 2 patches topically over 12 hours daily for 10 days Remove & Discard patch within 12 hours or as directed by MD, Starting Sat 8/3/2024, Until Wed 10/9/2024, Normal      nitroglycerin (NITROSTAT) 0.4 mg SL tablet Place 1 tablet (0.4 mg total) under the tongue every 5 (five) minutes as needed for chest pain If no relief after first dose call prescriber or 911, Starting Thu 6/8/2023, Normal       nystatin (MYCOSTATIN) cream Apply topically 2 (two) times a day, Starting Wed 8/7/2024, Normal      !! OneTouch Delica Lancets 33G MISC Use 4 (four) times a day Test 4 times daily, Starting Mon 2/19/2024, Normal      pregabalin (LYRICA) 100 mg capsule Take 1 capsule (100 mg total) by mouth daily at bedtime, Starting Thu 11/14/2024, Until Sat 12/14/2024, Normal      rOPINIRole (REQUIP) 1 mg tablet Take 1 tablet (1 mg total) by mouth daily, Starting Wed 10/16/2019, Normal      semaglutide, 1 mg/dose, (Ozempic, 1 MG/DOSE,) 4 mg/3 mL injection pen Inject 0.75 mL (1 mg total) under the skin every 7 days, Starting Mon 10/7/2024, Until Sun 1/5/2025, Normal      triamcinolone (KENALOG) 0.1 % cream Apply topically 2 (two) times a day, Starting Tue 6/18/2024, Normal       !! - Potential duplicate medications found. Please discuss with provider.        No discharge procedures on file.  ED SEPSIS DOCUMENTATION   Time reflects when diagnosis was documented in both MDM as applicable and the Disposition within this note       Time User Action Codes Description Comment    12/21/2024  4:28 PM Bossman Pruitt Add [E16.2] Hypoglycemia                  Bossman Pruitt PA-C  12/21/24 1746

## 2024-12-22 LAB
ATRIAL RATE: 71 BPM
P AXIS: 30 DEGREES
PR INTERVAL: 162 MS
QRS AXIS: 31 DEGREES
QRSD INTERVAL: 78 MS
QT INTERVAL: 420 MS
QTC INTERVAL: 456 MS
T WAVE AXIS: 23 DEGREES
VENTRICULAR RATE: 71 BPM

## 2024-12-22 PROCEDURE — 93010 ELECTROCARDIOGRAM REPORT: CPT | Performed by: INTERNAL MEDICINE

## 2024-12-23 DIAGNOSIS — Z79.4 TYPE 2 DIABETES MELLITUS WITHOUT COMPLICATION, WITH LONG-TERM CURRENT USE OF INSULIN (HCC): Chronic | ICD-10-CM

## 2024-12-23 DIAGNOSIS — E11.9 TYPE 2 DIABETES MELLITUS WITHOUT COMPLICATION, WITH LONG-TERM CURRENT USE OF INSULIN (HCC): Chronic | ICD-10-CM

## 2024-12-24 RX ORDER — INSULIN LISPRO 100 [IU]/ML
INJECTION, SOLUTION INTRAVENOUS; SUBCUTANEOUS
Qty: 15 ML | Refills: 1 | Status: SHIPPED | OUTPATIENT
Start: 2024-12-24

## 2025-01-05 DIAGNOSIS — E11.9 TYPE 2 DIABETES MELLITUS WITHOUT COMPLICATION, WITH LONG-TERM CURRENT USE OF INSULIN (HCC): Chronic | ICD-10-CM

## 2025-01-05 DIAGNOSIS — E11.8 TYPE 2 DIABETES MELLITUS WITH COMPLICATION, WITH LONG-TERM CURRENT USE OF INSULIN (HCC): ICD-10-CM

## 2025-01-05 DIAGNOSIS — Z79.4 TYPE 2 DIABETES MELLITUS WITH COMPLICATION, WITH LONG-TERM CURRENT USE OF INSULIN (HCC): ICD-10-CM

## 2025-01-05 DIAGNOSIS — Z79.4 TYPE 2 DIABETES MELLITUS WITHOUT COMPLICATION, WITH LONG-TERM CURRENT USE OF INSULIN (HCC): Chronic | ICD-10-CM

## 2025-01-06 RX ORDER — FAMOTIDINE 20 MG/1
20 TABLET, FILM COATED ORAL
Qty: 60 TABLET | Refills: 5 | Status: SHIPPED | OUTPATIENT
Start: 2025-01-06

## 2025-01-06 RX ORDER — PEN NEEDLE, DIABETIC 32GX 5/32"
NEEDLE, DISPOSABLE MISCELLANEOUS
Qty: 200 EACH | Refills: 3 | Status: SHIPPED | OUTPATIENT
Start: 2025-01-06

## 2025-02-04 DIAGNOSIS — I10 ESSENTIAL HYPERTENSION: ICD-10-CM

## 2025-02-05 RX ORDER — BENAZEPRIL HYDROCHLORIDE 20 MG/1
20 TABLET ORAL DAILY
Qty: 90 TABLET | Refills: 1 | Status: SHIPPED | OUTPATIENT
Start: 2025-02-05

## 2025-02-07 ENCOUNTER — OFFICE VISIT (OUTPATIENT)
Dept: FAMILY MEDICINE CLINIC | Facility: CLINIC | Age: 65
End: 2025-02-07
Payer: COMMERCIAL

## 2025-02-07 VITALS
BODY MASS INDEX: 29.08 KG/M2 | HEIGHT: 62 IN | DIASTOLIC BLOOD PRESSURE: 84 MMHG | SYSTOLIC BLOOD PRESSURE: 146 MMHG | TEMPERATURE: 97.3 F | OXYGEN SATURATION: 96 % | HEART RATE: 86 BPM | WEIGHT: 158 LBS

## 2025-02-07 DIAGNOSIS — E11.9 TYPE 2 DIABETES MELLITUS WITHOUT COMPLICATION, WITH LONG-TERM CURRENT USE OF INSULIN (HCC): Primary | ICD-10-CM

## 2025-02-07 DIAGNOSIS — M25.511 CHRONIC PAIN OF BOTH SHOULDERS: ICD-10-CM

## 2025-02-07 DIAGNOSIS — Z12.4 SCREENING FOR CERVICAL CANCER: ICD-10-CM

## 2025-02-07 DIAGNOSIS — G89.29 CHRONIC PAIN OF BOTH SHOULDERS: ICD-10-CM

## 2025-02-07 DIAGNOSIS — Z23 ENCOUNTER FOR IMMUNIZATION: ICD-10-CM

## 2025-02-07 DIAGNOSIS — M25.512 CHRONIC PAIN OF BOTH SHOULDERS: ICD-10-CM

## 2025-02-07 DIAGNOSIS — Z79.4 TYPE 2 DIABETES MELLITUS WITHOUT COMPLICATION, WITH LONG-TERM CURRENT USE OF INSULIN (HCC): Primary | ICD-10-CM

## 2025-02-07 LAB — SL AMB POCT HEMOGLOBIN AIC: 7.6 (ref ?–6.5)

## 2025-02-07 PROCEDURE — 99214 OFFICE O/P EST MOD 30 MIN: CPT

## 2025-02-07 PROCEDURE — 83036 HEMOGLOBIN GLYCOSYLATED A1C: CPT

## 2025-02-07 RX ORDER — ONDANSETRON 4 MG/1
4 TABLET, FILM COATED ORAL EVERY 8 HOURS PRN
Qty: 20 TABLET | Refills: 0 | Status: SHIPPED | OUTPATIENT
Start: 2025-02-07

## 2025-02-07 RX ORDER — METHOCARBAMOL 500 MG/1
500 TABLET, FILM COATED ORAL EVERY 12 HOURS PRN
Qty: 30 TABLET | Refills: 0 | Status: SHIPPED | OUTPATIENT
Start: 2025-02-07

## 2025-02-07 NOTE — PROGRESS NOTES
Name: Lala Allen      : 1960      MRN: [id number]  Encounter Provider: RUBEN Jackson  Encounter Date: 2025   Encounter department: [facility]  :  Assessment & Plan  Type 2 diabetes mellitus without complication, with long-term current use of insulin (HCC)    Lab Results   Component Value Date    HGBA1C 10.3 (H) 10/08/2024     Excellent improvement in A1c: 10.3% >> 7.6% in office today  Congratulated patient on this achievement  Continue medication management per Endocrinology  Next OV 2025  Counseled  DM2 foot exam completed  Repeat labs in 6 months - has pending labs for Endocrine - pt aware    Orders:    POCT hemoglobin A1c    Diabetic foot exam; Future    Albumin / creatinine urine ratio; Future    Comprehensive metabolic panel; Future    Hemoglobin A1C; Future    Lipid Panel with Direct LDL reflex; Future    TSH, 3rd generation with Free T4 reflex; Future    Albumin / creatinine urine ratio; Future    Chronic pain of both shoulders    Denies injury/trauma  Taking Tylenol OTC  Counseled on OTC & supportive care  Offered PT    Orders:    methocarbamol (ROBAXIN) 500 mg tablet; Take 1 tablet (500 mg total) by mouth every 12 (twelve) hours as needed for muscle spasms    Encounter for immunization    Counseled    Orders:    Pneumococcal Conjugate Vaccine 20-valent (Pcv20)    Zoster Vaccine Recombinant IM    influenza vaccine, recombinant, PF, 0.5 mL IM (Flublok)    Screening for cervical cancer    Also reminded on upcoming colorectal cancer screening    Orders:    Ambulatory referral to Obstetrics / Gynecology; Future          Depression Screening and Follow-up Plan: Patient was screened for depression during today's encounter. They screened negative with a PHQ-9 score of 0.      History of Present Illness     Review of Systems   Constitutional:  Negative for chills, fatigue and fever.   HENT:  Negative for congestion, ear pain, facial swelling, hearing loss,  "rhinorrhea, sinus pressure, sneezing, sore throat and trouble swallowing.    Eyes:  Negative for pain, redness and visual disturbance.   Respiratory:  Negative for cough, chest tightness, shortness of breath and wheezing.    Cardiovascular:  Negative for chest pain and palpitations.   Gastrointestinal:  Negative for abdominal pain, diarrhea, nausea and vomiting.   Genitourinary:  Negative for dysuria, flank pain, hematuria and pelvic pain.   Musculoskeletal:  Positive for arthralgias. Negative for back pain and myalgias.   Skin:  Negative for color change and rash.   Neurological:  Negative for dizziness, seizures, syncope, weakness, light-headedness and headaches.   Psychiatric/Behavioral:  Negative for confusion, hallucinations and sleep disturbance. The patient is not nervous/anxious.    All other systems reviewed and are negative.      Objective   /84   Pulse 86   Temp (!) 97.3 °F (36.3 °C) (Tympanic)   Ht 5' 2\" (1.575 m)   Wt 71.7 kg (158 lb)   SpO2 96%   BMI 28.90 kg/m²      Physical Exam  Vitals and nursing note reviewed.   Constitutional:       General: She is not in acute distress.     Appearance: She is well-developed.   HENT:      Head: Normocephalic and atraumatic.      Right Ear: Hearing and tympanic membrane normal.      Left Ear: Hearing and tympanic membrane normal.      Nose: Nose normal.      Mouth/Throat:      Mouth: Mucous membranes are moist.      Dentition: Normal dentition.      Tongue: No lesions.      Pharynx: Oropharynx is clear. Uvula midline. No oropharyngeal exudate.      Tonsils: No tonsillar exudate.   Eyes:      Extraocular Movements: Extraocular movements intact.      Conjunctiva/sclera: Conjunctivae normal.   Neck:      Vascular: No carotid bruit or JVD.   Cardiovascular:      Rate and Rhythm: Normal rate and regular rhythm.      Pulses: Pulses are weak.           Dorsalis pedis pulses are 1+ on the right side and 1+ on the left side.        Posterior tibial pulses are " 1+ on the right side and 1+ on the left side.      Heart sounds: S1 normal and S2 normal. No murmur heard.  Pulmonary:      Effort: Pulmonary effort is normal. No tachypnea or respiratory distress.      Breath sounds: Normal breath sounds and air entry. No decreased breath sounds.   Chest:      Chest wall: No deformity or tenderness.   Abdominal:      General: Abdomen is flat. Bowel sounds are normal. There is no distension.      Palpations: Abdomen is soft.      Tenderness: There is no abdominal tenderness. There is no right CVA tenderness, left CVA tenderness or guarding.   Musculoskeletal:         General: No swelling.      Cervical back: Full passive range of motion without pain and neck supple.      Right lower leg: No edema.      Left lower leg: No edema.   Feet:      Right foot:      Skin integrity: No ulcer, skin breakdown, erythema, warmth, callus or dry skin.      Left foot:      Skin integrity: No ulcer, skin breakdown, erythema, warmth, callus or dry skin.   Lymphadenopathy:      Cervical: No cervical adenopathy.   Skin:     General: Skin is warm and dry.      Capillary Refill: Capillary refill takes less than 2 seconds.      Findings: No rash.   Neurological:      Mental Status: She is alert and oriented to person, place, and time.      Cranial Nerves: Cranial nerves 2-12 are intact.      Sensory: Sensation is intact.      Motor: Motor function is intact.      Coordination: Coordination is intact.      Gait: Gait is intact.   Psychiatric:         Mood and Affect: Mood normal.         Behavior: Behavior is cooperative.       Diabetic Foot Exam    Patient's shoes and socks removed.    Right Foot/Ankle   Right Foot Inspection  Skin Exam: skin normal and skin intact. No dry skin, no warmth, no callus, no erythema, no maceration, no abnormal color, no pre-ulcer, no ulcer and no callus.     Toe Exam: ROM and strength within normal limits.     Sensory   Monofilament testing: intact    Vascular  Capillary  refills: < 3 seconds  The right DP pulse is 1+. The right PT pulse is 1+.     Left Foot/Ankle  Left Foot Inspection  Skin Exam: skin normal and skin intact. No dry skin, no warmth, no erythema, no maceration, normal color, no pre-ulcer, no ulcer and no callus.     Toe Exam: ROM and strength within normal limits.     Sensory   Monofilament testing: intact    Vascular  Capillary refills: < 3 seconds  The left DP pulse is 1+. The left PT pulse is 1+.     Assign Risk Category  No deformity present  No loss of protective sensation  Weak pulses  Risk: 1

## 2025-02-07 NOTE — ASSESSMENT & PLAN NOTE
Lab Results   Component Value Date    HGBA1C 10.3 (H) 10/08/2024     Excellent improvement in A1c: 10.3% >> 7.6% in office today  Congratulated patient on this achievement  Continue medication management per Endocrinology  Next OV 2/20/2025  Counseled  DM2 foot exam completed  Repeat labs in 6 months - has pending labs for Endocrine - pt aware    Orders:    POCT hemoglobin A1c    Diabetic foot exam; Future    Albumin / creatinine urine ratio; Future    Comprehensive metabolic panel; Future    Hemoglobin A1C; Future    Lipid Panel with Direct LDL reflex; Future    TSH, 3rd generation with Free T4 reflex; Future    Albumin / creatinine urine ratio; Future

## 2025-02-19 ENCOUNTER — APPOINTMENT (OUTPATIENT)
Age: 65
End: 2025-02-19
Payer: COMMERCIAL

## 2025-02-19 DIAGNOSIS — Z79.4 TYPE 2 DIABETES MELLITUS WITHOUT COMPLICATION, WITH LONG-TERM CURRENT USE OF INSULIN (HCC): Chronic | ICD-10-CM

## 2025-02-19 DIAGNOSIS — I10 ESSENTIAL HYPERTENSION: ICD-10-CM

## 2025-02-19 DIAGNOSIS — E11.9 TYPE 2 DIABETES MELLITUS WITHOUT COMPLICATION, WITH LONG-TERM CURRENT USE OF INSULIN (HCC): Chronic | ICD-10-CM

## 2025-02-19 DIAGNOSIS — E78.2 MIXED HYPERLIPIDEMIA: ICD-10-CM

## 2025-02-19 LAB
ALBUMIN SERPL BCG-MCNC: 4.1 G/DL (ref 3.5–5)
ALP SERPL-CCNC: 105 U/L (ref 34–104)
ALT SERPL W P-5'-P-CCNC: 21 U/L (ref 7–52)
ANION GAP SERPL CALCULATED.3IONS-SCNC: 8 MMOL/L (ref 4–13)
AST SERPL W P-5'-P-CCNC: 18 U/L (ref 13–39)
BILIRUB SERPL-MCNC: 0.74 MG/DL (ref 0.2–1)
BUN SERPL-MCNC: 18 MG/DL (ref 5–25)
CALCIUM SERPL-MCNC: 9.5 MG/DL (ref 8.4–10.2)
CHLORIDE SERPL-SCNC: 106 MMOL/L (ref 96–108)
CHOLEST SERPL-MCNC: 207 MG/DL (ref ?–200)
CO2 SERPL-SCNC: 28 MMOL/L (ref 21–32)
CREAT SERPL-MCNC: 0.82 MG/DL (ref 0.6–1.3)
GFR SERPL CREATININE-BSD FRML MDRD: 75 ML/MIN/1.73SQ M
GLUCOSE P FAST SERPL-MCNC: 126 MG/DL (ref 65–99)
HDLC SERPL-MCNC: 39 MG/DL
LDLC SERPL CALC-MCNC: 114 MG/DL (ref 0–100)
LDLC SERPL DIRECT ASSAY-MCNC: 125 MG/DL (ref 0–100)
POTASSIUM SERPL-SCNC: 4 MMOL/L (ref 3.5–5.3)
PROT SERPL-MCNC: 7.7 G/DL (ref 6.4–8.4)
SODIUM SERPL-SCNC: 142 MMOL/L (ref 135–147)
TRIGL SERPL-MCNC: 269 MG/DL (ref ?–150)

## 2025-02-19 PROCEDURE — 80061 LIPID PANEL: CPT

## 2025-02-19 PROCEDURE — 83036 HEMOGLOBIN GLYCOSYLATED A1C: CPT

## 2025-02-19 PROCEDURE — 83721 ASSAY OF BLOOD LIPOPROTEIN: CPT

## 2025-02-19 PROCEDURE — 36415 COLL VENOUS BLD VENIPUNCTURE: CPT

## 2025-02-19 PROCEDURE — 80053 COMPREHEN METABOLIC PANEL: CPT

## 2025-02-19 NOTE — TELEPHONE ENCOUNTER
Patient's  called in to have the following medication refilled:      Insulin Glargine Solostar (Lantus SoloStar) 100 UNIT/ML SOPN       Pharmacy: [facility] [place], [place]

## 2025-02-20 ENCOUNTER — RESULTS FOLLOW-UP (OUTPATIENT)
Dept: ENDOCRINOLOGY | Facility: CLINIC | Age: 65
End: 2025-02-20

## 2025-02-20 LAB
EST. AVERAGE GLUCOSE BLD GHB EST-MCNC: 174 MG/DL
HBA1C MFR BLD: 7.7 %

## 2025-02-20 RX ORDER — INSULIN GLARGINE 100 [IU]/ML
50 INJECTION, SOLUTION SUBCUTANEOUS
Qty: 45 ML | Refills: 0 | OUTPATIENT
Start: 2025-02-20 | End: 2025-05-21

## 2025-02-20 RX ORDER — INSULIN GLARGINE 100 [IU]/ML
50 INJECTION, SOLUTION SUBCUTANEOUS
Qty: 45 ML | Refills: 1 | Status: SHIPPED | OUTPATIENT
Start: 2025-02-20

## 2025-02-21 ENCOUNTER — RESULTS FOLLOW-UP (OUTPATIENT)
Dept: CARDIOLOGY CLINIC | Facility: CLINIC | Age: 65
End: 2025-02-21

## 2025-02-21 DIAGNOSIS — E78.2 MIXED HYPERLIPIDEMIA: Primary | ICD-10-CM

## 2025-02-24 DIAGNOSIS — I10 ESSENTIAL HYPERTENSION: ICD-10-CM

## 2025-02-25 RX ORDER — METOPROLOL SUCCINATE 100 MG/1
100 TABLET, EXTENDED RELEASE ORAL DAILY
Qty: 90 TABLET | Refills: 3 | Status: SHIPPED | OUTPATIENT
Start: 2025-02-25

## 2025-03-19 DIAGNOSIS — E11.9 TYPE 2 DIABETES MELLITUS WITHOUT COMPLICATION, WITH LONG-TERM CURRENT USE OF INSULIN (HCC): Chronic | ICD-10-CM

## 2025-03-19 DIAGNOSIS — Z79.4 TYPE 2 DIABETES MELLITUS WITHOUT COMPLICATION, WITH LONG-TERM CURRENT USE OF INSULIN (HCC): Chronic | ICD-10-CM

## 2025-03-19 DIAGNOSIS — I10 ESSENTIAL HYPERTENSION: ICD-10-CM

## 2025-03-19 DIAGNOSIS — I24.9 ACS (ACUTE CORONARY SYNDROME) (HCC): ICD-10-CM

## 2025-03-19 RX ORDER — INSULIN LISPRO 100 [IU]/ML
INJECTION, SOLUTION INTRAVENOUS; SUBCUTANEOUS
Qty: 15 ML | Refills: 1 | Status: SHIPPED | OUTPATIENT
Start: 2025-03-19

## 2025-03-20 RX ORDER — AMLODIPINE BESYLATE 5 MG/1
5 TABLET ORAL DAILY
Qty: 90 TABLET | Refills: 1 | Status: SHIPPED | OUTPATIENT
Start: 2025-03-20

## 2025-03-20 RX ORDER — ATORVASTATIN CALCIUM 80 MG/1
80 TABLET, FILM COATED ORAL
Qty: 90 TABLET | Refills: 1 | Status: SHIPPED | OUTPATIENT
Start: 2025-03-20

## 2025-03-21 DIAGNOSIS — E11.65 UNCONTROLLED TYPE 2 DIABETES MELLITUS WITH HYPERGLYCEMIA (HCC): ICD-10-CM

## 2025-03-21 RX ORDER — EMPAGLIFLOZIN 25 MG/1
25 TABLET, FILM COATED ORAL EVERY MORNING
Qty: 90 TABLET | Refills: 1 | Status: SHIPPED | OUTPATIENT
Start: 2025-03-21

## 2025-03-28 NOTE — PROGRESS NOTES
Name: Lala Allen      : 1960      MRN: [id number]  Encounter Provider: RUBEN Sultana  Encounter Date: 3/31/2025   Encounter department: [facility]  :  Assessment & Plan  Well woman exam with routine gynecological exam  Encouraged healthy diet, exercise and lifestyle  Encouraged follow-up with PCP and specialists as needed  Encouraged seasonal influenza vaccination  Encouraged supplementation with calcium, vitamin-D and strength training exercises for good bone health       Colon cancer screening  Due for 3 year repeat 2025  Orders:    Ambulatory Referral to Gastroenterology; Future    Breast cancer screening by mammogram    Orders:    Mammo screening bilateral w 3d and cad; Future    Malignant neoplasm of upper-outer quadrant of right breast in female, estrogen receptor positive (HCC)  S/p lumpectomy 20; completed chemotherapy, radiation. Maintained on Anastrozole   RANDELL Ceja       Orders:    Mammo screening bilateral w 3d and cad; Future    Vulvar itching    Orders:    Ambulatory referral to Obstetrics / Gynecology    nystatin-triamcinolone (MYCOLOG-II) ointment; Apply topically 2 (two) times a day as needed (itching)    Will call / [employer] message with results  VBI-   BMI Counseling: Body mass index is 28.17 kg/m². The BMI is above normal. Nutrition recommendations include 3-5 servings of fruits/vegetables daily. Exercise recommendations include exercising 3-5 times per week.    RTO 1 year for annual exam     History of Present Illness   HPI  Lala Allen is a 64 y.o. female who presents  for annual exam.  Last Pap smear 23 NILM/ HR HPV(-).  Last mammogram 24 birads 1. Hx right breast cancer  S/p right breast lumpectomy, chemotherapy and radiation.  Currently taking anastrozole.  Meets with Dr. Marcial MEJIAS. CRC screening  colonoscopy 22.  The patient has no complaints today. The patient is sexually active.  The patient is not taking  "hormone replacement therapy. Patient denies post-menopausal vaginal bleeding.. The patient wears seatbelts: yes. The patient participates in regular exercise: yes. The patient reports that there is not domestic violence in her life.  Patient states she has occasional vulvar itching and irritation.  She was using nystatin with good results however she just ran out.  Is requesting refill    History obtained from: patient    Review of Systems   Constitutional:  Negative for chills and fever.   Respiratory: Negative.     Cardiovascular: Negative.    Genitourinary: Negative.      Medical History Reviewed by provider this encounter:  Tobacco  Allergies  Meds  Problems  Med Hx  Surg Hx  Fam Hx  Soc   Hx    .     Objective   /76   Ht 5' 2\" (1.575 m)   Wt 69.9 kg (154 lb)   BMI 28.17 kg/m²      Physical Exam  General:   alert and oriented, in no acute distress   Heart: regular rate and rhythm, S1, S2 normal, no murmur, click, rub or gallop   Lungs: clear to auscultation bilaterally   Abdomen: soft, non-tender, without masses or organomegaly   Vulva: normal, Bartholin's, Urethra, Hammond's normal   Vagina: normal mucosa, normal discharge, no palpable nodules   Cervix: no cervical motion tenderness and no lesions   Uterus: normal size, non-tender, normal shape and consistency   Adnexa: normal adnexa and no mass, fullness, tenderness   Breast:  breasts appear normal, no suspicious masses, no skin or nipple changes or axillary nodes, surgical scars noted right breast.        Menstrual History:  OB History          2    Para   2    Term   2            AB        Living             SAB        IAB        Ectopic        Multiple        Live Births               Obstetric Comments   Menarche age 11              Menarche age: 11  No LMP recorded. Patient is postmenopausal.         "

## 2025-03-31 ENCOUNTER — CONSULT (OUTPATIENT)
Dept: OBGYN CLINIC | Facility: CLINIC | Age: 65
End: 2025-03-31
Payer: COMMERCIAL

## 2025-03-31 VITALS
WEIGHT: 154 LBS | HEIGHT: 62 IN | DIASTOLIC BLOOD PRESSURE: 76 MMHG | BODY MASS INDEX: 28.34 KG/M2 | SYSTOLIC BLOOD PRESSURE: 120 MMHG

## 2025-03-31 DIAGNOSIS — Z12.11 COLON CANCER SCREENING: ICD-10-CM

## 2025-03-31 DIAGNOSIS — Z01.419 WELL WOMAN EXAM WITH ROUTINE GYNECOLOGICAL EXAM: Primary | ICD-10-CM

## 2025-03-31 DIAGNOSIS — Z17.0 MALIGNANT NEOPLASM OF UPPER-OUTER QUADRANT OF RIGHT BREAST IN FEMALE, ESTROGEN RECEPTOR POSITIVE (HCC): ICD-10-CM

## 2025-03-31 DIAGNOSIS — L29.2 VULVAR ITCHING: ICD-10-CM

## 2025-03-31 DIAGNOSIS — C50.411 MALIGNANT NEOPLASM OF UPPER-OUTER QUADRANT OF RIGHT BREAST IN FEMALE, ESTROGEN RECEPTOR POSITIVE (HCC): ICD-10-CM

## 2025-03-31 DIAGNOSIS — Z12.31 BREAST CANCER SCREENING BY MAMMOGRAM: ICD-10-CM

## 2025-03-31 PROCEDURE — 99386 PREV VISIT NEW AGE 40-64: CPT | Performed by: NURSE PRACTITIONER

## 2025-03-31 RX ORDER — NYSTATIN AND TRIAMCINOLONE ACETONIDE 100000; 1 [USP'U]/G; MG/G
OINTMENT TOPICAL 2 TIMES DAILY PRN
Qty: 30 G | Refills: 1 | Status: SHIPPED | OUTPATIENT
Start: 2025-03-31

## 2025-04-18 DIAGNOSIS — E11.65 UNCONTROLLED TYPE 2 DIABETES MELLITUS WITH HYPERGLYCEMIA (HCC): ICD-10-CM

## 2025-04-18 RX ORDER — EMPAGLIFLOZIN 25 MG/1
25 TABLET, FILM COATED ORAL EVERY MORNING
Qty: 90 TABLET | Refills: 1 | Status: SHIPPED | OUTPATIENT
Start: 2025-04-18

## 2025-04-22 ENCOUNTER — TELEPHONE (OUTPATIENT)
Dept: ADMINISTRATIVE | Facility: OTHER | Age: 65
End: 2025-04-22

## 2025-04-22 DIAGNOSIS — E11.65 UNCONTROLLED TYPE 2 DIABETES MELLITUS WITH HYPERGLYCEMIA (HCC): ICD-10-CM

## 2025-04-22 NOTE — TELEPHONE ENCOUNTER
----- Message from Marlin LOVE sent at 4/22/2025  8:01 AM EDT -----  Regarding: Care Gap Request  04/22/25 8:01 AM    Hello, our patient above has had Pap Smear (HPV) aka Cervical Cancer Screening completed/performed. Please assist in updating the patient chart by making an External outreach to [facility] located in [place]. The date of service is 2025.    Thank you,  CHIVO Barnes [facility]

## 2025-04-22 NOTE — TELEPHONE ENCOUNTER
Upon review of the In Basket request we were able to locate, review, and update the patient chart as requested for Pap Smear (HPV) aka Cervical Cancer Screening.    9/1/2023 WAS LAST PAP PER GYN NOTE. NO PAP DONE IN 2025    Any additional questions or concerns should be emailed to the Practice Liaisons via the appropriate education email address, please do not reply via In Basket.    Thank you  Radha Gonsalez MA   [facility]

## 2025-04-23 RX ORDER — EMPAGLIFLOZIN 25 MG/1
25 TABLET, FILM COATED ORAL EVERY MORNING
Qty: 90 TABLET | Refills: 1 | OUTPATIENT
Start: 2025-04-23

## 2025-05-14 DIAGNOSIS — E11.65 UNCONTROLLED TYPE 2 DIABETES MELLITUS WITH HYPERGLYCEMIA (HCC): ICD-10-CM

## 2025-05-15 RX ORDER — SEMAGLUTIDE 1.34 MG/ML
INJECTION, SOLUTION SUBCUTANEOUS
Qty: 9 ML | Refills: 0 | Status: SHIPPED | OUTPATIENT
Start: 2025-05-15

## 2025-05-24 DIAGNOSIS — I10 ESSENTIAL HYPERTENSION: ICD-10-CM

## 2025-05-24 RX ORDER — METOPROLOL SUCCINATE 100 MG/1
100 TABLET, EXTENDED RELEASE ORAL DAILY
Qty: 90 TABLET | Refills: 3 | Status: SHIPPED | OUTPATIENT
Start: 2025-05-24

## 2025-05-25 RX ORDER — BENAZEPRIL HYDROCHLORIDE 20 MG/1
20 TABLET ORAL DAILY
Qty: 90 TABLET | Refills: 1 | Status: SHIPPED | OUTPATIENT
Start: 2025-05-25

## 2025-06-08 ENCOUNTER — APPOINTMENT (EMERGENCY)
Dept: RADIOLOGY | Facility: HOSPITAL | Age: 65
End: 2025-06-08
Payer: COMMERCIAL

## 2025-06-08 ENCOUNTER — HOSPITAL ENCOUNTER (EMERGENCY)
Facility: HOSPITAL | Age: 65
Discharge: HOME | End: 2025-06-08
Attending: EMERGENCY MEDICINE | Admitting: EMERGENCY MEDICINE
Payer: COMMERCIAL

## 2025-06-08 VITALS
BODY MASS INDEX: 28.34 KG/M2 | TEMPERATURE: 98 F | SYSTOLIC BLOOD PRESSURE: 171 MMHG | WEIGHT: 154 LBS | DIASTOLIC BLOOD PRESSURE: 98 MMHG | HEIGHT: 62 IN | OXYGEN SATURATION: 95 % | HEART RATE: 75 BPM | RESPIRATION RATE: 18 BRPM

## 2025-06-08 DIAGNOSIS — M67.919 ROTATOR CUFF DISORDER: Primary | ICD-10-CM

## 2025-06-08 PROCEDURE — 99283 EMERGENCY DEPT VISIT LOW MDM: CPT

## 2025-06-08 PROCEDURE — 73030 X-RAY EXAM OF SHOULDER: CPT

## 2025-06-08 PROCEDURE — 99284 EMERGENCY DEPT VISIT MOD MDM: CPT | Performed by: EMERGENCY MEDICINE

## 2025-06-08 RX ORDER — CYCLOBENZAPRINE HCL 10 MG
5 TABLET ORAL
Qty: 10 TABLET | Refills: 0 | Status: SHIPPED | OUTPATIENT
Start: 2025-06-08

## 2025-06-08 RX ORDER — CYCLOBENZAPRINE HCL 10 MG
10 TABLET ORAL ONCE
Status: COMPLETED | OUTPATIENT
Start: 2025-06-08 | End: 2025-06-08

## 2025-06-08 RX ADMIN — CYCLOBENZAPRINE HYDROCHLORIDE 10 MG: 10 TABLET, FILM COATED ORAL at 18:28

## 2025-06-09 NOTE — ED PROVIDER NOTES
Time reflects when diagnosis was documented in both MDM as applicable and the Disposition within this note       Time User Action Codes Description Comment    6/8/2025  6:10 PM Delonte Sotoel Add [M67.919] Rotator cuff disorder           ED Disposition       ED Disposition   Discharge    Condition   Stable    Date/Time   Sun Jun 8, 2025  6:10 PM    Comment   Lala ROYAL Alejandro discharge to home/self care.                   Assessment & Plan       Medical Decision Making  Joint appears normal size. No signs of infection. Pain well controlled. Neurovascularly intact. No radiographic findings on plain film suggestive of fracture or dislocation.  Patient fails Apley scratch test, strongly suspect rotator cuff injury.  Discussed warning signs and symptoms with the patient as well as when to return to the emergency department versus follow up with PCP or orthopedics. Patient states understanding and agreement with the plan.  This note was completed using dictation software.       Amount and/or Complexity of Data Reviewed  Independent Historian: lottie  External Data Reviewed: notes.  Radiology: ordered.    Risk  OTC drugs.  Prescription drug management.             Medications   cyclobenzaprine (FLEXERIL) tablet 10 mg (10 mg Oral Given 6/8/25 1828)       ED Risk Strat Scores                    No data recorded        SBIRT 20yo+      Flowsheet Row Most Recent Value   Initial Alcohol Screen: US AUDIT-C     1. How often do you have a drink containing alcohol? 0 Filed at: 06/08/2025 1641   2. How many drinks containing alcohol do you have on a typical day you are drinking?  0 Filed at: 06/08/2025 1641   3b. FEMALE Any Age, or MALE 65+: How often do you have 4 or more drinks on one occassion? 0 Filed at: 06/08/2025 1641   Audit-C Score 0 Filed at: 06/08/2025 1641   CUCA: How many times in the past year have you...    Used an illegal drug or used a prescription medication for non-medical reasons? Never Filed at: 06/08/2025 1641                             History of Present Illness       Chief Complaint   Patient presents with    Shoulder Pain     Pt reports shoulder pain on right side x 3 days. Pt denies any injury to area, pt reports stiffness in muscle at times.        Past Medical History[1]   Past Surgical History[2]   Family History[3]   Social History[4]   E-Cigarette/Vaping    E-Cigarette Use Never User       E-Cigarette/Vaping Substances    Nicotine No     THC No     CBD No     Flavoring No     Other No     Unknown No       I have reviewed and agree with the history as documented.     64-year-old female reports 3 weeks of worsening arm pain on the right side, especially worse in the past 3 days.  She does not remember doing any specific activities that exacerbated or injured it.  No traumas.  Has not been feeling sick or febrile recently.  Has never injured this shoulder before.  Is otherwise felt well.        Review of Systems   Constitutional:  Negative for activity change, chills, fatigue and fever.   HENT:  Negative for congestion.    Eyes:  Negative for visual disturbance.   Respiratory:  Negative for cough, chest tightness and shortness of breath.    Cardiovascular:  Negative for chest pain.   Gastrointestinal:  Negative for abdominal pain, diarrhea and vomiting.   Genitourinary:  Negative for dysuria.   Skin:  Negative for rash.   Neurological:  Negative for dizziness, weakness and numbness.           Objective       ED Triage Vitals   Temperature Pulse Blood Pressure Respirations SpO2 Patient Position - Orthostatic VS   06/08/25 1638 06/08/25 1638 06/08/25 1639 06/08/25 1638 06/08/25 1638 06/08/25 1638   98 °F (36.7 °C) 89 (!) 204/108 18 95 % Lying      Temp Source Heart Rate Source BP Location FiO2 (%) Pain Score    06/08/25 1638 06/08/25 1638 06/08/25 1638 -- 06/08/25 1638    Temporal Monitor Right arm  No Pain      Vitals      Date and Time Temp Pulse SpO2 Resp BP Pain Score FACES Pain Rating User   06/08/25 1804 98 °F  (36.7 °C) 75 95 % 18 171/98 -- -- RTM   06/08/25 1640 -- -- 95 % -- -- -- --    06/08/25 1639 -- -- -- -- 204/108 7 --    06/08/25 1638 98 °F (36.7 °C) 89 95 % 18 -- No Pain --             Physical Exam  Constitutional:       General: She is not in acute distress.     Appearance: She is well-developed. She is not ill-appearing or toxic-appearing.   HENT:      Head: Normocephalic and atraumatic.     Eyes:      Conjunctiva/sclera: Conjunctivae normal.      Pupils: Pupils are equal, round, and reactive to light.       Cardiovascular:      Rate and Rhythm: Normal rate and regular rhythm.      Heart sounds: Normal heart sounds.   Pulmonary:      Effort: Pulmonary effort is normal. No respiratory distress.      Breath sounds: Normal breath sounds.   Abdominal:      General: Bowel sounds are normal.      Palpations: Abdomen is soft.     Musculoskeletal:         General: Tenderness present. No swelling or deformity.      Cervical back: Normal range of motion and neck supple.     Skin:     General: Skin is warm and dry.      Capillary Refill: Capillary refill takes less than 2 seconds.     Neurological:      Mental Status: She is alert and oriented to person, place, and time.      Sensory: No sensory deficit.      Motor: No weakness.     Psychiatric:         Behavior: Behavior normal.         Results Reviewed       None            XR shoulder 2+ views RIGHT   Final Interpretation by Abdelrahman Workman MD (06/08 1746)      No acute osseous abnormality. No pathologic fracture.      Degenerative change of the right acromioclavicular joint with subacromial spurring.         Computerized Assisted Algorithm (CAA) may have been used to analyze all applicable images.         Workstation performed: [id number]             Procedures    ED Medication and Procedure Management   Prior to Admission Medications   Prescriptions Last Dose Informant Patient Reported? Taking?   Accu-Chek FastClix Lancets MISC  Self No No   Sig: TEST THREE  TIMES A DAY   Blood Glucose Monitoring Suppl (OneTouch Verio Flex System) w/Device KIT  Self No No   Sig: Test four times daily   Continuous Glucose  (Dexcom G7 ) SOFI  Self No No   Sig: Use 1 each continuous   Continuous Glucose Sensor (Dexcom G7 Sensor)  Self No No   Sig: Use 1 Device every 10 days   HYDROcodone-acetaminophen (Norco) 5-325 mg per tablet  Self No No   Sig: Take 1 tablet by mouth every 6 (six) hours as needed for pain (Initial therapy) Max Daily Amount: 4 tablets   Insulin Glargine Solostar (Lantus SoloStar) 100 UNIT/ML SOPN   No No   Sig: inject 50 units subcutaneously at bedtime   Insulin Pen Needle (BD Pen Needle Sadia 2nd Gen) 32G X 4 MM MISC   No No   Sig: use 1 PEN NEEDLE to inject MEDICATION subcutaneously four times a day   Jardiance 25 MG TABS   No No   Sig: take 1 tablet by mouth every morning   OneTouch Delica Lancets 33G MISC  Self No No   Sig: Use 4 (four) times a day Test 4 times daily   Ozempic, 1 MG/DOSE, 4 MG/3ML injection pen   No No   Sig: inject 0.75 MILLILITER subcutaneously every week   acyclovir (ZOVIRAX) 400 MG tablet   No No   Sig: Take 2 tablets (800 mg total) by mouth 5 (five) times a day for 10 days   amLODIPine (NORVASC) 5 mg tablet   No No   Sig: swallow 1 tablet once daily   anastrozole (ARIMIDEX) 1 mg tablet  Self Yes No   Sig: Take 1 mg by mouth daily   aspirin (ECOTRIN LOW STRENGTH) 81 mg EC tablet  Self No No   Sig: Take 1 tablet (81 mg total) by mouth daily   atorvastatin (LIPITOR) 80 mg tablet   No No   Sig: take 1 tablet by mouth once daily with dinner   benazepril (LOTENSIN) 20 mg tablet   No No   Sig: take 1 tablet by mouth once daily   betamethasone dipropionate (DIPROSONE) 0.05 % cream  Self No No   Sig: apply to affected area twice a day   betamethasone valerate (VALISONE) 0.1 % cream  Self Yes No   Sig: Apply 1 Application topically 2 (two) times a day   ezetimibe (ZETIA) 10 mg tablet  Self No No   Sig: Take 1 tablet (10 mg total) by mouth  daily at bedtime   famotidine (PEPCID) 20 mg tablet   No No   Sig: take 1 tablet by mouth twice a day before meals   glucose blood (OneTouch Verio) test strip  Self No No   Sig: as directed use 1 TEST STRIP to TEST BLOOD SUGAR four times a day   insulin lispro (HumaLOG) 100 units/mL injection pen   No No   Sig: inject 10 units subcutaneously once daily PLUS SLIDING SCALE   lactulose (CHRONULAC) 10 g/15 mL solution  Self Yes No   Sig: Take 10 g by mouth as needed (constipation)   lidocaine (Lidoderm) 5 %   No No   Sig: Apply 2 patches topically over 12 hours daily for 10 days Remove & Discard patch within 12 hours or as directed by MD   methocarbamol (ROBAXIN) 500 mg tablet   No No   Sig: Take 1 tablet (500 mg total) by mouth every 12 (twelve) hours as needed for muscle spasms   metoprolol succinate (TOPROL-XL) 100 mg 24 hr tablet   No No   Sig: take 1 tablet by mouth once daily   nitroglycerin (NITROSTAT) 0.4 mg SL tablet  Self No No   Sig: Place 1 tablet (0.4 mg total) under the tongue every 5 (five) minutes as needed for chest pain If no relief after first dose call prescriber or 911   nystatin (MYCOSTATIN) cream  Self No No   Sig: Apply topically 2 (two) times a day   nystatin-triamcinolone (MYCOLOG-II) ointment   No No   Sig: Apply topically 2 (two) times a day as needed (itching)   ondansetron (ZOFRAN) 4 mg tablet   No No   Sig: Take 1 tablet (4 mg total) by mouth every 8 (eight) hours as needed for nausea or vomiting   pregabalin (LYRICA) 100 mg capsule   No No   Sig: Take 1 capsule (100 mg total) by mouth daily at bedtime   rOPINIRole (REQUIP) 1 mg tablet  Self No No   Sig: Take 1 tablet (1 mg total) by mouth daily   triamcinolone (KENALOG) 0.1 % cream  Self No No   Sig: Apply topically 2 (two) times a day      Facility-Administered Medications: None     Discharge Medication List as of 6/8/2025  6:12 PM        START taking these medications    Details   cyclobenzaprine (FLEXERIL) 10 mg tablet Take 0.5 tablets  (5 mg total) by mouth daily at bedtime, Starting Sun 6/8/2025, Normal           CONTINUE these medications which have NOT CHANGED    Details   !! Accu-Chek FastClix Lancets MISC TEST THREE TIMES A DAY, Normal      acyclovir (ZOVIRAX) 400 MG tablet Take 2 tablets (800 mg total) by mouth 5 (five) times a day for 10 days, Starting Sat 8/3/2024, Until Wed 10/9/2024, Normal      amLODIPine (NORVASC) 5 mg tablet swallow 1 tablet once daily, Starting Thu 3/20/2025, Normal      anastrozole (ARIMIDEX) 1 mg tablet Take 1 mg by mouth daily, Starting Tue 8/18/2020, Historical Med      aspirin (ECOTRIN LOW STRENGTH) 81 mg EC tablet Take 1 tablet (81 mg total) by mouth daily, Starting Tue 12/5/2023, Normal      atorvastatin (LIPITOR) 80 mg tablet take 1 tablet by mouth once daily with dinner, Starting Thu 3/20/2025, Normal      benazepril (LOTENSIN) 20 mg tablet take 1 tablet by mouth once daily, Starting Sun 5/25/2025, Normal      betamethasone dipropionate (DIPROSONE) 0.05 % cream apply to affected area twice a day, Starting Mon 6/3/2024, Normal      betamethasone valerate (VALISONE) 0.1 % cream Apply 1 Application topically 2 (two) times a day, Starting Mon 7/6/2020, Until Wed 10/9/2024, Historical Med      Blood Glucose Monitoring Suppl (OneTouch Verio Flex System) w/Device KIT Test four times daily, Normal      Continuous Glucose  (Dexcom G7 ) SOFI Use 1 each continuous, Starting Wed 10/9/2024, Normal      Continuous Glucose Sensor (Dexcom G7 Sensor) Use 1 Device every 10 days, Starting Wed 10/9/2024, Normal      ezetimibe (ZETIA) 10 mg tablet Take 1 tablet (10 mg total) by mouth daily at bedtime, Starting Tue 12/5/2023, Normal      famotidine (PEPCID) 20 mg tablet take 1 tablet by mouth twice a day before meals, Starting Mon 1/6/2025, Normal      glucose blood (OneTouch Verio) test strip as directed use 1 TEST STRIP to TEST BLOOD SUGAR four times a day, Normal      HYDROcodone-acetaminophen (Norco) 5-325 mg  per tablet Take 1 tablet by mouth every 6 (six) hours as needed for pain (Initial therapy) Max Daily Amount: 4 tablets, Starting Wed 7/31/2024, Normal      Insulin Glargine Solostar (Lantus SoloStar) 100 UNIT/ML SOPN inject 50 units subcutaneously at bedtime, Starting Thu 2/20/2025, Normal      insulin lispro (HumaLOG) 100 units/mL injection pen inject 10 units subcutaneously once daily PLUS SLIDING SCALE, Normal      Insulin Pen Needle (BD Pen Needle Sadia 2nd Gen) 32G X 4 MM MISC use 1 PEN NEEDLE to inject MEDICATION subcutaneously four times a day, Normal      Jardiance 25 MG TABS take 1 tablet by mouth every morning, Starting Fri 4/18/2025, Normal      lactulose (CHRONULAC) 10 g/15 mL solution Take 10 g by mouth as needed (constipation), Starting Sat 8/27/2022, Historical Med      lidocaine (Lidoderm) 5 % Apply 2 patches topically over 12 hours daily for 10 days Remove & Discard patch within 12 hours or as directed by MD, Starting Sat 8/3/2024, Until Wed 10/9/2024, Normal      methocarbamol (ROBAXIN) 500 mg tablet Take 1 tablet (500 mg total) by mouth every 12 (twelve) hours as needed for muscle spasms, Starting Fri 2/7/2025, Normal      metoprolol succinate (TOPROL-XL) 100 mg 24 hr tablet take 1 tablet by mouth once daily, Starting Sat 5/24/2025, Normal      nitroglycerin (NITROSTAT) 0.4 mg SL tablet Place 1 tablet (0.4 mg total) under the tongue every 5 (five) minutes as needed for chest pain If no relief after first dose call prescriber or 911, Starting Thu 6/8/2023, Normal      nystatin (MYCOSTATIN) cream Apply topically 2 (two) times a day, Starting Wed 8/7/2024, Normal      nystatin-triamcinolone (MYCOLOG-II) ointment Apply topically 2 (two) times a day as needed (itching), Starting Mon 3/31/2025, Normal      ondansetron (ZOFRAN) 4 mg tablet Take 1 tablet (4 mg total) by mouth every 8 (eight) hours as needed for nausea or vomiting, Starting Fri 2/7/2025, Normal      !! OneTouch Delica Lancets 33G MISC Use 4  (four) times a day Test 4 times daily, Starting Mon 2/19/2024, Normal      Ozempic, 1 MG/DOSE, 4 MG/3ML injection pen inject 0.75 MILLILITER subcutaneously every week, Normal      pregabalin (LYRICA) 100 mg capsule Take 1 capsule (100 mg total) by mouth daily at bedtime, Starting Thu 11/14/2024, Until Sat 12/14/2024, Normal      rOPINIRole (REQUIP) 1 mg tablet Take 1 tablet (1 mg total) by mouth daily, Starting Wed 10/16/2019, Normal      triamcinolone (KENALOG) 0.1 % cream Apply topically 2 (two) times a day, Starting Tue 6/18/2024, Normal       !! - Potential duplicate medications found. Please discuss with provider.          ED SEPSIS DOCUMENTATION   Time reflects when diagnosis was documented in both MDM as applicable and the Disposition within this note       Time User Action Codes Description Comment    6/8/2025  6:10 PM Ty Soto [M67.919] Rotator cuff disorder                    [1]   Past Medical History:  Diagnosis Date    Cancer (HCC)     right breast    Chronic back pain     Coronary artery disease     Diabetes mellitus (HCC)     Hyperlipidemia     Hypertension     Non-ST elevation MI (NSTEMI) ([facility]) 12/02/2023    Subsequent INNA placed in mid LAD 80 and 2 INNA placed in second diagonal 12/4/23    NSTEMI (non-ST elevated myocardial infarction) ([facility]) 11/28/2018    Status post coronary angioplasty 1st obtuse marginal 11/28/2018 at [facility] stent could not be placed    Restless leg syndrome     Tuberculosis     patient was less than 5 years old    Type 2 diabetes mellitus (HCC)    [2]   Past Surgical History:  Procedure Laterality Date    ANKLE SURGERY      tubal    BREAST LUMPECTOMY Right     CARDIAC CATHETERIZATION  11/2018    Successful balloon angioplassty to OM1    CARDIAC CATHETERIZATION  12/04/2023    Procedure: Cardiac catheterization;  Surgeon: Flex Darby MD;  Location: [facility];  Service: Cardiology    CHOLECYSTECTOMY      FRACTURE SURGERY      right ankle    TUBAL  LIGATION     [3]   Family History  Problem Relation Name Age of Onset    Breast cancer Mother      Diabetes Mother      Heart failure Mother      Heart disease Father      Stroke Father      No Known Problems Sister      No Known Problems Sister      No Known Problems Sister      Diabetes Sister      Cervical cancer Sister      No Known Problems Brother      No Known Problems Brother      No Known Problems Brother      Osteoarthritis Daughter      No Known Problems Son      Stroke Maternal Grandmother      Diabetes Maternal Grandfather      Stroke Paternal Grandmother      Heart disease Paternal Grandfather      Heart attack Paternal Grandfather      Colon cancer Neg Hx      Ovarian cancer Neg Hx     [4]   Social History  Tobacco Use    Smoking status: Never    Smokeless tobacco: Never   Vaping Use    Vaping status: Never Used   Substance Use Topics    Alcohol use: Yes     Alcohol/week: 2.0 standard drinks of alcohol     Types: 1 Glasses of wine, 1 Cans of beer per week     Comment: occasionally    Drug use: Never        Ty Soto MD  06/08/25 2107

## 2025-06-10 ENCOUNTER — TELEPHONE (OUTPATIENT)
Age: 65
End: 2025-06-10

## 2025-06-10 DIAGNOSIS — Z79.4 TYPE 2 DIABETES MELLITUS WITH COMPLICATION, WITH LONG-TERM CURRENT USE OF INSULIN (HCC): ICD-10-CM

## 2025-06-10 DIAGNOSIS — Z79.4 TYPE 2 DIABETES MELLITUS WITHOUT COMPLICATION, WITH LONG-TERM CURRENT USE OF INSULIN (HCC): ICD-10-CM

## 2025-06-10 DIAGNOSIS — E11.8 TYPE 2 DIABETES MELLITUS WITH COMPLICATION, WITH LONG-TERM CURRENT USE OF INSULIN (HCC): ICD-10-CM

## 2025-06-10 DIAGNOSIS — E11.9 TYPE 2 DIABETES MELLITUS WITHOUT COMPLICATION, WITH LONG-TERM CURRENT USE OF INSULIN (HCC): ICD-10-CM

## 2025-06-10 NOTE — TELEPHONE ENCOUNTER
PA for dexcom G7 sensors SUBMITTED to [employer]    via    [x]CMM-KEY: [id number]  []Surescripts-Case ID #   []Availity-Auth ID # NDC #   []Faxed to plan   []Other website   []Phone call Case ID #     [x]PA sent as URGENT    All office notes, labs and other pertaining documents and studies sent. Clinical questions answered. Awaiting determination from insurance company.     Turnaround time for your insurance to make a decision on your Prior Authorization can take 7-21 business days.

## 2025-06-10 NOTE — TELEPHONE ENCOUNTER
Patients  called stating she has a new insurance card with new numbers and he was told by the pharmacy that the Dexcom G7 sensors require a prior authorization  He reports she is still covered under [facility]  ID # [id number] RX PCN [id number] RX BIN [id number]

## 2025-06-10 NOTE — TELEPHONE ENCOUNTER
PA for DEXCOM G7 SENSOR DENIED    Reason:(Screenshot if applicable)        Message sent to office clinical pool Yes    Denial letter scanned into Media Yes    We can gladly do an appeal but the process can take about 30-60 days to provide determination. Please have the office staff schedule a Peer to Peer at phone [phone] . If an appeal is truly warranted please have Provider send clinical documentation to the PA department to support the appeal.     **Please follow up with your patient regarding denial and next steps**

## 2025-06-11 DIAGNOSIS — E11.65 UNCONTROLLED TYPE 2 DIABETES MELLITUS WITH HYPERGLYCEMIA (HCC): ICD-10-CM

## 2025-06-11 DIAGNOSIS — E11.9 TYPE 2 DIABETES MELLITUS WITHOUT COMPLICATION, WITH LONG-TERM CURRENT USE OF INSULIN (HCC): Chronic | ICD-10-CM

## 2025-06-11 DIAGNOSIS — Z79.4 TYPE 2 DIABETES MELLITUS WITHOUT COMPLICATION, WITH LONG-TERM CURRENT USE OF INSULIN (HCC): Chronic | ICD-10-CM

## 2025-06-11 RX ORDER — INSULIN GLARGINE 100 [IU]/ML
50 INJECTION, SOLUTION SUBCUTANEOUS
Qty: 45 ML | Refills: 1 | Status: SHIPPED | OUTPATIENT
Start: 2025-06-11

## 2025-06-11 RX ORDER — INSULIN LISPRO 100 [IU]/ML
INJECTION, SOLUTION INTRAVENOUS; SUBCUTANEOUS
Qty: 15 ML | Refills: 1 | Status: SHIPPED | OUTPATIENT
Start: 2025-06-11 | End: 2025-06-18

## 2025-06-11 RX ORDER — BLOOD SUGAR DIAGNOSTIC
STRIP MISCELLANEOUS
Qty: 200 STRIP | Refills: 1 | Status: SHIPPED | OUTPATIENT
Start: 2025-06-11

## 2025-06-11 RX ORDER — LANCETS
EACH MISCELLANEOUS
Qty: 100 EACH | Refills: 0 | Status: SHIPPED | OUTPATIENT
Start: 2025-06-11

## 2025-06-11 NOTE — TELEPHONE ENCOUNTER
Patients  called back, informed him the PA was denied. He is asking if the supplies for her OneTouch glucometer can be sent to the [facility] in [place]. Please include test strips and lancets.  Both are on med list but previously ordered by PCP.  Please advise

## 2025-06-11 NOTE — TELEPHONE ENCOUNTER
Patient asking for medications to be sent to [facility] in [place]      Ozempic 1 mg    Jardiance 25 mg    Humalog     Lantus

## 2025-06-18 ENCOUNTER — TELEPHONE (OUTPATIENT)
Age: 65
End: 2025-06-18

## 2025-06-18 DIAGNOSIS — E11.9 TYPE 2 DIABETES MELLITUS WITHOUT COMPLICATION, WITH LONG-TERM CURRENT USE OF INSULIN (HCC): Chronic | ICD-10-CM

## 2025-06-18 DIAGNOSIS — Z79.4 TYPE 2 DIABETES MELLITUS WITHOUT COMPLICATION, WITH LONG-TERM CURRENT USE OF INSULIN (HCC): Chronic | ICD-10-CM

## 2025-06-18 RX ORDER — INSULIN LISPRO 100 [IU]/ML
INJECTION, SOLUTION INTRAVENOUS; SUBCUTANEOUS
Qty: 27 ML | Refills: 1 | Status: SHIPPED | OUTPATIENT
Start: 2025-06-18

## 2025-06-18 NOTE — TELEPHONE ENCOUNTER
Riccardo calling from [facility] pharmacy stating the Humalog script needs to have a max daily dose.  Please adjust script and send back to [facility] in [place].

## 2025-08-20 ENCOUNTER — OFFICE VISIT (OUTPATIENT)
Dept: CARDIOLOGY CLINIC | Facility: CLINIC | Age: 65
End: 2025-08-20

## 2025-08-20 VITALS
WEIGHT: 156 LBS | HEIGHT: 62 IN | BODY MASS INDEX: 28.71 KG/M2 | SYSTOLIC BLOOD PRESSURE: 128 MMHG | DIASTOLIC BLOOD PRESSURE: 86 MMHG | HEART RATE: 76 BPM

## 2025-08-20 DIAGNOSIS — I25.10 CORONARY ARTERY DISEASE INVOLVING NATIVE CORONARY ARTERY OF NATIVE HEART WITHOUT ANGINA PECTORIS: Primary | ICD-10-CM

## 2025-08-20 DIAGNOSIS — E78.2 MIXED HYPERLIPIDEMIA: ICD-10-CM

## 2025-08-20 DIAGNOSIS — I10 ESSENTIAL HYPERTENSION: ICD-10-CM

## (undated) DEVICE — RUNTHROUGH NS EXTRA FLOPPY PTCA GUIDEWIRE: Brand: RUNTHROUGH

## (undated) DEVICE — THE VASC BAND HEMOSTAT IS A COMPRESSION DEVICE TO ASSIST HEMOSTASIS OF ARTERIAL, VENOUS AND HEMODIALYSIS PERCUTANEOUS ACCESS SITES.: Brand: VASC BAND™ HEMOSTAT

## (undated) DEVICE — Device: Brand: ASAHI SILVERWAY

## (undated) DEVICE — DGW .035 FC J3MM 260CM TEF: Brand: EMERALD

## (undated) DEVICE — MINI TREK CORONARY DILATATION CATHETER 2.0 MM X 15 MM / RAPID-EXCHANGE: Brand: MINI TREK

## (undated) DEVICE — RADIFOCUS OPTITORQUE ANGIOGRAPHIC CATHETER: Brand: OPTITORQUE

## (undated) DEVICE — CATH IVUS EAGLE EYE PLATINUM 5FR 150CM

## (undated) DEVICE — TREK CORONARY DILATATION CATHETER 2.50 MM X 15 MM / RAPID-EXCHANGE: Brand: TREK

## (undated) DEVICE — CATH GUIDE LAUNCHER 6FR EBU 3.5

## (undated) DEVICE — GLIDESHEATH BASIC HYDROPHILIC COATED INTRODUCER SHEATH: Brand: GLIDESHEATH